# Patient Record
Sex: FEMALE | Race: BLACK OR AFRICAN AMERICAN | Employment: OTHER | URBAN - METROPOLITAN AREA
[De-identification: names, ages, dates, MRNs, and addresses within clinical notes are randomized per-mention and may not be internally consistent; named-entity substitution may affect disease eponyms.]

---

## 2017-02-15 ENCOUNTER — HOSPITAL ENCOUNTER (OUTPATIENT)
Dept: GENERAL RADIOLOGY | Age: 82
Discharge: HOME | End: 2017-02-15
Payer: MEDICARE

## 2017-02-15 DIAGNOSIS — M25.511 RIGHT SHOULDER PAIN: ICD-10-CM

## 2017-02-15 PROCEDURE — 73030 X-RAY EXAM OF SHOULDER: CPT

## 2017-06-20 ENCOUNTER — HOSPITAL ENCOUNTER (OUTPATIENT)
Age: 82
Discharge: HOME | End: 2017-06-20
Attending: ORTHOPAEDIC SURGERY
Payer: MEDICARE

## 2017-06-20 DIAGNOSIS — M25.511 RIGHT SHOULDER PAIN: ICD-10-CM

## 2017-06-20 PROCEDURE — 73221 MRI JOINT UPR EXTREM W/O DYE: CPT

## 2018-05-30 ENCOUNTER — ANESTHESIA EVENT (OUTPATIENT)
Dept: ENDOSCOPY | Age: 83
End: 2018-05-30
Payer: MEDICARE

## 2018-05-31 ENCOUNTER — ANESTHESIA (OUTPATIENT)
Dept: ENDOSCOPY | Age: 83
End: 2018-05-31
Payer: MEDICARE

## 2018-05-31 ENCOUNTER — HOSPITAL ENCOUNTER (OUTPATIENT)
Age: 83
Setting detail: OUTPATIENT SURGERY
Discharge: HOME | End: 2018-05-31
Attending: INTERNAL MEDICINE | Admitting: INTERNAL MEDICINE
Payer: MEDICARE

## 2018-05-31 VITALS
HEIGHT: 60 IN | HEART RATE: 54 BPM | DIASTOLIC BLOOD PRESSURE: 62 MMHG | BODY MASS INDEX: 30.55 KG/M2 | SYSTOLIC BLOOD PRESSURE: 147 MMHG | RESPIRATION RATE: 16 BRPM | TEMPERATURE: 97.6 F | WEIGHT: 155.6 LBS | OXYGEN SATURATION: 95 %

## 2018-05-31 LAB
ATRIAL RATE: 62 BPM
BUN BLD-MCNC: 45 MG/DL (ref 7–18)
CALCULATED P AXIS, ECG09: 42 DEGREES
CALCULATED R AXIS, ECG10: 15 DEGREES
CALCULATED T AXIS, ECG11: 43 DEGREES
CHLORIDE BLD-SCNC: 100 MMOL/L (ref 100–108)
DIAGNOSIS, 93000: NORMAL
GLUCOSE BLD STRIP.AUTO-MCNC: 93 MG/DL (ref 74–106)
HCT VFR BLD CALC: 35 % (ref 36–49)
HGB BLD-MCNC: 11.9 G/DL (ref 12–16)
P-R INTERVAL, ECG05: 150 MS
POTASSIUM BLD-SCNC: 4 MMOL/L (ref 3.5–5.5)
Q-T INTERVAL, ECG07: 420 MS
QRS DURATION, ECG06: 86 MS
QTC CALCULATION (BEZET), ECG08: 426 MS
SODIUM BLD-SCNC: 139 MMOL/L (ref 136–145)
VENTRICULAR RATE, ECG03: 62 BPM

## 2018-05-31 PROCEDURE — 93005 ELECTROCARDIOGRAM TRACING: CPT

## 2018-05-31 PROCEDURE — 77030038604 HC SNR ENDO EXACTO USEN -B: Performed by: INTERNAL MEDICINE

## 2018-05-31 PROCEDURE — 76040000019: Performed by: INTERNAL MEDICINE

## 2018-05-31 PROCEDURE — 76060000031 HC ANESTHESIA FIRST 0.5 HR: Performed by: INTERNAL MEDICINE

## 2018-05-31 PROCEDURE — 88305 TISSUE EXAM BY PATHOLOGIST: CPT | Performed by: INTERNAL MEDICINE

## 2018-05-31 PROCEDURE — 77030013992 HC SNR POLYP ENDOSC BSC -B: Performed by: INTERNAL MEDICINE

## 2018-05-31 PROCEDURE — 77030008565 HC TBNG SUC IRR ERBE -B: Performed by: INTERNAL MEDICINE

## 2018-05-31 PROCEDURE — 77030018846 HC SOL IRR STRL H20 ICUM -A: Performed by: INTERNAL MEDICINE

## 2018-05-31 PROCEDURE — 77030009426 HC FCPS BIOP ENDOSC BSC -B: Performed by: INTERNAL MEDICINE

## 2018-05-31 PROCEDURE — 82947 ASSAY GLUCOSE BLOOD QUANT: CPT

## 2018-05-31 PROCEDURE — 74011250636 HC RX REV CODE- 250/636: Performed by: NURSE ANESTHETIST, CERTIFIED REGISTERED

## 2018-05-31 PROCEDURE — 74011000250 HC RX REV CODE- 250: Performed by: NURSE ANESTHETIST, CERTIFIED REGISTERED

## 2018-05-31 PROCEDURE — 74011250636 HC RX REV CODE- 250/636

## 2018-05-31 RX ORDER — ONDANSETRON 2 MG/ML
4 INJECTION INTRAMUSCULAR; INTRAVENOUS ONCE
Status: DISCONTINUED | OUTPATIENT
Start: 2018-05-31 | End: 2018-05-31 | Stop reason: HOSPADM

## 2018-05-31 RX ORDER — FENTANYL CITRATE 50 UG/ML
50 INJECTION, SOLUTION INTRAMUSCULAR; INTRAVENOUS AS NEEDED
Status: DISCONTINUED | OUTPATIENT
Start: 2018-05-31 | End: 2018-05-31 | Stop reason: HOSPADM

## 2018-05-31 RX ORDER — DEXTROSE 50 % IN WATER (D50W) INTRAVENOUS SYRINGE
25-50 AS NEEDED
Status: DISCONTINUED | OUTPATIENT
Start: 2018-05-31 | End: 2018-05-31 | Stop reason: HOSPADM

## 2018-05-31 RX ORDER — MAGNESIUM SULFATE 100 %
4 CRYSTALS MISCELLANEOUS AS NEEDED
Status: DISCONTINUED | OUTPATIENT
Start: 2018-05-31 | End: 2018-05-31 | Stop reason: HOSPADM

## 2018-05-31 RX ORDER — SODIUM CHLORIDE 9 MG/ML
25 INJECTION, SOLUTION INTRAVENOUS CONTINUOUS
Status: DISCONTINUED | OUTPATIENT
Start: 2018-05-31 | End: 2018-05-31 | Stop reason: HOSPADM

## 2018-05-31 RX ORDER — PROPOFOL 10 MG/ML
INJECTION, EMULSION INTRAVENOUS AS NEEDED
Status: DISCONTINUED | OUTPATIENT
Start: 2018-05-31 | End: 2018-05-31 | Stop reason: HOSPADM

## 2018-05-31 RX ORDER — SODIUM CHLORIDE 0.9 % (FLUSH) 0.9 %
5-10 SYRINGE (ML) INJECTION AS NEEDED
Status: DISCONTINUED | OUTPATIENT
Start: 2018-05-31 | End: 2018-05-31 | Stop reason: HOSPADM

## 2018-05-31 RX ADMIN — SODIUM CHLORIDE 25 ML/HR: 900 INJECTION, SOLUTION INTRAVENOUS at 09:59

## 2018-05-31 RX ADMIN — PROPOFOL 30 MG: 10 INJECTION, EMULSION INTRAVENOUS at 11:07

## 2018-05-31 RX ADMIN — PROPOFOL 60 MG: 10 INJECTION, EMULSION INTRAVENOUS at 11:03

## 2018-05-31 RX ADMIN — SODIUM CHLORIDE 20 MG: 9 INJECTION INTRAMUSCULAR; INTRAVENOUS; SUBCUTANEOUS at 09:59

## 2018-05-31 NOTE — PROGRESS NOTES
[email or web address]  [address] [facility], [address]      Brief Procedure Note    Anna Dyson  1935  [id number]    Date of Procedure: 5/31/2018    Preoperative diagnosis: Abnormal feces [R19.5]    Postoperative diagnosis: mild rectal prolapse, ascending polyp, diverticula, hemorrhoids      Type of Anesthesia: MAC (Monitored anesthesia care)    Description of findings: same as post op dx    Procedure: Procedure(s):  COLONOSCOPY w polypectomy    :  Dr. Erin Cortez MD    Assistant(s): Endoscopy Technician-1: Flores Jin  Endoscopy RN-1: Susana Garcia RN; Ruben Bazzi RN    EBL:None    Specimens:   ID Type Source Tests Collected by Time Destination   1 : ascending polyp Preservative Colon  Erin Cortez MD 5/31/2018 1108 Pathology       Findings: See printed and scanned procedure note    Complications: None    Dr. Erin Cortez MD  5/31/2018  11:15 AM

## 2018-05-31 NOTE — DISCHARGE INSTRUCTIONS
Colonoscopy: What to Expect at [address] [place] [address]  After you have a colonoscopy, you will stay at the clinic for 1 to 2 hours until the medicines wear off. Then you can go home. But you will need to arrange for a ride. Your doctor will tell you when you can eat and do your other usual activities. Your doctor will talk to you about when you will need your next colonoscopy. Your doctor can help you decide how often you need to be checked. This will depend on the results of your test and your risk for colorectal cancer. After the test, you may be bloated or have gas pains. You may need to pass gas. If a biopsy was done or a polyp was removed, you may have streaks of blood in your stool (feces) for a few days. This care sheet gives you a general idea about how long it will take for you to recover. But each person recovers at a different pace. Follow the steps below to get better as quickly as possible. How can you care for yourself at home? Activity  ? · Rest when you feel tired. ? · You can do your normal activities when it feels okay to do so. Diet  ? · Follow your doctor's directions for eating. ? · Unless your doctor has told you not to, drink plenty of fluids. This helps to replace the fluids that were lost during the colon prep. ? · Do not drink alcohol. Medicines  ? · Your doctor will tell you if and when you can restart your medicines. He or she will also give you instructions about taking any new medicines. ? · If you take blood thinners, such as warfarin (Coumadin), clopidogrel (Plavix), or aspirin, be sure to talk to your doctor. He or she will tell you if and when to start taking those medicines again. Make sure that you understand exactly what your doctor wants you to do. ? · If polyps were removed or a biopsy was done during the test, your doctor may tell you not to take aspirin or other anti-inflammatory medicines for a few days.  These include ibuprofen (Advil, Motrin) and naproxen (Aleve). Other instructions  ? · For your safety, do not drive or operate machinery until the medicine wears off and you can think clearly. Your doctor may tell you not to drive or operate machinery until the day after your test.   ? · Do not sign legal documents or make major decisions until the medicine wears off and you can think clearly. The anesthesia can make it hard for you to fully understand what you are agreeing to. Follow-up care is a key part of your treatment and safety. Be sure to make and go to all appointments, and call your doctor if you are having problems. It's also a good idea to know your test results and keep a list of the medicines you take. When should you call for help? Call 911 anytime you think you may need emergency care. For example, call if:  ? · You passed out (lost consciousness). ? · You pass maroon or bloody stools. ? · You have trouble breathing. ?Call your doctor now or seek immediate medical care if:  ? · You have pain that does not get better after you take pain medicine. ? · You are sick to your stomach or cannot drink fluids. ? · You have new or worse belly pain. ? · You have blood in your stools. ? · You have a fever. ? · You cannot pass stools or gas. ? Watch closely for changes in your health, and be sure to contact your doctor if you have any problems. Where can you learn more? Go to http://elmo-eri.info/. Enter E264 in the search box to learn more about \"Colonoscopy: What to Expect at Home. \"  Current as of: May 12, 2017  Content Version: 11.4  © 5793-1592 [employer]. Care instructions adapted under license by [employer] (which disclaims liability or warranty for this information). If you have questions about a medical condition or this instruction, always ask your healthcare professional. [address] any warranty or liability for your use of this information. Colon Polyps: Care Instructions  Your Care Instructions    Colon polyps are growths in the colon or the rectum. The cause of most colon polyps is not known, and most people who get them do not have any problems. But a certain kind can turn into cancer. For this reason, regular testing for colon polyps is important for people age 48 and older and anyone who has an increased risk for colon cancer. Polyps are usually found through routine colon cancer screening tests. Although most colon polyps are not cancerous, they are usually removed and then tested for cancer. Screening for colon cancer saves lives because the cancer can usually be cured if it is caught early. If you have a polyp that is the type that can turn into cancer, you may need more tests to examine your entire colon. The doctor will remove any other polyps that he or she finds, and you will be tested more often. Follow-up care is a key part of your treatment and safety. Be sure to make and go to all appointments, and call your doctor if you are having problems. It's also a good idea to know your test results and keep a list of the medicines you take. How can you care for yourself at home? Regular exams to look for colon polyps are the best way to prevent polyps from turning into colon cancer. These can include stool tests, sigmoidoscopy, colonoscopy, and CT colonography. Talk with your doctor about a testing schedule that is right for you. To prevent polyps  There is no home treatment that can prevent colon polyps. But these steps may help lower your risk for cancer. · Stay active. Being active can help you get to and stay at a healthy weight. Try to exercise on most days of the week. Walking is a good choice. · Eat well. Choose a variety of vegetables, fruits, legumes (such as peas and beans), fish, poultry, and whole grains. · Do not smoke. If you need help quitting, talk to your doctor about stop-smoking programs and medicines.  These can increase your chances of quitting for good. · If you drink alcohol, limit how much you drink. Limit alcohol to 2 drinks a day for men and 1 drink a day for women. When should you call for help? Call your doctor now or seek immediate medical care if:  ? · You have severe belly pain. ? · Your stools are maroon or very bloody. ? Watch closely for changes in your health, and be sure to contact your doctor if:  ? · You have a fever. ? · You have nausea or vomiting. ? · You have a change in bowel habits (new constipation or diarrhea). ? · Your symptoms get worse or are not improving as expected. Where can you learn more? Go to http://elmo-eri.info/. Enter 95 [id number] in the search box to learn more about \"Colon Polyps: Care Instructions. \"  Current as of: May 12, 2017  Content Version: 11.4  © 3226-6185 [employer]. Care instructions adapted under license by [employer] (which disclaims liability or warranty for this information). If you have questions about a medical condition or this instruction, always ask your healthcare professional. [place] [address] any warranty or liability for your use of this information. Learning About Diverticulosis and Diverticulitis  What are diverticulosis and diverticulitis? In diverticulosis and diverticulitis, pouches called diverticula form in the wall of the large intestine, or colon. · In diverticulosis, the pouches do not cause any pain or other symptoms. · In diverticulitis, the pouches get inflamed or infected and cause symptoms. Doctors aren't sure what causes these pouches in the colon. But they think that a low-fiber diet may play a role. Without fiber to add bulk to the stool, the colon has to work harder than normal to push the stool forward. The pressure from this may cause pouches to form in weak spots along the colon. Some people with diverticulosis get diverticulitis.  But experts don't know why this happens. What are the symptoms? · In diverticulosis, most people don't have symptoms. But pouches sometimes bleed. · In diverticulitis, symptoms may last from a few hours to a week or more. They include:  ¨ Belly pain. This is usually in the lower left side. It is sometimes worse when you move. This is the most common symptom. ¨ Fever and chills. ¨ Bloating and gas. ¨ Diarrhea or constipation. ¨ Nausea and sometimes vomiting. ¨ Not feeling like eating. How can you prevent these problems? You may be able to lower your chance of getting diverticulitis. You can do this by taking steps to prevent constipation. · Eat fruits, vegetables, beans, and whole grains every day. These foods are high in fiber. · Drink plenty of fluids (enough so that your urine is light yellow or clear like water). If you have kidney, heart, or liver disease and have to limit fluids, talk with your doctor before you increase the amount of fluids you drink. · Get at least 30 minutes of exercise on most days of the week. Walking is a good choice. You also may want to do other activities, such as running, swimming, cycling, or playing tennis or team sports. · Take a fiber supplement, such as Citrucel or Metamucil, every day if needed. Read and follow all instructions on the label. · Schedule time each day for a bowel movement. Having a daily routine may help. Take your time and do not strain when having a bowel movement. Some people avoid nuts, seeds, berries, and popcorn. They believe that these foods might get trapped in the diverticula and cause pain. But there is no proof that these foods cause diverticulitis or make it worse. How are these problems treated? · The best way to treat diverticulosis is to avoid constipation. (See the tips above.)  · Treatment for diverticulitis includes antibiotics and often a change in your diet.  You may need only liquids at first. Your doctor may suggest pain medicines for pain or belly cramps. In some cases, surgery may be needed. Follow-up care is a key part of your treatment and safety. Be sure to make and go to all appointments, and call your doctor if you are having problems. It's also a good idea to know your test results and keep a list of the medicines you take. Where can you learn more? Go to http://elmo-eri.info/. Enter V175 in the search box to learn more about \"Learning About Diverticulosis and Diverticulitis. \"  Current as of: May 12, 2017  Content Version: 11.4  © 1617-0350 [employer]. Care instructions adapted under license by [employer] (which disclaims liability or warranty for this information). If you have questions about a medical condition or this instruction, always ask your healthcare professional. [address] any warranty or liability for your use of this information. Hemorrhoids: Care Instructions  Your Care Instructions    Hemorrhoids are enlarged veins that develop in the anal canal. Bleeding during bowel movements, itching, swelling, and rectal pain are the most common symptoms. They can be uncomfortable at times, but hemorrhoids rarely are a serious problem. You can treat most hemorrhoids with simple changes to your diet and bowel habits. These changes include eating more fiber and not straining to pass stools. Most hemorrhoids do not need surgery or other treatment unless they are very large and painful or bleed a lot. Follow-up care is a key part of your treatment and safety. Be sure to make and go to all appointments, and call your doctor if you are having problems. It's also a good idea to know your test results and keep a list of the medicines you take. How can you care for yourself at home? · Sit in a few inches of warm water (sitz bath) 3 times a day and after bowel movements. The warm water helps with pain and itching.   · Put ice on your anal area several times a day for 10 minutes at a time. Put a thin cloth between the ice and your skin. Follow this by placing a warm, wet towel on the area for another 10 to 20 minutes. · Take pain medicines exactly as directed. ¨ If the doctor gave you a prescription medicine for pain, take it as prescribed. ¨ If you are not taking a prescription pain medicine, ask your doctor if you can take an over-the-counter medicine. · Keep the anal area clean, but be gentle. Use water and a fragrance-free soap, such as [place], or use baby wipes or medicated pads, such as Tucks. · Wear cotton underwear and loose clothing to decrease moisture in the anal area. · Eat more fiber. Include foods such as whole-grain breads and cereals, raw vegetables, raw and dried fruits, and beans. · Drink plenty of fluids, enough so that your urine is light yellow or clear like water. If you have kidney, heart, or liver disease and have to limit fluids, talk with your doctor before you increase the amount of fluids you drink. · Use a stool softener that contains bran or psyllium. You can save money by buying bran or psyllium (available in bulk at most health food stores) and sprinkling it on foods or stirring it into fruit juice. Or you can use a product such as Metamucil or Hydrocil. · Practice healthy bowel habits. ¨ Go to the bathroom as soon as you have the urge. ¨ Avoid straining to pass stools. Relax and give yourself time to let things happen naturally. ¨ Do not hold your breath while passing stools. ¨ Do not read while sitting on the toilet. Get off the toilet as soon as you have finished. · Take your medicines exactly as prescribed. Call your doctor if you think you are having a problem with your medicine. When should you call for help? Call 911 anytime you think you may need emergency care. For example, call if:  ? · You pass maroon or very bloody stools. ?Call your doctor now or seek immediate medical care if:  ? · You have increased pain.    ? · You have increased bleeding. ? Watch closely for changes in your health, and be sure to contact your doctor if:  ? · Your symptoms have not improved after 3 or 4 days. Where can you learn more? Go to http://elmo-eri.info/. Enter F228 in the search box to learn more about \"Hemorrhoids: Care Instructions. \"  Current as of: May 12, 2017  Content Version: 11.4  © 2453-4623 [employer]. Care instructions adapted under license by [employer] (which disclaims liability or warranty for this information). If you have questions about a medical condition or this instruction, always ask your healthcare professional. [place] [address] any warranty or liability for your use of this information. DISCHARGE SUMMARY from Nurse    PATIENT INSTRUCTIONS:    After general anesthesia or intravenous sedation, for 24 hours or while taking prescription Narcotics:  · Limit your activities  · Do not drive and operate hazardous machinery  · Do not make important personal or business decisions  · Do  not drink alcoholic beverages  · If you have not urinated within 8 hours after discharge, please contact your surgeon on call. Report the following to your surgeon:  · Excessive pain, swelling, redness or odor of or around the surgical area  · Temperature over 100.5  · Nausea and vomiting lasting longer than 4 hours or if unable to take medications  · Any signs of decreased circulation or nerve impairment to extremity: change in color, persistent  numbness, tingling, coldness or increase pain  · Any questions    What to do at Home:  Recommended activity: Activity as tolerated and no driving for today      These are general instructions for a healthy lifestyle:    No smoking/ No tobacco products/ Avoid exposure to second hand smoke  Surgeon General's Warning:  Quitting smoking now greatly reduces serious risk to your health.     Obesity, smoking, and sedentary lifestyle greatly increases your risk for illness    A healthy diet, regular physical exercise & weight monitoring are important for maintaining a healthy lifestyle    You may be retaining fluid if you have a history of heart failure or if you experience any of the following symptoms:  Weight gain of 3 pounds or more overnight or 5 pounds in a week, increased swelling in our hands or feet or shortness of breath while lying flat in bed. Please call your doctor as soon as you notice any of these symptoms; do not wait until your next office visit. Recognize signs and symptoms of STROKE:    F-face looks uneven    A-arms unable to move or move unevenly    S-speech slurred or non-existent    T-time-call 911 as soon as signs and symptoms begin-DO NOT go       Back to bed or wait to see if you get better-TIME IS BRAIN. Warning Signs of HEART ATTACK     Call 911 if you have these symptoms:   Chest discomfort. Most heart attacks involve discomfort in the center of the chest that lasts more than a few minutes, or that goes away and comes back. It can feel like uncomfortable pressure, squeezing, fullness, or pain.  Discomfort in other areas of the upper body. Symptoms can include pain or discomfort in one or both arms, the back, neck, jaw, or stomach.  Shortness of breath with or without chest discomfort.  Other signs may include breaking out in a cold sweat, nausea, or lightheadedness. Don't wait more than five minutes to call 911 - MINUTES MATTER! Fast action can save your life. Calling 911 is almost always the fastest way to get lifesaving treatment. Emergency Medical Services staff can begin treatment when they arrive -- up to an hour sooner than if someone gets to the hospital by car. The discharge information has been reviewed with the patient. The patient verbalized understanding.   Discharge medications reviewed with the patient and appropriate educational materials and side effects teaching were provided.   ___________________________________________________________________________________________________________________________________

## 2018-05-31 NOTE — H&P
[email or web address]  [phone]    GASTROENTEROLOGY Pre-Procedure H and P      Impression/Plan:   1. This patient is consented for a colonoscopy for positive occult blood in stool    Chief Complaint: positive occult blood in stool      HPI:  Pamella Polk is a 81 y.o. female who is being is having a colonoscopy for positive occult blood in stool  PMH:   Past Medical History:   Diagnosis Date    Bell's palsy     Essential hypertension, benign     CONTROLLED    Mitral valve disorders(424.0)     2007 TRACE MR     Nonspecific abnormal electrocardiogram (ECG) (EKG)     Obesity, unspecified     Other and unspecified hyperlipidemia     Renal insufficiency        PSH:   Past Surgical History:   Procedure Laterality Date    ABDOMEN SURGERY PROC UNLISTED      Explor lap    BIOPSY OF BREAST, INCISIONAL      rt & lt 12 yrs ago    HX CATARACT REMOVAL      INSERT PROSTHETIC LENS: LEFT,RIGHT    HX GYN      HX ORTHOPAEDIC Left     Rotator cuff repair    HX PARTIAL THYROIDECTOMY         Social HX:   Social History     Social History    Marital status:      Spouse name: N/A    Number of children: N/A    Years of education: N/A     Occupational History    Not on file. Social History Main Topics    Smoking status: Former Smoker    Smokeless tobacco: Never Used      Comment: REMOTELY QUIT 1980'S    Alcohol use No    Drug use: No    Sexual activity: Not on file     Other Topics Concern    Not on file     Social History Narrative       FHX:   History reviewed. No pertinent family history. Allergy:   Allergies   Allergen Reactions    Ciprofibrate Unable to Obtain    Darvon [Propoxyphene] Unable to Obtain    Penicillins Unable to Obtain       Home Medications:     Prescriptions Prior to Admission   Medication Sig    losartan (COZAAR) 50 mg tablet Take  by mouth daily.  omeprazole (PRILOSEC) 20 mg capsule Take 20 mg by mouth daily.     hydrochlorothiazide (HYDRODIURIL) 25 mg tablet Take 25 mg by mouth daily.  aspirin 81 mg tablet Take 81 mg by mouth.  LORazepam (ATIVAN) 1 mg tablet Take  by mouth two (2) times a day.  FOLIC ACID/VITAMIN B COMP W-C (VIKTOR-LUCERO PO) Take  by mouth.  paricalcitol (ZEMPLAR) 1 mcg capsule Take 1 mcg by mouth daily.  biotin 500 mcg cap Take  by mouth.  FLUTICASONE PROPIONATE, BULK, 50 mcg by Does Not Apply route. Review of Systems:     Constitutional: No fevers, chills, weight loss, fatigue. Skin: No rashes, pruritis, jaundice, ulcerations, erythema. HENT: No headaches, nosebleeds, sinus pressure, rhinorrhea, sore throat. Eyes: No visual changes, blurred vision, eye pain, photophobia, jaundice. Cardiovascular: No chest pain, heart palpitations. Respiratory: No cough, SOB, wheezing, chest discomfort, orthopnea. Gastrointestinal:    Genitourinary: No dysuria, bleeding, discharge, pyuria. Musculoskeletal: No weakness, arthralgias, wasting. Endo: No sweats. Heme: No bruising, easy bleeding. Allergies: As noted. Neurological: Cranial nerves intact. Alert and oriented. Gait not assessed. Psychiatric:  No anxiety, depression, hallucinations. Visit Vitals    /73    Pulse 69    Temp 97.7 °F (36.5 °C)    Resp 18    Ht 5' (1.524 m)    Wt 70.6 kg (155 lb 9.6 oz)    SpO2 98%    BMI 30.39 kg/m2       Physical Assessment:     constitutional: appearance: well developed, well nourished, normal habitus, no deformities, in no acute distress. skin: inspection: no rashes, ulcers, icterus or other lesions; no clubbing or telangiectasias. palpation: no induration or subcutaneos nodules. eyes: inspection: normal conjunctivae and lids; no jaundice pupils: normal  ENMT: mouth: normal oral mucosa,lips and gums; good dentition. oropharynx: normal tongue, hard and soft palate; posterior pharynx without erithema, exudate or lesions. neck: thyroid: normal size, consistency and position; no masses or tenderness.    respiratory: effort: normal chest excursion; no intercostal retraction or accessory muscle use. cardiovascular: abdominal aorta: normal size and position; no bruits. palpation: PMI of normal size and position; normal rhythm; no thrill or murmurs. abdominal: abdomen: normal consistency; no tenderness or masses. hernias: no hernias appreciated. liver: normal size and consistency. spleen: not palpable. rectal: hemoccult/guaiac: not performed. musculoskeletal: digits and nails: no clubbing, cyanosis, petechiae or other inflammatory conditions. gait: normal gait and station head and neck: normal range of motion; no pain, crepitation or contracture. spine/ribs/pelvis: normal range of motion; no pain, deformity or contracture. neurologic: cranial nerves: II-XII normal.   psychiatric: judgement/insight: within normal limits. memory: within normal limits for recent and remote events. mood and affect: no evidence of depression, anxiety or agitation. orientation: oriented to time, space and person. Basic Metabolic Profile   No results for input(s): NA, K, CL, CO2, BUN, GLU, CA, MG, PHOS in the last 72 hours. No lab exists for component: CREAT      CBC w/Diff    No results for input(s): WBC, RBC, HGB, HCT, MCV, MCH, MCHC, RDW, PLT, HGBEXT, HCTEXT, PLTEXT in the last 72 hours. No lab exists for component: MPV No results for input(s): GRANS, LYMPH, EOS, PRO, MYELO, METAS, BLAST in the last 72 hours. No lab exists for component: MONO, BASO     Hepatic Function   No results for input(s): ALB, TP, TBILI, GPT, SGOT, AP, AML, LPSE in the last 72 hours. No lab exists for component: DBILI     Coags   No results for input(s): PTP, INR, APTT in the last 72 hours. No lab exists for component: Merle Diallo MD.  [facility] 1946, [facility]  Cell: [phone]  Direct pager: [phone]  [email or web address]  [email or web address]. [employer]

## 2018-05-31 NOTE — ANESTHESIA PREPROCEDURE EVALUATION
Anesthetic History   No history of anesthetic complications            Review of Systems / Medical History  Patient summary reviewed and pertinent labs reviewed    Pulmonary  Within defined limits                 Neuro/Psych   Within defined limits           Cardiovascular    Hypertension: well controlled              Exercise tolerance: >4 METS     GI/Hepatic/Renal  Within defined limits              Endo/Other        Morbid obesity     Other Findings   Comments: Documentation of current medication  Current medications obtained, documented and obtained? YES      Risk Factors for Postoperative nausea/vomiting:       History of postoperative nausea/vomiting? NO       Female? YES       Motion sickness? NO       Intended opioid administration for postoperative analgesia? NO      Smoking Abstinence:  Current Smoker? NO  Elective Surgery? YES  Seen preoperatively by anesthesiologist or proxy prior to day of surgery? YES  Pt abstained from smoking 24 hours prior to anesthesia?  N/A    Preventive care/screening for High Blood Pressure:  Aged 18 years and older: YES  Screened for high blood pressure: YES  Patients with high blood pressure referred to primary care provider   for BP management: YES                 Physical Exam    Airway  Mallampati: III  TM Distance: 4 - 6 cm  Neck ROM: decreased range of motion   Mouth opening: Diminished (comment)     Cardiovascular    Rhythm: regular  Rate: normal         Dental    Dentition: Caps/crowns and Poor dentition     Pulmonary  Breath sounds clear to auscultation               Abdominal  GI exam deferred       Other Findings            Anesthetic Plan    ASA: 3  Anesthesia type: MAC            Anesthetic plan and risks discussed with: Patient

## 2018-05-31 NOTE — IP AVS SNAPSHOT
[facility] 
 
 
 [address] [facility] Patient: Moisés Rodriguez MRN: [id number] FTA:[id number] About your hospitalization You were admitted on:  May 31, 2018 You last received care in the:  [facility] PHASE 2 RECOVERY You were discharged on:  May 31, 2018 Why you were hospitalized Your primary diagnosis was:  Not on File Follow-up Information Follow up With Details Comments Contact Info Sudhir Vasquez MD   [address] [facility] [address] [phone] Marek Joe MD  Follow up as instructed [address] [place] [address] [address] 706 [facility] 
[phone] Discharge Orders None A check opal indicates which time of day the medication should be taken. My Medications CONTINUE taking these medications Instructions Each Dose to Equal  
 Morning Noon Evening Bedtime  
 aspirin 81 mg tablet Your last dose was: Your next dose is: Take 81 mg by mouth. 81 mg  
    
   
   
   
  
 biotin 500 mcg Cap Your last dose was: Your next dose is: Take  by mouth. COZAAR 50 mg tablet Generic drug:  losartan Your last dose was: Your next dose is: Take  by mouth daily. FLUTICASONE PROPIONATE (BULK) Your last dose was: Your next dose is:    
   
   
 50 mcg by Does Not Apply route. 50 mcg  
    
   
   
   
  
 hydroCHLOROthiazide 25 mg tablet Commonly known as:  HYDRODIURIL Your last dose was: Your next dose is: Take 25 mg by mouth daily. 25 mg LORazepam 1 mg tablet Commonly known as:  ATIVAN Your last dose was: Your next dose is: Take  by mouth two (2) times a day. omeprazole 20 mg capsule Commonly known as:  PRILOSEC Your last dose was: Your next dose is: Take 20 mg by mouth daily. 20 mg  
    
   
   
   
  
 VIKTOR-LUCERO PO Your last dose was: Your next dose is: Take  by mouth. ZEMPLAR 1 mcg capsule Generic drug:  paricalcitol Your last dose was: Your next dose is: Take 1 mcg by mouth daily. 1 mcg Discharge Instructions Colonoscopy: What to Expect at [facility] Your Recovery After you have a colonoscopy, you will stay at the clinic for 1 to 2 hours until the medicines wear off. Then you can go home. But you will need to arrange for a ride. Your doctor will tell you when you can eat and do your other usual activities. Your doctor will talk to you about when you will need your next colonoscopy. Your doctor can help you decide how often you need to be checked. This will depend on the results of your test and your risk for colorectal cancer. After the test, you may be bloated or have gas pains. You may need to pass gas. If a biopsy was done or a polyp was removed, you may have streaks of blood in your stool (feces) for a few days. This care sheet gives you a general idea about how long it will take for you to recover. But each person recovers at a different pace. Follow the steps below to get better as quickly as possible. How can you care for yourself at home? Activity ? · Rest when you feel tired. ? · You can do your normal activities when it feels okay to do so. Diet ? · Follow your doctor's directions for eating. ? · Unless your doctor has told you not to, drink plenty of fluids. This helps to replace the fluids that were lost during the colon prep. ? · Do not drink alcohol. Medicines ? · Your doctor will tell you if and when you can restart your medicines. He or she will also give you instructions about taking any new medicines. ? · If you take blood thinners, such as warfarin (Coumadin), clopidogrel (Plavix), or aspirin, be sure to talk to your doctor. He or she will tell you if and when to start taking those medicines again. Make sure that you understand exactly what your doctor wants you to do. ? · If polyps were removed or a biopsy was done during the test, your doctor may tell you not to take aspirin or other anti-inflammatory medicines for a few days. These include ibuprofen (Advil, Motrin) and naproxen (Aleve). Other instructions ? · For your safety, do not drive or operate machinery until the medicine wears off and you can think clearly. Your doctor may tell you not to drive or operate machinery until the day after your test.  
? · Do not sign legal documents or make major decisions until the medicine wears off and you can think clearly. The anesthesia can make it hard for you to fully understand what you are agreeing to. Follow-up care is a key part of your treatment and safety. Be sure to make and go to all appointments, and call your doctor if you are having problems. It's also a good idea to know your test results and keep a list of the medicines you take. When should you call for help? Call 911 anytime you think you may need emergency care. For example, call if: 
? · You passed out (lost consciousness). ? · You pass maroon or bloody stools. ? · You have trouble breathing. ?Call your doctor now or seek immediate medical care if: 
? · You have pain that does not get better after you take pain medicine. ? · You are sick to your stomach or cannot drink fluids. ? · You have new or worse belly pain. ? · You have blood in your stools. ? · You have a fever. ? · You cannot pass stools or gas. ? Watch closely for changes in your health, and be sure to contact your doctor if you have any problems. Where can you learn more? Go to http://elmo-eri.info/. Enter E264 in the search box to learn more about \"Colonoscopy: What to Expect at Home. \" Current as of: May 12, 2017 Content Version: 11.4 © 2808-1963 Neomobile. Care instructions adapted under license by Pebbles Interfaces (which disclaims liability or warranty for this information). If you have questions about a medical condition or this instruction, always ask your healthcare professional. [address] any warranty or liability for your use of this information. Colon Polyps: Care Instructions Your Care Instructions Colon polyps are growths in the colon or the rectum. The cause of most colon polyps is not known, and most people who get them do not have any problems. But a certain kind can turn into cancer. For this reason, regular testing for colon polyps is important for people age 48 and older and anyone who has an increased risk for colon cancer. Polyps are usually found through routine colon cancer screening tests. Although most colon polyps are not cancerous, they are usually removed and then tested for cancer. Screening for colon cancer saves lives because the cancer can usually be cured if it is caught early. If you have a polyp that is the type that can turn into cancer, you may need more tests to examine your entire colon. The doctor will remove any other polyps that he or she finds, and you will be tested more often. Follow-up care is a key part of your treatment and safety. Be sure to make and go to all appointments, and call your doctor if you are having problems. It's also a good idea to know your test results and keep a list of the medicines you take. How can you care for yourself at home? Regular exams to look for colon polyps are the best way to prevent polyps from turning into colon cancer. These can include stool tests, sigmoidoscopy, colonoscopy, and CT colonography. Talk with your doctor about a testing schedule that is right for you. To prevent polyps There is no home treatment that can prevent colon polyps. But these steps may help lower your risk for cancer. · Stay active. Being active can help you get to and stay at a healthy weight. Try to exercise on most days of the week. Walking is a good choice. · Eat well. Choose a variety of vegetables, fruits, legumes (such as peas and beans), fish, poultry, and whole grains. · Do not smoke. If you need help quitting, talk to your doctor about stop-smoking programs and medicines. These can increase your chances of quitting for good. · If you drink alcohol, limit how much you drink. Limit alcohol to 2 drinks a day for men and 1 drink a day for women. When should you call for help? Call your doctor now or seek immediate medical care if: 
? · You have severe belly pain. ? · Your stools are maroon or very bloody. ? Watch closely for changes in your health, and be sure to contact your doctor if: 
? · You have a fever. ? · You have nausea or vomiting. ? · You have a change in bowel habits (new constipation or diarrhea). ? · Your symptoms get worse or are not improving as expected. Where can you learn more? Go to http://elmo-eri.info/. Enter 95 [id number] in the search box to learn more about \"Colon Polyps: Care Instructions. \" Current as of: May 12, 2017 Content Version: 11.4 © 0388-1852 VidaPak. Care instructions adapted under license by betNOW (which disclaims liability or warranty for this information). If you have questions about a medical condition or this instruction, always ask your healthcare professional. [address] any warranty or liability for your use of this information. Learning About Diverticulosis and Diverticulitis What are diverticulosis and diverticulitis? In diverticulosis and diverticulitis, pouches called diverticula form in the wall of the large intestine, or colon. · In diverticulosis, the pouches do not cause any pain or other symptoms. · In diverticulitis, the pouches get inflamed or infected and cause symptoms. Doctors aren't sure what causes these pouches in the colon. But they think that a low-fiber diet may play a role. Without fiber to add bulk to the stool, the colon has to work harder than normal to push the stool forward. The pressure from this may cause pouches to form in weak spots along the colon. Some people with diverticulosis get diverticulitis. But experts don't know why this happens. What are the symptoms? · In diverticulosis, most people don't have symptoms. But pouches sometimes bleed. · In diverticulitis, symptoms may last from a few hours to a week or more. They include: ¨ Belly pain. This is usually in the lower left side. It is sometimes worse when you move. This is the most common symptom. ¨ Fever and chills. ¨ Bloating and gas. ¨ Diarrhea or constipation. ¨ Nausea and sometimes vomiting. ¨ Not feeling like eating. How can you prevent these problems? You may be able to lower your chance of getting diverticulitis. You can do this by taking steps to prevent constipation. · Eat fruits, vegetables, beans, and whole grains every day. These foods are high in fiber. · Drink plenty of fluids (enough so that your urine is light yellow or clear like water). If you have kidney, heart, or liver disease and have to limit fluids, talk with your doctor before you increase the amount of fluids you drink. · Get at least 30 minutes of exercise on most days of the week. Walking is a good choice. You also may want to do other activities, such as running, swimming, cycling, or playing tennis or team sports. · Take a fiber supplement, such as Citrucel or Metamucil, every day if needed. Read and follow all instructions on the label. · Schedule time each day for a bowel movement.  Having a daily routine may help. Take your time and do not strain when having a bowel movement. Some people avoid nuts, seeds, berries, and popcorn. They believe that these foods might get trapped in the diverticula and cause pain. But there is no proof that these foods cause diverticulitis or make it worse. How are these problems treated? · The best way to treat diverticulosis is to avoid constipation. (See the tips above.) · Treatment for diverticulitis includes antibiotics and often a change in your diet. You may need only liquids at first. Your doctor may suggest pain medicines for pain or belly cramps. In some cases, surgery may be needed. Follow-up care is a key part of your treatment and safety. Be sure to make and go to all appointments, and call your doctor if you are having problems. It's also a good idea to know your test results and keep a list of the medicines you take. Where can you learn more? Go to http://elmoPlantSenseeri.info/. Enter U718 in the search box to learn more about \"Learning About Diverticulosis and Diverticulitis. \" Current as of: May 12, 2017 Content Version: 11.4 © 5141-9534 [employer]D Eye Solutions. Care instructions adapted under license by PubNative (which disclaims liability or warranty for this information). If you have questions about a medical condition or this instruction, always ask your healthcare professional. [address] any warranty or liability for your use of this information. Hemorrhoids: Care Instructions Your Care Instructions Hemorrhoids are enlarged veins that develop in the anal canal. Bleeding during bowel movements, itching, swelling, and rectal pain are the most common symptoms. They can be uncomfortable at times, but hemorrhoids rarely are a serious problem. You can treat most hemorrhoids with simple changes to your diet and bowel habits.  These changes include eating more fiber and not straining to pass stools. Most hemorrhoids do not need surgery or other treatment unless they are very large and painful or bleed a lot. Follow-up care is a key part of your treatment and safety. Be sure to make and go to all appointments, and call your doctor if you are having problems. It's also a good idea to know your test results and keep a list of the medicines you take. How can you care for yourself at home? · Sit in a few inches of warm water (sitz bath) 3 times a day and after bowel movements. The warm water helps with pain and itching. · Put ice on your anal area several times a day for 10 minutes at a time. Put a thin cloth between the ice and your skin. Follow this by placing a warm, wet towel on the area for another 10 to 20 minutes. · Take pain medicines exactly as directed. ¨ If the doctor gave you a prescription medicine for pain, take it as prescribed. ¨ If you are not taking a prescription pain medicine, ask your doctor if you can take an over-the-counter medicine. · Keep the anal area clean, but be gentle. Use water and a fragrance-free soap, such as [place], or use baby wipes or medicated pads, such as Tucks. · Wear cotton underwear and loose clothing to decrease moisture in the anal area. · Eat more fiber. Include foods such as whole-grain breads and cereals, raw vegetables, raw and dried fruits, and beans. · Drink plenty of fluids, enough so that your urine is light yellow or clear like water. If you have kidney, heart, or liver disease and have to limit fluids, talk with your doctor before you increase the amount of fluids you drink. · Use a stool softener that contains bran or psyllium. You can save money by buying bran or psyllium (available in bulk at most health food stores) and sprinkling it on foods or stirring it into fruit juice. Or you can use a product such as Metamucil or Hydrocil. · Practice healthy bowel habits. ¨ Go to the bathroom as soon as you have the urge. ¨ Avoid straining to pass stools. Relax and give yourself time to let things happen naturally. ¨ Do not hold your breath while passing stools. ¨ Do not read while sitting on the toilet. Get off the toilet as soon as you have finished. · Take your medicines exactly as prescribed. Call your doctor if you think you are having a problem with your medicine. When should you call for help? Call 911 anytime you think you may need emergency care. For example, call if: 
? · You pass maroon or very bloody stools. ?Call your doctor now or seek immediate medical care if: 
? · You have increased pain. ? · You have increased bleeding. ? Watch closely for changes in your health, and be sure to contact your doctor if: 
? · Your symptoms have not improved after 3 or 4 days. Where can you learn more? Go to http://elmoWebVisibleeri.info/. Enter F228 in the search box to learn more about \"Hemorrhoids: Care Instructions. \" Current as of: May 12, 2017 Content Version: 11.4 © 9763-9143 Gemini Mobile Technologies. Care instructions adapted under license by [employer] (which disclaims liability or warranty for this information). If you have questions about a medical condition or this instruction, always ask your healthcare professional. [address] any warranty or liability for your use of this information. DISCHARGE SUMMARY from Nurse PATIENT INSTRUCTIONS: 
 
After general anesthesia or intravenous sedation, for 24 hours or while taking prescription Narcotics: · Limit your activities · Do not drive and operate hazardous machinery · Do not make important personal or business decisions · Do  not drink alcoholic beverages · If you have not urinated within 8 hours after discharge, please contact your surgeon on call. Report the following to your surgeon: 
· Excessive pain, swelling, redness or odor of or around the surgical area · Temperature over 100.5 · Nausea and vomiting lasting longer than 4 hours or if unable to take medications · Any signs of decreased circulation or nerve impairment to extremity: change in color, persistent  numbness, tingling, coldness or increase pain · Any questions What to do at Home: 
Recommended activity: Activity as tolerated and no driving for today These are general instructions for a healthy lifestyle: No smoking/ No tobacco products/ Avoid exposure to second hand smoke Surgeon General's Warning:  Quitting smoking now greatly reduces serious risk to your health. Obesity, smoking, and sedentary lifestyle greatly increases your risk for illness A healthy diet, regular physical exercise & weight monitoring are important for maintaining a healthy lifestyle You may be retaining fluid if you have a history of heart failure or if you experience any of the following symptoms:  Weight gain of 3 pounds or more overnight or 5 pounds in a week, increased swelling in our hands or feet or shortness of breath while lying flat in bed. Please call your doctor as soon as you notice any of these symptoms; do not wait until your next office visit. Recognize signs and symptoms of STROKE: 
 
F-face looks uneven A-arms unable to move or move unevenly S-speech slurred or non-existent T-time-call 911 as soon as signs and symptoms begin-DO NOT go Back to bed or wait to see if you get better-TIME IS BRAIN. Warning Signs of HEART ATTACK Call 911 if you have these symptoms: 
? Chest discomfort. Most heart attacks involve discomfort in the center of the chest that lasts more than a few minutes, or that goes away and comes back. It can feel like uncomfortable pressure, squeezing, fullness, or pain. ? Discomfort in other areas of the upper body. Symptoms can include pain or discomfort in one or both arms, the back, neck, jaw, or stomach. ? Shortness of breath with or without chest discomfort. ? Other signs may include breaking out in a cold sweat, nausea, or lightheadedness. Don't wait more than five minutes to call [address]! Fast action can save your life. Calling 911 is almost always the fastest way to get lifesaving treatment. Emergency Medical Services staff can begin treatment when they arrive  up to an hour sooner than if someone gets to the hospital by car. The discharge information has been reviewed with the patient. The patient verbalized understanding. Discharge medications reviewed with the patient and appropriate educational materials and side effects teaching were provided. ___________________________________________________________________________________________________________________________________ Introducing [facility] & HEALTH SERVICES! Escobar Claudio introduces Heart Health patient portal. Now you can access parts of your medical record, email your doctor's office, and request medication refills online. 1. In your internet browser, go to [email or web address] 2. Click on the First Time User? Click Here link in the Sign In box. You will see the New Member Sign Up page. 3. Enter your Heart Health Access Code exactly as it appears below. You will not need to use this code after youve completed the sign-up process. If you do not sign up before the expiration date, you must request a new code. · Heart Health Access Code: [email or web address] Expires: 8/28/2018 10:58 AM 
 
4. Enter the last four digits of your Social Security Number (xxxx) and Date of Birth (mm/dd/yyyy) as indicated and click Submit. You will be taken to the next sign-up page. 5. Create a Heart Health ID. This will be your Heart Health login ID and cannot be changed, so think of one that is secure and easy to remember. 6. Create a Heart Health password. You can change your password at any time. 7. Enter your Password Reset Question and Answer. This can be used at a later time if you forget your password. 8. Enter your e-mail address. You will receive e-mail notification when new information is available in [address] 9. Click Sign Up. You can now view and download portions of your medical record. 10. Click the Download Summary menu link to download a portable copy of your medical information. If you have questions, please visit the Frequently Asked Questions section of the SADAR [employer]Dhart website. Remember, [employer] is NOT to be used for urgent needs. For medical emergencies, dial 911. Now available from your iPhone and Android! Introducing Jose Scherer As a [email or web address] patient, I wanted to make you aware of our electronic visit tool called Jose Scherer. Polleverywhere/[employer] allows you to connect within minutes with a medical provider 24 hours a day, seven days a week via a mobile device or tablet or logging into a secure website from your computer. You can access Jose Scherer from anywhere in the [place]. A virtual visit might be right for you when you have a simple condition and feel like you just dont want to get out of bed, or cant get away from work for an appointment, when your regular [facility] [employer] [facility] provider is not available (evenings, weekends or holidays), or when youre out of town and need minor care. Electronic visits cost only $49 and if the Polleverywhere[employer] provider determines a prescription is needed to treat your condition, one can be electronically transmitted to a nearby pharmacy*. Please take a moment to enroll today if you have not already done so. The enrollment process is free and takes just a few minutes. To enroll, please download the Polleverywhere/[employer] cuong to your tablet or phone, or visit [email or web address] to enroll on your computer.    
And, as an [address] [place] [address] patient with a [employer] account, the results of your visits will be scanned into your electronic medical record and your primary care provider will be able to view the scanned results. We urge you to continue to see your regular [facility] provider for your ongoing medical care. And while your primary care provider may not be the one available when you seek a [employer]gavinofin virtual visit, the peace of mind you get from getting a real diagnosis real time can be priceless. For more information on [employer], view our Frequently Asked Questions (FAQs) at [email or web address]. Sincerely, 
 
Carlota Hummel MD 
Chief Medical Officer Marianne Perez *:  certain medications cannot be prescribed via [employer]vo Unresulted Labs-Please follow up with your PCP about these lab tests Order Current Status EKG, 12 LEAD, INITIAL Preliminary result Providers Seen During Your Hospitalization Provider Specialty Primary office phone Kayleen Groves MD Gastroenterology [phone] Your Primary Care Physician (PCP) Primary Care Physician Office Phone Office Fax [place], Matthew [facility] [phone] You are allergic to the following Allergen Reactions Ciprofibrate Unable to Obtain Darvon (Propoxyphene) Unable to Obtain Penicillins Unable to Obtain Recent Documentation Height Weight BMI OB Status Smoking Status 1.524 m 70.6 kg 30.39 kg/m2 Menopause Former Smoker Emergency Contacts Name Discharge Info Relation Home Work Mobile Clyde Sheridan DISCHARGE CAREGIVER [3] Spouse [3] [phone] Darby Sheridan DISCHARGE CAREGIVER [3] Daughter [21] [phone] Patient Belongings The following personal items are in your possession at time of discharge: 
  Dental Appliances: None  Visual Aid: None Please provide this summary of care documentation to your next provider. Signatures-by signing, you are acknowledging that this After Visit Summary has been reviewed with you and you have received a copy. Patient Signature:  ____________________________________________________________ Date:  ____________________________________________________________  
  
Megan Oka Provider Signature:  ____________________________________________________________ Date:  ____________________________________________________________

## 2018-05-31 NOTE — ANESTHESIA POSTPROCEDURE EVALUATION
Post-Anesthesia Evaluation and Assessment    Patient: Maliha Henderson MRN: [id number]  SSN: [id number]    YOB: 1935  Age: 81 y.o. Sex: female       Cardiovascular Function/Vital Signs  Visit Vitals    /63    Pulse (!) 56    Temp 36.5 °C (97.7 °F)    Resp 16    Ht 5' (1.524 m)    Wt 70.6 kg (155 lb 9.6 oz)    SpO2 97%    BMI 30.39 kg/m2       Patient is status post MAC anesthesia for Procedure(s):  COLONOSCOPY w polypectomy. Nausea/Vomiting: None    Postoperative hydration reviewed and adequate. Pain:  Pain Scale 1: Numeric (0 - 10) (05/31/18 1121)  Pain Intensity 1: 0 (05/31/18 1121)   Managed    Neurological Status: At baseline    Mental Status and Level of Consciousness: Arousable    Pulmonary Status:   O2 Device: Oxygen mask;Room air (05/31/18 1121)   Adequate oxygenation and airway patent    Complications related to anesthesia: None    Post-anesthesia assessment completed.  No concerns    Signed By: Millie Moon MD     May 31, 2018

## 2019-09-23 ENCOUNTER — HOSPITAL ENCOUNTER (OUTPATIENT)
Dept: MAMMOGRAPHY | Age: 84
Discharge: HOME | End: 2019-09-23
Attending: FAMILY MEDICINE
Payer: MEDICARE

## 2019-09-23 DIAGNOSIS — Z12.39 BREAST SCREENING, UNSPECIFIED: ICD-10-CM

## 2019-09-23 PROCEDURE — 77063 BREAST TOMOSYNTHESIS BI: CPT

## 2019-09-23 PROCEDURE — 77067 SCR MAMMO BI INCL CAD: CPT

## 2020-01-01 ENCOUNTER — APPOINTMENT (OUTPATIENT)
Dept: GENERAL RADIOLOGY | Age: 85
DRG: 480 | End: 2020-01-01
Attending: ORTHOPAEDIC SURGERY
Payer: MEDICARE

## 2020-01-01 ENCOUNTER — APPOINTMENT (OUTPATIENT)
Dept: GENERAL RADIOLOGY | Age: 85
DRG: 480 | End: 2020-01-01
Attending: INTERNAL MEDICINE
Payer: MEDICARE

## 2020-01-01 ENCOUNTER — PATIENT OUTREACH (OUTPATIENT)
Dept: CASE MANAGEMENT | Age: 85
End: 2020-01-01

## 2020-01-01 ENCOUNTER — OFFICE VISIT (OUTPATIENT)
Dept: ORTHOPEDIC SURGERY | Age: 85
End: 2020-01-01

## 2020-01-01 ENCOUNTER — APPOINTMENT (OUTPATIENT)
Dept: NON INVASIVE DIAGNOSTICS | Age: 85
DRG: 480 | End: 2020-01-01
Attending: INTERNAL MEDICINE
Payer: MEDICARE

## 2020-01-01 ENCOUNTER — VIRTUAL VISIT (OUTPATIENT)
Dept: ORTHOPEDIC SURGERY | Age: 85
End: 2020-01-01

## 2020-01-01 ENCOUNTER — HOSPITAL ENCOUNTER (EMERGENCY)
Age: 85
Discharge: HOME | End: 2020-07-25
Attending: EMERGENCY MEDICINE
Payer: MEDICARE

## 2020-01-01 ENCOUNTER — APPOINTMENT (OUTPATIENT)
Dept: GENERAL RADIOLOGY | Age: 85
DRG: 480 | End: 2020-01-01
Attending: NURSE PRACTITIONER
Payer: MEDICARE

## 2020-01-01 ENCOUNTER — DOCUMENTATION ONLY (OUTPATIENT)
Dept: ORTHOPEDIC SURGERY | Age: 85
End: 2020-01-01

## 2020-01-01 ENCOUNTER — APPOINTMENT (OUTPATIENT)
Dept: NUCLEAR MEDICINE | Age: 85
DRG: 480 | End: 2020-01-01
Attending: INTERNAL MEDICINE
Payer: MEDICARE

## 2020-01-01 ENCOUNTER — APPOINTMENT (OUTPATIENT)
Dept: GENERAL RADIOLOGY | Age: 85
End: 2020-01-01
Attending: EMERGENCY MEDICINE
Payer: MEDICARE

## 2020-01-01 ENCOUNTER — OFFICE VISIT (OUTPATIENT)
Dept: ORTHOPEDIC SURGERY | Age: 85
End: 2020-01-01
Payer: MEDICARE

## 2020-01-01 ENCOUNTER — APPOINTMENT (OUTPATIENT)
Dept: CT IMAGING | Age: 85
DRG: 480 | End: 2020-01-01
Attending: INTERNAL MEDICINE
Payer: MEDICARE

## 2020-01-01 ENCOUNTER — APPOINTMENT (OUTPATIENT)
Dept: GENERAL RADIOLOGY | Age: 85
DRG: 480 | End: 2020-01-01
Attending: EMERGENCY MEDICINE
Payer: MEDICARE

## 2020-01-01 ENCOUNTER — VIRTUAL VISIT (OUTPATIENT)
Dept: VASCULAR SURGERY | Age: 85
End: 2020-01-01

## 2020-01-01 ENCOUNTER — TELEPHONE (OUTPATIENT)
Dept: ORTHOPEDIC SURGERY | Age: 85
End: 2020-01-01

## 2020-01-01 ENCOUNTER — HOSPITAL ENCOUNTER (INPATIENT)
Age: 85
LOS: 18 days | Discharge: NURSING, REHAB OR LONG-TERM CARE | DRG: 480 | End: 2020-04-29
Attending: EMERGENCY MEDICINE | Admitting: INTERNAL MEDICINE
Payer: MEDICARE

## 2020-01-01 ENCOUNTER — HOSPITAL ENCOUNTER (OUTPATIENT)
Dept: INTERVENTIONAL RADIOLOGY/VASCULAR | Age: 85
Discharge: HOME | DRG: 480 | End: 2020-04-13
Attending: HOSPITALIST
Payer: MEDICARE

## 2020-01-01 ENCOUNTER — HOSPITAL ENCOUNTER (OUTPATIENT)
Dept: NUCLEAR MEDICINE | Age: 85
Discharge: HOME | DRG: 480 | End: 2020-04-16
Attending: INTERNAL MEDICINE
Payer: MEDICARE

## 2020-01-01 ENCOUNTER — ANESTHESIA EVENT (OUTPATIENT)
Dept: SURGERY | Age: 85
DRG: 480 | End: 2020-01-01
Payer: MEDICARE

## 2020-01-01 ENCOUNTER — ANESTHESIA (OUTPATIENT)
Dept: SURGERY | Age: 85
DRG: 480 | End: 2020-01-01
Payer: MEDICARE

## 2020-01-01 ENCOUNTER — APPOINTMENT (OUTPATIENT)
Dept: VASCULAR SURGERY | Age: 85
DRG: 480 | End: 2020-01-01
Attending: INTERNAL MEDICINE
Payer: MEDICARE

## 2020-01-01 ENCOUNTER — OFFICE VISIT (OUTPATIENT)
Dept: VASCULAR SURGERY | Age: 85
End: 2020-01-01
Payer: MEDICARE

## 2020-01-01 VITALS
WEIGHT: 160.94 LBS | TEMPERATURE: 97.8 F | HEART RATE: 72 BPM | SYSTOLIC BLOOD PRESSURE: 157 MMHG | BODY MASS INDEX: 31.6 KG/M2 | OXYGEN SATURATION: 97 % | DIASTOLIC BLOOD PRESSURE: 53 MMHG | RESPIRATION RATE: 18 BRPM | HEIGHT: 60 IN

## 2020-01-01 VITALS
OXYGEN SATURATION: 100 % | HEART RATE: 57 BPM | DIASTOLIC BLOOD PRESSURE: 80 MMHG | RESPIRATION RATE: 20 BRPM | SYSTOLIC BLOOD PRESSURE: 150 MMHG

## 2020-01-01 VITALS
RESPIRATION RATE: 16 BRPM | DIASTOLIC BLOOD PRESSURE: 58 MMHG | HEIGHT: 60 IN | WEIGHT: 142 LBS | OXYGEN SATURATION: 98 % | SYSTOLIC BLOOD PRESSURE: 90 MMHG | TEMPERATURE: 98 F | BODY MASS INDEX: 27.88 KG/M2 | HEART RATE: 61 BPM

## 2020-01-01 VITALS
RESPIRATION RATE: 16 BRPM | WEIGHT: 132 LBS | HEART RATE: 47 BPM | SYSTOLIC BLOOD PRESSURE: 143 MMHG | TEMPERATURE: 98 F | DIASTOLIC BLOOD PRESSURE: 62 MMHG | OXYGEN SATURATION: 97 % | HEIGHT: 60 IN | BODY MASS INDEX: 25.91 KG/M2

## 2020-01-01 VITALS
TEMPERATURE: 97.1 F | RESPIRATION RATE: 15 BRPM | SYSTOLIC BLOOD PRESSURE: 107 MMHG | WEIGHT: 132 LBS | DIASTOLIC BLOOD PRESSURE: 48 MMHG | OXYGEN SATURATION: 100 % | BODY MASS INDEX: 25.91 KG/M2 | HEIGHT: 60 IN | HEART RATE: 56 BPM

## 2020-01-01 VITALS
BODY MASS INDEX: 25.91 KG/M2 | TEMPERATURE: 96.9 F | RESPIRATION RATE: 16 BRPM | OXYGEN SATURATION: 98 % | HEIGHT: 60 IN | DIASTOLIC BLOOD PRESSURE: 59 MMHG | WEIGHT: 132 LBS | HEART RATE: 65 BPM | SYSTOLIC BLOOD PRESSURE: 132 MMHG

## 2020-01-01 DIAGNOSIS — S62.342A CLOSED NONDISPLACED FRACTURE OF BASE OF THIRD METACARPAL BONE OF RIGHT HAND, INITIAL ENCOUNTER: ICD-10-CM

## 2020-01-01 DIAGNOSIS — S62.324D CLOSED DISPLACED FRACTURE OF SHAFT OF FOURTH METACARPAL BONE OF RIGHT HAND WITH ROUTINE HEALING, SUBSEQUENT ENCOUNTER: ICD-10-CM

## 2020-01-01 DIAGNOSIS — S62.322D CLOSED DISPLACED FRACTURE OF SHAFT OF THIRD METACARPAL BONE OF RIGHT HAND WITH ROUTINE HEALING, SUBSEQUENT ENCOUNTER: Primary | ICD-10-CM

## 2020-01-01 DIAGNOSIS — I82.621 ARM DVT (DEEP VENOUS THROMBOEMBOLISM), ACUTE, RIGHT (HCC): ICD-10-CM

## 2020-01-01 DIAGNOSIS — S62.324A CLOSED DISPLACED FRACTURE OF SHAFT OF FOURTH METACARPAL BONE OF RIGHT HAND, INITIAL ENCOUNTER: ICD-10-CM

## 2020-01-01 DIAGNOSIS — S62.322A DISPLACED FRACTURE OF SHAFT OF THIRD METACARPAL BONE, RIGHT HAND, INITIAL ENCOUNTER FOR CLOSED FRACTURE: Primary | ICD-10-CM

## 2020-01-01 DIAGNOSIS — I82.411 ACUTE DEEP VEIN THROMBOSIS (DVT) OF FEMORAL VEIN OF RIGHT LOWER EXTREMITY (HCC): Primary | ICD-10-CM

## 2020-01-01 DIAGNOSIS — S05.11XA CONTUSION OF GLOBE OF RIGHT EYE, INITIAL ENCOUNTER: ICD-10-CM

## 2020-01-01 DIAGNOSIS — S72.001A CLOSED FRACTURE OF RIGHT HIP, INITIAL ENCOUNTER (HCC): Primary | ICD-10-CM

## 2020-01-01 DIAGNOSIS — S62.304A CLOSED NONDISPLACED FRACTURE OF FOURTH METACARPAL BONE OF RIGHT HAND, UNSPECIFIED PORTION OF METACARPAL, INITIAL ENCOUNTER: Primary | ICD-10-CM

## 2020-01-01 DIAGNOSIS — I82.411 ACUTE DEEP VEIN THROMBOSIS (DVT) OF FEMORAL VEIN OF RIGHT LOWER EXTREMITY (HCC): ICD-10-CM

## 2020-01-01 DIAGNOSIS — S72.144D CLOSED NONDISPLACED INTERTROCHANTERIC FRACTURE OF RIGHT FEMUR WITH ROUTINE HEALING, SUBSEQUENT ENCOUNTER: Primary | ICD-10-CM

## 2020-01-01 DIAGNOSIS — Z79.01 ANTICOAGULATED: ICD-10-CM

## 2020-01-01 DIAGNOSIS — F41.9 ANXIETY: ICD-10-CM

## 2020-01-01 DIAGNOSIS — S72.001D CLOSED FRACTURE OF RIGHT HIP REQUIRING OPERATIVE REPAIR WITH ROUTINE HEALING, SUBSEQUENT ENCOUNTER: Primary | ICD-10-CM

## 2020-01-01 LAB
ABO + RH BLD: NORMAL
ABO + RH BLD: NORMAL
ALBUMIN SERPL-MCNC: 2.1 G/DL (ref 3.4–5)
ALBUMIN SERPL-MCNC: 3.3 G/DL (ref 3.4–5)
ALBUMIN/GLOB SERPL: 0.7 {RATIO} (ref 0.8–1.7)
ALBUMIN/GLOB SERPL: 0.8 {RATIO} (ref 0.8–1.7)
ALP SERPL-CCNC: 128 U/L (ref 45–117)
ALP SERPL-CCNC: 76 U/L (ref 45–117)
ALT SERPL-CCNC: 11 U/L (ref 13–56)
ALT SERPL-CCNC: 13 U/L (ref 13–56)
ANION GAP SERPL CALC-SCNC: 5 MMOL/L (ref 3–18)
ANION GAP SERPL CALC-SCNC: 6 MMOL/L (ref 3–18)
ANION GAP SERPL CALC-SCNC: 7 MMOL/L (ref 3–18)
ANION GAP SERPL CALC-SCNC: 7 MMOL/L (ref 3–18)
ANION GAP SERPL CALC-SCNC: 8 MMOL/L (ref 3–18)
ANION GAP SERPL CALC-SCNC: 9 MMOL/L (ref 3–18)
ANION GAP SERPL CALC-SCNC: 9 MMOL/L (ref 3–18)
APPEARANCE UR: ABNORMAL
APPEARANCE UR: CLEAR
APTT PPP: 32.6 SEC (ref 23–36.4)
APTT PPP: 40.9 SEC (ref 23–36.4)
APTT PPP: 63.8 SEC (ref 23–36.4)
ARTERIAL PATENCY WRIST A: YES
AST SERPL-CCNC: 13 U/L (ref 10–38)
AST SERPL-CCNC: 18 U/L (ref 10–38)
ATRIAL RATE: 163 BPM
ATRIAL RATE: 170 BPM
ATRIAL RATE: 197 BPM
ATRIAL RATE: 58 BPM
ATRIAL RATE: 78 BPM
ATRIAL RATE: 79 BPM
ATRIAL RATE: 79 BPM
BACTERIA SPEC CULT: NORMAL
BACTERIA URNS QL MICRO: ABNORMAL /HPF
BASE DEFICIT BLD-SCNC: 3 MMOL/L
BASOPHILS # BLD: 0 K/UL (ref 0–0.06)
BASOPHILS # BLD: 0 K/UL (ref 0–0.1)
BASOPHILS NFR BLD: 0 % (ref 0–2)
BASOPHILS NFR BLD: 0 % (ref 0–3)
BDY SITE: ABNORMAL
BILIRUB DIRECT SERPL-MCNC: 0.2 MG/DL (ref 0–0.2)
BILIRUB SERPL-MCNC: 0.4 MG/DL (ref 0.2–1)
BILIRUB SERPL-MCNC: 0.5 MG/DL (ref 0.2–1)
BILIRUB UR QL: NEGATIVE
BLD PROD TYP BPU: NORMAL
BLOOD GROUP ANTIBODIES SERPL: NORMAL
BLOOD GROUP ANTIBODIES SERPL: NORMAL
BNP SERPL-MCNC: ABNORMAL PG/ML (ref 0–1800)
BPU ID: NORMAL
BUN SERPL-MCNC: 103 MG/DL (ref 7–18)
BUN SERPL-MCNC: 32 MG/DL (ref 7–18)
BUN SERPL-MCNC: 34 MG/DL (ref 7–18)
BUN SERPL-MCNC: 46 MG/DL (ref 7–18)
BUN SERPL-MCNC: 47 MG/DL (ref 7–18)
BUN SERPL-MCNC: 49 MG/DL (ref 7–18)
BUN SERPL-MCNC: 52 MG/DL (ref 7–18)
BUN SERPL-MCNC: 60 MG/DL (ref 7–18)
BUN SERPL-MCNC: 63 MG/DL (ref 7–18)
BUN SERPL-MCNC: 65 MG/DL (ref 7–18)
BUN SERPL-MCNC: 72 MG/DL (ref 7–18)
BUN SERPL-MCNC: 72 MG/DL (ref 7–18)
BUN SERPL-MCNC: 79 MG/DL (ref 7–18)
BUN SERPL-MCNC: 81 MG/DL (ref 7–18)
BUN SERPL-MCNC: 83 MG/DL (ref 7–18)
BUN SERPL-MCNC: 89 MG/DL (ref 7–18)
BUN SERPL-MCNC: 94 MG/DL (ref 7–18)
BUN SERPL-MCNC: 95 MG/DL (ref 7–18)
BUN/CREAT SERPL: 17 (ref 12–20)
BUN/CREAT SERPL: 18 (ref 12–20)
BUN/CREAT SERPL: 22 (ref 12–20)
BUN/CREAT SERPL: 31 (ref 12–20)
BUN/CREAT SERPL: 32 (ref 12–20)
BUN/CREAT SERPL: 33 (ref 12–20)
BUN/CREAT SERPL: 35 (ref 12–20)
BUN/CREAT SERPL: 38 (ref 12–20)
BUN/CREAT SERPL: 40 (ref 12–20)
BUN/CREAT SERPL: 40 (ref 12–20)
BUN/CREAT SERPL: 44 (ref 12–20)
BUN/CREAT SERPL: 44 (ref 12–20)
BUN/CREAT SERPL: 45 (ref 12–20)
BUN/CREAT SERPL: 46 (ref 12–20)
BUN/CREAT SERPL: 48 (ref 12–20)
BUN/CREAT SERPL: 52 (ref 12–20)
CALCIUM SERPL-MCNC: 10 MG/DL (ref 8.5–10.1)
CALCIUM SERPL-MCNC: 7.9 MG/DL (ref 8.5–10.1)
CALCIUM SERPL-MCNC: 8.6 MG/DL (ref 8.5–10.1)
CALCIUM SERPL-MCNC: 9 MG/DL (ref 8.5–10.1)
CALCIUM SERPL-MCNC: 9.1 MG/DL (ref 8.5–10.1)
CALCIUM SERPL-MCNC: 9.2 MG/DL (ref 8.5–10.1)
CALCIUM SERPL-MCNC: 9.3 MG/DL (ref 8.5–10.1)
CALCIUM SERPL-MCNC: 9.3 MG/DL (ref 8.5–10.1)
CALCIUM SERPL-MCNC: 9.4 MG/DL (ref 8.5–10.1)
CALCIUM SERPL-MCNC: 9.4 MG/DL (ref 8.5–10.1)
CALCIUM SERPL-MCNC: 9.5 MG/DL (ref 8.5–10.1)
CALCIUM SERPL-MCNC: 9.6 MG/DL (ref 8.5–10.1)
CALCIUM SERPL-MCNC: 9.7 MG/DL (ref 8.5–10.1)
CALCIUM SERPL-MCNC: 9.7 MG/DL (ref 8.5–10.1)
CALCIUM SERPL-MCNC: 9.9 MG/DL (ref 8.5–10.1)
CALCIUM SERPL-MCNC: 9.9 MG/DL (ref 8.5–10.1)
CALCULATED P AXIS, ECG09: 52 DEGREES
CALCULATED P AXIS, ECG09: 56 DEGREES
CALCULATED P AXIS, ECG09: 66 DEGREES
CALCULATED P AXIS, ECG09: 77 DEGREES
CALCULATED R AXIS, ECG10: 22 DEGREES
CALCULATED R AXIS, ECG10: 43 DEGREES
CALCULATED R AXIS, ECG10: 57 DEGREES
CALCULATED R AXIS, ECG10: 65 DEGREES
CALCULATED R AXIS, ECG10: 76 DEGREES
CALCULATED R AXIS, ECG10: 76 DEGREES
CALCULATED R AXIS, ECG10: 85 DEGREES
CALCULATED T AXIS, ECG11: -32 DEGREES
CALCULATED T AXIS, ECG11: -44 DEGREES
CALCULATED T AXIS, ECG11: -83 DEGREES
CALCULATED T AXIS, ECG11: 123 DEGREES
CALCULATED T AXIS, ECG11: 17 DEGREES
CALCULATED T AXIS, ECG11: 29 DEGREES
CALCULATED T AXIS, ECG11: 47 DEGREES
CALLED TO:,BCALL1: NORMAL
CALLED TO:,BCALL1: NORMAL
CALLED TO:,BCALL2: NORMAL
CALLED TO:,BCALL3: NORMAL
CHLORIDE SERPL-SCNC: 101 MMOL/L (ref 100–111)
CHLORIDE SERPL-SCNC: 103 MMOL/L (ref 100–111)
CHLORIDE SERPL-SCNC: 107 MMOL/L (ref 100–111)
CHLORIDE SERPL-SCNC: 108 MMOL/L (ref 100–111)
CHLORIDE SERPL-SCNC: 108 MMOL/L (ref 100–111)
CHLORIDE SERPL-SCNC: 109 MMOL/L (ref 100–111)
CHLORIDE SERPL-SCNC: 110 MMOL/L (ref 100–111)
CHLORIDE SERPL-SCNC: 112 MMOL/L (ref 100–111)
CHLORIDE SERPL-SCNC: 114 MMOL/L (ref 100–111)
CHLORIDE SERPL-SCNC: 114 MMOL/L (ref 100–111)
CHLORIDE SERPL-SCNC: 116 MMOL/L (ref 100–111)
CK MB CFR SERPL CALC: 2.8 % (ref 0–4)
CK MB SERPL-MCNC: 1.3 NG/ML (ref 5–25)
CK SERPL-CCNC: 47 U/L (ref 26–192)
CO2 SERPL-SCNC: 20 MMOL/L (ref 21–32)
CO2 SERPL-SCNC: 22 MMOL/L (ref 21–32)
CO2 SERPL-SCNC: 23 MMOL/L (ref 21–32)
CO2 SERPL-SCNC: 24 MMOL/L (ref 21–32)
CO2 SERPL-SCNC: 25 MMOL/L (ref 21–32)
CO2 SERPL-SCNC: 28 MMOL/L (ref 21–32)
CO2 SERPL-SCNC: 28 MMOL/L (ref 21–32)
CO2 SERPL-SCNC: 29 MMOL/L (ref 21–32)
COLOR UR: ABNORMAL
COLOR UR: YELLOW
CREAT SERPL-MCNC: 1.38 MG/DL (ref 0.6–1.3)
CREAT SERPL-MCNC: 1.47 MG/DL (ref 0.6–1.3)
CREAT SERPL-MCNC: 1.55 MG/DL (ref 0.6–1.3)
CREAT SERPL-MCNC: 1.56 MG/DL (ref 0.6–1.3)
CREAT SERPL-MCNC: 1.6 MG/DL (ref 0.6–1.3)
CREAT SERPL-MCNC: 1.64 MG/DL (ref 0.6–1.3)
CREAT SERPL-MCNC: 1.85 MG/DL (ref 0.6–1.3)
CREAT SERPL-MCNC: 1.89 MG/DL (ref 0.6–1.3)
CREAT SERPL-MCNC: 1.91 MG/DL (ref 0.6–1.3)
CREAT SERPL-MCNC: 1.93 MG/DL (ref 0.6–1.3)
CREAT SERPL-MCNC: 1.97 MG/DL (ref 0.6–1.3)
CREAT SERPL-MCNC: 2.03 MG/DL (ref 0.6–1.3)
CREAT SERPL-MCNC: 2.07 MG/DL (ref 0.6–1.3)
CREAT SERPL-MCNC: 2.08 MG/DL (ref 0.6–1.3)
CREAT SERPL-MCNC: 2.16 MG/DL (ref 0.6–1.3)
CREAT SERPL-MCNC: 2.18 MG/DL (ref 0.6–1.3)
CREAT SERPL-MCNC: 2.34 MG/DL (ref 0.6–1.3)
CREAT SERPL-MCNC: 2.38 MG/DL (ref 0.6–1.3)
CREAT UR-MCNC: 100 MG/DL (ref 30–125)
CREAT UR-MCNC: 101 MG/DL (ref 30–125)
CROSSMATCH RESULT,%XM: NORMAL
D DIMER PPP FEU-MCNC: 16.81 UG/ML(FEU)
DIAGNOSIS, 93000: NORMAL
DIFFERENTIAL METHOD BLD: ABNORMAL
ECHO AO ROOT DIAM: 3.08 CM
ECHO LA AREA 4C: 21 CM2
ECHO LA VOL 2C: 65.88 ML (ref 22–52)
ECHO LA VOL 4C: 53.24 ML (ref 22–52)
ECHO LA VOL BP: 67.66 ML (ref 22–52)
ECHO LA VOL/BSA BIPLANE: 40.07 ML/M2 (ref 16–28)
ECHO LA VOLUME INDEX A2C: 39.01 ML/M2 (ref 16–28)
ECHO LA VOLUME INDEX A4C: 31.53 ML/M2 (ref 16–28)
ECHO LV EDV TEICHHOLZ: 0.55 ML
ECHO LV ESV TEICHHOLZ: 0.12 ML
ECHO LV INTERNAL DIMENSION DIASTOLIC: 4.31 CM (ref 3.9–5.3)
ECHO LV INTERNAL DIMENSION SYSTOLIC: 2.32 CM
ECHO LV IVSD: 1 CM (ref 0.6–0.9)
ECHO LV MASS 2D: 170.1 G (ref 67–162)
ECHO LV MASS INDEX 2D: 100.7 G/M2 (ref 43–95)
ECHO LV POSTERIOR WALL DIASTOLIC: 1.04 CM (ref 0.6–0.9)
ECHO LVOT DIAM: 1.92 CM
ECHO MV A VELOCITY: 122.37 CM/S
ECHO MV E DECELERATION TIME (DT): 267.8 MS
ECHO MV E VELOCITY: 98.21 CM/S
ECHO MV E/A RATIO: 0.8
ECHO PVEIN A DURATION: 133.8 MS
ECHO PVEIN A VELOCITY: 32.3 CM/S
ECHO RV INTERNAL DIMENSION: 4.02 CM
ECHO TV REGURGITANT MAX VELOCITY: 356.25 CM/S
ECHO TV REGURGITANT PEAK GRADIENT: 50.8 MMHG
EOSINOPHIL # BLD: 0 K/UL (ref 0–0.4)
EOSINOPHIL # BLD: 0.1 K/UL (ref 0–0.4)
EOSINOPHIL # BLD: 0.2 K/UL (ref 0–0.4)
EOSINOPHIL # BLD: 0.2 K/UL (ref 0–0.4)
EOSINOPHIL NFR BLD: 0 % (ref 0–5)
EOSINOPHIL NFR BLD: 1 % (ref 0–5)
EOSINOPHIL NFR BLD: 2 % (ref 0–5)
EPITH CASTS URNS QL MICRO: ABNORMAL /LPF (ref 0–5)
EPITH CASTS URNS QL MICRO: ABNORMAL /LPF (ref 0–5)
EPITH CASTS URNS QL MICRO: NEGATIVE /LPF (ref 0–5)
EPITH CASTS URNS QL MICRO: NORMAL /LPF (ref 0–5)
ERYTHROCYTE [DISTWIDTH] IN BLOOD BY AUTOMATED COUNT: 13.8 % (ref 11.6–14.5)
ERYTHROCYTE [DISTWIDTH] IN BLOOD BY AUTOMATED COUNT: 14.3 % (ref 11.6–14.5)
ERYTHROCYTE [DISTWIDTH] IN BLOOD BY AUTOMATED COUNT: 16.1 % (ref 11.6–14.5)
ERYTHROCYTE [DISTWIDTH] IN BLOOD BY AUTOMATED COUNT: 16.3 % (ref 11.6–14.5)
ERYTHROCYTE [DISTWIDTH] IN BLOOD BY AUTOMATED COUNT: 16.3 % (ref 11.6–14.5)
ERYTHROCYTE [DISTWIDTH] IN BLOOD BY AUTOMATED COUNT: 16.4 % (ref 11.6–14.5)
ERYTHROCYTE [DISTWIDTH] IN BLOOD BY AUTOMATED COUNT: 16.6 % (ref 11.6–14.5)
ERYTHROCYTE [DISTWIDTH] IN BLOOD BY AUTOMATED COUNT: 16.7 % (ref 11.6–14.5)
ERYTHROCYTE [DISTWIDTH] IN BLOOD BY AUTOMATED COUNT: 16.8 % (ref 11.6–14.5)
ERYTHROCYTE [DISTWIDTH] IN BLOOD BY AUTOMATED COUNT: 17.5 % (ref 11.6–14.5)
EST. AVERAGE GLUCOSE BLD GHB EST-MCNC: 111 MG/DL
FERRITIN SERPL-MCNC: 1457 NG/ML (ref 8–388)
FERRITIN SERPL-MCNC: 371 NG/ML (ref 8–388)
FOLATE SERPL-MCNC: >20 NG/ML (ref 3.1–17.5)
GAS FLOW.O2 O2 DELIVERY SYS: ABNORMAL L/MIN
GAS FLOW.O2 SETTING OXYMISER: 4 L/M
GLOBULIN SER CALC-MCNC: 2.9 G/DL (ref 2–4)
GLOBULIN SER CALC-MCNC: 3.9 G/DL (ref 2–4)
GLUCOSE BLD STRIP.AUTO-MCNC: 134 MG/DL (ref 70–110)
GLUCOSE BLD STRIP.AUTO-MCNC: 174 MG/DL (ref 70–110)
GLUCOSE BLD STRIP.AUTO-MCNC: 202 MG/DL (ref 70–110)
GLUCOSE SERPL-MCNC: 100 MG/DL (ref 74–99)
GLUCOSE SERPL-MCNC: 100 MG/DL (ref 74–99)
GLUCOSE SERPL-MCNC: 104 MG/DL (ref 74–99)
GLUCOSE SERPL-MCNC: 106 MG/DL (ref 74–99)
GLUCOSE SERPL-MCNC: 106 MG/DL (ref 74–99)
GLUCOSE SERPL-MCNC: 116 MG/DL (ref 74–99)
GLUCOSE SERPL-MCNC: 119 MG/DL (ref 74–99)
GLUCOSE SERPL-MCNC: 122 MG/DL (ref 74–99)
GLUCOSE SERPL-MCNC: 125 MG/DL (ref 74–99)
GLUCOSE SERPL-MCNC: 128 MG/DL (ref 74–99)
GLUCOSE SERPL-MCNC: 130 MG/DL (ref 74–99)
GLUCOSE SERPL-MCNC: 131 MG/DL (ref 74–99)
GLUCOSE SERPL-MCNC: 133 MG/DL (ref 74–99)
GLUCOSE SERPL-MCNC: 135 MG/DL (ref 74–99)
GLUCOSE SERPL-MCNC: 149 MG/DL (ref 74–99)
GLUCOSE SERPL-MCNC: 181 MG/DL (ref 74–99)
GLUCOSE UR STRIP.AUTO-MCNC: NEGATIVE MG/DL
HBA1C MFR BLD: 5.5 % (ref 4.2–5.6)
HCO3 BLD-SCNC: 23.1 MMOL/L (ref 22–26)
HCT VFR BLD AUTO: 18.9 % (ref 35–45)
HCT VFR BLD AUTO: 19.6 % (ref 35–45)
HCT VFR BLD AUTO: 20 % (ref 35–45)
HCT VFR BLD AUTO: 20.4 % (ref 35–45)
HCT VFR BLD AUTO: 20.6 % (ref 35–45)
HCT VFR BLD AUTO: 21.2 % (ref 35–45)
HCT VFR BLD AUTO: 21.5 % (ref 35–45)
HCT VFR BLD AUTO: 23 % (ref 35–45)
HCT VFR BLD AUTO: 23 % (ref 35–45)
HCT VFR BLD AUTO: 23.3 % (ref 35–45)
HCT VFR BLD AUTO: 23.3 % (ref 35–45)
HCT VFR BLD AUTO: 23.6 % (ref 35–45)
HCT VFR BLD AUTO: 24.1 % (ref 35–45)
HCT VFR BLD AUTO: 24.3 % (ref 35–45)
HCT VFR BLD AUTO: 24.5 % (ref 35–45)
HCT VFR BLD AUTO: 24.7 % (ref 35–45)
HCT VFR BLD AUTO: 24.7 % (ref 35–45)
HCT VFR BLD AUTO: 25 % (ref 35–45)
HCT VFR BLD AUTO: 25 % (ref 35–45)
HCT VFR BLD AUTO: 25.2 % (ref 35–45)
HCT VFR BLD AUTO: 25.3 % (ref 35–45)
HCT VFR BLD AUTO: 25.5 % (ref 35–45)
HCT VFR BLD AUTO: 25.7 % (ref 35–45)
HCT VFR BLD AUTO: 25.7 % (ref 35–45)
HCT VFR BLD AUTO: 26.2 % (ref 35–45)
HCT VFR BLD AUTO: 26.3 % (ref 35–45)
HCT VFR BLD AUTO: 26.3 % (ref 35–45)
HCT VFR BLD AUTO: 26.6 % (ref 35–45)
HCT VFR BLD AUTO: 26.7 % (ref 35–45)
HCT VFR BLD AUTO: 26.9 % (ref 35–45)
HCT VFR BLD AUTO: 27.2 % (ref 35–45)
HCT VFR BLD AUTO: 27.4 % (ref 35–45)
HCT VFR BLD AUTO: 28 % (ref 35–45)
HCT VFR BLD AUTO: 28.1 % (ref 35–45)
HCT VFR BLD AUTO: 31.5 % (ref 35–45)
HEMOCCULT STL QL: POSITIVE
HEMOCCULT STL QL: POSITIVE
HGB BLD-MCNC: 6.1 G/DL (ref 12–16)
HGB BLD-MCNC: 6.3 G/DL (ref 12–16)
HGB BLD-MCNC: 6.5 G/DL (ref 12–16)
HGB BLD-MCNC: 6.6 G/DL (ref 12–16)
HGB BLD-MCNC: 6.6 G/DL (ref 12–16)
HGB BLD-MCNC: 6.8 G/DL (ref 12–16)
HGB BLD-MCNC: 7 G/DL (ref 12–16)
HGB BLD-MCNC: 7.1 G/DL (ref 12–16)
HGB BLD-MCNC: 7.3 G/DL (ref 12–16)
HGB BLD-MCNC: 7.4 G/DL (ref 12–16)
HGB BLD-MCNC: 7.4 G/DL (ref 12–16)
HGB BLD-MCNC: 7.5 G/DL (ref 12–16)
HGB BLD-MCNC: 7.6 G/DL (ref 12–16)
HGB BLD-MCNC: 7.8 G/DL (ref 12–16)
HGB BLD-MCNC: 7.9 G/DL (ref 12–16)
HGB BLD-MCNC: 8 G/DL (ref 12–16)
HGB BLD-MCNC: 8.1 G/DL (ref 12–16)
HGB BLD-MCNC: 8.2 G/DL (ref 12–16)
HGB BLD-MCNC: 8.3 G/DL (ref 12–16)
HGB BLD-MCNC: 8.3 G/DL (ref 12–16)
HGB BLD-MCNC: 8.4 G/DL (ref 12–16)
HGB BLD-MCNC: 8.6 G/DL (ref 12–16)
HGB BLD-MCNC: 8.6 G/DL (ref 12–16)
HGB BLD-MCNC: 8.7 G/DL (ref 12–16)
HGB BLD-MCNC: 9.8 G/DL (ref 12–16)
HGB UR QL STRIP: ABNORMAL
HGB UR QL STRIP: NEGATIVE
HYALINE CASTS URNS QL MICRO: ABNORMAL /LPF (ref 0–2)
INR PPP: 1.3 (ref 0.8–1.2)
IRON SATN MFR SERPL: 13 % (ref 20–50)
IRON SATN MFR SERPL: 16 % (ref 20–50)
IRON SERPL-MCNC: 21 UG/DL (ref 50–175)
IRON SERPL-MCNC: 23 UG/DL (ref 50–175)
KETONES UR QL STRIP.AUTO: NEGATIVE MG/DL
LEUKOCYTE ESTERASE UR QL STRIP.AUTO: ABNORMAL
LEUKOCYTE ESTERASE UR QL STRIP.AUTO: ABNORMAL
LEUKOCYTE ESTERASE UR QL STRIP.AUTO: NEGATIVE
LEUKOCYTE ESTERASE UR QL STRIP.AUTO: NEGATIVE
LVFS 2D: 46.11 %
LVSV (TEICH): 36.73 ML
LYMPHOCYTES # BLD: 1.1 K/UL (ref 0.9–3.6)
LYMPHOCYTES # BLD: 1.2 K/UL (ref 0.8–3.5)
LYMPHOCYTES # BLD: 1.3 K/UL (ref 0.9–3.6)
LYMPHOCYTES # BLD: 1.4 K/UL (ref 0.9–3.6)
LYMPHOCYTES # BLD: 1.5 K/UL (ref 0.9–3.6)
LYMPHOCYTES # BLD: 1.7 K/UL (ref 0.8–3.5)
LYMPHOCYTES # BLD: 2 K/UL (ref 0.8–3.5)
LYMPHOCYTES # BLD: 2 K/UL (ref 0.9–3.6)
LYMPHOCYTES # BLD: 2.7 K/UL (ref 0.9–3.6)
LYMPHOCYTES NFR BLD: 10 % (ref 20–51)
LYMPHOCYTES NFR BLD: 10 % (ref 21–52)
LYMPHOCYTES NFR BLD: 11 % (ref 20–51)
LYMPHOCYTES NFR BLD: 12 % (ref 21–52)
LYMPHOCYTES NFR BLD: 12 % (ref 21–52)
LYMPHOCYTES NFR BLD: 14 % (ref 21–52)
LYMPHOCYTES NFR BLD: 15 % (ref 20–51)
LYMPHOCYTES NFR BLD: 15 % (ref 21–52)
LYMPHOCYTES NFR BLD: 16 % (ref 21–52)
LYMPHOCYTES NFR BLD: 24 % (ref 21–52)
LYMPHOCYTES NFR BLD: 28 % (ref 21–52)
LYMPHOCYTES NFR BLD: 6 % (ref 20–51)
LYMPHOCYTES NFR BLD: 8 % (ref 20–51)
MAGNESIUM SERPL-MCNC: 1.9 MG/DL (ref 1.6–2.6)
MAGNESIUM SERPL-MCNC: 2.3 MG/DL (ref 1.6–2.6)
MCH RBC QN AUTO: 29.7 PG (ref 24–34)
MCH RBC QN AUTO: 30 PG (ref 24–34)
MCH RBC QN AUTO: 30.2 PG (ref 24–34)
MCH RBC QN AUTO: 30.2 PG (ref 24–34)
MCH RBC QN AUTO: 30.4 PG (ref 24–34)
MCH RBC QN AUTO: 30.5 PG (ref 24–34)
MCH RBC QN AUTO: 30.6 PG (ref 24–34)
MCH RBC QN AUTO: 30.7 PG (ref 24–34)
MCH RBC QN AUTO: 30.9 PG (ref 24–34)
MCH RBC QN AUTO: 31.1 PG (ref 24–34)
MCH RBC QN AUTO: 31.1 PG (ref 24–34)
MCH RBC QN AUTO: 31.4 PG (ref 24–34)
MCH RBC QN AUTO: 31.4 PG (ref 24–34)
MCH RBC QN AUTO: 31.6 PG (ref 24–34)
MCHC RBC AUTO-ENTMCNC: 30.5 G/DL (ref 31–37)
MCHC RBC AUTO-ENTMCNC: 31.1 G/DL (ref 31–37)
MCHC RBC AUTO-ENTMCNC: 31.4 G/DL (ref 31–37)
MCHC RBC AUTO-ENTMCNC: 31.4 G/DL (ref 31–37)
MCHC RBC AUTO-ENTMCNC: 31.6 G/DL (ref 31–37)
MCHC RBC AUTO-ENTMCNC: 31.6 G/DL (ref 31–37)
MCHC RBC AUTO-ENTMCNC: 31.8 G/DL (ref 31–37)
MCHC RBC AUTO-ENTMCNC: 31.9 G/DL (ref 31–37)
MCHC RBC AUTO-ENTMCNC: 32 G/DL (ref 31–37)
MCHC RBC AUTO-ENTMCNC: 32 G/DL (ref 31–37)
MCHC RBC AUTO-ENTMCNC: 32.3 G/DL (ref 31–37)
MCHC RBC AUTO-ENTMCNC: 32.4 G/DL (ref 31–37)
MCHC RBC AUTO-ENTMCNC: 33 G/DL (ref 31–37)
MCHC RBC AUTO-ENTMCNC: 33 G/DL (ref 31–37)
MCV RBC AUTO: 100 FL (ref 74–97)
MCV RBC AUTO: 91.6 FL (ref 74–97)
MCV RBC AUTO: 92.6 FL (ref 74–97)
MCV RBC AUTO: 93.6 FL (ref 74–97)
MCV RBC AUTO: 94 FL (ref 74–97)
MCV RBC AUTO: 95.1 FL (ref 74–97)
MCV RBC AUTO: 96.1 FL (ref 74–97)
MCV RBC AUTO: 97 FL (ref 74–97)
MCV RBC AUTO: 97.2 FL (ref 74–97)
MCV RBC AUTO: 97.8 FL (ref 74–97)
MCV RBC AUTO: 98.2 FL (ref 74–97)
MCV RBC AUTO: 98.6 FL (ref 74–97)
MCV RBC AUTO: 98.7 FL (ref 74–97)
MCV RBC AUTO: 99.1 FL (ref 74–97)
MONOCYTES # BLD: 0.4 K/UL (ref 0–1)
MONOCYTES # BLD: 0.5 K/UL (ref 0–1)
MONOCYTES # BLD: 0.7 K/UL (ref 0.05–1.2)
MONOCYTES # BLD: 0.7 K/UL (ref 0.05–1.2)
MONOCYTES # BLD: 0.8 K/UL (ref 0.05–1.2)
MONOCYTES # BLD: 0.8 K/UL (ref 0–1)
MONOCYTES # BLD: 1 K/UL (ref 0.05–1.2)
MONOCYTES # BLD: 1 K/UL (ref 0–1)
MONOCYTES # BLD: 1 K/UL (ref 0–1)
MONOCYTES # BLD: 1.1 K/UL (ref 0.05–1.2)
MONOCYTES # BLD: 1.1 K/UL (ref 0.05–1.2)
MONOCYTES # BLD: 1.2 K/UL (ref 0.05–1.2)
MONOCYTES # BLD: 1.2 K/UL (ref 0.05–1.2)
MONOCYTES NFR BLD: 12 % (ref 3–10)
MONOCYTES NFR BLD: 12 % (ref 3–10)
MONOCYTES NFR BLD: 13 % (ref 3–10)
MONOCYTES NFR BLD: 13 % (ref 3–10)
MONOCYTES NFR BLD: 3 % (ref 2–9)
MONOCYTES NFR BLD: 3 % (ref 2–9)
MONOCYTES NFR BLD: 4 % (ref 2–9)
MONOCYTES NFR BLD: 5 % (ref 3–10)
MONOCYTES NFR BLD: 7 % (ref 2–9)
MONOCYTES NFR BLD: 8 % (ref 2–9)
MONOCYTES NFR BLD: 8 % (ref 3–10)
MONOCYTES NFR BLD: 9 % (ref 3–10)
MONOCYTES NFR BLD: 9 % (ref 3–10)
MUCOUS THREADS URNS QL MICRO: ABNORMAL /LPF
MV DEC SLOPE: 3.67
NEUTS BAND NFR BLD MANUAL: 13 % (ref 0–5)
NEUTS BAND NFR BLD MANUAL: 4 % (ref 0–5)
NEUTS BAND NFR BLD MANUAL: 4 % (ref 0–5)
NEUTS BAND NFR BLD MANUAL: 9 % (ref 0–5)
NEUTS SEG # BLD: 10.6 K/UL (ref 1.8–8)
NEUTS SEG # BLD: 10.8 K/UL (ref 1.8–8)
NEUTS SEG # BLD: 12 K/UL (ref 1.8–8)
NEUTS SEG # BLD: 13.1 K/UL (ref 1.8–8)
NEUTS SEG # BLD: 13.2 K/UL (ref 1.8–8)
NEUTS SEG # BLD: 18 K/UL (ref 1.8–8)
NEUTS SEG # BLD: 5.4 K/UL (ref 1.8–8)
NEUTS SEG # BLD: 5.5 K/UL (ref 1.8–8)
NEUTS SEG # BLD: 6.3 K/UL (ref 1.8–8)
NEUTS SEG # BLD: 6.4 K/UL (ref 1.8–8)
NEUTS SEG # BLD: 7.1 K/UL (ref 1.8–8)
NEUTS SEG # BLD: 7.5 K/UL (ref 1.8–8)
NEUTS SEG # BLD: 9.6 K/UL (ref 1.8–8)
NEUTS SEG NFR BLD: 57 % (ref 40–73)
NEUTS SEG NFR BLD: 65 % (ref 40–73)
NEUTS SEG NFR BLD: 70 % (ref 40–73)
NEUTS SEG NFR BLD: 72 % (ref 40–73)
NEUTS SEG NFR BLD: 74 % (ref 40–73)
NEUTS SEG NFR BLD: 74 % (ref 42–75)
NEUTS SEG NFR BLD: 77 % (ref 40–73)
NEUTS SEG NFR BLD: 77 % (ref 42–75)
NEUTS SEG NFR BLD: 82 % (ref 40–73)
NEUTS SEG NFR BLD: 83 % (ref 40–73)
NEUTS SEG NFR BLD: 89 % (ref 42–75)
NITRITE UR QL STRIP.AUTO: NEGATIVE
NRBC BLD-RTO: 1 PER 100 WBC
NRBC BLD-RTO: 1 PER 100 WBC
P-R INTERVAL, ECG05: 134 MS
P-R INTERVAL, ECG05: 138 MS
P-R INTERVAL, ECG05: 142 MS
P-R INTERVAL, ECG05: 146 MS
PCO2 BLD: 42.3 MMHG (ref 35–45)
PH BLD: 7.34 [PH] (ref 7.35–7.45)
PH UR STRIP: 5 [PH] (ref 5–8)
PH UR STRIP: 5.5 [PH] (ref 5–8)
PLATELET # BLD AUTO: 130 K/UL (ref 135–420)
PLATELET # BLD AUTO: 140 K/UL (ref 135–420)
PLATELET # BLD AUTO: 150 K/UL (ref 135–420)
PLATELET # BLD AUTO: 154 K/UL (ref 135–420)
PLATELET # BLD AUTO: 159 K/UL (ref 135–420)
PLATELET # BLD AUTO: 161 K/UL (ref 135–420)
PLATELET # BLD AUTO: 169 K/UL (ref 135–420)
PLATELET # BLD AUTO: 182 K/UL (ref 135–420)
PLATELET # BLD AUTO: 186 K/UL (ref 135–420)
PLATELET # BLD AUTO: 198 K/UL (ref 135–420)
PLATELET # BLD AUTO: 201 K/UL (ref 135–420)
PLATELET # BLD AUTO: 202 K/UL (ref 135–420)
PLATELET # BLD AUTO: 203 K/UL (ref 135–420)
PLATELET # BLD AUTO: 237 K/UL (ref 135–420)
PLATELET COMMENTS,PCOM: ABNORMAL
PMV BLD AUTO: 10.6 FL (ref 9.2–11.8)
PMV BLD AUTO: 11 FL (ref 9.2–11.8)
PMV BLD AUTO: 11.1 FL (ref 9.2–11.8)
PMV BLD AUTO: 11.2 FL (ref 9.2–11.8)
PMV BLD AUTO: 11.3 FL (ref 9.2–11.8)
PMV BLD AUTO: 11.4 FL (ref 9.2–11.8)
PMV BLD AUTO: 11.6 FL (ref 9.2–11.8)
PMV BLD AUTO: 11.7 FL (ref 9.2–11.8)
PMV BLD AUTO: 11.7 FL (ref 9.2–11.8)
PMV BLD AUTO: 12.1 FL (ref 9.2–11.8)
PMV BLD AUTO: 12.1 FL (ref 9.2–11.8)
PMV BLD AUTO: 12.3 FL (ref 9.2–11.8)
PO2 BLD: 56 MMHG (ref 80–100)
POTASSIUM SERPL-SCNC: 4.2 MMOL/L (ref 3.5–5.5)
POTASSIUM SERPL-SCNC: 4.3 MMOL/L (ref 3.5–5.5)
POTASSIUM SERPL-SCNC: 4.4 MMOL/L (ref 3.5–5.5)
POTASSIUM SERPL-SCNC: 4.5 MMOL/L (ref 3.5–5.5)
POTASSIUM SERPL-SCNC: 4.5 MMOL/L (ref 3.5–5.5)
POTASSIUM SERPL-SCNC: 4.7 MMOL/L (ref 3.5–5.5)
POTASSIUM SERPL-SCNC: 4.8 MMOL/L (ref 3.5–5.5)
POTASSIUM SERPL-SCNC: 4.8 MMOL/L (ref 3.5–5.5)
POTASSIUM SERPL-SCNC: 4.9 MMOL/L (ref 3.5–5.5)
POTASSIUM SERPL-SCNC: 5 MMOL/L (ref 3.5–5.5)
POTASSIUM SERPL-SCNC: 5.1 MMOL/L (ref 3.5–5.5)
POTASSIUM SERPL-SCNC: 5.2 MMOL/L (ref 3.5–5.5)
POTASSIUM SERPL-SCNC: 5.3 MMOL/L (ref 3.5–5.5)
POTASSIUM SERPL-SCNC: 5.4 MMOL/L (ref 3.5–5.5)
POTASSIUM SERPL-SCNC: 5.4 MMOL/L (ref 3.5–5.5)
POTASSIUM SERPL-SCNC: 5.5 MMOL/L (ref 3.5–5.5)
POTASSIUM SERPL-SCNC: 5.9 MMOL/L (ref 3.5–5.5)
PROCALCITONIN SERPL-MCNC: 2.74 NG/ML
PROCALCITONIN SERPL-MCNC: 4.54 NG/ML
PROCALCITONIN SERPL-MCNC: 6.3 NG/ML
PROT SERPL-MCNC: 5 G/DL (ref 6.4–8.2)
PROT SERPL-MCNC: 7.2 G/DL (ref 6.4–8.2)
PROT UR STRIP-MCNC: ABNORMAL MG/DL
PROT UR STRIP-MCNC: NEGATIVE MG/DL
PROT UR-MCNC: 59 MG/DL
PROT UR-MCNC: 60 MG/DL
PROT/CREAT UR-RTO: 0.6
PROTHROMBIN TIME: 15.8 SEC (ref 11.5–15.2)
Q-T INTERVAL, ECG07: 196 MS
Q-T INTERVAL, ECG07: 256 MS
Q-T INTERVAL, ECG07: 288 MS
Q-T INTERVAL, ECG07: 344 MS
Q-T INTERVAL, ECG07: 364 MS
Q-T INTERVAL, ECG07: 368 MS
Q-T INTERVAL, ECG07: 392 MS
QRS DURATION, ECG06: 72 MS
QRS DURATION, ECG06: 78 MS
QRS DURATION, ECG06: 78 MS
QRS DURATION, ECG06: 80 MS
QRS DURATION, ECG06: 84 MS
QRS DURATION, ECG06: 84 MS
QRS DURATION, ECG06: 88 MS
QTC CALCULATION (BEZET), ECG08: 354 MS
QTC CALCULATION (BEZET), ECG08: 384 MS
QTC CALCULATION (BEZET), ECG08: 392 MS
QTC CALCULATION (BEZET), ECG08: 417 MS
QTC CALCULATION (BEZET), ECG08: 421 MS
QTC CALCULATION (BEZET), ECG08: 433 MS
QTC CALCULATION (BEZET), ECG08: 446 MS
RBC # BLD AUTO: 2.02 M/UL (ref 4.2–5.3)
RBC # BLD AUTO: 2.09 M/UL (ref 4.2–5.3)
RBC # BLD AUTO: 2.16 M/UL (ref 4.2–5.3)
RBC # BLD AUTO: 2.19 M/UL (ref 4.2–5.3)
RBC # BLD AUTO: 2.23 M/UL (ref 4.2–5.3)
RBC # BLD AUTO: 2.35 M/UL (ref 4.2–5.3)
RBC # BLD AUTO: 2.36 M/UL (ref 4.2–5.3)
RBC # BLD AUTO: 2.63 M/UL (ref 4.2–5.3)
RBC # BLD AUTO: 2.63 M/UL (ref 4.2–5.3)
RBC # BLD AUTO: 2.67 M/UL (ref 4.2–5.3)
RBC # BLD AUTO: 2.69 M/UL (ref 4.2–5.3)
RBC # BLD AUTO: 2.8 M/UL (ref 4.2–5.3)
RBC # BLD AUTO: 2.82 M/UL (ref 4.2–5.3)
RBC # BLD AUTO: 3.15 M/UL (ref 4.2–5.3)
RBC #/AREA URNS HPF: ABNORMAL /HPF (ref 0–5)
RBC #/AREA URNS HPF: ABNORMAL /HPF (ref 0–5)
RBC #/AREA URNS HPF: NEGATIVE /HPF (ref 0–5)
RBC #/AREA URNS HPF: NORMAL /HPF (ref 0–5)
RBC MORPH BLD: ABNORMAL
SAO2 % BLD: 87 % (ref 92–97)
SERVICE CMNT-IMP: ABNORMAL
SERVICE CMNT-IMP: NORMAL
SODIUM SERPL-SCNC: 129 MMOL/L (ref 136–145)
SODIUM SERPL-SCNC: 135 MMOL/L (ref 136–145)
SODIUM SERPL-SCNC: 137 MMOL/L (ref 136–145)
SODIUM SERPL-SCNC: 138 MMOL/L (ref 136–145)
SODIUM SERPL-SCNC: 138 MMOL/L (ref 136–145)
SODIUM SERPL-SCNC: 139 MMOL/L (ref 136–145)
SODIUM SERPL-SCNC: 140 MMOL/L (ref 136–145)
SODIUM SERPL-SCNC: 140 MMOL/L (ref 136–145)
SODIUM SERPL-SCNC: 141 MMOL/L (ref 136–145)
SODIUM SERPL-SCNC: 142 MMOL/L (ref 136–145)
SODIUM SERPL-SCNC: 142 MMOL/L (ref 136–145)
SODIUM SERPL-SCNC: 144 MMOL/L (ref 136–145)
SODIUM SERPL-SCNC: 144 MMOL/L (ref 136–145)
SODIUM SERPL-SCNC: 145 MMOL/L (ref 136–145)
SODIUM SERPL-SCNC: 146 MMOL/L (ref 136–145)
SODIUM SERPL-SCNC: 146 MMOL/L (ref 136–145)
SP GR UR REFRACTOMETRY: 1.01 (ref 1–1.03)
SPECIMEN EXP DATE BLD: NORMAL
SPECIMEN EXP DATE BLD: NORMAL
SPECIMEN TYPE: ABNORMAL
STATUS OF UNIT,%ST: NORMAL
T3 SERPL-MCNC: 107 NG/DL (ref 71–180)
T3 SERPL-MCNC: 133 NG/DL (ref 71–180)
T3 SERPL-MCNC: 280 NG/DL (ref 71–180)
T3 SERPL-MCNC: 95 NG/DL (ref 71–180)
T3FREE SERPL-MCNC: 2.6 PG/ML (ref 2.18–3.98)
T4 FREE SERPL-MCNC: 0.5 NG/DL (ref 0.7–1.5)
T4 FREE SERPL-MCNC: 0.7 NG/DL (ref 0.7–1.5)
T4 FREE SERPL-MCNC: 0.8 NG/DL (ref 0.7–1.5)
T4 FREE SERPL-MCNC: 0.8 NG/DL (ref 0.7–1.5)
T4 FREE SERPL-MCNC: 0.9 NG/DL (ref 0.7–1.5)
T4 FREE SERPL-MCNC: 1.1 NG/DL (ref 0.7–1.5)
T4 FREE SERPL-MCNC: 1.3 NG/DL (ref 0.7–1.5)
T4 FREE SERPL-MCNC: 1.4 NG/DL (ref 0.7–1.5)
T4 FREE SERPL-MCNC: 1.7 NG/DL (ref 0.7–1.5)
T4 FREE SERPL-MCNC: 2 NG/DL (ref 0.7–1.5)
T4 FREE SERPL-MCNC: 2.6 NG/DL (ref 0.7–1.5)
T4 FREE SERPL-MCNC: 2.6 NG/DL (ref 0.7–1.5)
T4 FREE SERPL-MCNC: 2.7 NG/DL (ref 0.7–1.5)
T4 FREE SERPL-MCNC: 3.4 NG/DL (ref 0.7–1.5)
T4 FREE SERPL-MCNC: 3.7 NG/DL (ref 0.7–1.5)
THYROGLOB AB SERPL-ACNC: <1 IU/ML (ref 0–0.9)
THYROPEROXIDASE AB SERPL-ACNC: 8 IU/ML (ref 0–34)
TIBC SERPL-MCNC: 140 UG/DL (ref 250–450)
TIBC SERPL-MCNC: 162 UG/DL (ref 250–450)
TROPONIN I SERPL-MCNC: 0.15 NG/ML (ref 0–0.04)
TROPONIN I SERPL-MCNC: 0.2 NG/ML (ref 0–0.04)
TROPONIN I SERPL-MCNC: 0.63 NG/ML (ref 0–0.04)
TROPONIN I SERPL-MCNC: 1.87 NG/ML (ref 0–0.04)
TSH SERPL DL<=0.05 MIU/L-ACNC: 0.08 UIU/ML (ref 0.36–3.74)
TSH SERPL DL<=0.05 MIU/L-ACNC: 48.2 UIU/ML (ref 0.36–3.74)
TSI ACT/NOR SER: 20 IU/L (ref 0–0.55)
UNIT DIVISION, %UDIV: 0
UROBILINOGEN UR QL STRIP.AUTO: 0.2 EU/DL (ref 0.2–1)
UROBILINOGEN UR QL STRIP.AUTO: 1 EU/DL (ref 0.2–1)
VENTRICULAR RATE, ECG03: 136 BPM
VENTRICULAR RATE, ECG03: 183 BPM
VENTRICULAR RATE, ECG03: 196 BPM
VENTRICULAR RATE, ECG03: 58 BPM
VENTRICULAR RATE, ECG03: 78 BPM
VENTRICULAR RATE, ECG03: 79 BPM
VENTRICULAR RATE, ECG03: 79 BPM
VIT B12 SERPL-MCNC: 542 PG/ML (ref 211–911)
WBC # BLD AUTO: 11.9 K/UL (ref 4.6–13.2)
WBC # BLD AUTO: 13.1 K/UL (ref 4.6–13.2)
WBC # BLD AUTO: 13.6 K/UL (ref 4.6–13.2)
WBC # BLD AUTO: 14.7 K/UL (ref 4.6–13.2)
WBC # BLD AUTO: 14.8 K/UL (ref 4.6–13.2)
WBC # BLD AUTO: 15.4 K/UL (ref 4.6–13.2)
WBC # BLD AUTO: 20 K/UL (ref 4.6–13.2)
WBC # BLD AUTO: 8.3 K/UL (ref 4.6–13.2)
WBC # BLD AUTO: 8.4 K/UL (ref 4.6–13.2)
WBC # BLD AUTO: 8.7 K/UL (ref 4.6–13.2)
WBC # BLD AUTO: 8.8 K/UL (ref 4.6–13.2)
WBC # BLD AUTO: 9.5 K/UL (ref 4.6–13.2)
WBC # BLD AUTO: 9.6 K/UL (ref 4.6–13.2)
WBC # BLD AUTO: 9.8 K/UL (ref 4.6–13.2)
WBC URNS QL MICRO: ABNORMAL /HPF (ref 0–4)
WBC URNS QL MICRO: NORMAL /HPF (ref 0–5)

## 2020-01-01 PROCEDURE — 85025 COMPLETE CBC W/AUTO DIFF WBC: CPT

## 2020-01-01 PROCEDURE — P9016 RBC LEUKOCYTES REDUCED: HCPCS

## 2020-01-01 PROCEDURE — G8419 CALC BMI OUT NRM PARAM NOF/U: HCPCS | Performed by: PHYSICIAN ASSISTANT

## 2020-01-01 PROCEDURE — 74011250637 HC RX REV CODE- 250/637: Performed by: INTERNAL MEDICINE

## 2020-01-01 PROCEDURE — 74011250637 HC RX REV CODE- 250/637: Performed by: NURSE PRACTITIONER

## 2020-01-01 PROCEDURE — 74011250637 HC RX REV CODE- 250/637: Performed by: PHYSICIAN ASSISTANT

## 2020-01-01 PROCEDURE — 93005 ELECTROCARDIOGRAM TRACING: CPT

## 2020-01-01 PROCEDURE — 82962 GLUCOSE BLOOD TEST: CPT

## 2020-01-01 PROCEDURE — 77010033678 HC OXYGEN DAILY

## 2020-01-01 PROCEDURE — 74011250636 HC RX REV CODE- 250/636: Performed by: HOSPITALIST

## 2020-01-01 PROCEDURE — 74011250637 HC RX REV CODE- 250/637: Performed by: FAMILY MEDICINE

## 2020-01-01 PROCEDURE — C1713 ANCHOR/SCREW BN/BN,TIS/BN: HCPCS | Performed by: ORTHOPAEDIC SURGERY

## 2020-01-01 PROCEDURE — 51798 US URINE CAPACITY MEASURE: CPT

## 2020-01-01 PROCEDURE — 80048 BASIC METABOLIC PNL TOTAL CA: CPT

## 2020-01-01 PROCEDURE — 97530 THERAPEUTIC ACTIVITIES: CPT

## 2020-01-01 PROCEDURE — 74011250636 HC RX REV CODE- 250/636: Performed by: INTERNAL MEDICINE

## 2020-01-01 PROCEDURE — 82728 ASSAY OF FERRITIN: CPT

## 2020-01-01 PROCEDURE — 74011000250 HC RX REV CODE- 250: Performed by: INTERNAL MEDICINE

## 2020-01-01 PROCEDURE — G8419 CALC BMI OUT NRM PARAM NOF/U: HCPCS | Performed by: ORTHOPAEDIC SURGERY

## 2020-01-01 PROCEDURE — 82272 OCCULT BLD FECES 1-3 TESTS: CPT

## 2020-01-01 PROCEDURE — 65270000029 HC RM PRIVATE

## 2020-01-01 PROCEDURE — 76937 US GUIDE VASCULAR ACCESS: CPT

## 2020-01-01 PROCEDURE — 74011250637 HC RX REV CODE- 250/637: Performed by: HOSPITALIST

## 2020-01-01 PROCEDURE — 86376 MICROSOMAL ANTIBODY EACH: CPT

## 2020-01-01 PROCEDURE — 94762 N-INVAS EAR/PLS OXIMTRY CONT: CPT

## 2020-01-01 PROCEDURE — 1100F PTFALLS ASSESS-DOCD GE2>/YR: CPT | Performed by: ORTHOPAEDIC SURGERY

## 2020-01-01 PROCEDURE — 83540 ASSAY OF IRON: CPT

## 2020-01-01 PROCEDURE — C9113 INJ PANTOPRAZOLE SODIUM, VIA: HCPCS | Performed by: INTERNAL MEDICINE

## 2020-01-01 PROCEDURE — G8428 CUR MEDS NOT DOCUMENT: HCPCS | Performed by: PHYSICIAN ASSISTANT

## 2020-01-01 PROCEDURE — 85018 HEMOGLOBIN: CPT

## 2020-01-01 PROCEDURE — 84439 ASSAY OF FREE THYROXINE: CPT

## 2020-01-01 PROCEDURE — 74011250636 HC RX REV CODE- 250/636: Performed by: PHYSICIAN ASSISTANT

## 2020-01-01 PROCEDURE — 77030040361 HC SLV COMPR DVT MDII -B: Performed by: ORTHOPAEDIC SURGERY

## 2020-01-01 PROCEDURE — 36415 COLL VENOUS BLD VENIPUNCTURE: CPT

## 2020-01-01 PROCEDURE — 77030040830 HC CATH URETH FOL MDII -A

## 2020-01-01 PROCEDURE — 73130 X-RAY EXAM OF HAND: CPT

## 2020-01-01 PROCEDURE — 74011250636 HC RX REV CODE- 250/636

## 2020-01-01 PROCEDURE — 96375 TX/PRO/DX INJ NEW DRUG ADDON: CPT

## 2020-01-01 PROCEDURE — 81001 URINALYSIS AUTO W/SCOPE: CPT

## 2020-01-01 PROCEDURE — 84480 ASSAY TRIIODOTHYRONINE (T3): CPT

## 2020-01-01 PROCEDURE — 97535 SELF CARE MNGMENT TRAINING: CPT

## 2020-01-01 PROCEDURE — 77030037878 HC DRSG MEPILEX >48IN BORD MOLN -B

## 2020-01-01 PROCEDURE — C1769 GUIDE WIRE: HCPCS | Performed by: ORTHOPAEDIC SURGERY

## 2020-01-01 PROCEDURE — 1090F PRES/ABSN URINE INCON ASSESS: CPT | Performed by: PHYSICIAN ASSISTANT

## 2020-01-01 PROCEDURE — 97168 OT RE-EVAL EST PLAN CARE: CPT

## 2020-01-01 PROCEDURE — 74011000258 HC RX REV CODE- 258: Performed by: INTERNAL MEDICINE

## 2020-01-01 PROCEDURE — G8400 PT W/DXA NO RESULTS DOC: HCPCS | Performed by: PHYSICIAN ASSISTANT

## 2020-01-01 PROCEDURE — G8427 DOCREV CUR MEDS BY ELIG CLIN: HCPCS | Performed by: ORTHOPAEDIC SURGERY

## 2020-01-01 PROCEDURE — 83880 ASSAY OF NATRIURETIC PEPTIDE: CPT

## 2020-01-01 PROCEDURE — 82570 ASSAY OF URINE CREATININE: CPT

## 2020-01-01 PROCEDURE — 85027 COMPLETE CBC AUTOMATED: CPT

## 2020-01-01 PROCEDURE — 82550 ASSAY OF CK (CPK): CPT

## 2020-01-01 PROCEDURE — 77030038269 HC DRN EXT URIN PURWCK BARD -A

## 2020-01-01 PROCEDURE — 77030041247 HC PROTECTOR HEEL HEELMEDIX MDII -B

## 2020-01-01 PROCEDURE — 70450 CT HEAD/BRAIN W/O DYE: CPT

## 2020-01-01 PROCEDURE — 85730 THROMBOPLASTIN TIME PARTIAL: CPT

## 2020-01-01 PROCEDURE — 77030016474 HC BIT DRL QC3 SYNT -C: Performed by: ORTHOPAEDIC SURGERY

## 2020-01-01 PROCEDURE — 97110 THERAPEUTIC EXERCISES: CPT

## 2020-01-01 PROCEDURE — 65660000000 HC RM CCU STEPDOWN

## 2020-01-01 PROCEDURE — C9113 INJ PANTOPRAZOLE SODIUM, VIA: HCPCS | Performed by: HOSPITALIST

## 2020-01-01 PROCEDURE — 77010033678 HC OXYGEN DAILY: Performed by: INTERNAL MEDICINE

## 2020-01-01 PROCEDURE — 71045 X-RAY EXAM CHEST 1 VIEW: CPT

## 2020-01-01 PROCEDURE — 1100F PTFALLS ASSESS-DOCD GE2>/YR: CPT | Performed by: PHYSICIAN ASSISTANT

## 2020-01-01 PROCEDURE — 99283 EMERGENCY DEPT VISIT LOW MDM: CPT

## 2020-01-01 PROCEDURE — 73502 X-RAY EXAM HIP UNI 2-3 VIEWS: CPT

## 2020-01-01 PROCEDURE — 85379 FIBRIN DEGRADATION QUANT: CPT

## 2020-01-01 PROCEDURE — 84156 ASSAY OF PROTEIN URINE: CPT

## 2020-01-01 PROCEDURE — 36430 TRANSFUSION BLD/BLD COMPNT: CPT

## 2020-01-01 PROCEDURE — 74011000258 HC RX REV CODE- 258: Performed by: NURSE PRACTITIONER

## 2020-01-01 PROCEDURE — 99285 EMERGENCY DEPT VISIT HI MDM: CPT

## 2020-01-01 PROCEDURE — 86923 COMPATIBILITY TEST ELECTRIC: CPT

## 2020-01-01 PROCEDURE — 97164 PT RE-EVAL EST PLAN CARE: CPT

## 2020-01-01 PROCEDURE — 84484 ASSAY OF TROPONIN QUANT: CPT

## 2020-01-01 PROCEDURE — 74011250636 HC RX REV CODE- 250/636: Performed by: NURSE PRACTITIONER

## 2020-01-01 PROCEDURE — 84481 FREE ASSAY (FT-3): CPT

## 2020-01-01 PROCEDURE — 3288F FALL RISK ASSESSMENT DOCD: CPT | Performed by: PHYSICIAN ASSISTANT

## 2020-01-01 PROCEDURE — 84443 ASSAY THYROID STIM HORMONE: CPT

## 2020-01-01 PROCEDURE — 76010000149 HC OR TIME 1 TO 1.5 HR: Performed by: ORTHOPAEDIC SURGERY

## 2020-01-01 PROCEDURE — 84145 PROCALCITONIN (PCT): CPT

## 2020-01-01 PROCEDURE — 77010033711 HC HIGH FLOW OXYGEN

## 2020-01-01 PROCEDURE — 86900 BLOOD TYPING SEROLOGIC ABO: CPT

## 2020-01-01 PROCEDURE — 96374 THER/PROPH/DIAG INJ IV PUSH: CPT

## 2020-01-01 PROCEDURE — 77030018836 HC SOL IRR NACL ICUM -A: Performed by: ORTHOPAEDIC SURGERY

## 2020-01-01 PROCEDURE — 97116 GAIT TRAINING THERAPY: CPT

## 2020-01-01 PROCEDURE — G8432 DEP SCR NOT DOC, RNG: HCPCS | Performed by: ORTHOPAEDIC SURGERY

## 2020-01-01 PROCEDURE — 3288F FALL RISK ASSESSMENT DOCD: CPT | Performed by: ORTHOPAEDIC SURGERY

## 2020-01-01 PROCEDURE — G8400 PT W/DXA NO RESULTS DOC: HCPCS | Performed by: ORTHOPAEDIC SURGERY

## 2020-01-01 PROCEDURE — 84445 ASSAY OF TSI GLOBULIN: CPT

## 2020-01-01 PROCEDURE — 77030027138 HC INCENT SPIROMETER -A

## 2020-01-01 PROCEDURE — 74011250636 HC RX REV CODE- 250/636: Performed by: EMERGENCY MEDICINE

## 2020-01-01 PROCEDURE — 76060000033 HC ANESTHESIA 1 TO 1.5 HR: Performed by: ORTHOPAEDIC SURGERY

## 2020-01-01 PROCEDURE — 36600 WITHDRAWAL OF ARTERIAL BLOOD: CPT

## 2020-01-01 PROCEDURE — 74011000250 HC RX REV CODE- 250

## 2020-01-01 PROCEDURE — G8753 SYS BP > OR = 140: HCPCS | Performed by: PHYSICIAN ASSISTANT

## 2020-01-01 PROCEDURE — 76210000017 HC OR PH I REC 1.5 TO 2 HR: Performed by: ORTHOPAEDIC SURGERY

## 2020-01-01 PROCEDURE — G8752 SYS BP LESS 140: HCPCS | Performed by: ORTHOPAEDIC SURGERY

## 2020-01-01 PROCEDURE — 72170 X-RAY EXAM OF PELVIS: CPT

## 2020-01-01 PROCEDURE — 80053 COMPREHEN METABOLIC PANEL: CPT

## 2020-01-01 PROCEDURE — G8754 DIAS BP LESS 90: HCPCS | Performed by: PHYSICIAN ASSISTANT

## 2020-01-01 PROCEDURE — 99213 OFFICE O/P EST LOW 20 MIN: CPT | Performed by: ORTHOPAEDIC SURGERY

## 2020-01-01 PROCEDURE — 74011000250 HC RX REV CODE- 250: Performed by: NURSE PRACTITIONER

## 2020-01-01 PROCEDURE — 74011000250 HC RX REV CODE- 250: Performed by: NURSE ANESTHETIST, CERTIFIED REGISTERED

## 2020-01-01 PROCEDURE — 77030008462 HC STPLR SKN PROX J&J -A: Performed by: ORTHOPAEDIC SURGERY

## 2020-01-01 PROCEDURE — 82803 BLOOD GASES ANY COMBINATION: CPT

## 2020-01-01 PROCEDURE — 74011250636 HC RX REV CODE- 250/636: Performed by: NURSE ANESTHETIST, CERTIFIED REGISTERED

## 2020-01-01 PROCEDURE — 77030019908 HC STETH ESOPH SIMS -A: Performed by: ANESTHESIOLOGY

## 2020-01-01 PROCEDURE — 82607 VITAMIN B-12: CPT

## 2020-01-01 PROCEDURE — G8754 DIAS BP LESS 90: HCPCS | Performed by: ORTHOPAEDIC SURGERY

## 2020-01-01 PROCEDURE — 30233N1 TRANSFUSION OF NONAUTOLOGOUS RED BLOOD CELLS INTO PERIPHERAL VEIN, PERCUTANEOUS APPROACH: ICD-10-PCS | Performed by: HOSPITALIST

## 2020-01-01 PROCEDURE — 74011000250 HC RX REV CODE- 250: Performed by: STUDENT IN AN ORGANIZED HEALTH CARE EDUCATION/TRAINING PROGRAM

## 2020-01-01 PROCEDURE — 87040 BLOOD CULTURE FOR BACTERIA: CPT

## 2020-01-01 PROCEDURE — G8536 NO DOC ELDER MAL SCRN: HCPCS | Performed by: ORTHOPAEDIC SURGERY

## 2020-01-01 PROCEDURE — 93970 EXTREMITY STUDY: CPT

## 2020-01-01 PROCEDURE — 77030008683 HC TU ET CUF COVD -A: Performed by: ANESTHESIOLOGY

## 2020-01-01 PROCEDURE — 99213 OFFICE O/P EST LOW 20 MIN: CPT | Performed by: PHYSICIAN ASSISTANT

## 2020-01-01 PROCEDURE — 83036 HEMOGLOBIN GLYCOSYLATED A1C: CPT

## 2020-01-01 PROCEDURE — 97165 OT EVAL LOW COMPLEX 30 MIN: CPT

## 2020-01-01 PROCEDURE — 83735 ASSAY OF MAGNESIUM: CPT

## 2020-01-01 PROCEDURE — 77030038554 HC DRSG WND THERAHONEY MDII -Z

## 2020-01-01 PROCEDURE — 77030013079 HC BLNKT BAIR HGGR 3M -A: Performed by: ANESTHESIOLOGY

## 2020-01-01 PROCEDURE — 84132 ASSAY OF SERUM POTASSIUM: CPT

## 2020-01-01 PROCEDURE — A9516 IODINE I-123 SOD IODIDE MIC: HCPCS

## 2020-01-01 PROCEDURE — 77030031139 HC SUT VCRL2 J&J -A: Performed by: ORTHOPAEDIC SURGERY

## 2020-01-01 PROCEDURE — 0QS604Z REPOSITION RIGHT UPPER FEMUR WITH INTERNAL FIXATION DEVICE, OPEN APPROACH: ICD-10-PCS | Performed by: ORTHOPAEDIC SURGERY

## 2020-01-01 PROCEDURE — 75810000053 HC SPLINT APPLICATION

## 2020-01-01 PROCEDURE — 97162 PT EVAL MOD COMPLEX 30 MIN: CPT

## 2020-01-01 PROCEDURE — 80076 HEPATIC FUNCTION PANEL: CPT

## 2020-01-01 PROCEDURE — 1090F PRES/ABSN URINE INCON ASSESS: CPT | Performed by: ORTHOPAEDIC SURGERY

## 2020-01-01 PROCEDURE — G8536 NO DOC ELDER MAL SCRN: HCPCS | Performed by: PHYSICIAN ASSISTANT

## 2020-01-01 PROCEDURE — 74011000258 HC RX REV CODE- 258: Performed by: STUDENT IN AN ORGANIZED HEALTH CARE EDUCATION/TRAINING PROGRAM

## 2020-01-01 PROCEDURE — 85610 PROTHROMBIN TIME: CPT

## 2020-01-01 PROCEDURE — 93306 TTE W/DOPPLER COMPLETE: CPT

## 2020-01-01 PROCEDURE — 77030003029 HC SUT VCRL J&J -B: Performed by: ORTHOPAEDIC SURGERY

## 2020-01-01 PROCEDURE — G8432 DEP SCR NOT DOC, RNG: HCPCS | Performed by: PHYSICIAN ASSISTANT

## 2020-01-01 PROCEDURE — 73130 X-RAY EXAM OF HAND: CPT | Performed by: ORTHOPAEDIC SURGERY

## 2020-01-01 PROCEDURE — 74011250636 HC RX REV CODE- 250/636: Performed by: STUDENT IN AN ORGANIZED HEALTH CARE EDUCATION/TRAINING PROGRAM

## 2020-01-01 PROCEDURE — 94760 N-INVAS EAR/PLS OXIMETRY 1: CPT

## 2020-01-01 DEVICE — IMPLANTABLE DEVICE: Type: IMPLANTABLE DEVICE | Site: HIP | Status: FUNCTIONAL

## 2020-01-01 DEVICE — SCREW BNE L36MM DIA5MM NONSTERILE TIB LT GRN TI ST CANN LOK: Type: IMPLANTABLE DEVICE | Site: HIP | Status: FUNCTIONAL

## 2020-01-01 RX ORDER — CALCITRIOL 0.25 UG/1
0.25 CAPSULE ORAL
Status: DISCONTINUED | OUTPATIENT
Start: 2020-01-01 | End: 2020-01-01 | Stop reason: HOSPADM

## 2020-01-01 RX ORDER — ACETAMINOPHEN 325 MG/1
650 TABLET ORAL EVERY 8 HOURS
Status: DISCONTINUED | OUTPATIENT
Start: 2020-01-01 | End: 2020-01-01 | Stop reason: HOSPADM

## 2020-01-01 RX ORDER — AMIODARONE HYDROCHLORIDE 200 MG/1
200 TABLET ORAL DAILY
Status: DISCONTINUED | OUTPATIENT
Start: 2020-01-01 | End: 2020-01-01

## 2020-01-01 RX ORDER — SODIUM CHLORIDE 9 MG/ML
250 INJECTION, SOLUTION INTRAVENOUS AS NEEDED
Status: DISCONTINUED | OUTPATIENT
Start: 2020-01-01 | End: 2020-01-01 | Stop reason: ALTCHOICE

## 2020-01-01 RX ORDER — HEPARIN SODIUM 5000 [USP'U]/ML
5000 INJECTION, SOLUTION INTRAVENOUS; SUBCUTANEOUS EVERY 8 HOURS
Status: DISCONTINUED | OUTPATIENT
Start: 2020-01-01 | End: 2020-01-01

## 2020-01-01 RX ORDER — METHIMAZOLE 10 MG/1
10 TABLET ORAL DAILY
Qty: 30 TAB | Refills: 0 | Status: SHIPPED
Start: 2020-01-01

## 2020-01-01 RX ORDER — NALOXONE HYDROCHLORIDE 1 MG/ML
1 INJECTION INTRAMUSCULAR; INTRAVENOUS; SUBCUTANEOUS
Status: ACTIVE | OUTPATIENT
Start: 2020-01-01 | End: 2020-01-01

## 2020-01-01 RX ORDER — METOPROLOL TARTRATE 25 MG/1
12.5 TABLET, FILM COATED ORAL EVERY 12 HOURS
Status: DISCONTINUED | OUTPATIENT
Start: 2020-01-01 | End: 2020-01-01

## 2020-01-01 RX ORDER — PANTOPRAZOLE SODIUM 40 MG/10ML
40 INJECTION, POWDER, LYOPHILIZED, FOR SOLUTION INTRAVENOUS EVERY 12 HOURS
Status: DISCONTINUED | OUTPATIENT
Start: 2020-01-01 | End: 2020-01-01

## 2020-01-01 RX ORDER — LORAZEPAM 0.5 MG/1
0.5 TABLET ORAL 2 TIMES DAILY
Status: DISCONTINUED | OUTPATIENT
Start: 2020-01-01 | End: 2020-01-01

## 2020-01-01 RX ORDER — DEXTROSE MONOHYDRATE AND SODIUM CHLORIDE 5; .9 G/100ML; G/100ML
100 INJECTION, SOLUTION INTRAVENOUS CONTINUOUS
Status: DISCONTINUED | OUTPATIENT
Start: 2020-01-01 | End: 2020-01-01

## 2020-01-01 RX ORDER — AMIODARONE HCL/D5W 450 MG/250
1 PLASTIC BAG, INJECTION (ML) INTRAVENOUS
Status: DISCONTINUED | OUTPATIENT
Start: 2020-01-01 | End: 2020-01-01

## 2020-01-01 RX ORDER — POLYETHYLENE GLYCOL 3350 17 G/17G
17 POWDER, FOR SOLUTION ORAL DAILY
Qty: 30 PACKET | Refills: 0 | Status: SHIPPED
Start: 2020-01-01

## 2020-01-01 RX ORDER — SIMVASTATIN 20 MG/1
20 TABLET, FILM COATED ORAL
COMMUNITY
End: 2020-01-01

## 2020-01-01 RX ORDER — FUROSEMIDE 10 MG/ML
20 INJECTION INTRAMUSCULAR; INTRAVENOUS ONCE
Status: COMPLETED | OUTPATIENT
Start: 2020-01-01 | End: 2020-01-01

## 2020-01-01 RX ORDER — AMIODARONE HYDROCHLORIDE 200 MG/1
200 TABLET ORAL 3 TIMES DAILY
Status: DISCONTINUED | OUTPATIENT
Start: 2020-01-01 | End: 2020-01-01

## 2020-01-01 RX ORDER — DEXAMETHASONE SODIUM PHOSPHATE 4 MG/ML
INJECTION, SOLUTION INTRA-ARTICULAR; INTRALESIONAL; INTRAMUSCULAR; INTRAVENOUS; SOFT TISSUE AS NEEDED
Status: DISCONTINUED | OUTPATIENT
Start: 2020-01-01 | End: 2020-01-01 | Stop reason: HOSPADM

## 2020-01-01 RX ORDER — ASPIRIN 325 MG
325 TABLET ORAL 2 TIMES DAILY
Status: DISCONTINUED | OUTPATIENT
Start: 2020-01-01 | End: 2020-01-01

## 2020-01-01 RX ORDER — SODIUM CHLORIDE 9 MG/ML
250 INJECTION, SOLUTION INTRAVENOUS AS NEEDED
Status: DISCONTINUED | OUTPATIENT
Start: 2020-01-01 | End: 2020-01-01 | Stop reason: SDUPTHER

## 2020-01-01 RX ORDER — PANTOPRAZOLE SODIUM 40 MG/1
40 TABLET, DELAYED RELEASE ORAL
Status: DISCONTINUED | OUTPATIENT
Start: 2020-01-01 | End: 2020-01-01 | Stop reason: HOSPADM

## 2020-01-01 RX ORDER — EPHEDRINE SULFATE/0.9% NACL/PF 50 MG/5 ML
SYRINGE (ML) INTRAVENOUS AS NEEDED
Status: DISCONTINUED | OUTPATIENT
Start: 2020-01-01 | End: 2020-01-01 | Stop reason: HOSPADM

## 2020-01-01 RX ORDER — CALCITRIOL 0.25 UG/1
0.25 CAPSULE ORAL
Qty: 12 CAP | Refills: 0 | Status: SHIPPED
Start: 2020-01-01

## 2020-01-01 RX ORDER — DILTIAZEM HYDROCHLORIDE 5 MG/ML
INJECTION INTRAVENOUS
Status: DISPENSED
Start: 2020-01-01 | End: 2020-01-01

## 2020-01-01 RX ORDER — SODIUM CHLORIDE 9 MG/ML
500 INJECTION, SOLUTION INTRAVENOUS ONCE
Status: COMPLETED | OUTPATIENT
Start: 2020-01-01 | End: 2020-01-01

## 2020-01-01 RX ORDER — POLYETHYLENE GLYCOL 3350 17 G/17G
17 POWDER, FOR SOLUTION ORAL DAILY
Status: DISCONTINUED | OUTPATIENT
Start: 2020-01-01 | End: 2020-01-01 | Stop reason: HOSPADM

## 2020-01-01 RX ORDER — PROPOFOL 10 MG/ML
INJECTION, EMULSION INTRAVENOUS AS NEEDED
Status: DISCONTINUED | OUTPATIENT
Start: 2020-01-01 | End: 2020-01-01 | Stop reason: HOSPADM

## 2020-01-01 RX ORDER — HEPARIN SODIUM 1000 [USP'U]/ML
INJECTION, SOLUTION INTRAVENOUS; SUBCUTANEOUS
Status: COMPLETED
Start: 2020-01-01 | End: 2020-01-01

## 2020-01-01 RX ORDER — DIGOXIN 0.25 MG/ML
500 INJECTION INTRAMUSCULAR; INTRAVENOUS
Status: COMPLETED | OUTPATIENT
Start: 2020-01-01 | End: 2020-01-01

## 2020-01-01 RX ORDER — ATORVASTATIN CALCIUM 40 MG/1
40 TABLET, FILM COATED ORAL
Qty: 30 TAB | Refills: 0 | Status: SHIPPED
Start: 2020-01-01

## 2020-01-01 RX ORDER — ATORVASTATIN CALCIUM 10 MG/1
10 TABLET, FILM COATED ORAL
Status: DISCONTINUED | OUTPATIENT
Start: 2020-01-01 | End: 2020-01-01

## 2020-01-01 RX ORDER — HEPARIN SODIUM 1000 [USP'U]/ML
3000 INJECTION, SOLUTION INTRAVENOUS; SUBCUTANEOUS ONCE
Status: COMPLETED | OUTPATIENT
Start: 2020-01-01 | End: 2020-01-01

## 2020-01-01 RX ORDER — SODIUM CHLORIDE 9 MG/ML
50 INJECTION, SOLUTION INTRAVENOUS CONTINUOUS
Status: DISCONTINUED | OUTPATIENT
Start: 2020-01-01 | End: 2020-01-01

## 2020-01-01 RX ORDER — LORAZEPAM 0.5 MG/1
0.5 TABLET ORAL
Status: DISCONTINUED | OUTPATIENT
Start: 2020-01-01 | End: 2020-01-01 | Stop reason: HOSPADM

## 2020-01-01 RX ORDER — HEPARIN SODIUM 10000 [USP'U]/100ML
12-25 INJECTION, SOLUTION INTRAVENOUS
Status: DISCONTINUED | OUTPATIENT
Start: 2020-01-01 | End: 2020-01-01

## 2020-01-01 RX ORDER — LORAZEPAM 1 MG/1
0.5 TABLET ORAL
Qty: 6 TAB | Refills: 0 | Status: SHIPPED | OUTPATIENT
Start: 2020-01-01

## 2020-01-01 RX ORDER — METOPROLOL TARTRATE 25 MG/1
25 TABLET, FILM COATED ORAL 2 TIMES DAILY
Status: DISCONTINUED | OUTPATIENT
Start: 2020-01-01 | End: 2020-01-01

## 2020-01-01 RX ORDER — ATORVASTATIN CALCIUM 40 MG/1
40 TABLET, FILM COATED ORAL
Status: DISCONTINUED | OUTPATIENT
Start: 2020-01-01 | End: 2020-01-01 | Stop reason: HOSPADM

## 2020-01-01 RX ORDER — ONDANSETRON 2 MG/ML
4 INJECTION INTRAMUSCULAR; INTRAVENOUS
Status: COMPLETED | OUTPATIENT
Start: 2020-01-01 | End: 2020-01-01

## 2020-01-01 RX ORDER — PANTOPRAZOLE SODIUM 40 MG/1
40 TABLET, DELAYED RELEASE ORAL
Status: DISCONTINUED | OUTPATIENT
Start: 2020-01-01 | End: 2020-01-01

## 2020-01-01 RX ORDER — SODIUM CHLORIDE 9 MG/ML
150 INJECTION, SOLUTION INTRAVENOUS ONCE
Status: COMPLETED | OUTPATIENT
Start: 2020-01-01 | End: 2020-01-01

## 2020-01-01 RX ORDER — CEFPODOXIME PROXETIL 100 MG/1
200 TABLET, FILM COATED ORAL EVERY 12 HOURS
Status: DISCONTINUED | OUTPATIENT
Start: 2020-01-01 | End: 2020-01-01

## 2020-01-01 RX ORDER — SODIUM BICARBONATE 650 MG/1
650 TABLET ORAL 2 TIMES DAILY
Status: DISCONTINUED | OUTPATIENT
Start: 2020-01-01 | End: 2020-01-01 | Stop reason: HOSPADM

## 2020-01-01 RX ORDER — SODIUM CHLORIDE 9 MG/ML
1000 INJECTION, SOLUTION INTRAVENOUS ONCE
Status: COMPLETED | OUTPATIENT
Start: 2020-01-01 | End: 2020-01-01

## 2020-01-01 RX ORDER — HYDROMORPHONE HYDROCHLORIDE 2 MG/ML
1 INJECTION, SOLUTION INTRAMUSCULAR; INTRAVENOUS; SUBCUTANEOUS
Status: DISCONTINUED | OUTPATIENT
Start: 2020-01-01 | End: 2020-01-01 | Stop reason: HOSPADM

## 2020-01-01 RX ORDER — ONDANSETRON 2 MG/ML
INJECTION INTRAMUSCULAR; INTRAVENOUS AS NEEDED
Status: DISCONTINUED | OUTPATIENT
Start: 2020-01-01 | End: 2020-01-01 | Stop reason: HOSPADM

## 2020-01-01 RX ORDER — ACETAMINOPHEN 500 MG
1000 TABLET ORAL EVERY 8 HOURS
Status: DISCONTINUED | OUTPATIENT
Start: 2020-01-01 | End: 2020-01-01

## 2020-01-01 RX ORDER — METOPROLOL TARTRATE 5 MG/5ML
INJECTION INTRAVENOUS
Status: DISPENSED
Start: 2020-01-01 | End: 2020-01-01

## 2020-01-01 RX ORDER — METOPROLOL TARTRATE 5 MG/5ML
2.5 INJECTION INTRAVENOUS ONCE
Status: COMPLETED | OUTPATIENT
Start: 2020-01-01 | End: 2020-01-01

## 2020-01-01 RX ORDER — SUCCINYLCHOLINE CHLORIDE 20 MG/ML
INJECTION INTRAMUSCULAR; INTRAVENOUS AS NEEDED
Status: DISCONTINUED | OUTPATIENT
Start: 2020-01-01 | End: 2020-01-01 | Stop reason: HOSPADM

## 2020-01-01 RX ORDER — DEXMEDETOMIDINE HYDROCHLORIDE 100 UG/ML
INJECTION, SOLUTION INTRAVENOUS AS NEEDED
Status: DISCONTINUED | OUTPATIENT
Start: 2020-01-01 | End: 2020-01-01 | Stop reason: HOSPADM

## 2020-01-01 RX ORDER — ONDANSETRON 2 MG/ML
8 INJECTION INTRAMUSCULAR; INTRAVENOUS
Status: DISCONTINUED | OUTPATIENT
Start: 2020-01-01 | End: 2020-01-01 | Stop reason: HOSPADM

## 2020-01-01 RX ORDER — VANCOMYCIN/0.9 % SOD CHLORIDE 1.5G/250ML
1500 PLASTIC BAG, INJECTION (ML) INTRAVENOUS
Status: COMPLETED | OUTPATIENT
Start: 2020-01-01 | End: 2020-01-01

## 2020-01-01 RX ORDER — ALLOPURINOL 100 MG/1
100 TABLET ORAL DAILY
COMMUNITY

## 2020-01-01 RX ORDER — ENOXAPARIN SODIUM 100 MG/ML
30 INJECTION SUBCUTANEOUS EVERY 24 HOURS
Status: DISCONTINUED | OUTPATIENT
Start: 2020-01-01 | End: 2020-01-01

## 2020-01-01 RX ORDER — AMIODARONE HYDROCHLORIDE 200 MG/1
200 TABLET ORAL 2 TIMES DAILY
Status: DISCONTINUED | OUTPATIENT
Start: 2020-01-01 | End: 2020-01-01

## 2020-01-01 RX ORDER — SODIUM POLYSTYRENE SULFONATE 15 G/60ML
30 SUSPENSION ORAL; RECTAL
Status: COMPLETED | OUTPATIENT
Start: 2020-01-01 | End: 2020-01-01

## 2020-01-01 RX ORDER — ALLOPURINOL 100 MG/1
100 TABLET ORAL DAILY
Status: DISCONTINUED | OUTPATIENT
Start: 2020-01-01 | End: 2020-01-01 | Stop reason: HOSPADM

## 2020-01-01 RX ORDER — ONDANSETRON 2 MG/ML
4 INJECTION INTRAMUSCULAR; INTRAVENOUS ONCE
Status: COMPLETED | OUTPATIENT
Start: 2020-01-01 | End: 2020-01-01

## 2020-01-01 RX ORDER — ASPIRIN 81 MG/1
81 TABLET ORAL DAILY
Status: DISCONTINUED | OUTPATIENT
Start: 2020-01-01 | End: 2020-01-01 | Stop reason: HOSPADM

## 2020-01-01 RX ORDER — CEFPODOXIME PROXETIL 200 MG/1
200 TABLET, FILM COATED ORAL EVERY 24 HOURS
Qty: 5 TAB | Refills: 0 | Status: SHIPPED
Start: 2020-01-01 | End: 2020-01-01

## 2020-01-01 RX ORDER — LEVOTHYROXINE SODIUM ANHYDROUS 200 UG/5ML
100 INJECTION, POWDER, LYOPHILIZED, FOR SOLUTION INTRAVENOUS DAILY
Status: DISCONTINUED | OUTPATIENT
Start: 2020-01-01 | End: 2020-01-01

## 2020-01-01 RX ORDER — AMLODIPINE BESYLATE 5 MG/1
5 TABLET ORAL DAILY
COMMUNITY
End: 2020-01-01

## 2020-01-01 RX ORDER — METRONIDAZOLE 500 MG/1
500 TABLET ORAL EVERY 12 HOURS
Qty: 10 TAB | Refills: 0 | Status: SHIPPED
Start: 2020-01-01 | End: 2020-01-01

## 2020-01-01 RX ORDER — ACETAMINOPHEN 500 MG
1000 TABLET ORAL ONCE
Status: DISCONTINUED | OUTPATIENT
Start: 2020-01-01 | End: 2020-01-01 | Stop reason: HOSPADM

## 2020-01-01 RX ORDER — DILTIAZEM HYDROCHLORIDE 5 MG/ML
0.25 INJECTION INTRAVENOUS ONCE
Status: COMPLETED | OUTPATIENT
Start: 2020-01-01 | End: 2020-01-01

## 2020-01-01 RX ORDER — DILTIAZEM HYDROCHLORIDE 5 MG/ML
INJECTION INTRAVENOUS
Status: COMPLETED
Start: 2020-01-01 | End: 2020-01-01

## 2020-01-01 RX ORDER — GLYCOPYRROLATE 0.2 MG/ML
INJECTION INTRAMUSCULAR; INTRAVENOUS AS NEEDED
Status: DISCONTINUED | OUTPATIENT
Start: 2020-01-01 | End: 2020-01-01 | Stop reason: HOSPADM

## 2020-01-01 RX ORDER — SODIUM BICARBONATE 650 MG/1
650 TABLET ORAL 3 TIMES DAILY
Status: DISCONTINUED | OUTPATIENT
Start: 2020-01-01 | End: 2020-01-01

## 2020-01-01 RX ORDER — PANTOPRAZOLE SODIUM 40 MG/1
40 TABLET, DELAYED RELEASE ORAL
Qty: 60 TAB | Refills: 0 | Status: SHIPPED
Start: 2020-01-01

## 2020-01-01 RX ORDER — OXYCODONE HYDROCHLORIDE 5 MG/1
5 TABLET ORAL
Qty: 28 TAB | Refills: 0 | Status: SHIPPED | OUTPATIENT
Start: 2020-01-01 | End: 2020-01-01

## 2020-01-01 RX ORDER — PROMETHAZINE HYDROCHLORIDE 25 MG/ML
INJECTION, SOLUTION INTRAMUSCULAR; INTRAVENOUS
Status: DISCONTINUED
Start: 2020-01-01 | End: 2020-01-01

## 2020-01-01 RX ORDER — FUROSEMIDE 20 MG/1
20 TABLET ORAL EVERY OTHER DAY
COMMUNITY
End: 2020-01-01

## 2020-01-01 RX ORDER — METOPROLOL TARTRATE 25 MG/1
12.5 TABLET, FILM COATED ORAL EVERY 12 HOURS
Status: DISCONTINUED | OUTPATIENT
Start: 2020-01-01 | End: 2020-01-01 | Stop reason: HOSPADM

## 2020-01-01 RX ORDER — LIDOCAINE HYDROCHLORIDE 20 MG/ML
INJECTION, SOLUTION EPIDURAL; INFILTRATION; INTRACAUDAL; PERINEURAL AS NEEDED
Status: DISCONTINUED | OUTPATIENT
Start: 2020-01-01 | End: 2020-01-01 | Stop reason: HOSPADM

## 2020-01-01 RX ORDER — METHIMAZOLE 5 MG/1
10 TABLET ORAL EVERY 12 HOURS
Status: COMPLETED | OUTPATIENT
Start: 2020-01-01 | End: 2020-01-01

## 2020-01-01 RX ORDER — METRONIDAZOLE 500 MG/100ML
500 INJECTION, SOLUTION INTRAVENOUS EVERY 8 HOURS
Status: DISCONTINUED | OUTPATIENT
Start: 2020-01-01 | End: 2020-01-01

## 2020-01-01 RX ORDER — OXYCODONE AND ACETAMINOPHEN 5; 325 MG/1; MG/1
1-2 TABLET ORAL
Status: DISCONTINUED | OUTPATIENT
Start: 2020-01-01 | End: 2020-01-01

## 2020-01-01 RX ORDER — SODIUM CHLORIDE 9 MG/ML
INJECTION, SOLUTION INTRAVENOUS
Status: DISCONTINUED | OUTPATIENT
Start: 2020-01-01 | End: 2020-01-01 | Stop reason: HOSPADM

## 2020-01-01 RX ORDER — DILTIAZEM HYDROCHLORIDE 5 MG/ML
5 INJECTION INTRAVENOUS ONCE
Status: COMPLETED | OUTPATIENT
Start: 2020-01-01 | End: 2020-01-01

## 2020-01-01 RX ORDER — FAMOTIDINE 20 MG/1
20 TABLET, FILM COATED ORAL 2 TIMES DAILY
Status: DISCONTINUED | OUTPATIENT
Start: 2020-01-01 | End: 2020-01-01

## 2020-01-01 RX ORDER — HYDROCORTISONE ACETATE 25 MG/1
25 SUPPOSITORY RECTAL EVERY 12 HOURS
Status: DISCONTINUED | OUTPATIENT
Start: 2020-01-01 | End: 2020-01-01 | Stop reason: HOSPADM

## 2020-01-01 RX ORDER — METOPROLOL TARTRATE 25 MG/1
12.5 TABLET, FILM COATED ORAL EVERY 12 HOURS
Qty: 15 TAB | Refills: 0 | Status: SHIPPED
Start: 2020-01-01

## 2020-01-01 RX ORDER — SODIUM POLYSTYRENE SULFONATE 15 G/60ML
15 SUSPENSION ORAL; RECTAL
Status: COMPLETED | OUTPATIENT
Start: 2020-01-01 | End: 2020-01-01

## 2020-01-01 RX ORDER — OXYCODONE HYDROCHLORIDE 5 MG/1
5 TABLET ORAL
Status: DISCONTINUED | OUTPATIENT
Start: 2020-01-01 | End: 2020-01-01

## 2020-01-01 RX ORDER — FENTANYL CITRATE 50 UG/ML
50 INJECTION, SOLUTION INTRAMUSCULAR; INTRAVENOUS AS NEEDED
Status: DISCONTINUED | OUTPATIENT
Start: 2020-01-01 | End: 2020-01-01 | Stop reason: HOSPADM

## 2020-01-01 RX ORDER — KETAMINE HCL 50MG/ML(1)
SYRINGE (ML) INTRAVENOUS AS NEEDED
Status: DISCONTINUED | OUTPATIENT
Start: 2020-01-01 | End: 2020-01-01 | Stop reason: HOSPADM

## 2020-01-01 RX ORDER — MORPHINE SULFATE 2 MG/ML
2 INJECTION, SOLUTION INTRAMUSCULAR; INTRAVENOUS
Status: COMPLETED | OUTPATIENT
Start: 2020-01-01 | End: 2020-01-01

## 2020-01-01 RX ORDER — DEXTROSE MONOHYDRATE 50 MG/ML
100 INJECTION, SOLUTION INTRAVENOUS CONTINUOUS
Status: DISCONTINUED | OUTPATIENT
Start: 2020-01-01 | End: 2020-01-01

## 2020-01-01 RX ORDER — AMOXICILLIN 250 MG
1 CAPSULE ORAL DAILY
Status: DISCONTINUED | OUTPATIENT
Start: 2020-01-01 | End: 2020-01-01 | Stop reason: HOSPADM

## 2020-01-01 RX ORDER — SODIUM CHLORIDE 0.9 % (FLUSH) 0.9 %
5-40 SYRINGE (ML) INJECTION EVERY 8 HOURS
Status: DISCONTINUED | OUTPATIENT
Start: 2020-01-01 | End: 2020-01-01 | Stop reason: HOSPADM

## 2020-01-01 RX ORDER — LORAZEPAM 1 MG/1
1 TABLET ORAL
Status: DISCONTINUED | OUTPATIENT
Start: 2020-01-01 | End: 2020-01-01

## 2020-01-01 RX ORDER — SODIUM CHLORIDE 0.9 % (FLUSH) 0.9 %
5-40 SYRINGE (ML) INJECTION AS NEEDED
Status: DISCONTINUED | OUTPATIENT
Start: 2020-01-01 | End: 2020-01-01 | Stop reason: HOSPADM

## 2020-01-01 RX ORDER — CEFPODOXIME PROXETIL 100 MG/1
200 TABLET, FILM COATED ORAL EVERY 24 HOURS
Status: DISCONTINUED | OUTPATIENT
Start: 2020-01-01 | End: 2020-01-01 | Stop reason: HOSPADM

## 2020-01-01 RX ORDER — METOPROLOL TARTRATE 5 MG/5ML
5 INJECTION INTRAVENOUS EVERY 6 HOURS
Status: DISCONTINUED | OUTPATIENT
Start: 2020-01-01 | End: 2020-01-01

## 2020-01-01 RX ORDER — MORPHINE SULFATE 2 MG/ML
1-2 INJECTION, SOLUTION INTRAMUSCULAR; INTRAVENOUS
Status: DISCONTINUED | OUTPATIENT
Start: 2020-01-01 | End: 2020-01-01

## 2020-01-01 RX ORDER — METRONIDAZOLE 500 MG/1
500 TABLET ORAL EVERY 12 HOURS
Status: DISCONTINUED | OUTPATIENT
Start: 2020-01-01 | End: 2020-01-01 | Stop reason: HOSPADM

## 2020-01-01 RX ORDER — METHIMAZOLE 5 MG/1
10 TABLET ORAL DAILY
Status: DISCONTINUED | OUTPATIENT
Start: 2020-01-01 | End: 2020-01-01 | Stop reason: HOSPADM

## 2020-01-01 RX ADMIN — SODIUM CHLORIDE 40 MG: 9 INJECTION, SOLUTION INTRAMUSCULAR; INTRAVENOUS; SUBCUTANEOUS at 11:35

## 2020-01-01 RX ADMIN — NEPHROCAP 1 CAPSULE: 1 CAP ORAL at 15:26

## 2020-01-01 RX ADMIN — SENNOSIDES AND DOCUSATE SODIUM 1 TABLET: 8.6; 5 TABLET ORAL at 08:24

## 2020-01-01 RX ADMIN — ASPIRIN 325 MG ORAL TABLET 325 MG: 325 PILL ORAL at 08:23

## 2020-01-01 RX ADMIN — PANTOPRAZOLE 40 MG: 40 TABLET, DELAYED RELEASE ORAL at 05:20

## 2020-01-01 RX ADMIN — PANTOPRAZOLE 40 MG: 40 TABLET, DELAYED RELEASE ORAL at 05:44

## 2020-01-01 RX ADMIN — METOPROLOL TARTRATE 25 MG: 25 TABLET, FILM COATED ORAL at 08:56

## 2020-01-01 RX ADMIN — VANCOMYCIN HYDROCHLORIDE 1500 G: 10 INJECTION, POWDER, LYOPHILIZED, FOR SOLUTION INTRAVENOUS at 11:35

## 2020-01-01 RX ADMIN — ENOXAPARIN SODIUM 30 MG: 30 INJECTION SUBCUTANEOUS at 08:07

## 2020-01-01 RX ADMIN — ACETAMINOPHEN 1000 MG: 500 TABLET, FILM COATED ORAL at 05:20

## 2020-01-01 RX ADMIN — SODIUM CHLORIDE 40 MG: 9 INJECTION, SOLUTION INTRAMUSCULAR; INTRAVENOUS; SUBCUTANEOUS at 22:02

## 2020-01-01 RX ADMIN — OXYCODONE 5 MG: 5 TABLET ORAL at 11:24

## 2020-01-01 RX ADMIN — METHIMAZOLE 10 MG: 5 TABLET ORAL at 11:37

## 2020-01-01 RX ADMIN — HYDROCORTISONE ACETATE 25 MG: 25 SUPPOSITORY RECTAL at 11:36

## 2020-01-01 RX ADMIN — MORPHINE SULFATE 2 MG: 2 INJECTION, SOLUTION INTRAMUSCULAR; INTRAVENOUS at 07:30

## 2020-01-01 RX ADMIN — PANTOPRAZOLE SODIUM 40 MG: 40 INJECTION, POWDER, FOR SOLUTION INTRAVENOUS at 08:51

## 2020-01-01 RX ADMIN — ALLOPURINOL 100 MG: 100 TABLET ORAL at 11:36

## 2020-01-01 RX ADMIN — DEXTROSE MONOHYDRATE AND SODIUM CHLORIDE 100 ML/HR: 5; .9 INJECTION, SOLUTION INTRAVENOUS at 12:35

## 2020-01-01 RX ADMIN — MORPHINE SULFATE 2 MG: 2 INJECTION, SOLUTION INTRAMUSCULAR; INTRAVENOUS at 09:00

## 2020-01-01 RX ADMIN — METRONIDAZOLE 500 MG: 500 TABLET ORAL at 10:09

## 2020-01-01 RX ADMIN — PANTOPRAZOLE SODIUM 40 MG: 40 INJECTION, POWDER, FOR SOLUTION INTRAVENOUS at 08:07

## 2020-01-01 RX ADMIN — ACETAMINOPHEN 650 MG: 325 TABLET ORAL at 15:15

## 2020-01-01 RX ADMIN — ACETAMINOPHEN 650 MG: 325 TABLET ORAL at 22:32

## 2020-01-01 RX ADMIN — METOPROLOL TARTRATE 25 MG: 25 TABLET, FILM COATED ORAL at 09:02

## 2020-01-01 RX ADMIN — AMIODARONE HYDROCHLORIDE 200 MG: 200 TABLET ORAL at 17:34

## 2020-01-01 RX ADMIN — HYDROCORTISONE ACETATE 25 MG: 25 SUPPOSITORY RECTAL at 10:01

## 2020-01-01 RX ADMIN — SODIUM CHLORIDE 40 MG: 9 INJECTION, SOLUTION INTRAMUSCULAR; INTRAVENOUS; SUBCUTANEOUS at 21:37

## 2020-01-01 RX ADMIN — ACETAMINOPHEN 650 MG: 325 TABLET ORAL at 14:20

## 2020-01-01 RX ADMIN — Medication 50 MG: at 11:15

## 2020-01-01 RX ADMIN — OXYCODONE 5 MG: 5 TABLET ORAL at 13:30

## 2020-01-01 RX ADMIN — ACETAMINOPHEN 650 MG: 325 TABLET ORAL at 14:30

## 2020-01-01 RX ADMIN — POLYETHYLENE GLYCOL 3350 17 G: 17 POWDER, FOR SOLUTION ORAL at 08:13

## 2020-01-01 RX ADMIN — GLYCOPYRROLATE 0.2 MG: 0.2 INJECTION INTRAMUSCULAR; INTRAVENOUS at 12:03

## 2020-01-01 RX ADMIN — ACETAMINOPHEN 650 MG: 325 TABLET ORAL at 21:38

## 2020-01-01 RX ADMIN — LORAZEPAM 1 MG: 1 TABLET ORAL at 05:32

## 2020-01-01 RX ADMIN — ATORVASTATIN CALCIUM 10 MG: 10 TABLET, FILM COATED ORAL at 21:31

## 2020-01-01 RX ADMIN — ATORVASTATIN CALCIUM 40 MG: 40 TABLET, FILM COATED ORAL at 21:32

## 2020-01-01 RX ADMIN — ACETAMINOPHEN 650 MG: 325 TABLET ORAL at 15:30

## 2020-01-01 RX ADMIN — HYDROCORTISONE ACETATE 25 MG: 25 SUPPOSITORY RECTAL at 09:03

## 2020-01-01 RX ADMIN — OXYCODONE 5 MG: 5 TABLET ORAL at 07:45

## 2020-01-01 RX ADMIN — ACETAMINOPHEN 650 MG: 325 TABLET ORAL at 21:10

## 2020-01-01 RX ADMIN — PANTOPRAZOLE SODIUM 40 MG: 40 TABLET, DELAYED RELEASE ORAL at 16:01

## 2020-01-01 RX ADMIN — Medication 81 MG: at 08:29

## 2020-01-01 RX ADMIN — PANTOPRAZOLE SODIUM 40 MG: 40 TABLET, DELAYED RELEASE ORAL at 15:37

## 2020-01-01 RX ADMIN — SODIUM CHLORIDE 5 MG/HR: 900 INJECTION, SOLUTION INTRAVENOUS at 01:47

## 2020-01-01 RX ADMIN — NEPHROCAP 1 CAPSULE: 1 CAP ORAL at 09:10

## 2020-01-01 RX ADMIN — METHIMAZOLE 10 MG: 5 TABLET ORAL at 09:02

## 2020-01-01 RX ADMIN — HYDROCORTISONE ACETATE 25 MG: 25 SUPPOSITORY RECTAL at 08:50

## 2020-01-01 RX ADMIN — CEFEPIME 2 G: 2 INJECTION, POWDER, FOR SOLUTION INTRAVENOUS at 17:47

## 2020-01-01 RX ADMIN — LORAZEPAM 1 MG: 1 TABLET ORAL at 21:26

## 2020-01-01 RX ADMIN — METHIMAZOLE 10 MG: 5 TABLET ORAL at 10:28

## 2020-01-01 RX ADMIN — METHIMAZOLE 10 MG: 5 TABLET ORAL at 21:32

## 2020-01-01 RX ADMIN — ACETAMINOPHEN 650 MG: 325 TABLET ORAL at 06:11

## 2020-01-01 RX ADMIN — ACETAMINOPHEN 1000 MG: 500 TABLET, FILM COATED ORAL at 22:43

## 2020-01-01 RX ADMIN — PANTOPRAZOLE SODIUM 40 MG: 40 INJECTION, POWDER, FOR SOLUTION INTRAVENOUS at 22:04

## 2020-01-01 RX ADMIN — SENNOSIDES AND DOCUSATE SODIUM 1 TABLET: 8.6; 5 TABLET ORAL at 11:37

## 2020-01-01 RX ADMIN — LORAZEPAM 0.5 MG: 0.5 TABLET ORAL at 17:55

## 2020-01-01 RX ADMIN — POLYETHYLENE GLYCOL 3350 17 G: 17 POWDER, FOR SOLUTION ORAL at 11:37

## 2020-01-01 RX ADMIN — HYDROCORTISONE ACETATE 25 MG: 25 SUPPOSITORY RECTAL at 22:03

## 2020-01-01 RX ADMIN — POLYETHYLENE GLYCOL 3350 17 G: 17 POWDER, FOR SOLUTION ORAL at 08:23

## 2020-01-01 RX ADMIN — SENNOSIDES AND DOCUSATE SODIUM 1 TABLET: 8.6; 5 TABLET ORAL at 08:28

## 2020-01-01 RX ADMIN — ATORVASTATIN CALCIUM 10 MG: 10 TABLET, FILM COATED ORAL at 21:01

## 2020-01-01 RX ADMIN — OXYCODONE AND ACETAMINOPHEN 1 TABLET: 5; 325 TABLET ORAL at 23:39

## 2020-01-01 RX ADMIN — AMIODARONE HYDROCHLORIDE 200 MG: 200 TABLET ORAL at 11:36

## 2020-01-01 RX ADMIN — NEPHROCAP 1 CAPSULE: 1 CAP ORAL at 09:01

## 2020-01-01 RX ADMIN — PANTOPRAZOLE SODIUM 40 MG: 40 INJECTION, POWDER, FOR SOLUTION INTRAVENOUS at 20:29

## 2020-01-01 RX ADMIN — DEXAMETHASONE SODIUM PHOSPHATE 4 MG: 4 INJECTION, SOLUTION INTRAMUSCULAR; INTRAVENOUS at 11:40

## 2020-01-01 RX ADMIN — LORAZEPAM 0.5 MG: 0.5 TABLET ORAL at 17:09

## 2020-01-01 RX ADMIN — Medication 81 MG: at 10:08

## 2020-01-01 RX ADMIN — METOPROLOL TARTRATE 12.5 MG: 25 TABLET, FILM COATED ORAL at 21:16

## 2020-01-01 RX ADMIN — ALLOPURINOL 100 MG: 100 TABLET ORAL at 08:50

## 2020-01-01 RX ADMIN — DIGOXIN 500 MCG: 250 INJECTION, SOLUTION INTRAMUSCULAR; INTRAVENOUS; PARENTERAL at 11:33

## 2020-01-01 RX ADMIN — ACETAMINOPHEN 650 MG: 325 TABLET ORAL at 21:18

## 2020-01-01 RX ADMIN — CEFPODOXIME PROXETIL 200 MG: 100 TABLET, FILM COATED ORAL at 20:54

## 2020-01-01 RX ADMIN — METHIMAZOLE 10 MG: 5 TABLET ORAL at 21:11

## 2020-01-01 RX ADMIN — NEPHROCAP 1 CAPSULE: 1 CAP ORAL at 10:08

## 2020-01-01 RX ADMIN — CEFTRIAXONE SODIUM 2 G: 2 INJECTION, POWDER, FOR SOLUTION INTRAMUSCULAR; INTRAVENOUS at 15:47

## 2020-01-01 RX ADMIN — SODIUM CHLORIDE 40 MG: 9 INJECTION, SOLUTION INTRAMUSCULAR; INTRAVENOUS; SUBCUTANEOUS at 08:59

## 2020-01-01 RX ADMIN — ACETAMINOPHEN 1000 MG: 500 TABLET, FILM COATED ORAL at 21:27

## 2020-01-01 RX ADMIN — ATORVASTATIN CALCIUM 10 MG: 10 TABLET, FILM COATED ORAL at 21:26

## 2020-01-01 RX ADMIN — DEXMEDETOMIDINE 6 MCG: 100 INJECTION, SOLUTION, CONCENTRATE INTRAVENOUS at 12:12

## 2020-01-01 RX ADMIN — POLYETHYLENE GLYCOL 3350 17 G: 17 POWDER, FOR SOLUTION ORAL at 08:07

## 2020-01-01 RX ADMIN — SODIUM BICARBONATE 650 MG TABLET 650 MG: at 15:20

## 2020-01-01 RX ADMIN — ASPIRIN 325 MG ORAL TABLET 325 MG: 325 PILL ORAL at 08:49

## 2020-01-01 RX ADMIN — DEXTROSE MONOHYDRATE 100 ML/HR: 5 INJECTION, SOLUTION INTRAVENOUS at 17:45

## 2020-01-01 RX ADMIN — HYDROCORTISONE ACETATE 25 MG: 25 SUPPOSITORY RECTAL at 08:56

## 2020-01-01 RX ADMIN — ONDANSETRON 8 MG: 2 INJECTION INTRAMUSCULAR; INTRAVENOUS at 08:08

## 2020-01-01 RX ADMIN — ACETAMINOPHEN 650 MG: 325 TABLET ORAL at 21:26

## 2020-01-01 RX ADMIN — POLYETHYLENE GLYCOL 3350 17 G: 17 POWDER, FOR SOLUTION ORAL at 08:39

## 2020-01-01 RX ADMIN — SENNOSIDES AND DOCUSATE SODIUM 1 TABLET: 8.6; 5 TABLET ORAL at 10:09

## 2020-01-01 RX ADMIN — ACETAMINOPHEN 1000 MG: 500 TABLET, FILM COATED ORAL at 05:44

## 2020-01-01 RX ADMIN — ALLOPURINOL 100 MG: 100 TABLET ORAL at 08:28

## 2020-01-01 RX ADMIN — METRONIDAZOLE 500 MG: 500 TABLET ORAL at 20:54

## 2020-01-01 RX ADMIN — ALUMINUM HYDROXIDE AND MAGNESIUM HYDROXIDE 15 ML: 200; 200 SUSPENSION ORAL at 08:07

## 2020-01-01 RX ADMIN — NEPHROCAP 1 CAPSULE: 1 CAP ORAL at 08:56

## 2020-01-01 RX ADMIN — ACETAMINOPHEN 650 MG: 325 TABLET ORAL at 05:15

## 2020-01-01 RX ADMIN — ACETAMINOPHEN 650 MG: 325 TABLET ORAL at 21:55

## 2020-01-01 RX ADMIN — HYDROCORTISONE ACETATE 25 MG: 25 SUPPOSITORY RECTAL at 22:44

## 2020-01-01 RX ADMIN — DEXMEDETOMIDINE 12 MCG: 100 INJECTION, SOLUTION, CONCENTRATE INTRAVENOUS at 11:10

## 2020-01-01 RX ADMIN — ACETAMINOPHEN 650 MG: 325 TABLET ORAL at 06:31

## 2020-01-01 RX ADMIN — OXYCODONE AND ACETAMINOPHEN 1 TABLET: 5; 325 TABLET ORAL at 04:15

## 2020-01-01 RX ADMIN — OXYCODONE 5 MG: 5 TABLET ORAL at 13:00

## 2020-01-01 RX ADMIN — OXYCODONE 5 MG: 5 TABLET ORAL at 20:08

## 2020-01-01 RX ADMIN — METHIMAZOLE 10 MG: 5 TABLET ORAL at 22:03

## 2020-01-01 RX ADMIN — HYDROCORTISONE ACETATE 25 MG: 25 SUPPOSITORY RECTAL at 15:19

## 2020-01-01 RX ADMIN — MORPHINE SULFATE 2 MG: 2 INJECTION, SOLUTION INTRAMUSCULAR; INTRAVENOUS at 00:39

## 2020-01-01 RX ADMIN — SODIUM CHLORIDE 150 ML/HR: 900 INJECTION, SOLUTION INTRAVENOUS at 21:51

## 2020-01-01 RX ADMIN — SENNOSIDES AND DOCUSATE SODIUM 1 TABLET: 8.6; 5 TABLET ORAL at 09:02

## 2020-01-01 RX ADMIN — Medication 81 MG: at 08:59

## 2020-01-01 RX ADMIN — LORAZEPAM 1 MG: 1 TABLET ORAL at 08:39

## 2020-01-01 RX ADMIN — METHIMAZOLE 10 MG: 5 TABLET ORAL at 21:17

## 2020-01-01 RX ADMIN — LORAZEPAM 1 MG: 1 TABLET ORAL at 06:32

## 2020-01-01 RX ADMIN — OXYCODONE 5 MG: 5 TABLET ORAL at 06:25

## 2020-01-01 RX ADMIN — ACETAMINOPHEN 650 MG: 325 TABLET ORAL at 22:02

## 2020-01-01 RX ADMIN — SODIUM CHLORIDE 5 MG/HR: 900 INJECTION, SOLUTION INTRAVENOUS at 14:06

## 2020-01-01 RX ADMIN — SENNOSIDES AND DOCUSATE SODIUM 1 TABLET: 8.6; 5 TABLET ORAL at 10:28

## 2020-01-01 RX ADMIN — PANTOPRAZOLE SODIUM 40 MG: 40 INJECTION, POWDER, FOR SOLUTION INTRAVENOUS at 08:23

## 2020-01-01 RX ADMIN — METHIMAZOLE 10 MG: 5 TABLET ORAL at 08:28

## 2020-01-01 RX ADMIN — SODIUM CHLORIDE 10 MG/HR: 900 INJECTION, SOLUTION INTRAVENOUS at 05:32

## 2020-01-01 RX ADMIN — METOPROLOL TARTRATE 5 MG: 5 INJECTION INTRAVENOUS at 00:01

## 2020-01-01 RX ADMIN — SUCCINYLCHOLINE CHLORIDE 140 MG: 20 INJECTION, SOLUTION INTRAMUSCULAR; INTRAVENOUS at 11:15

## 2020-01-01 RX ADMIN — POLYETHYLENE GLYCOL 3350 17 G: 17 POWDER, FOR SOLUTION ORAL at 10:35

## 2020-01-01 RX ADMIN — METHIMAZOLE 10 MG: 5 TABLET ORAL at 09:53

## 2020-01-01 RX ADMIN — OXYCODONE 5 MG: 5 TABLET ORAL at 21:18

## 2020-01-01 RX ADMIN — ATORVASTATIN CALCIUM 40 MG: 40 TABLET, FILM COATED ORAL at 10:58

## 2020-01-01 RX ADMIN — METOPROLOL TARTRATE 5 MG: 5 INJECTION INTRAVENOUS at 17:34

## 2020-01-01 RX ADMIN — SENNOSIDES AND DOCUSATE SODIUM 1 TABLET: 8.6; 5 TABLET ORAL at 08:39

## 2020-01-01 RX ADMIN — LORAZEPAM 1 MG: 1 TABLET ORAL at 07:45

## 2020-01-01 RX ADMIN — PANTOPRAZOLE SODIUM 40 MG: 40 INJECTION, POWDER, FOR SOLUTION INTRAVENOUS at 21:19

## 2020-01-01 RX ADMIN — DEXMEDETOMIDINE 8 MCG: 100 INJECTION, SOLUTION, CONCENTRATE INTRAVENOUS at 11:43

## 2020-01-01 RX ADMIN — SENNOSIDES AND DOCUSATE SODIUM 1 TABLET: 8.6; 5 TABLET ORAL at 08:06

## 2020-01-01 RX ADMIN — HYDROCORTISONE ACETATE 25 MG: 25 SUPPOSITORY RECTAL at 08:27

## 2020-01-01 RX ADMIN — SODIUM CHLORIDE 1000 ML: 900 INJECTION, SOLUTION INTRAVENOUS at 20:42

## 2020-01-01 RX ADMIN — ACETAMINOPHEN 650 MG: 325 TABLET ORAL at 17:47

## 2020-01-01 RX ADMIN — METRONIDAZOLE 500 MG: 500 INJECTION, SOLUTION INTRAVENOUS at 00:13

## 2020-01-01 RX ADMIN — HYDROCORTISONE ACETATE 25 MG: 25 SUPPOSITORY RECTAL at 21:12

## 2020-01-01 RX ADMIN — ALLOPURINOL 100 MG: 100 TABLET ORAL at 10:01

## 2020-01-01 RX ADMIN — DILTIAZEM HYDROCHLORIDE 16 MG: 5 INJECTION INTRAVENOUS at 01:42

## 2020-01-01 RX ADMIN — METHIMAZOLE 10 MG: 5 TABLET ORAL at 08:55

## 2020-01-01 RX ADMIN — ACETAMINOPHEN 650 MG: 325 TABLET ORAL at 14:33

## 2020-01-01 RX ADMIN — LORAZEPAM 0.5 MG: 0.5 TABLET ORAL at 17:47

## 2020-01-01 RX ADMIN — ACETAMINOPHEN 650 MG: 325 TABLET ORAL at 21:00

## 2020-01-01 RX ADMIN — POLYETHYLENE GLYCOL 3350 17 G: 17 POWDER, FOR SOLUTION ORAL at 09:00

## 2020-01-01 RX ADMIN — LORAZEPAM 0.5 MG: 0.5 TABLET ORAL at 09:10

## 2020-01-01 RX ADMIN — Medication 25 MG: at 11:37

## 2020-01-01 RX ADMIN — ONDANSETRON 4 MG: 2 INJECTION INTRAMUSCULAR; INTRAVENOUS at 19:58

## 2020-01-01 RX ADMIN — ATORVASTATIN CALCIUM 40 MG: 40 TABLET, FILM COATED ORAL at 21:17

## 2020-01-01 RX ADMIN — NEPHROCAP 1 CAPSULE: 1 CAP ORAL at 11:36

## 2020-01-01 RX ADMIN — HEPARIN SODIUM 12 UNITS/KG/HR: 10000 INJECTION, SOLUTION INTRAVENOUS at 14:23

## 2020-01-01 RX ADMIN — LORAZEPAM 1 MG: 1 TABLET ORAL at 19:51

## 2020-01-01 RX ADMIN — ALUMINUM HYDROXIDE AND MAGNESIUM HYDROXIDE 15 ML: 200; 200 SUSPENSION ORAL at 12:36

## 2020-01-01 RX ADMIN — ATORVASTATIN CALCIUM 40 MG: 40 TABLET, FILM COATED ORAL at 21:26

## 2020-01-01 RX ADMIN — PANTOPRAZOLE SODIUM 40 MG: 40 INJECTION, POWDER, FOR SOLUTION INTRAVENOUS at 21:00

## 2020-01-01 RX ADMIN — CEFEPIME 2 G: 2 INJECTION, POWDER, FOR SOLUTION INTRAVENOUS at 17:56

## 2020-01-01 RX ADMIN — ACETAMINOPHEN 650 MG: 325 TABLET ORAL at 06:25

## 2020-01-01 RX ADMIN — Medication 81 MG: at 14:28

## 2020-01-01 RX ADMIN — SODIUM CHLORIDE 5 MG/HR: 900 INJECTION, SOLUTION INTRAVENOUS at 11:43

## 2020-01-01 RX ADMIN — SENNOSIDES AND DOCUSATE SODIUM 1 TABLET: 8.6; 5 TABLET ORAL at 09:43

## 2020-01-01 RX ADMIN — ATORVASTATIN CALCIUM 10 MG: 10 TABLET, FILM COATED ORAL at 23:39

## 2020-01-01 RX ADMIN — DEXTROSE MONOHYDRATE 100 ML/HR: 5 INJECTION, SOLUTION INTRAVENOUS at 11:31

## 2020-01-01 RX ADMIN — SUGAMMADEX 200 MG: 100 INJECTION, SOLUTION INTRAVENOUS at 12:04

## 2020-01-01 RX ADMIN — SODIUM CHLORIDE 40 MG: 9 INJECTION, SOLUTION INTRAMUSCULAR; INTRAVENOUS; SUBCUTANEOUS at 22:33

## 2020-01-01 RX ADMIN — HEPARIN SODIUM 3000 UNITS: 1000 INJECTION, SOLUTION INTRAVENOUS; SUBCUTANEOUS at 07:24

## 2020-01-01 RX ADMIN — SODIUM BICARBONATE 650 MG TABLET 650 MG: at 10:08

## 2020-01-01 RX ADMIN — ACETAMINOPHEN 650 MG: 325 TABLET ORAL at 05:23

## 2020-01-01 RX ADMIN — HYDROCORTISONE ACETATE 25 MG: 25 SUPPOSITORY RECTAL at 21:17

## 2020-01-01 RX ADMIN — ALUMINUM HYDROXIDE AND MAGNESIUM HYDROXIDE 15 ML: 200; 200 SUSPENSION ORAL at 18:43

## 2020-01-01 RX ADMIN — OXYCODONE 5 MG: 5 TABLET ORAL at 16:22

## 2020-01-01 RX ADMIN — METOPROLOL TARTRATE 2.5 MG: 5 INJECTION INTRAVENOUS at 01:11

## 2020-01-01 RX ADMIN — ACETAMINOPHEN 650 MG: 325 TABLET ORAL at 21:32

## 2020-01-01 RX ADMIN — PANTOPRAZOLE SODIUM 40 MG: 40 TABLET, DELAYED RELEASE ORAL at 07:01

## 2020-01-01 RX ADMIN — ACETAMINOPHEN 1000 MG: 500 TABLET, FILM COATED ORAL at 15:54

## 2020-01-01 RX ADMIN — NEPHROCAP 1 CAPSULE: 1 CAP ORAL at 08:58

## 2020-01-01 RX ADMIN — AMIODARONE HYDROCHLORIDE 150 MG: 1.5 INJECTION, SOLUTION INTRAVENOUS at 14:14

## 2020-01-01 RX ADMIN — METOPROLOL TARTRATE 12.5 MG: 25 TABLET, FILM COATED ORAL at 22:33

## 2020-01-01 RX ADMIN — METRONIDAZOLE 500 MG: 500 INJECTION, SOLUTION INTRAVENOUS at 09:50

## 2020-01-01 RX ADMIN — OXYCODONE 5 MG: 5 TABLET ORAL at 14:00

## 2020-01-01 RX ADMIN — ALLOPURINOL 100 MG: 100 TABLET ORAL at 10:27

## 2020-01-01 RX ADMIN — DEXMEDETOMIDINE 6 MCG: 100 INJECTION, SOLUTION, CONCENTRATE INTRAVENOUS at 12:06

## 2020-01-01 RX ADMIN — Medication 81 MG: at 09:00

## 2020-01-01 RX ADMIN — ACETAMINOPHEN 1000 MG: 500 TABLET, FILM COATED ORAL at 05:57

## 2020-01-01 RX ADMIN — LIDOCAINE HYDROCHLORIDE 100 MG: 20 INJECTION, SOLUTION EPIDURAL; INFILTRATION; INTRACAUDAL; PERINEURAL at 11:15

## 2020-01-01 RX ADMIN — HYDROCORTISONE ACETATE 25 MG: 25 SUPPOSITORY RECTAL at 09:11

## 2020-01-01 RX ADMIN — NEPHROCAP 1 CAPSULE: 1 CAP ORAL at 09:52

## 2020-01-01 RX ADMIN — ALLOPURINOL 100 MG: 100 TABLET ORAL at 10:08

## 2020-01-01 RX ADMIN — SENNOSIDES AND DOCUSATE SODIUM 1 TABLET: 8.6; 5 TABLET ORAL at 08:49

## 2020-01-01 RX ADMIN — ALUMINUM HYDROXIDE AND MAGNESIUM HYDROXIDE 15 ML: 200; 200 SUSPENSION ORAL at 19:33

## 2020-01-01 RX ADMIN — PANTOPRAZOLE SODIUM 40 MG: 40 TABLET, DELAYED RELEASE ORAL at 08:28

## 2020-01-01 RX ADMIN — ACETAMINOPHEN 650 MG: 325 TABLET ORAL at 06:01

## 2020-01-01 RX ADMIN — METRONIDAZOLE 500 MG: 500 INJECTION, SOLUTION INTRAVENOUS at 08:58

## 2020-01-01 RX ADMIN — ACETAMINOPHEN 650 MG: 325 TABLET ORAL at 13:00

## 2020-01-01 RX ADMIN — HYDROCORTISONE ACETATE 25 MG: 25 SUPPOSITORY RECTAL at 10:28

## 2020-01-01 RX ADMIN — METOPROLOL TARTRATE 12.5 MG: 25 TABLET, FILM COATED ORAL at 08:59

## 2020-01-01 RX ADMIN — MORPHINE SULFATE 2 MG: 2 INJECTION, SOLUTION INTRAMUSCULAR; INTRAVENOUS at 12:00

## 2020-01-01 RX ADMIN — ACETAMINOPHEN 650 MG: 325 TABLET ORAL at 15:03

## 2020-01-01 RX ADMIN — LORAZEPAM 1 MG: 1 TABLET ORAL at 21:18

## 2020-01-01 RX ADMIN — METOPROLOL TARTRATE 5 MG: 5 INJECTION INTRAVENOUS at 12:38

## 2020-01-01 RX ADMIN — ENOXAPARIN SODIUM 30 MG: 30 INJECTION SUBCUTANEOUS at 09:57

## 2020-01-01 RX ADMIN — METHIMAZOLE 10 MG: 5 TABLET ORAL at 21:26

## 2020-01-01 RX ADMIN — ALUMINUM HYDROXIDE AND MAGNESIUM HYDROXIDE 15 ML: 200; 200 SUSPENSION ORAL at 05:44

## 2020-01-01 RX ADMIN — ONDANSETRON 4 MG: 2 INJECTION INTRAMUSCULAR; INTRAVENOUS at 13:25

## 2020-01-01 RX ADMIN — NEPHROCAP 1 CAPSULE: 1 CAP ORAL at 08:28

## 2020-01-01 RX ADMIN — CALCITRIOL CAPSULES 0.25 MCG 0.25 MCG: 0.25 CAPSULE ORAL at 21:38

## 2020-01-01 RX ADMIN — NEPHROCAP 1 CAPSULE: 1 CAP ORAL at 09:02

## 2020-01-01 RX ADMIN — SODIUM BICARBONATE 650 MG TABLET 650 MG: at 15:47

## 2020-01-01 RX ADMIN — DEXTROSE MONOHYDRATE 100 ML/HR: 5 INJECTION, SOLUTION INTRAVENOUS at 07:40

## 2020-01-01 RX ADMIN — METOPROLOL TARTRATE 12.5 MG: 25 TABLET, FILM COATED ORAL at 20:54

## 2020-01-01 RX ADMIN — LORAZEPAM 0.5 MG: 0.5 TABLET ORAL at 08:56

## 2020-01-01 RX ADMIN — METHIMAZOLE 10 MG: 5 TABLET ORAL at 08:24

## 2020-01-01 RX ADMIN — HYDROCORTISONE ACETATE 25 MG: 25 SUPPOSITORY RECTAL at 21:59

## 2020-01-01 RX ADMIN — SODIUM CHLORIDE 40 MG: 9 INJECTION, SOLUTION INTRAMUSCULAR; INTRAVENOUS; SUBCUTANEOUS at 21:27

## 2020-01-01 RX ADMIN — ATORVASTATIN CALCIUM 40 MG: 40 TABLET, FILM COATED ORAL at 21:55

## 2020-01-01 RX ADMIN — METOPROLOL TARTRATE 25 MG: 25 TABLET, FILM COATED ORAL at 10:58

## 2020-01-01 RX ADMIN — ALLOPURINOL 100 MG: 100 TABLET ORAL at 08:55

## 2020-01-01 RX ADMIN — METOPROLOL TARTRATE 12.5 MG: 25 TABLET, FILM COATED ORAL at 10:08

## 2020-01-01 RX ADMIN — SODIUM BICARBONATE 650 MG TABLET 650 MG: at 22:44

## 2020-01-01 RX ADMIN — LORAZEPAM 1 MG: 1 TABLET ORAL at 08:50

## 2020-01-01 RX ADMIN — AMIODARONE HYDROCHLORIDE 200 MG: 200 TABLET ORAL at 08:29

## 2020-01-01 RX ADMIN — METHIMAZOLE 10 MG: 5 TABLET ORAL at 09:10

## 2020-01-01 RX ADMIN — HYDROCORTISONE ACETATE 25 MG: 25 SUPPOSITORY RECTAL at 21:27

## 2020-01-01 RX ADMIN — METHIMAZOLE 10 MG: 5 TABLET ORAL at 08:59

## 2020-01-01 RX ADMIN — ACETAMINOPHEN 650 MG: 325 TABLET ORAL at 13:03

## 2020-01-01 RX ADMIN — Medication 81 MG: at 09:10

## 2020-01-01 RX ADMIN — CEFEPIME 2 G: 2 INJECTION, POWDER, FOR SOLUTION INTRAVENOUS at 17:50

## 2020-01-01 RX ADMIN — ACETAMINOPHEN 650 MG: 325 TABLET ORAL at 22:03

## 2020-01-01 RX ADMIN — LORAZEPAM 1 MG: 1 TABLET ORAL at 21:24

## 2020-01-01 RX ADMIN — POLYETHYLENE GLYCOL 3350 17 G: 17 POWDER, FOR SOLUTION ORAL at 09:03

## 2020-01-01 RX ADMIN — ASPIRIN 325 MG ORAL TABLET 325 MG: 325 PILL ORAL at 08:07

## 2020-01-01 RX ADMIN — ACETAMINOPHEN 650 MG: 325 TABLET ORAL at 21:16

## 2020-01-01 RX ADMIN — SODIUM BICARBONATE 650 MG TABLET 650 MG: at 08:59

## 2020-01-01 RX ADMIN — PANTOPRAZOLE SODIUM 40 MG: 40 TABLET, DELAYED RELEASE ORAL at 10:09

## 2020-01-01 RX ADMIN — HYDROCORTISONE ACETATE 25 MG: 25 SUPPOSITORY RECTAL at 21:00

## 2020-01-01 RX ADMIN — METOPROLOL TARTRATE 25 MG: 25 TABLET, FILM COATED ORAL at 08:24

## 2020-01-01 RX ADMIN — ATORVASTATIN CALCIUM 40 MG: 40 TABLET, FILM COATED ORAL at 22:33

## 2020-01-01 RX ADMIN — DEXTROSE MONOHYDRATE AND SODIUM CHLORIDE 100 ML/HR: 5; .9 INJECTION, SOLUTION INTRAVENOUS at 04:00

## 2020-01-01 RX ADMIN — ATORVASTATIN CALCIUM 40 MG: 40 TABLET, FILM COATED ORAL at 22:44

## 2020-01-01 RX ADMIN — DEXMEDETOMIDINE 6 MCG: 100 INJECTION, SOLUTION, CONCENTRATE INTRAVENOUS at 11:54

## 2020-01-01 RX ADMIN — METOPROLOL TARTRATE 25 MG: 25 TABLET, FILM COATED ORAL at 08:08

## 2020-01-01 RX ADMIN — DEXMEDETOMIDINE 6 MCG: 100 INJECTION, SOLUTION, CONCENTRATE INTRAVENOUS at 11:49

## 2020-01-01 RX ADMIN — ALLOPURINOL 100 MG: 100 TABLET ORAL at 09:02

## 2020-01-01 RX ADMIN — ACETAMINOPHEN 650 MG: 325 TABLET ORAL at 05:42

## 2020-01-01 RX ADMIN — ONDANSETRON 8 MG: 2 INJECTION INTRAMUSCULAR; INTRAVENOUS at 11:36

## 2020-01-01 RX ADMIN — ALLOPURINOL 100 MG: 100 TABLET ORAL at 09:43

## 2020-01-01 RX ADMIN — SODIUM CHLORIDE 500 ML: 900 INJECTION, SOLUTION INTRAVENOUS at 22:16

## 2020-01-01 RX ADMIN — MORPHINE SULFATE 1 MG: 2 INJECTION, SOLUTION INTRAMUSCULAR; INTRAVENOUS at 09:10

## 2020-01-01 RX ADMIN — NEPHROCAP 1 CAPSULE: 1 CAP ORAL at 10:34

## 2020-01-01 RX ADMIN — METRONIDAZOLE 500 MG: 500 INJECTION, SOLUTION INTRAVENOUS at 23:35

## 2020-01-01 RX ADMIN — PANTOPRAZOLE SODIUM 40 MG: 40 TABLET, DELAYED RELEASE ORAL at 17:32

## 2020-01-01 RX ADMIN — SODIUM CHLORIDE 50 ML/HR: 900 INJECTION, SOLUTION INTRAVENOUS at 22:18

## 2020-01-01 RX ADMIN — ACETAMINOPHEN 650 MG: 325 TABLET ORAL at 15:39

## 2020-01-01 RX ADMIN — METOPROLOL TARTRATE 25 MG: 25 TABLET, FILM COATED ORAL at 14:29

## 2020-01-01 RX ADMIN — LORAZEPAM 0.5 MG: 0.5 TABLET ORAL at 17:50

## 2020-01-01 RX ADMIN — SODIUM BICARBONATE 650 MG TABLET 650 MG: at 08:28

## 2020-01-01 RX ADMIN — SODIUM CHLORIDE 40 MG: 9 INJECTION, SOLUTION INTRAMUSCULAR; INTRAVENOUS; SUBCUTANEOUS at 21:31

## 2020-01-01 RX ADMIN — ATORVASTATIN CALCIUM 10 MG: 10 TABLET, FILM COATED ORAL at 22:43

## 2020-01-01 RX ADMIN — METHIMAZOLE 10 MG: 5 TABLET ORAL at 14:05

## 2020-01-01 RX ADMIN — SODIUM POLYSTYRENE SULFONATE 15 G: 15 SUSPENSION ORAL; RECTAL at 15:47

## 2020-01-01 RX ADMIN — ACETAMINOPHEN 650 MG: 325 TABLET ORAL at 21:11

## 2020-01-01 RX ADMIN — SODIUM BICARBONATE 650 MG TABLET 650 MG: at 18:08

## 2020-01-01 RX ADMIN — DEXMEDETOMIDINE 8 MCG: 100 INJECTION, SOLUTION, CONCENTRATE INTRAVENOUS at 12:00

## 2020-01-01 RX ADMIN — ACETAMINOPHEN 650 MG: 325 TABLET ORAL at 14:43

## 2020-01-01 RX ADMIN — ACETAMINOPHEN 650 MG: 325 TABLET ORAL at 06:16

## 2020-01-01 RX ADMIN — SODIUM BICARBONATE 650 MG TABLET 650 MG: at 09:52

## 2020-01-01 RX ADMIN — OXYCODONE 5 MG: 5 TABLET ORAL at 00:05

## 2020-01-01 RX ADMIN — SENNOSIDES AND DOCUSATE SODIUM 1 TABLET: 8.6; 5 TABLET ORAL at 09:10

## 2020-01-01 RX ADMIN — ALLOPURINOL 100 MG: 100 TABLET ORAL at 09:36

## 2020-01-01 RX ADMIN — ALUMINUM HYDROXIDE AND MAGNESIUM HYDROXIDE 15 ML: 200; 200 SUSPENSION ORAL at 22:45

## 2020-01-01 RX ADMIN — SODIUM CHLORIDE 40 MG: 9 INJECTION, SOLUTION INTRAMUSCULAR; INTRAVENOUS; SUBCUTANEOUS at 08:53

## 2020-01-01 RX ADMIN — ACETAMINOPHEN 1000 MG: 500 TABLET, FILM COATED ORAL at 21:31

## 2020-01-01 RX ADMIN — FENTANYL CITRATE 12.5 MCG: 50 INJECTION INTRAMUSCULAR; INTRAVENOUS at 13:22

## 2020-01-01 RX ADMIN — ASPIRIN 325 MG ORAL TABLET 325 MG: 325 PILL ORAL at 09:44

## 2020-01-01 RX ADMIN — ACETAMINOPHEN 650 MG: 325 TABLET ORAL at 21:01

## 2020-01-01 RX ADMIN — METHIMAZOLE 10 MG: 5 TABLET ORAL at 21:38

## 2020-01-01 RX ADMIN — LORAZEPAM 0.5 MG: 0.5 TABLET ORAL at 10:34

## 2020-01-01 RX ADMIN — ALLOPURINOL 100 MG: 100 TABLET ORAL at 08:06

## 2020-01-01 RX ADMIN — SODIUM CHLORIDE 40 MG: 9 INJECTION, SOLUTION INTRAMUSCULAR; INTRAVENOUS; SUBCUTANEOUS at 10:28

## 2020-01-01 RX ADMIN — ACETAMINOPHEN 650 MG: 325 TABLET ORAL at 14:05

## 2020-01-01 RX ADMIN — CALCITRIOL CAPSULES 0.25 MCG 0.25 MCG: 0.25 CAPSULE ORAL at 22:02

## 2020-01-01 RX ADMIN — OXYCODONE 5 MG: 5 TABLET ORAL at 09:55

## 2020-01-01 RX ADMIN — LORAZEPAM 1 MG: 1 TABLET ORAL at 08:00

## 2020-01-01 RX ADMIN — LORAZEPAM 1 MG: 1 TABLET ORAL at 08:08

## 2020-01-01 RX ADMIN — SENNOSIDES AND DOCUSATE SODIUM 1 TABLET: 8.6; 5 TABLET ORAL at 09:35

## 2020-01-01 RX ADMIN — ASPIRIN 325 MG ORAL TABLET 325 MG: 325 PILL ORAL at 17:23

## 2020-01-01 RX ADMIN — DEXTROSE MONOHYDRATE 100 ML/HR: 5 INJECTION, SOLUTION INTRAVENOUS at 21:32

## 2020-01-01 RX ADMIN — OXYCODONE 5 MG: 5 TABLET ORAL at 08:05

## 2020-01-01 RX ADMIN — HYDROCORTISONE ACETATE 25 MG: 25 SUPPOSITORY RECTAL at 09:01

## 2020-01-01 RX ADMIN — DILTIAZEM HYDROCHLORIDE 16 MG: 5 INJECTION INTRAVENOUS at 01:28

## 2020-01-01 RX ADMIN — SODIUM CHLORIDE 40 MG: 9 INJECTION, SOLUTION INTRAMUSCULAR; INTRAVENOUS; SUBCUTANEOUS at 09:01

## 2020-01-01 RX ADMIN — SODIUM CHLORIDE 40 MG: 9 INJECTION, SOLUTION INTRAMUSCULAR; INTRAVENOUS; SUBCUTANEOUS at 21:12

## 2020-01-01 RX ADMIN — METOPROLOL TARTRATE 12.5 MG: 25 TABLET, FILM COATED ORAL at 08:28

## 2020-01-01 RX ADMIN — METOPROLOL TARTRATE 25 MG: 25 TABLET, FILM COATED ORAL at 17:50

## 2020-01-01 RX ADMIN — ACETAMINOPHEN 650 MG: 325 TABLET ORAL at 05:18

## 2020-01-01 RX ADMIN — METRONIDAZOLE 500 MG: 500 INJECTION, SOLUTION INTRAVENOUS at 15:22

## 2020-01-01 RX ADMIN — MORPHINE SULFATE 2 MG: 2 INJECTION, SOLUTION INTRAMUSCULAR; INTRAVENOUS at 20:01

## 2020-01-01 RX ADMIN — ACETAMINOPHEN 1000 MG: 500 TABLET, FILM COATED ORAL at 15:24

## 2020-01-01 RX ADMIN — PANTOPRAZOLE SODIUM 40 MG: 40 TABLET, DELAYED RELEASE ORAL at 09:00

## 2020-01-01 RX ADMIN — METOPROLOL TARTRATE 12.5 MG: 25 TABLET, FILM COATED ORAL at 21:55

## 2020-01-01 RX ADMIN — CALCITRIOL CAPSULES 0.25 MCG 0.25 MCG: 0.25 CAPSULE ORAL at 21:16

## 2020-01-01 RX ADMIN — METOPROLOL TARTRATE 5 MG: 5 INJECTION INTRAVENOUS at 05:49

## 2020-01-01 RX ADMIN — ACETAMINOPHEN 650 MG: 325 TABLET ORAL at 13:36

## 2020-01-01 RX ADMIN — ALLOPURINOL 100 MG: 100 TABLET ORAL at 09:00

## 2020-01-01 RX ADMIN — Medication 25 MG: at 11:31

## 2020-01-01 RX ADMIN — METRONIDAZOLE 500 MG: 500 INJECTION, SOLUTION INTRAVENOUS at 01:00

## 2020-01-01 RX ADMIN — POLYETHYLENE GLYCOL 3350 17 G: 17 POWDER, FOR SOLUTION ORAL at 08:59

## 2020-01-01 RX ADMIN — LORAZEPAM 0.5 MG: 0.5 TABLET ORAL at 09:02

## 2020-01-01 RX ADMIN — HEPARIN SODIUM 3000 UNITS: 1000 INJECTION, SOLUTION INTRAVENOUS; SUBCUTANEOUS at 21:59

## 2020-01-01 RX ADMIN — ATORVASTATIN CALCIUM 40 MG: 40 TABLET, FILM COATED ORAL at 21:00

## 2020-01-01 RX ADMIN — ACETAMINOPHEN 650 MG: 325 TABLET ORAL at 07:07

## 2020-01-01 RX ADMIN — METOPROLOL TARTRATE 12.5 MG: 25 TABLET, FILM COATED ORAL at 09:03

## 2020-01-01 RX ADMIN — PROPOFOL 100 MG: 10 INJECTION, EMULSION INTRAVENOUS at 11:15

## 2020-01-01 RX ADMIN — ASPIRIN 325 MG ORAL TABLET 325 MG: 325 PILL ORAL at 17:32

## 2020-01-01 RX ADMIN — AMIODARONE HYDROCHLORIDE 200 MG: 200 TABLET ORAL at 09:00

## 2020-01-01 RX ADMIN — CALCITRIOL CAPSULES 0.25 MCG 0.25 MCG: 0.25 CAPSULE ORAL at 22:32

## 2020-01-01 RX ADMIN — ACETAMINOPHEN 1000 MG: 500 TABLET, FILM COATED ORAL at 14:47

## 2020-01-01 RX ADMIN — SENNOSIDES AND DOCUSATE SODIUM 1 TABLET: 8.6; 5 TABLET ORAL at 08:08

## 2020-01-01 RX ADMIN — AMIODARONE HYDROCHLORIDE 200 MG: 200 TABLET ORAL at 08:56

## 2020-01-01 RX ADMIN — ACETAMINOPHEN 650 MG: 325 TABLET ORAL at 22:43

## 2020-01-01 RX ADMIN — SODIUM CHLORIDE 200 MG: 900 INJECTION, SOLUTION INTRAVENOUS at 15:25

## 2020-01-01 RX ADMIN — ACETAMINOPHEN 650 MG: 325 TABLET ORAL at 13:46

## 2020-01-01 RX ADMIN — METOPROLOL TARTRATE 12.5 MG: 25 TABLET, FILM COATED ORAL at 12:51

## 2020-01-01 RX ADMIN — METHIMAZOLE 10 MG: 5 TABLET ORAL at 08:08

## 2020-01-01 RX ADMIN — FUROSEMIDE 20 MG: 10 INJECTION, SOLUTION INTRAMUSCULAR; INTRAVENOUS at 13:00

## 2020-01-01 RX ADMIN — Medication 81 MG: at 08:55

## 2020-01-01 RX ADMIN — METRONIDAZOLE 500 MG: 500 INJECTION, SOLUTION INTRAVENOUS at 15:55

## 2020-01-01 RX ADMIN — ACETAMINOPHEN 650 MG: 325 TABLET ORAL at 07:01

## 2020-01-01 RX ADMIN — HYDROCORTISONE ACETATE 25 MG: 25 SUPPOSITORY RECTAL at 22:33

## 2020-01-01 RX ADMIN — PANTOPRAZOLE SODIUM 40 MG: 40 TABLET, DELAYED RELEASE ORAL at 17:00

## 2020-01-01 RX ADMIN — SODIUM CHLORIDE 200 MG: 900 INJECTION, SOLUTION INTRAVENOUS at 14:39

## 2020-01-01 RX ADMIN — HEPARIN SODIUM 5000 UNITS: 5000 INJECTION INTRAVENOUS; SUBCUTANEOUS at 23:39

## 2020-01-01 RX ADMIN — SODIUM BICARBONATE 650 MG TABLET 650 MG: at 17:09

## 2020-01-01 RX ADMIN — ONDANSETRON 8 MG: 2 INJECTION INTRAMUSCULAR; INTRAVENOUS at 09:01

## 2020-01-01 RX ADMIN — CEFTRIAXONE SODIUM 2 G: 2 INJECTION, POWDER, FOR SOLUTION INTRAMUSCULAR; INTRAVENOUS at 15:20

## 2020-01-01 RX ADMIN — DILTIAZEM HYDROCHLORIDE 5 MG: 5 INJECTION INTRAVENOUS at 05:23

## 2020-01-01 RX ADMIN — ATORVASTATIN CALCIUM 40 MG: 40 TABLET, FILM COATED ORAL at 21:37

## 2020-01-01 RX ADMIN — HYDROCORTISONE ACETATE 25 MG: 25 SUPPOSITORY RECTAL at 10:09

## 2020-01-01 RX ADMIN — PANTOPRAZOLE SODIUM 40 MG: 40 INJECTION, POWDER, FOR SOLUTION INTRAVENOUS at 09:44

## 2020-01-01 RX ADMIN — SODIUM POLYSTYRENE SULFONATE 30 G: 15 SUSPENSION ORAL; RECTAL at 12:14

## 2020-01-01 RX ADMIN — SODIUM CHLORIDE: 900 INJECTION, SOLUTION INTRAVENOUS at 11:10

## 2020-01-01 RX ADMIN — MORPHINE SULFATE 2 MG: 2 INJECTION, SOLUTION INTRAMUSCULAR; INTRAVENOUS at 08:40

## 2020-01-01 RX ADMIN — ATORVASTATIN CALCIUM 40 MG: 40 TABLET, FILM COATED ORAL at 22:06

## 2020-01-01 RX ADMIN — HYDROCORTISONE ACETATE 25 MG: 25 SUPPOSITORY RECTAL at 09:00

## 2020-01-01 RX ADMIN — HYDROCORTISONE ACETATE 25 MG: 25 SUPPOSITORY RECTAL at 09:44

## 2020-01-01 RX ADMIN — SODIUM CHLORIDE 40 MG: 9 INJECTION, SOLUTION INTRAMUSCULAR; INTRAVENOUS; SUBCUTANEOUS at 09:11

## 2020-01-01 RX ADMIN — ACETAMINOPHEN 650 MG: 325 TABLET ORAL at 05:49

## 2020-01-01 RX ADMIN — MORPHINE SULFATE 2 MG: 2 INJECTION, SOLUTION INTRAMUSCULAR; INTRAVENOUS at 03:56

## 2020-01-01 RX ADMIN — METOPROLOL TARTRATE 25 MG: 25 TABLET, FILM COATED ORAL at 17:23

## 2020-01-01 RX ADMIN — ATORVASTATIN CALCIUM 10 MG: 10 TABLET, FILM COATED ORAL at 21:10

## 2020-01-01 RX ADMIN — AMIODARONE HYDROCHLORIDE 200 MG: 200 TABLET ORAL at 17:50

## 2020-01-01 RX ADMIN — PANTOPRAZOLE SODIUM 40 MG: 40 TABLET, DELAYED RELEASE ORAL at 15:47

## 2020-01-01 RX ADMIN — AMIODARONE HYDROCHLORIDE 200 MG: 200 TABLET ORAL at 09:52

## 2020-01-01 RX ADMIN — OXYCODONE 5 MG: 5 TABLET ORAL at 08:00

## 2020-01-01 RX ADMIN — POLYETHYLENE GLYCOL 3350 17 G: 17 POWDER, FOR SOLUTION ORAL at 09:43

## 2020-01-01 RX ADMIN — FUROSEMIDE 20 MG: 10 INJECTION, SOLUTION INTRAMUSCULAR; INTRAVENOUS at 10:34

## 2020-01-01 RX ADMIN — METHIMAZOLE 10 MG: 5 TABLET ORAL at 10:07

## 2020-01-01 RX ADMIN — OXYCODONE 5 MG: 5 TABLET ORAL at 03:55

## 2020-01-01 RX ADMIN — ATORVASTATIN CALCIUM 40 MG: 40 TABLET, FILM COATED ORAL at 21:16

## 2020-01-01 RX ADMIN — ALLOPURINOL 100 MG: 100 TABLET ORAL at 09:47

## 2020-01-01 RX ADMIN — HYDROCORTISONE ACETATE 25 MG: 25 SUPPOSITORY RECTAL at 23:45

## 2020-01-01 RX ADMIN — AMIODARONE HYDROCHLORIDE 200 MG: 200 TABLET ORAL at 10:27

## 2020-01-01 RX ADMIN — ONDANSETRON 4 MG: 2 INJECTION INTRAMUSCULAR; INTRAVENOUS at 12:01

## 2020-01-01 RX ADMIN — SODIUM BICARBONATE 650 MG TABLET 650 MG: at 17:32

## 2020-01-01 RX ADMIN — PANTOPRAZOLE 40 MG: 40 TABLET, DELAYED RELEASE ORAL at 07:30

## 2020-01-01 RX ADMIN — POLYETHYLENE GLYCOL 3350 17 G: 17 POWDER, FOR SOLUTION ORAL at 08:51

## 2020-01-01 RX ADMIN — Medication 81 MG: at 09:52

## 2020-01-01 RX ADMIN — METRONIDAZOLE 500 MG: 500 INJECTION, SOLUTION INTRAVENOUS at 08:26

## 2020-01-01 RX ADMIN — METHIMAZOLE 10 MG: 5 TABLET ORAL at 22:02

## 2020-01-01 RX ADMIN — HYDROCORTISONE ACETATE 25 MG: 25 SUPPOSITORY RECTAL at 08:25

## 2020-01-01 RX ADMIN — AMIODARONE HYDROCHLORIDE 200 MG: 200 TABLET ORAL at 21:11

## 2020-01-01 RX ADMIN — ASPIRIN 325 MG ORAL TABLET 325 MG: 325 PILL ORAL at 17:24

## 2020-01-01 RX ADMIN — ALLOPURINOL 100 MG: 100 TABLET ORAL at 09:10

## 2020-01-01 RX ADMIN — METHIMAZOLE 10 MG: 5 TABLET ORAL at 09:01

## 2020-01-01 RX ADMIN — AMIODARONE HYDROCHLORIDE 200 MG: 200 TABLET ORAL at 12:28

## 2020-01-01 RX ADMIN — ALLOPURINOL 100 MG: 100 TABLET ORAL at 08:39

## 2020-01-01 RX ADMIN — AMIODARONE HYDROCHLORIDE 200 MG: 200 TABLET ORAL at 21:38

## 2020-01-01 RX ADMIN — HYDROCORTISONE ACETATE 25 MG: 25 SUPPOSITORY RECTAL at 21:55

## 2020-01-01 RX ADMIN — SODIUM BICARBONATE 650 MG TABLET 650 MG: at 21:55

## 2020-04-11 PROBLEM — S72.009A HIP FRACTURE REQUIRING OPERATIVE REPAIR (HCC): Status: ACTIVE | Noted: 2020-01-01

## 2020-04-11 NOTE — ED TRIAGE NOTES
Per EMS, patient fell while walking in kitchen after tripping over her feet. States right leg externally rotated. Patient unable to bear weight on right leg. Denies LOC

## 2020-04-11 NOTE — ED PROVIDER NOTES
HPI  Patient is a 80 yo female who tripped and fell while walking in kitchen. She landed on her right hip. She was unable to get up. No other complaints. No trauma to her head, chest or abdomen. Past Medical History:  
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   
 Nonspecific abnormal electrocardiogram (ECG) (EKG)  Obesity, unspecified  Other and unspecified hyperlipidemia  Renal insufficiency Past Surgical History:  
Procedure Laterality Date [address] [place] [address] UNLISTED Explor lap  COLONOSCOPY N/A 5/31/2018 COLONOSCOPY w polypectomy performed by Deepak Alarcon MD at [address] [facility] Left  HX CATARACT REMOVAL INSERT PROSTHETIC LENS: LEFT,RIGHT  HX GYN    
 HX ORTHOPAEDIC Left Rotator cuff repair  HX PARTIAL THYROIDECTOMY  CT BIOPSY OF BREAST, INCISIONAL    
 rt & lt 12 yrs ago History reviewed. No pertinent family history. Social History Socioeconomic History  Marital status:  Spouse name: Not on file  Number of children: Not on file  Years of education: Not on file  Highest education level: Not on file Occupational History  Not on file Social Needs  Financial resource strain: Not on file  Food insecurity Worry: Not on file Inability: Not on file  Transportation needs Medical: Not on file Non-medical: Not on file Tobacco Use  Smoking status: Former Smoker  Smokeless tobacco: Never Used  Tobacco comment: REMOTELY QUIT 1980'S Substance and Sexual Activity  Alcohol use: No  
 Drug use: No  
 Sexual activity: Not on file Lifestyle  Physical activity Days per week: Not on file Minutes per session: Not on file  Stress: Not on file Relationships  Social connections Talks on phone: Not on file Gets together: Not on file Attends Protestant service: Not on file Active member of club or organization: Not on file Attends meetings of clubs or organizations: Not on file Relationship status: Not on file  Intimate partner violence Fear of current or ex partner: Not on file Emotionally abused: Not on file Physically abused: Not on file Forced sexual activity: Not on file Other Topics Concern  Not on file Social History Narrative  Not on file ALLERGIES: Ciprofibrate; Darvon [propoxyphene]; and Penicillins Review of Systems Constitutional: Negative. HENT: Negative. Eyes: Negative. Respiratory: Negative. Cardiovascular: Negative. Gastrointestinal: Negative. Endocrine: Negative. Genitourinary: Negative. Musculoskeletal: Positive for arthralgias (right hip). Skin: Negative. Allergic/Immunologic: Negative. Neurological: Negative. Hematological: Negative. Psychiatric/Behavioral: Negative. All other systems reviewed and are negative. Vitals:  
 04/11/20 1851 BP: 174/53 Pulse: 66 Resp: 18 Temp: 97.6 °F (36.4 °C) SpO2: 97% Weight: 63.5 kg (140 lb) Height: 5' (1.524 m) Physical Exam 
Vitals signs and nursing note reviewed. Constitutional:   
   General: She is not in acute distress. Appearance: She is well-developed. She is not diaphoretic. HENT:  
   Head: Normocephalic. Right Ear: External ear normal.  
   Left Ear: External ear normal.  
   Mouth/Throat:  
   Pharynx: No oropharyngeal exudate. Eyes:  
   General: No scleral icterus. Right eye: No discharge. Left eye: No discharge. Conjunctiva/sclera: Conjunctivae normal.  
   Pupils: Pupils are equal, round, and reactive to light. Neck: Musculoskeletal: Normal range of motion and neck supple. Thyroid: No thyromegaly. Vascular: No JVD. Trachea: No tracheal deviation. Cardiovascular: Rate and Rhythm: Normal rate and regular rhythm. Heart sounds: Normal heart sounds. No murmur. No friction rub. No gallop. Pulmonary:  
   Effort: Pulmonary effort is normal. No respiratory distress. Breath sounds: Normal breath sounds. No stridor. No wheezing or rales. Chest:  
   Chest wall: No tenderness. Abdominal:  
   General: Bowel sounds are normal. There is no distension. Palpations: Abdomen is soft. There is no mass. Tenderness: There is no abdominal tenderness. There is no guarding or rebound. Musculoskeletal: Normal range of motion. General: No tenderness. Comments: RIGHT HIP: external rotated and shortened. Normal pulses and sensory. Limited movement secondary to pain. Lymphadenopathy:  
   Cervical: No cervical adenopathy. Skin: 
   General: Skin is warm and dry. Coloration: Skin is not pale. Findings: No erythema or rash. Neurological:  
   Mental Status: She is alert and oriented to person, place, and time. Cranial Nerves: No cranial nerve deficit. Motor: No abnormal muscle tone. Coordination: Coordination normal.  
   Deep Tendon Reflexes: Reflexes normal.  
 
  
 
MDM Number of Diagnoses or Management Options Amount and/or Complexity of Data Reviewed Clinical lab tests: ordered and reviewed Tests in the radiology section of CPT®: ordered and reviewed Risk of Complications, Morbidity, and/or Mortality Presenting problems: high Diagnostic procedures: high Management options: high Dif Dx: fx hip, dislocated hip, contusion hip Procedures Hospital course: Patient main stable Consultation: Case discussed with Dr. Sita Morton. Patient will be admitted to hospital service with him at consultant. Consultation: Case discussed with Dr. Giacomo Iniguez. She accepted patient for admission. Dx: Fx hip Disp: admit Dictation disclaimer:  Please note that this dictation was completed with Dragon, the computer voice recognition software. Quite often unanticipated grammatical, syntax, homophones, and other interpretive errors are inadvertently transcribed by the computer software. Please disregard these errors. Please excuse any errors that have escaped final proofreading.

## 2020-04-11 NOTE — ED NOTES
Assumed care of pt. Resting on stretcher, A&Ox3, no acute distress. External rotation of RLE, +pulse/motor/sensation. Call bell in reach.

## 2020-04-12 NOTE — PERIOP NOTES
TRANSFER - OUT REPORT: 
 
Verbal report given to Sarah(name) on [address] [facility]  being transferred to [facility](unit) for routine post - op Report consisted of patients Situation, Background, Assessment and  
Recommendations(SBAR). Information from the following report(s) SBAR, OR Summary and MAR was reviewed with the receiving nurse. Lines:  
Peripheral IV 04/11/20 Right Antecubital (Active) Site Assessment Clean, dry, & intact 4/12/2020 12:26 PM  
Phlebitis Assessment 0 4/12/2020 12:26 PM  
Infiltration Assessment 0 4/12/2020 12:26 PM  
Dressing Status Clean, dry, & intact 4/12/2020 12:26 PM  
Dressing Type Tape;Transparent 4/12/2020 12:26 PM  
Hub Color/Line Status Pink; Infusing 4/12/2020 12:26 PM  
Alcohol Cap Used Yes 4/12/2020  7:39 AM  
  
 
Opportunity for questions and clarification was provided. Patient transported with: 
 Registered Nurse

## 2020-04-12 NOTE — BRIEF OP NOTE
Brief Postoperative Note Patient: Carol Lowe YOB: 1935 MRN: [id number] Date of Procedure: 4/12/2020 Pre-Op Diagnosis: intertrochanteric femur fracture right Post-Op Diagnosis: Same as preoperative diagnosis. Procedure(s): RIGHT FEMORAL INTERTROCHANTERIC NAIL INSERTION Surgeon(s): 
Sophia Mackey MD 
 
Surgical Assistant: None Anesthesia: General  
 
Estimated Blood Loss (mL): less than 50 Complications: None Specimens: * No specimens in log * Implants:  
Implant Name Type Inv. Item Serial No.  Lot No. LRB No. Used Action NAIL JEB [id number] STRL -- TFNA - [id number]  NAIL JEB 130D [id number] STRL -- Eulis Rather [place] [id number] Right 1 Implanted BLADE HELCL TFNA 100MM --  - [id number]  BLADE HELCL TFNA 100MM --   SYNTHES [place] N/A Right 1 Implanted SCR BNE LCK T25 F/IM NL [id number] --  - [id number]  SCR BNE LCK T25 F/IM NL [id number] --   SYNTHES [place] N/A Right 1 Implanted Drains: * No LDAs found * Findings: as above Electronically Signed by Farncisco Chau MD on 4/12/2020 at 12:06 PM

## 2020-04-12 NOTE — PROGRESS NOTES
1045: TRANSFER - IN REPORT: 
 
Verbal report received from [facility]) on [address]  being received from [facility](unit) for ordered procedure Report consisted of patients Situation, Background, Assessment and  
Recommendations(SBAR). Information from the following report(s) SBAR was reviewed with the receiving nurse. Opportunity for questions and clarification was provided. Assessment completed upon patients arrival to unit and care assumed.

## 2020-04-12 NOTE — ED PROVIDER NOTES
HPI  Patient is a 80 yo female who fell while walking in kitchen. She landed on her right hip. She was unable to get up. No other complaints. Past Medical History:  
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   
 Nonspecific abnormal electrocardiogram (ECG) (EKG)  Obesity, unspecified  Other and unspecified hyperlipidemia  Renal insufficiency Past Surgical History:  
Procedure Laterality Date [address] [place] UNLISTED Explor lap  COLONOSCOPY N/A 5/31/2018 COLONOSCOPY w polypectomy performed by Wale Cuadra MD at [address] [facility] Left  HX CATARACT REMOVAL INSERT PROSTHETIC LENS: LEFT,RIGHT  HX GYN    
 HX ORTHOPAEDIC Left Rotator cuff repair  HX PARTIAL THYROIDECTOMY  [place] BIOPSY OF BREAST, INCISIONAL    
 rt & lt 12 yrs ago History reviewed. No pertinent family history. Social History Socioeconomic History  Marital status:  Spouse name: Not on file  Number of children: Not on file  Years of education: Not on file  Highest education level: Not on file Occupational History  Not on file Social Needs  Financial resource strain: Not on file  Food insecurity Worry: Not on file Inability: Not on file  Transportation needs Medical: Not on file Non-medical: Not on file Tobacco Use  Smoking status: Former Smoker  Smokeless tobacco: Never Used  Tobacco comment: REMOTELY QUIT 1980'S Substance and Sexual Activity  Alcohol use: No  
 Drug use: No  
 Sexual activity: Not on file Lifestyle  Physical activity Days per week: Not on file Minutes per session: Not on file  Stress: Not on file Relationships  Social connections Talks on phone: Not on file Gets together: Not on file Attends Restorationist service: Not on file Active member of club or organization: Not on file Attends meetings of clubs or organizations: Not on file Relationship status: Not on file  Intimate partner violence Fear of current or ex partner: Not on file Emotionally abused: Not on file Physically abused: Not on file Forced sexual activity: Not on file Other Topics Concern  Not on file Social History Narrative  Not on file ALLERGIES: Ciprofibrate; Darvon [propoxyphene]; and Penicillins Review of Systems Constitutional: Negative. HENT: Negative. Eyes: Negative. Respiratory: Negative. Cardiovascular: Negative. Gastrointestinal: Negative. Endocrine: Negative. Genitourinary: Negative. Musculoskeletal: Positive for arthralgias (right hip). Skin: Negative. Allergic/Immunologic: Negative. Neurological: Negative. Hematological: Negative. Psychiatric/Behavioral: Negative. All other systems reviewed and are negative. Vitals:  
 04/11/20 1851 BP: 174/53 Pulse: 66 Resp: 18 Temp: 97.6 °F (36.4 °C) SpO2: 97% Weight: 63.5 kg (140 lb) Height: 5' (1.524 m) Physical Exam 
Vitals signs and nursing note reviewed. Constitutional:   
   General: She is not in acute distress. Appearance: She is well-developed. She is not diaphoretic. HENT:  
   Head: Normocephalic. Right Ear: External ear normal.  
   Left Ear: External ear normal.  
   Mouth/Throat:  
   Pharynx: No oropharyngeal exudate. Eyes:  
   General: No scleral icterus. Right eye: No discharge. Left eye: No discharge. Conjunctiva/sclera: Conjunctivae normal.  
   Pupils: Pupils are equal, round, and reactive to light. Neck: Musculoskeletal: Normal range of motion and neck supple. Thyroid: No thyromegaly. Vascular: No JVD. Trachea: No tracheal deviation. Cardiovascular:  
   Rate and Rhythm: Normal rate and regular rhythm. Heart sounds: Normal heart sounds. No murmur. No friction rub. No gallop. Pulmonary:  
   Effort: Pulmonary effort is normal. No respiratory distress. Breath sounds: Normal breath sounds. No stridor. No wheezing or rales. Chest:  
   Chest wall: No tenderness. Abdominal:  
   General: Bowel sounds are normal. There is no distension. Palpations: Abdomen is soft. There is no mass. Tenderness: There is no abdominal tenderness. There is no guarding or rebound. Musculoskeletal: Normal range of motion. General: No tenderness. Comments: RIGHT HIP: external rotated and shortened. Normal pulses and sensory. Limited movement secondary to pain. Lymphadenopathy:  
   Cervical: No cervical adenopathy. Skin: 
   General: Skin is warm and dry. Coloration: Skin is not pale. Findings: No erythema or rash. Neurological:  
   Mental Status: She is alert and oriented to person, place, and time. Cranial Nerves: No cranial nerve deficit. Motor: No abnormal muscle tone. Coordination: Coordination normal.  
   Deep Tendon Reflexes: Reflexes normal.  
 
  
 
MDM Number of Diagnoses or Management Options Amount and/or Complexity of Data Reviewed Clinical lab tests: ordered and reviewed Tests in the radiology section of CPT®: ordered and reviewed Risk of Complications, Morbidity, and/or Mortality Presenting problems: high Diagnostic procedures: high Management options: high Dif Dx: fx hip, dislocated hip, contusion hip Procedures Hospital course:patient remained stable Case discussed with   
 
Dx: Fx Hip Disp:Admit Dictation disclaimer:  Please note that this dictation was completed with [employer], the computer voice recognition software. Quite often unanticipated grammatical, syntax, homophones, and other interpretive errors are inadvertently transcribed by the computer software.   Please disregard these errors. Please excuse any errors that have escaped final proofreading.

## 2020-04-12 NOTE — PROGRESS NOTES
Date of Surgery Update: 
Alli Mercedes was seen and examined. History and physical has been reviewed. The patient has been examined. There have been no significant clinical changes since the completion of the originally dated History and Physical. 
 
Signed By: Conrad Serrato MD   
 April 12, 2020 10:58 AM   
  
The above patient was independently seen and examined by myself. The case was then discussed and a proper diagnosis/plan was made. I agree with the above assessment.

## 2020-04-12 NOTE — CONSULTS
[address] [facility] Consult Note Patient: Viki Lucio               Sex: female          DOA: 4/11/2020 YOB: 1935      Age:  81 y.o.        LOS:  LOS: 1 day HPI:  
 
Viki Lucio is a 81 y.o. female who has been seen for right hip pain. Patient fell yesterday while doing laundry. Denies LOC. Has pain when she tries to move her hip. States it is sharp and stabbing. She is \"OK\" this am.  
 
Past Medical History:  
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   
 Nonspecific abnormal electrocardiogram (ECG) (EKG)  Obesity, unspecified  Other and unspecified hyperlipidemia  Renal insufficiency Past Surgical History:  
Procedure Laterality Date [address] [place] [address] UNLISTED Explor lap  COLONOSCOPY N/A 5/31/2018 COLONOSCOPY w polypectomy performed by Antonio Baca MD at [address] Left  HX CATARACT REMOVAL INSERT PROSTHETIC LENS: LEFT,RIGHT  HX GYN    
 HX ORTHOPAEDIC Left Rotator cuff repair  HX PARTIAL THYROIDECTOMY  [place] BIOPSY OF BREAST, INCISIONAL    
 rt & lt 12 yrs ago History reviewed. No pertinent family history. Social History Socioeconomic History  Marital status:  Spouse name: Not on file  Number of children: Not on file  Years of education: Not on file  Highest education level: Not on file Tobacco Use  Smoking status: Former Smoker  Smokeless tobacco: Never Used  Tobacco comment: REMOTELY QUIT 1980'S Substance and Sexual Activity  Alcohol use: No  
 Drug use: No  
 
 
Prior to Admission medications Medication Sig Start Date End Date Taking? Authorizing Provider  
amLODIPine (NORVASC) 5 mg tablet Take 5 mg by mouth daily. Yes Other, MD Max  
allopurinoL (ZYLOPRIM) 100 mg tablet Take 100 mg by mouth daily.    Yes Other, MD Max  
simvastatin (ZOCOR) 20 mg tablet Take 20 mg by mouth nightly. Yes Other, MD Max  
furosemide (LASIX) 20 mg tablet Take 20 mg by mouth every other day. Yes Jabier, MD Max  
losartan (COZAAR) 50 mg tablet Take 50 mg by mouth daily. Yes Provider, Historical  
FOLIC ACID/VITAMIN B COMP W-C (VIKTOR-LUCERO PO) Take 1 Tab by mouth daily. Yes Provider, Historical  
omeprazole (PRILOSEC) 20 mg capsule Take 20 mg by mouth daily. Yes Provider, Historical  
aspirin 81 mg tablet Take 81 mg by mouth. Yes Provider, Historical  
LORazepam (ATIVAN) 1 mg tablet Take  by mouth two (2) times a day. Yes Provider, Historical  
 
 
Allergies Allergen Reactions  Ciprofibrate Unable to [employer] Jose  Darvon [Propoxyphene] Unable to Obtain  Penicillins Unable to Obtain Review of Systems Negative for any fever, chills, sweats, headache, blurred vision, cough, congestion, SOB, abdominal pain, bleeding, bruising, numbness, or tingling. 12 point ROS otherwise negative other than noted in the HPI Physical Exam:  
  
Visit Vitals /65 (BP Patient Position: At rest) Pulse 69 Temp 97.1 °F (36.2 °C) Resp 16 Ht 5' (1.524 m) Wt 140 lb (63.5 kg) SpO2 97% BMI 27.34 kg/m² Physical Exam: 
General: Well developed, well nourished, 81 y.o. female in  NAD Vitals: Blood pressure 135/65, pulse 69, temperature 97.1 °F (36.2 °C), resp. rate 16, height 5' (1.524 m), weight 140 lb (63.5 kg), SpO2 97 %. Skin: No rashs, ulcers, icterus, or other obvious lesions/ lacerations Eyes: EOM intact, PERRLA  
ENM: Without obvious lesions. Nares patent. Moist mucous membranes. Neck: Supple, FROM Lungs: CTAB Heart: RRR, normal S1, S2. No murmurs, rubs, or gallops Abdomen: Soft, NT, bowel sounds present all 4 quadrants Musculoskeletal: ttp right hip No abrasions Unable to assess motion secondary to fracture Neuro: AAOx3. Without obvious deficits IMAGIN view pelvis show intertrochanteric fracture right hip Labs Reviewed: 
BMP:  
Lab Results Component Value Date/Time  2020 08:05 PM  
 K 4.8 2020 08:05 PM  
  2020 08:05 PM  
 CO2 24 2020 08:05 PM  
 AGAP 6 2020 08:05 PM  
  (H) 2020 08:05 PM  
 BUN 32 (H) 2020 08:05 PM  
 CREA 1.89 (H) 2020 08:05 PM  
 GFRAA 31 (L) 2020 08:05 PM  
 GFRNA 25 (L) 2020 08:05 PM  
 
CMP:  
Lab Results Component Value Date/Time  2020 08:05 PM  
 K 4.8 2020 08:05 PM  
  2020 08:05 PM  
 CO2 24 2020 08:05 PM  
 AGAP 6 2020 08:05 PM  
  (H) 2020 08:05 PM  
 BUN 32 (H) 2020 08:05 PM  
 CREA 1.89 (H) 2020 08:05 PM  
 GFRAA 31 (L) 2020 08:05 PM  
 GFRNA 25 (L) 2020 08:05 PM  
 CA 10.0 2020 08:05 PM  
 ALB 3.3 (L) 2020 08:05 PM  
 TP 7.2 2020 08:05 PM  
 GLOB 3.9 2020 08:05 PM  
 AGRAT 0.8 2020 08:05 PM  
 SGOT 13 2020 08:05 PM  
 ALT 13 2020 08:05 PM  
 
CBC:  
Lab Results Component Value Date/Time WBC 13.1 2020 08:05 PM  
 HGB 9.8 (L) 2020 08:05 PM  
 HCT 31.5 (L) 2020 08:05 PM  
  2020 08:05 PM  
 
All Cardiac Markers in the last 24 hours: No results found for: CPK, CK, CKMMB, CKMB, RCK3, CKMBT, CKNDX, CKND1, MICHELLE, TROPT, TROIQ, DARA, TROPT, TNIPOC, BNP, BNPP 
COAGS:  
Lab Results Component Value Date/Time PTP 15.8 (H) 2020 08:05 PM  
 INR 1.3 (H) 2020 08:05 PM  
 
 
Assessment/Plan [unfilled] Active Problems: 
  Hip fracture requiring operative repair ([facility] [address] (2020) Pt. Stable Pt. NPO x meds Consent Pt. For short troch nail right hip to be done this afternoon time pending surgery schedule. I discussed the risks and benefits and potential adverse outcomes of both operative vs non operative treatment of right intertroch fracture with the patient. Risks of operative intervention include but not limited to bleeding, infection, deep vein thrombosis, pulmonary embolism, death, limb length discrepancy, reflexive sympathetic dystrophy, fat embolism syndrome,damage to blood vessels and nerves, malunion, non-union, delayed union, failure of hardware, post traumatic arthritis, stroke, heart attack, and death. Patient understands that infection may arise and may require numerous surgeries. Medical consultation for Pre-/post-operative medical care. Will place patient on lovenox post operatively for DVT prophylaxis Tania Chase PA-C [facility] [phone] [facility]  
[phone]

## 2020-04-12 NOTE — ROUTINE PROCESS
End of Shift Note Bedside and verbal shift change report given to Kayleigh Luu RN (On coming nurse) by Justyn Lerma RN (Off going nurse). Report included the following information:  
   --Procedure Summary 
   --MAR, 
   --Recent Results --Med Rec Status SBAR Recommendations: surg? Issues for Provider to address placement Activity This Shift [x] Bed Rest Order 
 [] Refused 
 [] Dangled  
 [] TDWB Ambulating: 
   [] Bathroom [] BSC [] Room/Hallway Up in Chair for meals []Yes [] No  
Voiding       [] Yes  [] No 
Mathur          [x] Yes  [] No 
Incontinent [] Yes  [] No 
 
DUE TO VOID POUR        [] Yes [] No 
Purewick    [] Yes [] No 
New Onset [] Yes [] No Straight Cath   []Yes  [] No 
Condom Cath  [] Yes [] No 
MD Called      [] Yes  [] No  
Blood Sugars Managed []Yes [x] No   
Bowels Moved [] Yes [x] No 
 
Incontinent     [] Yes [] No Passed Gas []Yes [x] No 
 
New Onset  []Yes [] No 
  
 
 MD Called []Yes  [] No 
  
CHG Bath Done Before Surgery After Surgery  
  
[x] Yes  [] No 
[] Yes  [x] No   
  
Drain Removed [] Yes  [] No [x] N/A Dressing Changed [] Yes   [] No [x] N/A Nausea/Vomiting [] Yes   [x] No    
Ice Packs Changed [] Yes   [] No  [x] N/A Incentive Spirometer  [] Yes  [x] No     
SCD Pumps On Ankle Pumping  [] Yes   [x] No  
  
[] Yes   [x] No    
  
Telemetry Monitoring [] Yes   [x] No   Rhythm

## 2020-04-12 NOTE — PROGRESS NOTES
Reason for Admission:  Hip fracture requiring operative repair ([facility] [address] Tanvi Huff RRAT Score:    12 Plan for utilizing home health:    NO Likelihood of Readmission:   LOW Transition of Care Plan:         
 
 
Initial assessment completed with DAUGHTER Cognitive status of patient: PATIENT IN OR. Face sheet information confirmed:  yes. The patient designates Es Moyer [phone] to participate in her discharge plan and to receive any needed information. This patient lives in a single family home with patient and daughter. Patient is not able to navigate steps as needed. Prior to hospitalization, patient was considered to be independent with ADLs/IADLS : yes . Patient has a current ACP document on file: no The patient and other:  BLS will be available to transport patient home upon discharge. The patient already has none reported,  medical equipment available in the home. Patient is not currently active with home health. Patient has not stayed in a skilled nursing facility or rehaB. This patient is on dialysis :no 
 
List of available SNF agencies were provided and reviewed with the patient prior to discharge. Freedom of choice signed: yes, for #1 [facility], [facility]2 [facility] [place] AND #3 [address] [place] [address]. MATCHED IN [facility] AND [facility] Currently, the discharge plan is SNF. The patient states that she can obtain her medications from the pharmacy, and take her medications as directed. Patient's current insurance is [employer] Care Management Interventions PCP Verified by CM: Yes Mode of Transport at Discharge: BLS Current Support Network: Relative's Home(daughter) Confirm Follow Up Transport: Family The Plan for Transition of Care is Related to the Following Treatment Goals : Skilled Nursing The Patient and/or Patient Representative was Provided with a Choice of Provider and Agrees with the Discharge Plan?: Yes Name of the Patient Representative Who was Provided with a Choice of Provider and Agrees with the Discharge Plan: [facility] of Choice List was Provided with Basic Dialogue that Supports the Patient's Individualized Plan of Care/Goals, Treatment Preferences and Shares the Quality Data Associated with the Providers?: Yes Discharge Location Discharge Placement: Skilled nursing facility Daniel Mujica RN BSN Care Manager [phone]

## 2020-04-12 NOTE — ROUTINE PROCESS
Bedside and Verbal shift change report given to GREER Jhaveri (oncoming nurse) by David Stanley (offgoing nurse). Report included the following information SBAR, Kardex, Intake/Output, MAR and Recent Results.

## 2020-04-12 NOTE — PROGRESS NOTES
[facility] Progress Note Patient: Dano Nazario Age: 81 y.o. : 1935 MR#: [id number] SSN: [id number] Date/Time: 2020 Subjective: Patient feels fine, still has pain in the right hip area on movement. Denies any chest pain or shortness of breath. No orthopnea or PND. Patient states her fall was mechanical, did not loss of consciousness, no chest pain or dizziness prior to fall. Patient walks around by herself at home, no exertional chest pain or shortness of breath. She did have a thyroid surgery but does not know if she takes any thyroid medications. Assessment/Plan: 1. Hip fracture due to #2: Plan for ORIF today, patient is moderate cardiovascular risk for low risk surgery. Benefits outweigh risks. 2. Mechanical fall PT OT eval post surgery: 3. CKD III, at baseline creat, will monitor and Dr. Herlinda Mosher is nephrologist 
4. Goiter  w/ partial thyroidectomy, pt states she is on thyroid medication but does not know name/dose, will get TSH 
5. HTN: BP stable, will monitor off medications given episode of hypotension in ED. 6. Anemia: We will monitor H&H post surgery. 7. DVT prophylaxis per Ortho team  
8. full code Discussed with the patient and daughter over the phone in length in detail about risk and benefits of surgery, both understand and wants to proceed with the surgery. Records from nephrology office noted, patient not on any thyroid medication. Pill bottles reviewed at the bedside. I spent 40 minutes with the patient in face-to-face consultation, of which greater than 50% was spent in counseling and coordination of care as described above. Case discussed with:  [x]Patient  [x]Family  []Nursing  []Case Management DVT Prophylaxis:  []Lovenox  []Hep SQ  [x]SCDs  []Coumadin   []Eliquis/Xarelto Objective:  
VS:  
Visit Vitals /57 (BP Patient Position: At rest) Pulse 62 Temp 97 °F (36.1 °C) Resp 16  
 Ht 5' (1.524 m) Wt 63.5 kg (140 lb) SpO2 97% BMI 27.34 kg/m² Tmax/24hrs: Temp (24hrs), Av.4 °F (36.3 °C), Min:97 °F (36.1 °C), Max:98 °F (36.7 °C) IOBRIEF Intake/Output Summary (Last 24 hours) at 2020 1106 Last data filed at 2020 2349 Gross per 24 hour Intake 1240 ml Output  Net 1240 ml General:  Alert, cooperative, no acute distress HEENT: PERRLA, anicteric sclerae. Pulmonary:  CTA Bilaterally. No Wheezing/Rales. Cardiovascular: Regular rate and Rhythm. GI:  Soft, Non distended, Non tender. + Bowel sounds. Extremities:  No edema. No calf tenderness. Psych: Good insight. Not anxious or agitated. Neurologic: Alert and oriented X 3. Limited movement right lower extremity due to pain. Additional: 
 
Medications:  
Current Facility-Administered Medications Medication Dose Route Frequency  acetaminophen (TYLENOL) tablet 1,000 mg  1,000 mg Oral ONCE  
 vancomycin (VANCOCIN) 1500 mg in  ml infusion  1,500 mg IntraVENous ON CALL TO OR  
 allopurinoL (ZYLOPRIM) tablet 100 mg  100 mg Oral DAILY  LORazepam (ATIVAN) tablet 1 mg  1 mg Oral BID PRN  pantoprazole (PROTONIX) tablet 40 mg  40 mg Oral ACB  atorvastatin (LIPITOR) tablet 10 mg  10 mg Oral QHS  oxyCODONE-acetaminophen (PERCOCET) 5-325 mg per tablet 1-2 Tab  1-2 Tab Oral Q4H PRN Labs:   
Recent Results (from the past 24 hour(s)) EKG, 12 LEAD, SUBSEQUENT Collection Time: 20  7:48 PM  
Result Value Ref Range Ventricular Rate 58 BPM  
 Atrial Rate 58 BPM  
 P-R Interval 142 ms QRS Duration 72 ms Q-T Interval 392 ms QTC Calculation (Bezet) 384 ms Calculated P Axis 77 degrees Calculated R Axis 22 degrees Calculated T Axis 17 degrees Diagnosis Sinus bradycardia Junctional ST depression, probably normal 
Borderline ECG When compared with ECG of 31-MAY-2018 09:22, No significant change was found Confirmed by Adrianna Grissom ([id number]) on 4/12/2020 10:48:45 AM 
  
CBC WITH AUTOMATED DIFF Collection Time: 04/11/20  8:05 PM  
Result Value Ref Range WBC 13.1 4.6 - 13.2 K/uL  
 RBC 3.15 (L) 4.20 - 5.30 M/uL HGB 9.8 (L) 12.0 - 16.0 g/dL HCT 31.5 (L) 35.0 - 45.0 % .0 (H) 74.0 - 97.0 FL  
 MCH 31.1 24.0 - 34.0 PG  
 MCHC 31.1 31.0 - 37.0 g/dL  
 RDW 13.8 11.6 - 14.5 % PLATELET 668 965 - 573 K/uL MPV 12.1 (H) 9.2 - 11.8 FL  
 NEUTROPHILS 83 (H) 40 - 73 % LYMPHOCYTES 12 (L) 21 - 52 % MONOCYTES 5 3 - 10 % EOSINOPHILS 0 0 - 5 % BASOPHILS 0 0 - 2 %  
 ABS. NEUTROPHILS 10.8 (H) 1.8 - 8.0 K/UL  
 ABS. LYMPHOCYTES 1.5 0.9 - 3.6 K/UL  
 ABS. MONOCYTES 0.7 0.05 - 1.2 K/UL  
 ABS. EOSINOPHILS 0.1 0.0 - 0.4 K/UL  
 ABS. BASOPHILS 0.0 0.0 - 0.1 K/UL  
 DF AUTOMATED PROTHROMBIN TIME + INR Collection Time: 04/11/20  8:05 PM  
Result Value Ref Range Prothrombin time 15.8 (H) 11.5 - 15.2 sec INR 1.3 (H) 0.8 - 1.2 METABOLIC PANEL, COMPREHENSIVE Collection Time: 04/11/20  8:05 PM  
Result Value Ref Range Sodium 138 136 - 145 mmol/L Potassium 4.8 3.5 - 5.5 mmol/L Chloride 108 100 - 111 mmol/L  
 CO2 24 21 - 32 mmol/L Anion gap 6 3.0 - 18 mmol/L Glucose 131 (H) 74 - 99 mg/dL BUN 32 (H) 7.0 - 18 MG/DL Creatinine 1.89 (H) 0.6 - 1.3 MG/DL  
 BUN/Creatinine ratio 17 12 - 20 GFR est AA 31 (L) >60 ml/min/1.73m2 GFR est non-AA 25 (L) >60 ml/min/1.73m2 Calcium 10.0 8.5 - 10.1 MG/DL Bilirubin, total 0.4 0.2 - 1.0 MG/DL  
 ALT (SGPT) 13 13 - 56 U/L  
 AST (SGOT) 13 10 - 38 U/L Alk. phosphatase 128 (H) 45 - 117 U/L Protein, total 7.2 6.4 - 8.2 g/dL Albumin 3.3 (L) 3.4 - 5.0 g/dL Globulin 3.9 2.0 - 4.0 g/dL A-G Ratio 0.8 0.8 - 1.7 URINALYSIS W/MICROSCOPIC Collection Time: 04/12/20  6:00 AM  
Result Value Ref Range Color YELLOW Appearance CLEAR Specific gravity 1.014 1.005 - 1.030    
 pH (UA) 5.5 5.0 - 8.0 Protein Negative NEG mg/dL Glucose Negative NEG mg/dL Ketone Negative NEG mg/dL Bilirubin Negative NEG Blood Negative NEG Urobilinogen 0.2 0.2 - 1.0 EU/dL Nitrites Negative NEG Leukocyte Esterase Negative NEG    
 WBC 1 to 2 0 - 4 /hpf  
 RBC Negative 0 - 5 /hpf Epithelial cells FEW 0 - 5 /lpf Bacteria FEW (A) NEG /hpf Signed By: Mark Canales MD   
 April 12, 2020 Disclaimer: Sections of this note are dictated using utilizing voice recognition software. Minor typographical errors may be present. If questions arise, please do not hesitate to contact me or call our department.

## 2020-04-12 NOTE — ANESTHESIA POSTPROCEDURE EVALUATION
Procedure(s): RIGHT FEMORAL INTERTROCHANTERIC NAIL INSERTION. general 
 
Anesthesia Post Evaluation Multimodal analgesia: multimodal analgesia used between 6 hours prior to anesthesia start to PACU discharge Patient location during evaluation: bedside Patient participation: complete - patient participated Level of consciousness: awake Pain management: adequate Airway patency: patent Anesthetic complications: no 
Cardiovascular status: stable Respiratory status: acceptable Hydration status: acceptable Post anesthesia nausea and vomiting:  controlled Vitals Value Taken Time /47 4/12/2020  1:56 PM  
Temp 36.4 °C (97.6 °F) 4/12/2020  1:25 PM  
Pulse 81 4/12/2020  2:04 PM  
Resp 25 4/12/2020  2:04 PM  
SpO2 99 % 4/12/2020  2:04 PM  
Vitals shown include unvalidated device data.

## 2020-04-12 NOTE — H&P
Hospitalist Admission History and Physical 
 
NAME:  Farheen Rausch :   1935 MRN:   [id number] PCP:  Umair Troncoso MD 
Date/Time:  2020 11:17 PM 
Subjective: CHIEF COMPLAINT:  Fall and hip pain HISTORY OF PRESENT ILLNESS:    
Jerald De Dios is a 81 y.o.   female w/ h/o HTN, CKD who presents after a fall w/ c/o right leg pain. Fall happened around 4pm on date of admission. Pt stated that she was doing laundry when she fell and blames her fall on the slippers she was wearing. She landed on her back. No LOC. No c/o chest pain and sob lately. She denies recent illnesses/hospitalizations. In the ED, she was noted to have evidence of right hip fx and she has been admitted for further eval and management. Past Medical History:  
Diagnosis Date  Bell's palsy  Essential hypertension, benign CONTROLLED  Mitral valve disorders(424.0)  TRACE MR   
 Nonspecific abnormal electrocardiogram (ECG) (EKG)  Obesity, unspecified  Other and unspecified hyperlipidemia  Renal insufficiency Goiter, she appears to have had partial thyroidectomy, she states she is on thyroid medication, but does not know name and dose of drug. Past Surgical History:  
Procedure Laterality Date   [place] UNLISTED Explor lap  COLONOSCOPY N/A 2018 COLONOSCOPY w polypectomy performed by Jong Ferrell MD at [address] [facility] Left  HX CATARACT REMOVAL INSERT PROSTHETIC LENS: LEFT,RIGHT  HX GYN    
 HX ORTHOPAEDIC Left Rotator cuff repair  HX PARTIAL THYROIDECTOMY  MT BIOPSY OF BREAST, INCISIONAL    
 rt & lt 12 yrs ago Social History Tobacco Use  Smoking status: Former Smoker  Smokeless tobacco: Never Used  Tobacco comment: REMOTELY QUIT  Substance Use Topics  Alcohol use: No  
  
 
History reviewed. No pertinent family history. Allergies Allergen Reactions  Ciprofibrate Unable to Consolidated Jose  Darvon [Propoxyphene] Unable to Obtain  Penicillins Unable to Obtain Prior to Admission Medications Prescriptions Last Dose Informant Patient Reported? Taking? FOLIC ACID/VITAMIN B COMP W-C (VIKTOR-LUCERO PO)   Yes Yes Sig: Take 1 Tab by mouth daily. LORazepam (ATIVAN) 1 mg tablet   Yes Yes Sig: Take  by mouth two (2) times a day. allopurinoL (ZYLOPRIM) 100 mg tablet   Yes Yes Sig: Take 100 mg by mouth daily. amLODIPine (NORVASC) 5 mg tablet   Yes Yes Sig: Take 5 mg by mouth daily. aspirin 81 mg tablet   Yes Yes Sig: Take 81 mg by mouth. furosemide (LASIX) 20 mg tablet   Yes Yes Sig: Take 20 mg by mouth every other day. losartan (COZAAR) 50 mg tablet   Yes Yes Sig: Take 50 mg by mouth daily. omeprazole (PRILOSEC) 20 mg capsule   Yes Yes Sig: Take 20 mg by mouth daily. simvastatin (ZOCOR) 20 mg tablet   Yes Yes Sig: Take 20 mg by mouth nightly. Facility-Administered Medications: None Pt states she is on medicine for thyroid but does not know name/dose of drug. REVIEW OF SYSTEMS:   
Please see above otw 10 point ROS checked and negative. Objective: VITALS:   
Visit Vitals /57 (BP Patient Position: At rest) Pulse (!) 57 Temp 98 °F (36.7 °C) Resp 20 Ht 5' (1.524 m) Wt 63.5 kg (140 lb) SpO2 97% BMI 27.34 kg/m² Temp (24hrs), Av.8 °F (36.6 °C), Min:97.6 °F (36.4 °C), Max:98 °F (36.7 °C) PHYSICAL EXAM:  
General:    Alert, cooperative, no distress, appears stated age. Head:   Normocephalic, without obvious abnormality, atraumatic. Eyes:   Conjunctivae clear, anicteric sclerae. Pupils are equal 
Nose:  Nares normal. No drainage. Throat:    Lips, mucosa, and tongue normal.  No Thrush Neck:  Supple, symmetrical,  no adenopathy,  
   and no JVD. Lungs:   Clear to auscultation bilaterally. No Wheezing or Rhonchi. No rales. Chest wall:  No tenderness or deformity. No Accessory muscle use. Heart:   Regular rate and rhythm,  no murmur, rub or gallop. Abdomen:   Soft, non-tender. Not distended. Bowel sounds normal. No masses Extremities: Right lower ext w/ limited ROM due to hip fx, otherwise extremities normal, atraumatic, No cyanosis. No edema. No clubbing Skin:     Texture, turgor normal. No rashes or lesions. Not Jaundiced Lymph nodes: Cervical, supraclavicular normal. 
Psych:  Good insight. Not depressed. Not anxious or agitated. Neurologic: EOMs intact. No facial asymmetry. No aphasia or slurred speech. Normal   strength, Alert and oriented X 3. LAB DATA REVIEWED:   
No components found for: [place] Recent Labs 04/11/20 2005   
K 4.8  
 CO2 24 BUN 32* CREA 1.89* * CA 10.0 ALB 3.3* WBC 13.1 HGB 9.8* HCT 31.5*  IMAGING RESULTS: 
 []  I have personally reviewed the actual   [x]hip XR  []CT scan Pelvic XR: right hip fx CXR : no acute findings Assessment/Plan: Active Problems: 
  Hip fracture requiring operative repair ([facility] [address] (4/11/2020) Mechanical fall HISTORY OF: 
-CKD III, at baseline creat 
-What sounds like goieter w/ partial thyroidectomy, pt states she is on thyroid medication but does not know name/dose -HTN 
-Anemia, at baseline hgb 
___________________________________________________ PLAN:   
 
-Hip fx management per ortho 
-Hold bp meds and lasix given episode of hypotension w/ IV narcotic 
-cont statin, ppi 
-oral narcotics for now -PT/OT Risk of deterioration:  []Low    [x]Moderate  []High Prophylaxis:  []Lovenox  []Coumadin  []Hep SQ  []SCDs  []H2B/PPI Disposition:  []Home w/ Family   [] PT,OT,RN   [x]SNF/LTC   []SAH/Rehab Discussed Code Status:    [x]Full Code      []DNR    
___________________________________________________ Care Plan discussed with: 
  [x]Patient   []Family    []ED Care Manager  [x]ED Doc   []Specialist : 
 
 Total Time Coordinating Admission:      minutes []Total Critical Care Time:    
___________________________________________________ Admitting Physician: Mohan Leigh MD

## 2020-04-12 NOTE — ANESTHESIA PREPROCEDURE EVALUATION
Anesthetic History No history of anesthetic complications Review of Systems / Medical History Patient summary reviewed and pertinent labs reviewed Pulmonary Within defined limits Neuro/Psych Within defined limits Cardiovascular Hypertension: well controlled Exercise tolerance: >4 METS 
  
GI/Hepatic/Renal 
Within defined limits Endo/Other Morbid obesity Other Findings Comments: Documentation of current medication Current medications obtained, documented and obtained? YES Risk Factors for Postoperative nausea/vomiting: 
     History of postoperative nausea/vomiting? NO Female? YES Motion sickness? NO Intended opioid administration for postoperative analgesia? NO Smoking Abstinence: 
Current Smoker? NO Elective Surgery? YES Seen preoperatively by anesthesiologist or proxy prior to day of surgery? YES Pt abstained from smoking 24 hours prior to anesthesia? N/A Preventive care/screening for High Blood Pressure: 
Aged 18 years and older: YES Screened for high blood pressure: YES Patients with high blood pressure referred to primary care provider 
 for BP management: YES Physical Exam 
 
Airway Mallampati: III 
TM Distance: 4 - 6 cm Neck ROM: decreased range of motion Mouth opening: Diminished (comment) Cardiovascular Rhythm: regular Rate: normal 
 
 
 
 Dental 
 
Dentition: Caps/crowns and Poor dentition Pulmonary Breath sounds clear to auscultation Abdominal 
GI exam deferred Other Findings Anesthetic Plan ASA: 3, emergent Anesthesia type: general 
 
 
 
 
Induction: Intravenous Anesthetic plan and risks discussed with: Patient

## 2020-04-12 NOTE — ED NOTES
TRANSFER - OUT REPORT: 
 
Verbal report given to Emilio at [facility] ([address]) on [address] [facility]  being transferred to [address] [place] [address](unit) for routine progression of care Report consisted of patients Situation, Background, Assessment and  
Recommendations(SBAR). Information from the following report(s) ED Summary was reviewed with the receiving nurse. Lines:  
Peripheral IV 04/11/20 Right Antecubital (Active) Site Assessment Clean, dry, & intact 4/11/2020  8:28 PM  
Phlebitis Assessment 0 4/11/2020  8:28 PM  
Infiltration Assessment 0 4/11/2020  8:28 PM  
Dressing Status Clean, dry, & intact 4/11/2020  8:28 PM  
Dressing Type Transparent 4/11/2020  8:28 PM  
Hub Color/Line Status Pink 4/11/2020  8:28 PM  
Alcohol Cap Used No 4/11/2020  8:28 PM  
  
 
Opportunity for questions and clarification was provided. Patient transported with: 
Coco Em (clothing, cell phone), discharged with S transport.

## 2020-04-12 NOTE — ED NOTES
Pt became hypotensive approximately 30 minutes s/p morphine administration. Updated Dr. Christa Leung; received order for 1L normal saline bolus. Pt's BP responsive at this time.

## 2020-04-13 NOTE — PROGRESS NOTES
Discharge planning Reviewed chart. Spoke with Stefany Gaines with Admissions at [facility], patient's first choice. Per Stefany Gaines, admissions are on hold at this time. She will let me after her morning meeting, if facility will accept patients. Patient will need PT/OT evaluations to assist with discharge planning. CM will continue to assist with transitional needs. OSWALD JimenezN, RN Pager # [phone] Care Manager

## 2020-04-13 NOTE — PROGRESS NOTES
[facility] Progress Note Patient: Roxy Phillip Age: 81 y.o. : 1935 MR#: [id number] SSN: [id number] Date/Time: 2020 Subjective: Patient is morning had an episode of panic attack, started feeling anxious and nervous. Patient also had some epigastric discomfort during that time. During my visit, patient feels lot better, mild pain in the right hip area. Denies any chest pain or shortness of breath. No orthopnea or PND. Patient states her epigastric discomfort was due to her panic attack and also due to her GI symptoms. She has been feeling more belching and burping. She started feeling better after taking her Ativan. Patient denies any lightheadedness or dizziness now. Assessment/Plan: 1. Hip fracture due to #2: s/p ORIF. 2. Acute blood loss anemia due to # 1: will transfuse 1 unit of blood, monitor H&H. No signs of bleeding at the surgical site. 3. Mechanical fall PT OT eval post surgery: 4. CKD III, at baseline creat, will monitor and Dr. Aly Barboza is nephrologist 
5. Goiter  w/ partial thyroidectomy, pt states she is on thyroid medication but does not know name/dose, TSH high, will get T4 free and consider starting Synthroid based on T4 levels. 6. HTN: BP stable, monitor off medications given episode of hypotension in ED. 7. GERD: Continue PPI and add Maalox as needed. 8. DVT prophylaxis per Ortho team  
9. Full code Discussed with the patient and daughter over the phone in length in detail about risk and benefits of transfusion, both understand and wants to proceed with transfusion. Addendum 
2 PM 
RN called me that patient does not have IV access. Multiple attempts were made by nursing staff on the floor, PACU nurse, nursing supervisor and ED nurse but unsuccessful. Discussed with IR HIEN Jensen for midline, IR to place midline today. Addendum TSH high, free T4 low, hypothyroidism,? Undertreated versus not treated. Patient states she follows with Dr. Dank Ying. We will consult endocrine. Start IV Synthroid and monitor free T4. Addendum: D/w endocrine, pt was on methimazole, not sure if still taking or not. No need for synthroid for now. Case discussed with:  [x]Patient  [x]Family  []Nursing  []Case Management DVT Prophylaxis:  [x]Lovenox  []Hep SQ  [x]SCDs  []Coumadin   []Eliquis/Xarelto Objective:  
VS:  
Visit Vitals /59 (BP 1 Location: Left arm, BP Patient Position: At rest) Pulse 70 Temp 97.2 °F (36.2 °C) Resp 16 Ht 5' (1.524 m) Wt 63.5 kg (140 lb) SpO2 95% BMI 27.34 kg/m² Tmax/24hrs: Temp (24hrs), Av.2 °F (36.2 °C), Min:96.7 °F (35.9 °C), Max:97.7 °F (36.5 °C) IOBRIEF Intake/Output Summary (Last 24 hours) at 2020 1148 Last data filed at 2020 7309 Gross per 24 hour Intake 580 ml Output 325 ml Net 255 ml General:  Alert, cooperative, no acute distress Pulmonary:  CTA Bilaterally. No Wheezing/Rales. Cardiovascular: Regular rate and Rhythm. GI:  Soft, Non distended, Non tender. + Bowel sounds. Extremities:  No edema. Neurologic: Alert and oriented X 3. Limited movement right lower extremity due to pain. Additional: Right hip surgical site dressing clean, no swelling or hematoma. Medications:  
Current Facility-Administered Medications Medication Dose Route Frequency  sodium polystyrene (KAYEXALATE) 15 gram/60 mL oral suspension 30 g  30 g Oral NOW  acetaminophen (TYLENOL) tablet 1,000 mg  1,000 mg Oral Q8H  
 enoxaparin (LOVENOX) injection 30 mg  30 mg SubCUTAneous Q24H  
 naloxone (NARCAN) injection 1 mg  1 mg IntraVENous ONCE PRN  polyethylene glycol (MIRALAX) packet 17 g  17 g Oral DAILY  senna-docusate (PERICOLACE) 8.6-50 mg per tablet 1 Tab  1 Tab Oral DAILY  morphine injection 1-2 mg  1-2 mg IntraVENous Q4H PRN  
 oxyCODONE IR (ROXICODONE) tablet 5 mg  5 mg Oral Q4H PRN  
  ondansetron (ZOFRAN) injection 8 mg  8 mg IntraVENous Q6H PRN  
 allopurinoL (ZYLOPRIM) tablet 100 mg  100 mg Oral DAILY  LORazepam (ATIVAN) tablet 1 mg  1 mg Oral BID PRN  pantoprazole (PROTONIX) tablet 40 mg  40 mg Oral ACB  atorvastatin (LIPITOR) tablet 10 mg  10 mg Oral QHS Labs:   
Recent Results (from the past 24 hour(s)) TSH 3RD GENERATION Collection Time: 04/13/20  5:18 AM  
Result Value Ref Range TSH 48.20 (H) 0.36 - 3.74 uIU/mL METABOLIC PANEL, BASIC Collection Time: 04/13/20  5:18 AM  
Result Value Ref Range Sodium 138 136 - 145 mmol/L Potassium 5.9 (H) 3.5 - 5.5 mmol/L Chloride 110 100 - 111 mmol/L  
 CO2 22 21 - 32 mmol/L Anion gap 6 3.0 - 18 mmol/L Glucose 122 (H) 74 - 99 mg/dL BUN 34 (H) 7.0 - 18 MG/DL Creatinine 1.93 (H) 0.6 - 1.3 MG/DL  
 BUN/Creatinine ratio 18 12 - 20 GFR est AA 30 (L) >60 ml/min/1.73m2 GFR est non-AA 25 (L) >60 ml/min/1.73m2 Calcium 9.2 8.5 - 10.1 MG/DL  
CBC WITH AUTOMATED DIFF Collection Time: 04/13/20  5:18 AM  
Result Value Ref Range WBC 9.6 4.6 - 13.2 K/uL  
 RBC 2.09 (L) 4.20 - 5.30 M/uL HGB 6.6 (L) 12.0 - 16.0 g/dL HCT 20.6 (L) 35.0 - 45.0 % MCV 98.6 (H) 74.0 - 97.0 FL  
 MCH 31.6 24.0 - 34.0 PG  
 MCHC 32.0 31.0 - 37.0 g/dL  
 RDW 14.3 11.6 - 14.5 % PLATELET 253 702 - 614 K/uL MPV 12.1 (H) 9.2 - 11.8 FL  
 NEUTROPHILS 77 (H) 40 - 73 % LYMPHOCYTES 14 (L) 21 - 52 % MONOCYTES 9 3 - 10 % EOSINOPHILS 0 0 - 5 % BASOPHILS 0 0 - 2 %  
 ABS. NEUTROPHILS 7.5 1.8 - 8.0 K/UL  
 ABS. LYMPHOCYTES 1.3 0.9 - 3.6 K/UL  
 ABS. MONOCYTES 0.8 0.05 - 1.2 K/UL  
 ABS. EOSINOPHILS 0.0 0.0 - 0.4 K/UL  
 ABS. BASOPHILS 0.0 0.0 - 0.1 K/UL  
 DF AUTOMATED    
HGB & HCT Collection Time: 04/13/20 10:56 AM  
Result Value Ref Range HGB 6.3 (L) 12.0 - 16.0 g/dL HCT 20.4 (L) 35.0 - 45.0 % Signed By: Goran Castellanos MD   
 April 13, 2020 Disclaimer: Sections of this note are dictated using utilizing voice recognition software. Minor typographical errors may be present. If questions arise, please do not hesitate to contact me or call our department.

## 2020-04-13 NOTE — PROGRESS NOTES
Bedside and Verbal shift change report given to Kemar Albarado RN (oncoming nurse) by Annamarie Blandon RN (offgoing nurse). Report included the following information SBAR and Kardex.

## 2020-04-13 NOTE — PROGRESS NOTES
Problem: Self Care Deficits Care Plan (Adult) Goal: *Acute Goals and Plan of Care (Insert Text) Description: Occupational Therapy Goals Initiated 4/13/2020 within 7 day(s). 1.  Patient will perform bed mobility in preparation for selfcare with minimal assistance/contact guard assist.  
2.  Patient will perform functional activity seated EOB for 4-7 minutes with modified independence and F+ balance. 3.  Patient will perform lower body dressing with supervision/set-up using AE prn. 
4.  Patient will perform toilet transfers with supervision/set-up. 5.  Patient will perform all aspects of toileting with supervision/set-up. 6.  Patient will participate in upper extremity therapeutic exercise/activities with modified independence for 8 minutes to increase ROM/strength for selfcare tasks. 7.  Patient will utilize energy conservation techniques during functional activities with verbal cues. Prior Level of Function: Patient lives with daughter in 2 story condo, 8 steps to bedroom and was independent with self-care and functional mobility PTA. Patient reports having shower chair at home Outcome: Progressing Towards Goal 
  
OCCUPATIONAL THERAPY EVALUATION Patient: Esteban Lacy (21 y.o. female) Date: 4/13/2020 Primary Diagnosis: Hip fracture requiring operative repair ([facility] [address] erence Floresita Procedure(s) (LRB): 
RIGHT FEMORAL INTERTROCHANTERIC NAIL INSERTION (Right) 1 Day Post-Op Precautions:  Fall, Skin, PWB(RLE) ASSESSMENT AND RECOMMENDATIONS: 
Upon entering the room, the patient was semi-reclined in bed and alert with PT and RN present. Patient anxious and agreeable to co-treatment in order to maximize patient safety, participation, and functional mobility in preparation for self-care tasks. Patient educated on the role of OT, evaluation process, and WB status, with patient verbalizing understanding.  Patient very anxious this session and benefits best from instructions prior to movements. While semi-reclined in bed, patient reported feeling dizzy, /50, O2 98%-100%, and HR 73. Patient educated on energy conservation techniques such as pursed lip breathing and keeping eyes open with focus on steady object. Patient reported dizziness improved. Patient performed objective assessment at bed level demonstrating decreased AROM of R shoulder. Patient reports she has had imaging done PTA in R shoulder 2/2 pain, however verbalizes new pain this admission. Patient may benefit from additional testing of RUE as patient with limited AROM and increased pain s/p mechanical fall. Patient able to reposition trunk in bed, stand by assist and lift LLE independently, however required assist for lifting RLE. Patient with increased pain in RLE this session impacting functional mobility, sitting EOB and functional transfers deferred. Based on the objective data described below, the patient presents with decreased strength, decreased independence, decreased AROM, decreased functional balance, and decreased functional mobility, which impedes pt performance in basic self-care/ADL tasks. Pt would benefit from skilled OT to restore PLOF and maximize function. Discharge Recommendations: Dimas Mccracken Further Equipment Recommendations for Discharge: bedside commode and rolling walker SUBJECTIVE:  
Patient stated i'm sorry. The anticipation and not knowing that you guys were coming this morning is just a lot OBJECTIVE DATA SUMMARY:  
 
Past Medical History:  
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   
 Nonspecific abnormal electrocardiogram (ECG) (EKG)  Obesity, unspecified  Other and unspecified hyperlipidemia  Renal insufficiency Past Surgical History:  
Procedure Laterality Date [address] [place] [address] UNLISTED Explor lap  COLONOSCOPY N/A 5/31/2018 COLONOSCOPY w polypectomy performed by Wale Cuadra MD at [address] Left  HX CATARACT REMOVAL INSERT PROSTHETIC LENS: LEFT,RIGHT  HX GYN    
 HX ORTHOPAEDIC Left Rotator cuff repair  HX PARTIAL THYROIDECTOMY  MT BIOPSY OF BREAST, INCISIONAL    
 rt & lt 12 yrs ago Barriers to Learning/Limitations: None Compensate with: visual, verbal, tactile, kinesthetic cues/model Home Situation:  
Home Situation Home Environment: Private residence One/Two Story Residence: Two story Living Alone: No 
Support Systems: Child(do) Patient Expects to be Discharged to[de-identified] Rehabilitation facility Current DME Used/Available at Home: None Tub or Shower Type: Tub/Shower combination 
[x]     Right hand dominant   []     Left hand dominant Cognitive/Behavioral Status: 
Neurologic State: Alert Orientation Level: Oriented X4 Cognition: Follows commands Safety/Judgement: Fall prevention Skin: Visible skin intact Edema: None noted in BUE, swelling observed in R ankle Vision/Perceptual:   
Acuity: Within Defined Limits;Able to read clock/calendar on wall without difficulty Coordination: BUE Fine Motor Skills-Upper: Left Intact; Right Intact Gross Motor Skills-Upper: Left Intact; Right Impaired(additional time needed and comensation observed with RUE) Strength: BUE Strength: Grossly decreased, non-functional 
 
Tone & Sensation: BUE Tone: Normal 
Sensation: Intact Range of Motion: BUE 
AROM: Generally decreased, functional(R shoulder) PROM: Generally decreased, functional(R shoulder) Functional Mobility and Transfers for ADLs: 
Bed Mobility: 
Supine to Sit: (deferred 2/2 increased pain in RLE with movement) Scooting: (patient able to readjust trunk in bed using rails SBA) Transfers: 
Sit to Stand: (not safe at this time as patient's pain limits bed mobility) ADL Assessment: 
Feeding: Modified independent Oral Facial Hygiene/Grooming: Stand-by assistance Bathing: Moderate assistance Upper Body Dressing: Stand-by assistance Lower Body Dressing: Maximum assistance Toileting: Maximum assistance ADL Intervention: 
Feeding Feeding Assistance: Modified independent Drink to Mouth: Modified independent Grooming Grooming Assistance: Stand-by assistance Position Performed: (bed level) Brushing/Combing Hair: Stand-by assistance Upper Body Dressing Assistance Dressing Assistance: Stand-by assistance Shirt simulation with hospital gown: Stand-by assistance; Compensatory technique training(limited R shoulder AROM) Toileting Toileting Assistance: (cath inserted) Cognitive Retraining Safety/Judgement: Fall prevention Pain: 
Pain level pre-treatment: mild pain, in R ankle Pain level post-treatment: 8/10, RLE (groin) with movement Pain Intervention(s): Medication (see MAR); Response to intervention: Nurse notified, See doc flow Activity Tolerance:  
Patient demonstrated decreased activity tolerance during OT evaluation. Please refer to the flowsheet for vital signs taken during this treatment. After treatment:  
[]  Patient left in no apparent distress sitting up in chair 
[x]  Patient left in no apparent distress in bed 
[x]  Call bell left within reach [x]  Nursing notified 
[]  Caregiver present 
[]  Bed alarm activated COMMUNICATION/EDUCATION:  
[x]      Role of Occupational Therapy in the acute care setting 
[x]      Home safety education was provided and the patient/caregiver indicated understanding. [x]      Patient/family have participated as able and agree with findings and recommendations. []      Patient is unable to participate in plan of care at this time. Thank you for this referral. 
Ever Yeager OTR/L Time Calculation: 23 mins Eval Complexity: History: LOW Complexity : Brief history review ;  
 Examination: MEDIUM Complexity : 3-5 performance deficits relating to physical, cognitive , or psychosocial skils that result in activity limitations and / or participation restrictions; Decision Making:MEDIUM Complexity : Patient may present with comorbidities that affect occupational performnce. Miniml to moderate modification of tasks or assistance (eg, physical or verbal ) with assesment(s) is necessary to enable patient to complete evaluation

## 2020-04-13 NOTE — PROGRESS NOTES
Problem: Mobility Impaired (Adult and Pediatric) Goal: *Acute Goals and Plan of Care (Insert Text) Description: Physical Therapy Goals Initiated 4/13/2020 and to be accomplished within 7 day(s) 1. Patient will move from supine to sit and sit to supine , scoot up and down and roll side to side in bed with supervision/set-up. 2.  Patient will transfer from bed to chair and chair to bed with supervision/set-up using the least restrictive device. 3.  Patient will perform sit to stand with supervision/set-up. 4.  Patient will ambulate with supervision/set-up for 50 feet with the least restrictive device. 5.  Patient will ascend/descend 8 stairs with unilateral handrail(s) with minimal assistance/contact guard assist. 
 
PLOF: Patient reports she is independent with ADLs and functional mobility. Outcome: Progressing Towards Goal 
 
PHYSICAL THERAPY EVALUATION Patient: Martha Hummel (52 y.o. female) Date: 4/13/2020 Primary Diagnosis: Hip fracture requiring operative repair ([facility] [address] Burgess Esquivel Procedure(s) (LRB): 
RIGHT FEMORAL INTERTROCHANTERIC NAIL INSERTION (Right) 1 Day Post-Op Precautions:   Fall, Skin, PWB(RLE) ASSESSMENT : 
RN cleared pt to participate in skilled PT evaluation/treatment. Patient semi reclined in bed and reports that she is anxious and is dizzy. Pt reports history of anxiety attacks at home and states she would pray and have family help calm her down. Educated patient on deep/pursed lip breathing to assist with decreasing heart rate and to reduce anxiety. Also educated patient on role of PT and WB precautions; pt verbalizes understanding. Her vitals stable; /50 and HR in the 70's bpm. Patient has full AROM of left LE, but limited right secondary to hip & ankle pain. Educated patient on ankle pumps to increase circulation and reduce stiffness. Patient deferred participation in bed mobility at this time secondary to increased right LE pain.  Based on the objective data described below, the patient presents with decreased strength, impaired balance, decreased activity tolerance, and decreased independence with functional mobility. Patient will benefit from skilled PT to maximize mobility and restore PLOF. Patient will benefit from skilled intervention to address the above impairments. Patient's rehabilitation potential is considered to be Good Factors which may influence rehabilitation potential include:  
[]         None noted 
[]         Mental ability/status [x]         Medical condition 
[]         Home/family situation and support systems 
[]         Safety awareness [x]         Pain tolerance/management 
[]         Other: PLAN : 
Recommendations and Planned Interventions:  
[x]           Bed Mobility Training             [x]    Neuromuscular Re-Education 
[x]           Transfer Training                   []    Orthotic/Prosthetic Training 
[x]           Gait Training                          []    Modalities [x]           Therapeutic Exercises           []    Edema Management/Control 
[x]           Therapeutic Activities            [x]    Family Training/Education 
[x]           Patient Education 
[]           Other (comment): Frequency/Duration: Patient will be followed by physical therapy 1-2 times per day/4-7 days per week to address goals. Discharge Recommendations: Dimas Mccracken Further Equipment Recommendations for Discharge: TBD SUBJECTIVE:  
Patient stated I am anxious, my heart is racing.  OBJECTIVE DATA SUMMARY:  
 
Past Medical History:  
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   
 Nonspecific abnormal electrocardiogram (ECG) (EKG) Obesity, unspecified Other and unspecified hyperlipidemia Renal insufficiency Past Surgical History:  
Procedure Laterality Date ABDOMEN SURGERY PROC UNLISTED Explor lap COLONOSCOPY N/A 5/31/2018 COLONOSCOPY w polypectomy performed by Vic Samson MD at [address] [place] [address] Left HX CATARACT REMOVAL INSERT PROSTHETIC LENS: LEFT,RIGHT  
 HX GYN    
 HX ORTHOPAEDIC Left Rotator cuff repair HX PARTIAL THYROIDECTOMY    
 MI BIOPSY OF BREAST, INCISIONAL    
 rt & lt 12 yrs ago Barriers to Learning/Limitations: None Compensate with: N/A Home Situation: 
Home Situation Home Environment: Private residence One/Two Story Residence: Two story Living Alone: No 
Support Systems: Child(do) Patient Expects to be Discharged to[de-identified] Rehabilitation facility Current DME Used/Available at Home: None Tub or Shower Type: Tub/Shower combination Critical Behavior: 
Neurologic State: Alert Orientation Level: Oriented X4 Cognition: Follows commands Safety/Judgement: Fall prevention Psychosocial 
Patient Behaviors: Anxious; Cooperative Strength:   
Strength: Generally decreased, functional 
 
Tone & Sensation:  
Tone: Normal 
Sensation: Intact Range Of Motion: 
AROM: Generally decreased, functional(right hip) PROM: Generally decreased, functional(R shoulder) Functional Mobility: 
Bed Mobility: 
Supine to Sit: (deferred secondary to increase RLE pain with mobility ) Scooting: (patient able to readjust trunk in bed using rails SBA) Transfers: 
Sit to Stand: (not safe at this time as patient's pain limits bed mobility) Pain: 
Pain level pre-treatment: 7/10 right hip and ankle pain Pain level post-treatment: 7/10 Pain Intervention(s) : Medication (see MAR); Rest, Ice, Repositioning Response to intervention: Nurse notified, See doc flow Activity Tolerance:  
Limited Please refer to the flowsheet for vital signs taken during this treatment. After treatment:  
[]         Patient left in no apparent distress sitting up in chair 
[x]         Patient left in no apparent distress in bed 
[x]         Call bell left within reach [x]         Nursing notified 
[]         Caregiver present 
[]         Bed alarm activated 
[]         SCDs applied COMMUNICATION/EDUCATION:  
[x]         Role of Physical Therapy in the acute care setting. [x]         Fall prevention education was provided and the patient/caregiver indicated understanding. [x]         Patient/family have participated as able in goal setting and plan of care. []         Patient/family agree to work toward stated goals and plan of care. []         Patient understands intent and goals of therapy, but is neutral about his/her participation. []         Patient is unable to participate in goal setting/plan of care: ongoing with therapy staff. 
[]         Other: Thank you for this referral. 
Denita Cherry, PT Time Calculation: 27 mins Eval Complexity: History: MEDIUM  Complexity : 1-2 comorbidities / personal factors will impact the outcome/ POC Exam:MEDIUM Complexity : 3 Standardized tests and measures addressing body structure, function, activity limitation and / or participation in recreation  Presentation: MEDIUM Complexity : Evolving with changing characteristics  Clinical Decision Making:Medium Complexity    Overall Complexity:MEDIUM

## 2020-04-13 NOTE — PROGRESS NOTES
[facility] [address] Progress Note Patient: Raad Mcmanus               Sex: female          DOA: 4/11/2020 YOB: 1935      Age:  81 y.o.        LOS:  LOS: 2 days S/P  Procedure(s): RIGHT FEMORAL INTERTROCHANTERIC NAIL INSERTION Subjective:  
 
Mild pain. Some chest discomfort. Denies cp, sob, abd pain Objective:  
  
Blood pressure 107/56, pulse 84, temperature 97.2 °F (36.2 °C), resp. rate 20, height 5' (1.524 m), weight 140 lb (63.5 kg), SpO2 96 %. Well developed, Well Nourished alert, cooperative, no distress Incision clean, dry, no drainage, dressing in place 
swelling and tenderness noted in right lower extremity Sensory is intact Motor is intact 
nv intact 2+distal pulses Neg calf tenderness Labs: 
CBC 
@ 
CBC:  
Lab Results Component Value Date/Time WBC 9.6 04/13/2020 05:18 AM  
 RBC 2.09 (L) 04/13/2020 05:18 AM  
 HGB 6.6 (L) 04/13/2020 05:18 AM  
 HCT 20.6 (L) 04/13/2020 05:18 AM  
 PLATELET 112 79/53/5696 05:18 AM  
@ Coagulation Lab Results Component Value Date INR 1.3 (H) 04/11/2020 Basic Metabolic Profile Lab Results Component Value Date  04/13/2020 CO2 22 04/13/2020 BUN 34 (H) 04/13/2020 Medications Reviewed Assessment/Plan Active Problems: 
  Hip fracture requiring operative repair ([facility] [address] (4/11/2020) Procedure(s): RIGHT FEMORAL INTERTROCHANTERIC NAIL INSERTION :  
 
1. PT and/or OT Consults: YES continue PT/OT: oob to chair, gentle rom                         PWB 2. Incision Care:  Routine Incision Care and Dressing Changes as necessary: dressing changes POD#2 and as needed 3. Pain control 4. lovenox 30mg sq daily for DVT prophylaxis 5. Acute blood loss anemia and chest discomfort - H&H recheck. Discussed with Dr. Huber Gonzales. Awaiting recheck of labs. Appreciate medicine input on patients medical issues 4.  DISCHARGE PLANNING 
 
  Intervention : Dimas Mccracken ([facility]) Sheron Dandy, PA-C [phone] 
[phone]

## 2020-04-13 NOTE — PROGRESS NOTES
Discharge planning Writer met with patient concerning [address] [facility] unable to accept to SNF. Explained to patient that her daughter, Alka Calvert, gave choices for hospitals and [facility]. These facilities are ready to accept. Patient stated \" I want to go to [address]. \"  Writer obtained verbal consent for Freedom of choice for [address] and placed in cc/Link. Patient asked CM to call daughter and explain that the situation. Writer spoke with Alka Calvert per patient's request. Alka Jonesia stated \" If that's where she wants to go, it okay with me. \"  Zoila Kennedy explained that patient receives too much money a month and doesn't qualify for medicaid. Mary Anne Kelsey, BSN, RN Pager # [phone] Care Manager

## 2020-04-13 NOTE — ROUTINE PROCESS
End of Shift Note Bedside and verbal shift change report given to GREER Brunson (On coming nurse) by Brody Slaughter RN (Off going nurse). Report included the following information:  
   --Procedure Summary 
   --MAR, 
   --Recent Results --Med Rec Status SBAR Recommendations: out of bed Issues for Provider to address placement Activity This Shift [x] Bed Rest Order 
 [] Refused 
 [] Dangled  
 [] TDWB Ambulating: 
   [] Bathroom [] BSC [] Room/Hallway Up in Chair for meals []Yes [] No  
Voiding       [] Yes  [] No 
Mathur          [x] Yes  [] No 
Incontinent [] Yes  [] No 
 
DUE TO VOID POUR        [] Yes [] No 
Purewick    [] Yes [] No 
New Onset [] Yes [] No Straight Cath   []Yes  [] No 
Condom Cath  [] Yes [] No 
MD Called      [] Yes  [] No  
Blood Sugars Managed []Yes [x] No   
Bowels Moved [] Yes [x] No 
 
Incontinent     [] Yes [] No Passed Gas []Yes [x] No 
 
New Onset  []Yes [] No 
  
 
 MD Called []Yes  [] No 
  
CHG Bath Done Before Surgery After Surgery  
  
[] Yes  [x] No 
[] Yes  [x] No   
  
Drain Removed [] Yes  [] No [x] N/A Dressing Changed [] Yes   [] No [x] N/A Nausea/Vomiting [] Yes   [x] No    
Ice Packs Changed [] Yes   [] No  [x] N/A Incentive Spirometer  [] Yes  [x] No     
SCD Pumps On Ankle Pumping  [] Yes   [x] No  
  
[] Yes   [x] No    
  
Telemetry Monitoring [] Yes   [x] No   Rhythm

## 2020-04-13 NOTE — PROGRESS NOTES
conducted an initial consultation and Spiritual Assessment for Lisa Meehan, who is a 81 y.o.,female. Patients Primary Language is: [place]. According to the patients EMR Muslim Affiliation is: Confucianism. The reason the Patient came to the hospital is:  
Patient Active Problem List  
 Diagnosis Date Noted  Hip fracture requiring operative repair ([facility] [address] 04/11/2020  Mitral valve disorders(424.0)  Other and unspecified hyperlipidemia  Essential hypertension, benign  Obesity, unspecified  Nonspecific abnormal electrocardiogram (ECG) (EKG)  Renal insufficiency The  provided the following Interventions: 
Initiated a relationship of care and support. Provided information about Spiritual Care Services. Chart reviewed. The following outcomes where achieved: 
Patient expressed gratitude for 's visit. Assessment: 
Patient does not have any Mu-ism/cultural needs that will affect patients preferences in health care. There are no spiritual or Mu-ism issues which require intervention at this time. Plan: 
Chaplains will continue to follow and will provide pastoral care on an as needed/requested basis.  recommends bedside caregivers page  on duty if patient shows signs of acute spiritual or emotional distress. [address] [facility] 
[phone])

## 2020-04-13 NOTE — PROGRESS NOTES
1700 Blood transfusion started. 1718 No transfusion reacted suspected. 1935 Blood transfusion completed. VS stable no signs of distress.

## 2020-04-13 NOTE — PROGRESS NOTES
conducted a Follow up consultation and Spiritual Assessment for Dano Nazario, who is a 81 y.o.,female. The  provided the following Interventions: 
Continued the relationship of care and support. Listened empathically. Offered prayer and assurance of continued prayer on patient's behalf. Patient received holy communion. Chart reviewed. The following outcomes were achieved: 
Patient expressed gratitude for 's visit. Assessment: 
There are no further spiritual or Jain issues which require Spiritual Care Services interventions at this time. Plan: 
Chaplains will continue to follow and will provide pastoral care on an as needed/requested basis.  recommends bedside caregivers page  on duty if patient shows signs of acute spiritual or emotional distress. [facility] [address] [facility]  
[phone]

## 2020-04-13 NOTE — PROGRESS NOTES
Patient alert and oriented. VS stable. Patient stated \"I feel very anxious like I am having a panic attack. Patient show no signs of respiratory distress. Calmly talked to the patient and administered Ativan 1mg. No signs of neurovascular changes. Drsg to hip is dry, clean and intact. Will continue to monitor.

## 2020-04-13 NOTE — PROGRESS NOTES
96 [id number] Blood transfusion started. 
1300. No transfusion reaction suspected. VS stable. Patient stable. 1400 patient's IV access infiltrated. Blood stopped. Provider notified. Orders given for Midline awaiting IR.

## 2020-04-14 NOTE — PROGRESS NOTES
Problem: Mobility Impaired (Adult and Pediatric) Goal: *Acute Goals and Plan of Care (Insert Text) Description: Physical Therapy Goals Initiated 4/13/2020 and to be accomplished within 7 day(s) 1. Patient will move from supine to sit and sit to supine , scoot up and down and roll side to side in bed with supervision/set-up. 2.  Patient will transfer from bed to chair and chair to bed with supervision/set-up using the least restrictive device. 3.  Patient will perform sit to stand with supervision/set-up. 4.  Patient will ambulate with supervision/set-up for 50 feet with the least restrictive device. 5.  Patient will ascend/descend 8 stairs with unilateral handrail(s) with minimal assistance/contact guard assist. 
 
PLOF: Patient reports she is independent with ADLs and functional mobility. Outcome: Progressing Towards Goal 
 
PHYSICAL THERAPY TREATMENT Patient: Esteban Lacy (31 y.o. female) Date: 4/14/2020 Diagnosis: Hip fracture requiring operative repair ([facility] [address] Lawerence Floresita <principal problem not specified> Procedure(s) (LRB): 
RIGHT FEMORAL INTERTROCHANTERIC NAIL INSERTION (Right) 2 Days Post-Op Precautions: Fall, Skin, PWB(right LE) ASSESSMENT: 
Patient semi reclined in bed agreeable to skilled therapy session. Pt seen in conjunction with OT to maximize pt safety with functional mobility. Educated pt on weight bearing precautions and she verbalizes understanding. Patient is nervous to engage in OOB mobility, pt reassured that the plan is take it slowly going step by step. Patient requires max A x2 with bed mobility and transfers this session due to impaired balance, weakness, and pain. Requires assist to lift RLE for supine to sit transfers with leg fully supported until sitting EOB. Pt c/o dizziness siting EOB, BP /64 and heart rate 112 bpm; cues provided for pursed lip breathing.  Pt leaning away from painful right hip, but about to correct posture with verbal cues. Educated pt on safe hand placement during sit to stand transfers and sequencing for bed<>chair using RW. During SPT, pt had difficulty clearing B feet off the ground, had to slide feet using heel toe. Pt leaning forward heavily on RW cued to press up from her forearms to safely hold onto RW. Educated pt to perform B ankle pumps to increase circulation and she demos improved right ankle AROM. Patient left in recliner with all needs addressed. Discussed with RN that pt would need 2 person assist for return to bed. Progression toward goals:  
[x]      Improving appropriately and progressing toward goals 
[]      Improving slowly and progressing toward goals 
[]      Not making progress toward goals and plan of care will be adjusted PLAN: 
Patient continues to benefit from skilled intervention to address the above impairments. Continue treatment per established plan of care. Discharge Recommendations:  Dimas Mccracken Further Equipment Recommendations for Discharge: TBD at next level of care SUBJECTIVE:  
Patient stated Berle Piles you for being patient .  OBJECTIVE DATA SUMMARY:  
Critical Behavior: 
Neurologic State: Alert Orientation Level: Oriented X4 Cognition: Appropriate decision making Safety/Judgement: Fall prevention Functional Mobility Training: 
Bed Mobility: 
Supine to Sit: Maximum assistance;Assist x2 Scooting: Maximum assistance;Assist x2 Transfers: 
Sit to Stand: Maximum assistance;Assist x2 Stand to Sit: Maximum assistance Bed to Chair: Maximum assistance;Assist x2 Balance: 
Sitting: Impaired; With support Sitting - Static: Fair (occasional)((+)) Sitting - Dynamic: Fair (occasional) Standing: Impaired; With support Standing - Static: Fair Standing - Dynamic : Fair;Poor Therapeutic Exercises:  
 
 
 
EXERCISE Sets Reps Active Active Assist  
Passive Self ROM Comments Ankle Pumps 1 10  [x] [] [] [] Quad Sets/Glut Sets    [] [] [] [] Hold for 5 secs Hamstring Sets   [] [] [] [] Short Arc Quads   [] [] [] [] Heel Slides   [] [] [] [] Straight Leg Raises   [] [] [] [] Hip Add   [] [] [] [] Hold for 5 secs, w/ pillow squeeze Long Arc Quads   [] [] [] [] Seated Marching   [] [] [] []   
Standing Marching   [] [] [] []   
   [] [] [] []   
 
 
Pain: 
Pain level pre-treatment: 5/10 right LE 
Pain level post-treatment: 5/10 Pain Intervention(s): Medication (see MAR); Rest, Ice, Repositioning Response to intervention: Nurse notified, See doc flow Activity Tolerance:  
Fair Please refer to the flowsheet for vital signs taken during this treatment. After treatment:  
[x] Patient left in no apparent distress sitting up in recliner 
[] Patient left in no apparent distress in bed 
[x] Call bell left within reach [x] Nursing notified 
[] Caregiver present 
[] Bed alarm activated 
[] SCDs applied COMMUNICATION/EDUCATION:  
[x]         Role of Physical Therapy in the acute care setting. [x]         Fall prevention education was provided and the patient/caregiver indicated understanding. [x]         Patient/family have participated as able in working toward goals and plan of care. []         Patient/family agree to work toward stated goals and plan of care. []         Patient understands intent and goals of therapy, but is neutral about his/her participation. []         Patient is unable to participate in stated goals/plan of care: ongoing with therapy staff. 
[]         Other: 
 
   
Yulisa Pérez, PT Time Calculation: 25 mins

## 2020-04-14 NOTE — PROGRESS NOTES
[facility] Patient: Lexii Rai Age: 81 y.o. : 1935 MR#: [id number] SSN: [id number] Date/Time: 2020 Subjective: Patient feels lot better today, not anxious and did not have any panic attack. Pain well controlled. Assessment/Plan: 1. Hip fracture due to #2: s/p ORIF. 2. Acute blood loss anemia due to # 1: Repeat H&H 6.2 today, patient getting 1 more unit of transfusion. monitor H&H. We will also get stool occult blood no signs of bleeding at the surgical site. 3. Mechanical fall PT OT eval post surgery: 4. CKD III, at baseline creat, will monitor and Dr. Gabbi Ravi is nephrologist 
5. Goiter w/ partial thyroidectomy, pt states she is on thyroid medication but does not know name/dose, TSH high, free T4 low, concern for hypothyroid versus overtreated hyper. Discussed with endocrine Dr. Miguel Romero she will be seeing the patient and adjust medications further.,  
6. HTN: BP stable, monitor off medications. 7. GERD: Continue PPI and add Maalox as needed. 8. DVT prophylaxis per Ortho team  
9. Full code Discussed with the patient and daughter over the phone in length in detail about risk and benefits of transfusion, both understand and wants to proceed with transfusion. Addendum 2:30 PM 
Repeat Hb 7.9, will repeat H&H tomorrow again We will DC Mathur catheter tomorrow. If H&H remains stable possible transfer to SNF tomorrow. Case discussed with:  [x]Patient  [x]Family  []Nursing  []Case Management DVT Prophylaxis:  [x]Lovenox  []Hep SQ  [x]SCDs  []Coumadin   []Eliquis/Xarelto Objective:  
VS:  
Visit Vitals /56 Pulse 93 Temp 97.7 °F (36.5 °C) Resp 18 Ht 5' (1.524 m) Wt 63.5 kg (140 lb) SpO2 95% BMI 27.34 kg/m² Tmax/24hrs: Temp (24hrs), Av.3 °F (36.3 °C), Min:96 °F (35.6 °C), Max:98.1 °F (36.7 °C) IOBRIEF Intake/Output Summary (Last 24 hours) at 2020 1109 Last data filed at 4/14/2020 0621 Gross per 24 hour Intake 2260 ml Output 360 ml Net 1900 ml General:  Alert, cooperative, no acute distress Pulmonary:  CTA Bilaterally. No Wheezing/Rales. Cardiovascular: Regular rate and Rhythm. GI:  Soft, Non distended, Non tender. + Bowel sounds. Extremities:  No edema. Neurologic: Alert and oriented X 3. Limited movement right lower extremity due to pain. Additional: Right hip surgical site dressing clean, no swelling or hematoma. Medications:  
Current Facility-Administered Medications Medication Dose Route Frequency  0.9% sodium chloride infusion 250 mL  250 mL IntraVENous PRN  
 aluminum-magnesium hydroxide (MAALOX) oral suspension 15 mL  15 mL Oral QID PRN  
 0.9% sodium chloride infusion 250 mL  250 mL IntraVENous PRN  
 0.9% sodium chloride infusion 250 mL  250 mL IntraVENous PRN  
 acetaminophen (TYLENOL) tablet 1,000 mg  1,000 mg Oral Q8H  
 enoxaparin (LOVENOX) injection 30 mg  30 mg SubCUTAneous Q24H  polyethylene glycol (MIRALAX) packet 17 g  17 g Oral DAILY  senna-docusate (PERICOLACE) 8.6-50 mg per tablet 1 Tab  1 Tab Oral DAILY  morphine injection 1-2 mg  1-2 mg IntraVENous Q4H PRN  
 oxyCODONE IR (ROXICODONE) tablet 5 mg  5 mg Oral Q4H PRN  
 ondansetron (ZOFRAN) injection 8 mg  8 mg IntraVENous Q6H PRN  
 allopurinoL (ZYLOPRIM) tablet 100 mg  100 mg Oral DAILY  LORazepam (ATIVAN) tablet 1 mg  1 mg Oral BID PRN  pantoprazole (PROTONIX) tablet 40 mg  40 mg Oral ACB  atorvastatin (LIPITOR) tablet 10 mg  10 mg Oral QHS Labs:   
Recent Results (from the past 24 hour(s)) T4, FREE Collection Time: 04/13/20  2:53 PM  
Result Value Ref Range T4, Free 0.5 (L) 0.7 - 1.5 NG/DL  
POTASSIUM Collection Time: 04/13/20  8:16 PM  
Result Value Ref Range Potassium 5.3 3.5 - 5.5 mmol/L  
CBC W/O DIFF Collection Time: 04/14/20  5:14 AM  
Result Value Ref Range WBC 8.3 4.6 - 13.2 K/uL RBC 2.02 (L) 4.20 - 5.30 M/uL HGB 6.1 (L) 12.0 - 16.0 g/dL HCT 18.9 (L) 35.0 - 45.0 % MCV 93.6 74.0 - 97.0 FL  
 MCH 30.2 24.0 - 34.0 PG  
 MCHC 32.3 31.0 - 37.0 g/dL  
 RDW 17.5 (H) 11.6 - 14.5 % PLATELET 410 (L) 179 - 420 K/uL MPV 12.3 (H) 9.2 - 04.8 FL  
METABOLIC PANEL, BASIC Collection Time: 04/14/20  5:14 AM  
Result Value Ref Range Sodium 137 136 - 145 mmol/L Potassium 4.2 3.5 - 5.5 mmol/L Chloride 107 100 - 111 mmol/L  
 CO2 24 21 - 32 mmol/L Anion gap 6 3.0 - 18 mmol/L Glucose 119 (H) 74 - 99 mg/dL BUN 47 (H) 7.0 - 18 MG/DL Creatinine 2.18 (H) 0.6 - 1.3 MG/DL  
 BUN/Creatinine ratio 22 (H) 12 - 20 GFR est AA 26 (L) >60 ml/min/1.73m2 GFR est non-AA 22 (L) >60 ml/min/1.73m2 Calcium 7.9 (L) 8.5 - 10.1 MG/DL  
T4, FREE Collection Time: 04/14/20  5:14 AM  
Result Value Ref Range T4, Free 0.7 0.7 - 1.5 NG/DL Signed By: Orly Gonzalez MD   
 April 14, 2020 Disclaimer: Sections of this note are dictated using utilizing voice recognition software. Minor typographical errors may be present. If questions arise, please do not hesitate to contact me or call our department.

## 2020-04-14 NOTE — PROGRESS NOTES
4/13/2020 
9:28 PM 
 
Patient complaint of gas pain - locates pain at upper abdomen. Notes that she is passing gas but that it hasn't helped. Patient does not want to turn when offered to help patient reposition. 9:47 PM 
Hospitalist on call paged at this time. Patient's bp on lower side. Initially assessed at 89/48; reassessed 2 minutes later in same (left) arm as 94/55. Visit Vitals BP 94/55 Pulse 85 Temp 97.4 °F (36.3 °C) Resp 16 Ht 5' (1.524 m) Wt 63.5 kg (140 lb) SpO2 97% BMI 27.34 kg/m² And Urine output low at 60 ml over last 4 hours. Recently received RBCs unit. Patient alert and oriented; drinking fluids. 5:49 AM 
Output improving. 100 ml in last 3 hours. 6:07 AM 
Hbg 6.1 - down from 6.3. Patient received 1 UPRBCs on 4/13/2020. MD on call paged to make aware of low Hgb.  
 
6:16 AM 
Dr. Kavon Shelton returned page; one unit PRBC ordered at this time. No evidence of bleeding or drainage at this time.

## 2020-04-14 NOTE — PROGRESS NOTES
[facility] [address] Progress Note Patient: Anita Swan               Sex: female          DOA: 4/11/2020 YOB: 1935      Age:  81 y.o.        LOS:  LOS: 3 days S/P  Procedure(s): RIGHT FEMORAL INTERTROCHANTERIC NAIL INSERTION Subjective:  
 
Mild pain. Some chest discomfort. Denies cp, sob, abd pain Objective:  
  
Blood pressure 112/59, pulse 89, temperature 97.2 °F (36.2 °C), resp. rate 18, height 5' (1.524 m), weight 140 lb (63.5 kg), SpO2 95 %. Well developed, Well Nourished alert, cooperative, no distress Incision clean, dry, no drainage, dressing in place 
swelling and tenderness noted in right lower extremity Sensory is intact Motor is intact 
nv intact 2+distal pulses Neg calf tenderness Labs: 
CBC 
@ 
CBC:  
Lab Results Component Value Date/Time WBC 8.3 04/14/2020 05:14 AM  
 RBC 2.02 (L) 04/14/2020 05:14 AM  
 HGB 6.1 (L) 04/14/2020 05:14 AM  
 HCT 18.9 (L) 04/14/2020 05:14 AM  
 PLATELET 442 (L) 46/80/3569 05:14 AM  
@ Coagulation Lab Results Component Value Date INR 1.3 (H) 04/11/2020 Basic Metabolic Profile Lab Results Component Value Date  04/14/2020 CO2 24 04/14/2020 BUN 47 (H) 04/14/2020 Medications Reviewed Assessment/Plan Active Problems: 
  Hip fracture requiring operative repair ([facility] [address] (4/11/2020) Procedure(s): RIGHT FEMORAL INTERTROCHANTERIC NAIL INSERTION :  
 
1. PT and/or OT Consults: YES continue PT/OT: oob to chair, gentle rom                         PWB 2. Incision Care:  Routine Incision Care and Dressing Changes as necessary: dressing changes POD#2 and as needed 3. Pain control 4. lovenox 30mg sq daily for DVT prophylaxis 5. Acute blood loss anemia - getting transfusion today. Appreciate medicine input on patients medical issues 4.  DISCHARGE PLANNING 
 
  Intervention : [facility] PEREZ [address] [facility] 
[phone]

## 2020-04-14 NOTE — ROUTINE PROCESS
4/14/2020 
7:42 AM 
 
End of Shift Note Bedside and verbal shift change report given to Homero Campos RN (On coming nurse) by Luly Jhaveri RN (Off going nurse). Report included the following information:  
   --Procedure Summary 
   --MAR, 
   --Recent Results --Med Rec Status SBAR Recommendations:  
 
Issues for Provider to address: Activity This Shift [x] Bed Rest Order 
 [] Refused 
 [] Dangled  
 [] TDWB Ambulating: 
   [] Bathroom [] BSC [] Room/Hallway Up in Chair for meals []Yes [] No  
Voiding       [] Yes  [] No 
Mathur          [x] Yes  [] No 
Incontinent [] Yes  [] No 
 
DUE TO VOID POUR        [] Yes [] No 
Purewick    [] Yes [] No 
New Onset [] Yes [] No Straight Cath   []Yes  [] No 
Condom Cath  [] Yes [] No 
MD Called      [] Yes  [] No  
Blood Sugars Managed []Yes [x] No   
Bowels Moved [] Yes [x] No 
 
Incontinent     [] Yes [] No Passed Gas [x]Yes [] No 
 
New Onset  []Yes [] No 
  
 
 MD Called []Yes  [] No 
  
CHG Bath Done Before Surgery After Surgery  
  
[] Yes  [x] No 
[] Yes  [x] No   
  
Drain Removed [] Yes  [] No [x] N/A Dressing Changed [] Yes   [x] No [] N/A Nausea/Vomiting [] Yes   [x] No    
Ice Packs Changed [x] Yes   [] No  [] N/A Incentive Spirometer  [x] Yes  [] No     
SCD Pumps On Ankle Pumping  [x] Yes   [] No  
  
[x] Yes   [] No    
  
Telemetry Monitoring [x] Yes   [] No   Rhythm

## 2020-04-14 NOTE — INTERDISCIPLINARY ROUNDS
Interdisciplinary Round Note Patient Information: Patient Information: Keyonna Diaz [address] Reason for Admission: Hip fracture requiring operative repair ([facility] [address] Odalis Max Attending Provider:   Marlena Andrade Past Medical History:  
Past Medical History:  
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   
 Nonspecific abnormal electrocardiogram (ECG) (EKG)  Obesity, unspecified  Other and unspecified hyperlipidemia  Renal insufficiency Hospital day: 3 Estimated discharge date: Possible 4/15/2020 RRAT Score: Low Risk   
      
 5 Total Score   
  
 5 Pt. Coverage (Medicare=5 , Medicaid, or Self-Pay=4) Criteria that do not apply:  
 Has Seen PCP in Last 6 Months (Yes=3, No=0) . Living with Significant Other. Assisted Living. LTAC. SNF. or  
Rehab Patient Length of Stay (>5 days = 3) IP Visits Last 12 Months (1-3=4, 4=9, >4=11) Charlson Comorbidity Score (Age + Comorbid Conditions) Goals for Today: Gearing patient up for a discharge for tomorrow (4/15/2020). VITAL SIGNS Vitals:  
 04/14/20 0742 04/14/20 0815 04/14/20 1028 04/14/20 1038 BP: 114/54 112/59 134/66 126/56 Pulse: 93 89 92 93 Resp: 18 18 18 18 Temp: 97.8 °F (36.6 °C) 97.2 °F (36.2 °C) 98.1 °F (36.7 °C) 97.7 °F (36.5 °C) SpO2: 95% 95% 94% 95% Weight:      
Height:      
 
 
 
 Lines, Drains, & Airways [REMOVED] Peripheral IV 04/11/20 Right Antecubital-Site Assessment: Clean, dry, & intact Midline Catheter 04/13/20 Right;Brachial-Site Assessment: Clean, dry, & intact VTE Prophylaxis Sequential/Intermittent Compression Device: Bilateral 
     Graduated Compression Stockings: Bilateral 
        
Intake and Output:  
04/12 1901 - 04/14 0700 In: 1897 [P.O.:2240; I.V.:500] Out: 360 [Urine:360] No intake/output data recorded. Current Diet: DIET REGULAR 
DIET NUTRITIONAL SUPPLEMENTS All Meals; Zoraida Mancilla Abdominal  
Last Bowel Movement Date: 04/10/20 Recent Glucose Results:  
Lab Results Component Value Date/Time  (H) 04/14/2020 05:14 AM  
 
 
  
IV Antibiotics? NO Activity Level: Activity Level: Up with Assistance Needs assistance with ADLs: YES 
PT Consult Status: YES Current Immunizations: There is no immunization history on file for this patient. Recommendations:  
Discharge Disposition: SNF Medication Reconciliation Completed: NO 
 
Cardiac/Pulmonary Rehab Ordered:  NO 
 
Needs for Discharge: [facility], once medically stable for discharge. Recommendations from IDR team: Skilled nursing facility Janine Sears. MSW Care Manager Pager#: [phone]

## 2020-04-14 NOTE — PROGRESS NOTES
LTSS screening: This writer completed the LTSS screening for long term care. The screening was submitted for processing. Care management will continue to monitor for discharge planning. Janine Hampton. MS Care Manager Pager#: [phone]

## 2020-04-14 NOTE — PROGRESS NOTES
Discharge planning: 
 
Patient has been accepted to [facility] and it is anticipated that patient will discharge tomorrow (4/15/2020) to the SNF. Tiffany, with [facility], verified that patient can transfer tomorrow. Care management will monitor for discharge planning to ensure a safe discharge plan from [facility]. Janine Perez. MS Care Manager Pager#: [phone]

## 2020-04-14 NOTE — PROGRESS NOTES
conducted an initial consultation and Spiritual Assessment for Maqruise Oneil, who is a 81 y.o.,female. Patient's Primary Language is: [place]. According to the patient's EMR Yazidi Affiliation is: Adventism. The reason the Patient came to the hospital is:  
Patient Active Problem List  
 Diagnosis Date Noted  Hip fracture requiring operative repair ([facility] [address] 04/11/2020  Mitral valve disorders(424.0)  Other and unspecified hyperlipidemia  Essential hypertension, benign  Obesity, unspecified  Nonspecific abnormal electrocardiogram (ECG) (EKG)  Renal insufficiency The  provided the following Interventions: 
Initiated a relationship of care and support. Explored issues of geno, belief, spirituality and Zoroastrian/ritual needs while hospitalized. Listened empathically. Given Anointing of the sick and  [employer]. Offered prayer and assurance of continued prayers on patient's behalf. The following outcomes where achieved: 
Patient shared limited information about both their medical narrative and spiritual journey/beliefs.  confirmed Patient's Yazidi Affiliation. Patient processed feeling about current hospitalization. Patient expressed gratitude for 's visit. Assessment: 
Patient does not have any Zoroastrian/cultural needs that will affect patient's preferences in health care. There are no spiritual or Zoroastrian issues which require intervention at this time. Plan: 
Chaplains will continue to follow and will provide pastoral care on an as needed/requested basis.  recommends bedside caregivers page  on duty if patient shows signs of acute spiritual or emotional distress. [facility]  
[phone]

## 2020-04-14 NOTE — PROGRESS NOTES
Attempted to see pt at [phone] for skilled session, but pt receiving blood transfusion. Will follow up as schedule permits.  
 
Amilcar Shafer PT, DPT

## 2020-04-15 NOTE — PROGRESS NOTES
Problem: Mobility Impaired (Adult and Pediatric) Goal: *Acute Goals and Plan of Care (Insert Text) Description: Physical Therapy Goals Initiated 4/13/2020 and to be accomplished within 7 day(s) 1. Patient will move from supine to sit and sit to supine , scoot up and down and roll side to side in bed with supervision/set-up. 2.  Patient will transfer from bed to chair and chair to bed with supervision/set-up using the least restrictive device. 3.  Patient will perform sit to stand with supervision/set-up. 4.  Patient will ambulate with supervision/set-up for 50 feet with the least restrictive device. 5.  Patient will ascend/descend 8 stairs with unilateral handrail(s) with minimal assistance/contact guard assist. 
 
PLOF: Patient reports she is independent with ADLs and functional mobility. Outcome: Progressing Towards Goal 
  
PHYSICAL THERAPY TREATMENT Patient: Marquise Oneil (60 y.o. female) Date: 4/15/2020 Diagnosis: Hip fracture requiring operative [facility] Phyllis Crimes <principal problem not specified> Procedure(s) (LRB): 
RIGHT FEMORAL INTERTROCHANTERIC NAIL INSERTION (Right) 3 Days Post-Op Precautions: Fall, Skin, PWB(right LE) ASSESSMENT: 
Pt received in bed after just getting cleaned up from BM, cleared by nursing and agreeable to participate. Pt continues to need assist to move RLE for bed mobility and required max A x 1 to come to sitting EOB. Once sitting she states she has the urge to have a BM but is agreeble to standing to see about a BSC, however after coming to standing with max A x 1 and use of RW she states no BM anymore and is able to stand pivot to recliner next to bed with attempted to keep weight off of her RLE to maintain her PWB. Pt continues to have difficulty picking up R foot off the floor but was able to pivot this date, verbal cues given for sitting into chair and positioned for comfort.  Educated on glute sets and ankle pumps while sitting to promote blood flow. Pt educated on need for heavy assist or lift team to get back to bed and pt left with all needs within reach. Will continue to follow per POC in the acute setting. Progression toward goals:  
[]      Improving appropriately and progressing toward goals [x]      Improving slowly and progressing toward goals 
[]      Not making progress toward goals and plan of care will be adjusted PLAN: 
Patient continues to benefit from skilled intervention to address the above impairments. Continue treatment per established plan of care. Discharge Recommendations:  Dimas Mccracken Further Equipment Recommendations for Discharge:  walker if does not own SUBJECTIVE:  
Patient stated I feel like I need to have a BM.  OBJECTIVE DATA SUMMARY:  
Critical Behavior: 
Neurologic State: (P) Alert, Appropriate for age(periodic confusion) Orientation Level: (P) Oriented X4(periodic confusion) Cognition: (P) Appropriate decision making, Appropriate for age attention/concentration, Appropriate safety awareness, Follows commands Safety/Judgement: Fall prevention Functional Mobility Training: 
Bed Mobility: 
  
Supine to Sit: Maximum assistance;Assist x1 Scooting: Maximum assistance; Moderate assistance;Assist x1 Transfers: 
Sit to Stand: Maximum assistance;Assist x1 Stand to Sit: Moderate assistance;Assist x1 Bed to Chair: Maximum assistance; Moderate assistance;Assist x1 Balance: 
Sitting: Impaired; With support Standing: Impaired; With support Pain: 
Pain level pre-treatment: 0/10 Pain level post-treatment: 0/10 Activity Tolerance:  
Good Please refer to the flowsheet for vital signs taken during this treatment. After treatment:  
[x] Patient left in no apparent distress sitting up in chair 
[] Patient left in no apparent distress in bed 
[x] Call bell left within reach [x] Nursing notified 
[] Caregiver present [] Bed alarm activated 
[] SCDs applied COMMUNICATION/EDUCATION:  
[x]         Role of Physical Therapy in the acute care setting. [x]         Fall prevention education was provided and the patient/caregiver indicated understanding. [x]         Patient/family have participated as able in working toward goals and plan of care. [x]         Patient/family agree to work toward stated goals and plan of care. []         Patient understands intent and goals of therapy, but is neutral about his/her participation. []         Patient is unable to participate in stated goals/plan of care: ongoing with therapy staff. 
[]         Other: 
 
   
Colin Serra Time Calculation: 11 mins

## 2020-04-15 NOTE — PROGRESS NOTES
Patient has not been able to void since 0600 this morning when the poon catheter was pulled. Bladder scan showed patient has 522 ml in her bladder. Notified Dr. Huber Gonzales. New orders to insert a poon catheter and leave the catheter in place if 500 ml drained, but remove the catheter as a straight cath would be removed if only 100 or 200 ml drained.

## 2020-04-15 NOTE — OP NOTES
[facility] 
OPERATIVE REPORT Name:  Tremaine Noble 
MR#:   [id number] :  1935 ACCOUNT #:  [de-identified] DATE OF SERVICE:  2020 PREOPERATIVE DIAGNOSIS:  Displaced intertrochanteric femur fracture. POSTOPERATIVE DIAGNOSIS:  Displaced intertrochanteric femur fracture. PROCEDURE PERFORMED:  Open reduction internal fixation of right intertrochanteric femur fracture with a short trochanteric nail. SURGEON:  Shawna Banuelos MD 
 
ASSISTANT:  Eli Viera ANESTHESIA:  General. 
 
COMPLICATIONS:  None. SPECIMENS REMOVED:  None. IMPLANTS:  Per brief op note. ESTIMATED BLOOD LOSS:  Less than 50 mL. FINDINGS:  As above. BRIEF HISTORY:  The patient has had significant problems with falls, sustaining a comminuted intertrochanteric femur fracture, was consented for surgery after having discussed at length possible risks and complications of surgery including infection, bleeding, recurrence of pain among other possible problems. PROCEDURE IN DETAIL:  The patient was taken to the operating room, placed under general endotracheal anesthesia by the anesthesia staff, placed in a standard Ceres table. The right leg was prepped with ChloraPrep solution and draped as a free sterile field after a reduction maneuver. A 3 cm incision proximal to the trochanter was made. Subcutaneous tissues dissected sharply to the level of the IT band, which was incised in line with the incision. Guide pin was placed in the middle of the trochanter in the AP and lateral planes. This was followed by a proximal reamer. A 11-mm short nail was then inserted through the targeting device and a 1 inch incision, the guide pin was placed in the middle of the head and neck towards the inferoposterior quadrant. This was followed by lateral reamer and then reamer at 95 mm. A 95-mm spiral blade was then impacted into place and locked.   Through the stab incision, the distal screw trocar was placed and drilled and distal screw was secured. Very stable construct achieved in this manner. The IT band was closed with 0 Vicryl, subcutaneous tissues with 2-0 Vicryl, and skin with staples. Sterile dressings were applied. The patient tolerated the procedure well and was taken to recovery room without problems. Kalpesh Payne MD 
 
 
[id number] 
D:  04/15/2020 12:41 T:  04/15/2020 14:33 
JOB #:  [id number]

## 2020-04-15 NOTE — CONSULTS
[address] [facility], [facility] Phone: [phone] Urology Consult Note 1. Closed fracture of right hip, initial encounter ([facility] Assessment & Plan Assessment Lisa Meehan is an 81year old female with: 
 
Right Hip fracture s/p ORIF 4/12/2020 Post op Urinary Retention - s/p failed VT x 1, poon replaced 4/15/2020 with 425 mls urine return - UA 4/15/2020 neg Hx of CKD III  
- creat at baseline Plan: 
Maintain poon x 7-10 days or until patient more mobile Defer flomax given falls Will do VT as outpt in coordination with SNF Follow Up arranged: YES message sent to office to arrange outpt VT in 7-10 days and f/up with provider in 4-6 weeks Ok to d/c with poon in place form urological standpoint Will sign off at this time. Please contact with any questions or concerns. \ [facility] Pager # [phone] Available M-F 7:30- 5pm .  After 5pm and weekends please call answering service at [phone] Chief Complaint Patient presents with  Fall  Hip Injury HISTORY OF PRESENT ILLNESS:   
Lisa Meehan is a 81 y.o. female who is seen in consultation as referred by Dr. Kofi Garcia  for post op urinary retention. Patient has not been seen by a urologist and is not currently receiving urological care. Urological history is significant for none. HPI: Patient originally presented to the ER on 4/11/2020 with c/o right hip pain s/p mechanical fall at home. Found to have right hip fx and was admitted for further management/ treatment. She is s/p ORIF 4/12/2020 and had poon placed post op. Attempted VT today however failed. poon replaced 4/15/2020 with 425 mls urine return.  She denies issues with retention in the past. Reports Urgency with UUI at baseline. No dysuria or gross hematuria. Limited mobility at this time. Reports issues with constipation which is now resolved. Location bladder Duration days Associated signs/symptoms as above Modifying factors poon Severity unknown No flowsheet data found. Past Medical History:  
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   
 Nonspecific abnormal electrocardiogram (ECG) (EKG)  Obesity, unspecified  Other and unspecified hyperlipidemia  Renal insufficiency Past Surgical History:  
Procedure Laterality Date [address] UNLISTED Explor lap  COLONOSCOPY N/A 5/31/2018 COLONOSCOPY w polypectomy performed by Praful Weaver MD at [facility] Left  HX CATARACT REMOVAL INSERT PROSTHETIC LENS: LEFT,RIGHT  HX GYN    
 HX ORTHOPAEDIC Left Rotator cuff repair  HX PARTIAL THYROIDECTOMY  AL BIOPSY OF BREAST, INCISIONAL    
 rt & lt 12 yrs ago Social History Tobacco Use  Smoking status: Former Smoker  Smokeless tobacco: Never Used  Tobacco comment: REMOTELY QUIT 1980'S Substance Use Topics  Alcohol use: No  
 Drug use: No  
 
 
Allergies Allergen Reactions  Ciprofibrate Unable to Consolidated Jose  Darvon [Propoxyphene] Unable to Obtain  Penicillins Unable to Obtain History reviewed. No pertinent family history. Current Facility-Administered Medications Medication Dose Route Frequency Provider Last Rate Last Dose  
 0.9% sodium chloride infusion 250 mL  250 mL IntraVENous PRN Brandon Troy MD      
 pantoprazole (PROTONIX) injection 40 mg  40 mg IntraVENous Q12H Brandon Troy MD      
 acetaminophen (TYLENOL) tablet 650 mg  650 mg Oral Q8H Garrett Hashimoto, MD      
  0.9% sodium chloride infusion 250 mL  250 mL IntraVENous PRN Anne Troy MD      
 aluminum-magnesium hydroxide (MAALOX) oral suspension 15 mL  15 mL Oral QID PRN Michelle Hall MD   15 mL at 04/14/20 2245  
 0.9% sodium chloride infusion 250 mL  250 mL IntraVENous PRN Anne Troy MD      
 0.9% sodium chloride infusion 250 mL  250 mL IntraVENous PRN Anne Troy MD      
 polyethylene glycol (MIRALAX) packet 17 g  17 g Oral DAILY Judyth Hammans, [place]   17 g at 04/15/20 0900  
 senna-docusate (PERICOLACE) 8.6-50 mg per tablet 1 Tab  1 Tab Oral DAILY Judyth Hammans, [place]   1 Tab at 04/15/20 2201  morphine injection 1-2 mg  1-2 mg IntraVENous Q4H PRN Judyth Hammans, [place]   1 mg at 04/13/20 0910  
 oxyCODONE IR (ROXICODONE) tablet 5 mg  5 mg Oral Q4H PRN Judyth Hammans PA   5 mg at 04/13/20 7964  ondansetron (ZOFRAN) injection 8 mg  8 mg IntraVENous Q6H PRN Judyth Hammans PA      
 allopurinoL (ZYLOPRIM) tablet 100 mg  100 mg Oral DAILY Judyth Hammans, [place]   100 mg at 04/15/20 0311  LORazepam (ATIVAN) tablet 1 mg  1 mg Oral BID PRN [facility]   1 mg at 04/14/20 0295  
 atorvastatin (LIPITOR) tablet 10 mg  10 mg Oral QHS Judyth Hammans, [place]   10 mg at 04/14/20 2243 Review of Systems Pertinent items are noted in the History of Present Illness. PHYSICAL EXAMINATION:  
Visit Vitals /62 (BP 1 Location: Left arm, BP Patient Position: At rest) Pulse 87 Temp 97.7 °F (36.5 °C) Resp 18 Ht 5' (1.524 m) Wt 140 lb (63.5 kg) SpO2 93% BMI 27.34 kg/m² Constitutional: Well developed, well nourished elderly female. No acute distress. HEENT: Normocephalic, Atraumatic CV:  RRR Pulm: No respiratory distress or difficulties breathing GI:  No abdominal masses or tenderness. :  No CVA tenderness Mathur draining clear yellow urine Skin: No evidence of jaundice. Normal color Neuro/Psych:  Alert and oriented. Affect appropriate. Lymphatic:  No inguinal lymphadenopathy MSK:  Limited ROM 
 
 
 
REVIEW OF LABS AND IMAGING:   
 
 
Labs: Results:  
Chemistry Recent Labs 04/15/20 
9904 04/14/20 
8409 04/13/20 2016 04/13/20 
0518 * 119*  --  122*  137  --  138  
K 4.3 4.2 5.3 5.9*  
 107  --  110 CO2 23 24  --  22 BUN 63* 47*  --  34* CREA 1.97* 2.18*  --  1.93* CA 8.6 7.9*  --  9.2 AGAP 7 6  --  6  
BUCR 32* 22*  --  18  
AP  --  76  --   --   
TP  --  5.0*  --   --   
ALB  --  2.1*  --   --   
GLOB  --  2.9  --   --   
AGRAT  --  0.7*  --   --   
  
CBC w/Diff Recent Labs 04/15/20 
0945 04/15/20 
9119 04/14/20 
1356 04/14/20 
0514  04/13/20 
0518 WBC  --  8.4  --  8.3  --  9.6 RBC  --  2.16*  --  2.02*  --  2.09* HGB 6.5* 6.6* 7.9* 6.1*   < > 6.6* HCT 19.6* 20.0* 24.7* 18.9*   < > 20.6* PLT  --  140  --  130*  --  150 GRANS  --  65  --   --   --  77* LYMPH  --  24  --   --   --  14* EOS  --  2  --   --   --  0  
 < > = values in this interval not displayed. Cultures No results for input(s): CULT in the last 72 hours. All Micro Results None Urinalysis Color Date Value Ref Range Status 04/12/2020 YELLOW   Final  
 
Appearance Date Value Ref Range Status 04/12/2020 CLEAR   Final  
 
Specific gravity Date Value Ref Range Status 04/12/2020 1.014 1.005 - 1.030   Final  
 
pH (UA) Date Value Ref Range Status 04/12/2020 5.5 5.0 - 8.0   Final  
 
Protein Date Value Ref Range Status 04/12/2020 Negative NEG mg/dL Final  
 
Ketone Date Value Ref Range Status 04/12/2020 Negative NEG mg/dL Final  
 
Bilirubin Date Value Ref Range Status 04/12/2020 Negative NEG   Final  
 
Blood Date Value Ref Range Status 04/12/2020 Negative NEG   Final  
 
Urobilinogen Date Value Ref Range Status 04/12/2020 0.2 0.2 - 1.0 EU/dL Final  
 
Nitrites Date Value Ref Range Status 04/12/2020 Negative NEG   Final  
 
Leukocyte Esterase Date Value Ref Range Status 04/12/2020 Negative NEG   Final  
 
Potassium Date Value Ref Range Status 04/15/2020 4.3 3.5 - 5.5 mmol/L Final  
 
Creatinine Date Value Ref Range Status 04/15/2020 1.97 (H) 0.6 - 1.3 MG/DL Final  
 
BUN Date Value Ref Range Status 04/15/2020 63 (H) 7.0 - 18 MG/DL Final  
  
PSA No results for input(s): PSA in the last 72 hours. Coagulation Lab Results Component Value Date/Time  Prothrombin time 15.8 (H) 04/11/2020 08:05 PM  
 INR 1.3 (H) 04/11/2020 08:05 PM

## 2020-04-15 NOTE — ROUTINE PROCESS
Bedside shift change report given to Yaneth Stephen RN (oncoming nurse) by [facility], RN (offgoing nurse). Report included the following information SBAR, Kardex, OR Summary, Procedure Summary, Intake/Output, MAR, Recent Results and Med Rec Status.

## 2020-04-15 NOTE — PROGRESS NOTES
Problem: Self Care Deficits Care Plan (Adult) Goal: *Acute Goals and Plan of Care (Insert Text) Description: Occupational Therapy Goals Initiated 4/13/2020 within 7 day(s). 1.  Patient will perform bed mobility in preparation for selfcare with minimal assistance/contact guard assist.  
2.  Patient will perform functional activity seated EOB for 4-7 minutes with modified independence and F+ balance. 3.  Patient will perform lower body dressing with supervision/set-up using AE prn. 
4.  Patient will perform toilet transfers with supervision/set-up. 5.  Patient will perform all aspects of toileting with supervision/set-up. 6.  Patient will participate in upper extremity therapeutic exercise/activities with modified independence for 8 minutes to increase ROM/strength for selfcare tasks. 7.  Patient will utilize energy conservation techniques during functional activities with verbal cues. Prior Level of Function: Patient lives with daughter in 2 story condo, 8 steps to bedroom and was independent with self-care and functional mobility PTA. Patient reports having shower chair at home Outcome: Progressing Towards Goal 
 OCCUPATIONAL THERAPY TREATMENT Patient: Raad Mcmanus (68 y.o. female) Date: 4/14/2020 Diagnosis: Hip fracture requiring operative repair ([facility] [address] Vennie Area <principal problem not specified> Procedure(s) (LRB): 
RIGHT FEMORAL INTERTROCHANTERIC NAIL INSERTION (Right) 2 Days Post-Op Precautions: Fall, Skin, PWB(right LE) Chart, occupational therapy assessment, plan of care, and goals were reviewed. ASSESSMENT: 
Patient able to sit on EOB from supine with max assist x2 given extra time. She was seen with PT to maximize functional mobility, safety and patient comfort. Patient with fear of falling and pain in right hip. PWB precautions reviewed with her and she verbalized understanding. Extra time also needed to gain trunk control/sitting balance once on EOB. Patient c/o dizziness while seated; BP taken at 110/64. Increased HR at 112 bpm.  Encouragement given to decrease anxiety of sitting up on EOB. She stood using a RW and max assist x2 in prep for functional task and then transferred to chair also with max assist x2 for safety. BUE therapeutic exercises completed while seated. Continue to recommend further therapy at SNF to maximize her functional independence. Progression toward goals: 
[]          Improving appropriately and progressing toward goals [x]          Improving slowly and progressing toward goals 
[]          Not making progress toward goals and plan of care will be adjusted PLAN: 
Patient continues to benefit from skilled intervention to address the above impairments. Continue treatment per established plan of care. Discharge Recommendations:  Dimas Mccracken Further Equipment Recommendations for Discharge:  TBD at next level of care SUBJECTIVE:  
Patient stated I don't think I can stand.  OBJECTIVE DATA SUMMARY:  
Cognitive/Behavioral Status: 
Neurologic State: Alert Orientation Level: Disoriented to person, Disoriented to place, Disoriented to situation Cognition: Follows commands Safety/Judgement: Fall prevention Functional Mobility and Transfers for ADLs: 
 Bed Mobility: 
Supine to Sit: Maximum assistance;Assist x2 Scooting: Maximum assistance;Assist x2 Transfers: 
Sit to Stand: Maximum assistance;Assist x2 Stand to Sit: Maximum assistance Bed to Chair: Maximum assistance;Assist x2 Balance: 
Sitting: Impaired; With support Sitting - Static: Fair (occasional)((+)) Sitting - Dynamic: Fair (occasional) Standing: Impaired; With support Standing - Static: Fair Standing - Dynamic : Fair;Poor UE Therapeutic Exercises:  
Pt was able to perform BUE: 
 
EXERCISE Sets Reps Active Active Assist  
Passive Self- assisted ROM Comments Shoulder horizontal abduction     []  []  []  [] Shoulder flexion 1  10 [x]  []  []  [] Shoulder extension  1 10  [x]  []  []  [] Forearm extension/flexion 1 10 [x]  []  []  [] Shoulder external rotation     []  []  []  [] Shoulder internal rotation     []  []  []  []     
Wrist flexion/extension 1   10 [x]  []  []  [] Finger flexion/extension 1  10  [x]  []  []  [] To increase strength/endurance for ADLs. Pain: 
Pain level pre-treatment: 5/10, in right hip Pain level post-treatment: 5/10, in right hip Pain Intervention(s): Medication (see MAR); Rest, Ice, Repositioning Response to intervention: Nurse notified, See doc flow Activity Tolerance:   
Good Please refer to the flowsheet for vital signs taken during this treatment. After treatment:  
[x]  Patient left in no apparent distress sitting up in chair 
[]  Patient left in no apparent distress in bed 
[x]  Call bell left within reach [x]  Nursing notified 
[]  Caregiver present 
[]  Bed alarm activated COMMUNICATION/EDUCATION:  
[x] Role of Occupational Therapy in the acute care setting 
[x] Home safety education was provided and the patient/caregiver indicated understanding. [x] Patient/family have participated as able in working towards goals and plan of care. [x] Patient/family agree to work toward stated goals and plan of care. [] Patient understands intent and goals of therapy, but is neutral about his/her participation. [] Patient is unable to participate in goal setting and plan of care. Thank you for this referral. 
iNcolasa Kowalski MS OTR/L Time Calculation: 25 mins

## 2020-04-15 NOTE — PROGRESS NOTES
[facility] Patient: Raad Mcmanus Age: 81 y.o. : 1935 MR#: [id number] SSN: [id number] Date/Time: 4/15/2020 Subjective: Patient feels good, no complaints. Pain well controlled. Assessment/Plan: 1. Hip fracture due to #2: s/p ORIF. 2. Acute blood loss anemia due to # 1: H&H 6.6 today, repeated again at 6.5, brown stool but heme positive, will transfuse 1 more unit of blood, start PPI IV, get GI eval.  Will hold Lovenox. Discussed with Ortho, does not think anemia due to bleeding at the surgical site. 3. Mechanical fall PT OT eval post surgery: 4. CKD III, at baseline creat, will monitor and Dr. Jose Alfredo Navarro is nephrologist 
5. Goiter w/ partial thyroidectomy, pt states she is on thyroid medication but does not know name/dose, TSH high, free T4 low, concern for hypothyroid versus overtreated hyper. Discussed with endocrine Dr. Charmian Councilman, patient on methimazole at home but recommend to hold for 2 weeks and then resume at lower dose. Endocrine input awaited. 6. HTN: BP stable, monitor off medications. 7. GERD: Continue PPI and add Maalox as needed. 8. DVT prophylaxis with SCD, hold Lovenox due to acute blood loss anemia. 9. Full code Discussed with the patient and daughter over the phone in length in detail about risk and benefits of transfusion, both understand and wants to proceed with transfusion. Needs SNF placement once acute issues resolve. Discussed with RN Discussed with  Case discussed with:  [x]Patient  [x]Family  []Nursing  []Case Management DVT Prophylaxis:  []Lovenox  []Hep SQ  [x]SCDs  []Coumadin   []Eliquis/Xarelto Objective:  
VS:  
Visit Vitals /53 Pulse 88 Temp 97.1 °F (36.2 °C) Resp 18 Ht 5' (1.524 m) Wt 63.5 kg (140 lb) SpO2 94% BMI 27.34 kg/m² Tmax/24hrs: Temp (24hrs), Av.5 °F (36.4 °C), Min:97.1 °F (36.2 °C), Max:97.8 °F (36.6 °C) YOEL 
 
 Intake/Output Summary (Last 24 hours) at 4/15/2020 1226 Last data filed at 4/15/2020 1808 Gross per 24 hour Intake 1080 ml Output 1350 ml Net -270 ml General:  Alert, cooperative, no acute distress Pulmonary:  CTA Bilaterally. No Wheezing/Rales. Cardiovascular: Regular rate and Rhythm. GI:  Soft, Non distended, Non tender. + Bowel sounds. Extremities:  No edema. Neurologic: Alert and oriented X 3. Limited movement right lower extremity due to pain. Additional: Right hip surgical site dressing slightly soaked, minimum swelling, slightly tense the skin but no hematoma. Medications:  
Current Facility-Administered Medications Medication Dose Route Frequency  0.9% sodium chloride infusion 250 mL  250 mL IntraVENous PRN  pantoprazole (PROTONIX) injection 40 mg  40 mg IntraVENous Q12H  
 acetaminophen (TYLENOL) tablet 650 mg  650 mg Oral Q8H  
 0.9% sodium chloride infusion 250 mL  250 mL IntraVENous PRN  
 aluminum-magnesium hydroxide (MAALOX) oral suspension 15 mL  15 mL Oral QID PRN  
 0.9% sodium chloride infusion 250 mL  250 mL IntraVENous PRN  
 0.9% sodium chloride infusion 250 mL  250 mL IntraVENous PRN  polyethylene glycol (MIRALAX) packet 17 g  17 g Oral DAILY  senna-docusate (PERICOLACE) 8.6-50 mg per tablet 1 Tab  1 Tab Oral DAILY  morphine injection 1-2 mg  1-2 mg IntraVENous Q4H PRN  
 oxyCODONE IR (ROXICODONE) tablet 5 mg  5 mg Oral Q4H PRN  
 ondansetron (ZOFRAN) injection 8 mg  8 mg IntraVENous Q6H PRN  
 allopurinoL (ZYLOPRIM) tablet 100 mg  100 mg Oral DAILY  LORazepam (ATIVAN) tablet 1 mg  1 mg Oral BID PRN  
 atorvastatin (LIPITOR) tablet 10 mg  10 mg Oral QHS Labs:   
Recent Results (from the past 24 hour(s)) HGB & HCT Collection Time: 04/14/20  1:56 PM  
Result Value Ref Range HGB 7.9 (L) 12.0 - 16.0 g/dL HCT 24.7 (L) 35.0 - 45.0 % CBC WITH AUTOMATED DIFF Collection Time: 04/15/20  5:23 AM  
Result Value Ref Range WBC 8.4 4.6 - 13.2 K/uL  
 RBC 2.16 (L) 4.20 - 5.30 M/uL HGB 6.6 (L) 12.0 - 16.0 g/dL HCT 20.0 (L) 35.0 - 45.0 % MCV 92.6 74.0 - 97.0 FL  
 MCH 30.6 24.0 - 34.0 PG  
 MCHC 33.0 31.0 - 37.0 g/dL  
 RDW 16.6 (H) 11.6 - 14.5 % PLATELET 584 026 - 785 K/uL MPV 11.7 9.2 - 11.8 FL  
 NEUTROPHILS 65 40 - 73 % LYMPHOCYTES 24 21 - 52 % MONOCYTES 9 3 - 10 % EOSINOPHILS 2 0 - 5 % BASOPHILS 0 0 - 2 %  
 ABS. NEUTROPHILS 5.5 1.8 - 8.0 K/UL  
 ABS. LYMPHOCYTES 2.0 0.9 - 3.6 K/UL  
 ABS. MONOCYTES 0.7 0.05 - 1.2 K/UL  
 ABS. EOSINOPHILS 0.2 0.0 - 0.4 K/UL  
 ABS. BASOPHILS 0.0 0.0 - 0.1 K/UL  
 DF AUTOMATED METABOLIC PANEL, BASIC Collection Time: 04/15/20  5:23 AM  
Result Value Ref Range Sodium 139 136 - 145 mmol/L Potassium 4.3 3.5 - 5.5 mmol/L Chloride 109 100 - 111 mmol/L  
 CO2 23 21 - 32 mmol/L Anion gap 7 3.0 - 18 mmol/L Glucose 106 (H) 74 - 99 mg/dL BUN 63 (H) 7.0 - 18 MG/DL Creatinine 1.97 (H) 0.6 - 1.3 MG/DL  
 BUN/Creatinine ratio 32 (H) 12 - 20 GFR est AA 29 (L) >60 ml/min/1.73m2 GFR est non-AA 24 (L) >60 ml/min/1.73m2 Calcium 8.6 8.5 - 10.1 MG/DL  
OCCULT BLOOD, STOOL Collection Time: 04/15/20  7:25 AM  
Result Value Ref Range Occult blood, stool Positive (A) NEG    
HGB & HCT Collection Time: 04/15/20  9:45 AM  
Result Value Ref Range HGB 6.5 (L) 12.0 - 16.0 g/dL HCT 19.6 (L) 35.0 - 45.0 % Signed By: Ritesh Arriola MD   
 April 15, 2020 Disclaimer: Sections of this note are dictated using utilizing voice recognition software. Minor typographical errors may be present. If questions arise, please do not hesitate to contact me or call our department.

## 2020-04-15 NOTE — ROUTINE PROCESS
4/14/2020 
10:08 PM 
 
End of Shift Note Bedside and verbal shift change report given to [facility], RN (On coming nurse) by Nunu Rome RN (Off going nurse). Report included the following information:  
   --Procedure Summary 
   --MAR, 
   --Recent Results --Med Rec Status SBAR Recommendations:  
 
Issues for Provider to address Activity This Shift 
 
 [] Bed Rest Order 
 [] Refused 
 [] Dangled  
 [] TDWB Ambulating: 
   [] Bathroom [] BSC [] Room/Hallway Up in Chair for meals []Yes [] No  
Voiding       [] Yes  [] No 
Mathur          [x] Yes  [] No 
Incontinent [] Yes  [] No 
 
DUE TO VOId POUR        [] Yes [] No 
Purewick    [] Yes [] No 
New Onset [] Yes [] No Straight Cath   []Yes  [] No 
Condom Cath  [] Yes [] No 
MD Called      [] Yes  [] No  
Blood Sugars Managed []Yes [x] No   
Bowels Moved [] Yes [x] No 
 
Incontinent     [] Yes [] No Passed Gas [x]Yes [] No 
 
New Onset  []Yes [] No 
  
 
 MD Called []Yes  [] No 
  
CHG Bath Done Before Surgery After Surgery  
  
[] Yes  [x] No 
[] Yes  [x] No   
  
Drain Removed [] Yes  [] No [x] N/A Dressing Changed [] Yes   [x] No [] N/A Nausea/Vomiting [] Yes   [x] No    
Ice Packs Changed [x] Yes   [] No  [] N/A Incentive Spirometer  [x] Yes  [] No     
SCD Pumps On Ankle Pumping  [x] Yes   [] No  
  
[x] Yes   [] No    
  
Telemetry Monitoring [x] Yes   [] No   Rhythm NSR

## 2020-04-15 NOTE — CONSULTS
[email or web address] 
[phone] GASTROENTEROLOGY CONSULT Impression: 1. Acute blood loss anemia- noted to be hgb 9.8 on admission with drop noted 2 days later to hgb 6.6 s/p blood tranfusion 2. Positive FOB- brown stool on rectal exam- has ? Internal hemorrhoids. Of note had positive FOB in 2018 and colonoscopy performed. 3. Hemorrhoids- will treat locally with topical treatment 4. R hip fracture s/p ORIF- per ortho 5. CKD III 6. Goiter with partial thyroidectomy 7. HTN 
8. GERD- controlled on PPI; no warning symptoms 9. H/o colon polyps- TA noted on colonoscopy in 2018- due to age no further surveillance recommended Plan: 1. Monitor for overt signs of GI bleeding 2. Monitor H/H and transfuse per protocol 3. Continue PPI 4. Will place order for hemorrhoid treatment 5. No plans for scopes at this time 6. Medical management per primary team.  
 
 
Chief Complaint: anemia, positive FOB 
 
 
HPI: 
Brian Estrada is a 81 y.o. female who I am being asked to see in consultation for an opinion regarding the above. Patient was admitted for R hip fracture s/p fall and underwent ORIF the following day (4/12/20)  She was noted to have mild anemia on admission but noted drop of Hgb to 6.6 on post op day one. She denies abdominal pain. She is currently having loose brown stools and admits to some rectal pain that feels like her hemorrhoids when they flare. No episodes of hematochezia or melena. She reports heartburn which she has h/o- controlled with PPI. No N/V. Last colonoscopy was in 2018 and had one polypectomy which was TA on pathology. PMH:  
Past Medical History:  
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   
 Nonspecific abnormal electrocardiogram (ECG) (EKG)  Obesity, unspecified  Other and unspecified hyperlipidemia  Renal insufficiency PSH:  
Past Surgical History: Procedure Laterality Date [address] [place] UNLISTED Explor lap  COLONOSCOPY N/A 5/31/2018 COLONOSCOPY w polypectomy performed by Brandi Osorio MD at [address] Left  HX CATARACT REMOVAL INSERT PROSTHETIC LENS: LEFT,RIGHT  HX GYN    
 HX ORTHOPAEDIC Left Rotator cuff repair  HX PARTIAL THYROIDECTOMY  VA BIOPSY OF BREAST, INCISIONAL    
 rt & lt 12 yrs ago Social HX:  
Social History Socioeconomic History  Marital status:  Spouse name: Not on file  Number of children: Not on file  Years of education: Not on file  Highest education level: Not on file Occupational History  Not on file Social Needs  Financial resource strain: Not on file  Food insecurity Worry: Not on file Inability: Not on file  Transportation needs Medical: Not on file Non-medical: Not on file Tobacco Use  Smoking status: Former Smoker  Smokeless tobacco: Never Used  Tobacco comment: REMOTELY QUIT 1980'S Substance and Sexual Activity  Alcohol use: No  
 Drug use: No  
 Sexual activity: Not on file Lifestyle  Physical activity Days per week: Not on file Minutes per session: Not on file  Stress: Not on file Relationships  Social connections Talks on phone: Not on file Gets together: Not on file Attends Restorationism service: Not on file Active member of club or organization: Not on file Attends meetings of clubs or organizations: Not on file Relationship status: Not on file  Intimate partner violence Fear of current or ex partner: Not on file Emotionally abused: Not on file Physically abused: Not on file Forced sexual activity: Not on file Other Topics Concern  Not on file Social History Narrative  Not on file FHX:  
History reviewed. No pertinent family history. Allergy: Allergies Allergen Reactions  Ciprofibrate Unable to Consolidated Jose  Darvon [Propoxyphene] Unable to Obtain  Penicillins Unable to Obtain Patient Active Problem List  
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  
 Obesity, unspecified E66.9  Nonspecific abnormal electrocardiogram (ECG) (EKG) R94.31  
 Renal insufficiency N28.9  [facility] S72.009A Home Medications:  
 
Medications Prior to Admission Medication Sig  
 amLODIPine (NORVASC) 5 mg tablet Take 5 mg by mouth daily.  allopurinoL (ZYLOPRIM) 100 mg tablet Take 100 mg by mouth daily.  simvastatin (ZOCOR) 20 mg tablet Take 20 mg by mouth nightly.  furosemide (LASIX) 20 mg tablet Take 20 mg by mouth every other day.  losartan (COZAAR) 50 mg tablet Take 50 mg by mouth daily.  FOLIC ACID/VITAMIN B COMP W-C (VIKTOR-LUCERO PO) Take 1 Tab by mouth daily.  omeprazole (PRILOSEC) 20 mg capsule Take 20 mg by mouth daily.  aspirin 81 mg tablet Take 81 mg by mouth.  LORazepam (ATIVAN) 1 mg tablet Take  by mouth two (2) times a day. Review of Systems:  
 
Constitutional: No fevers, chills, weight loss, fatigue. Skin: No rashes, pruritis, jaundice, ulcerations, erythema. HENT: No headaches, nosebleeds, sinus pressure, rhinorrhea, sore throat. Eyes: No visual changes, blurred vision, eye pain, photophobia, jaundice. Cardiovascular: No chest pain, heart palpitations. Respiratory: No cough, SOB, wheezing, chest discomfort, orthopnea. Gastrointestinal: See HPI Genitourinary: No dysuria, bleeding, discharge, pyuria. Musculoskeletal: No weakness, arthralgias, wasting. Endo: No sweats. Heme: No bruising, easy bleeding. Allergies: As noted. Neurological: Cranial nerves intact. Alert and oriented. Gait not assessed. Psychiatric:  No anxiety, depression, hallucinations. Visit Vitals /62 (BP 1 Location: Left arm, BP Patient Position: At rest) Pulse 87 Temp 97.7 °F (36.5 °C) Resp 18 Ht 5' (1.524 m) Wt 63.5 kg (140 lb) SpO2 93% BMI 27.34 kg/m² Physical Assessment:  
 
constitutional: appearance: well developed, well nourished, normal habitus, no deformities, in no acute distress. skin: inspection: no rashes, ulcers, icterus or other lesions; no clubbing or telangiectasias. palpation: no induration or subcutaneos nodules. eyes: inspection: normal conjunctivae and lids; no jaundice pupils: normal 
ENMT: mouth: normal oral mucosa,lips and gums; good dentition. oropharynx: normal tongue, hard and soft palate; posterior pharynx without erithema, exudate or lesions. neck: thyroid: normal size, consistency and position; no masses or tenderness. respiratory: effort: normal chest excursion; no intercostal retraction or accessory muscle use. cardiovascular: abdominal aorta: normal size and position; no bruits. palpation: PMI of normal size and position; normal rhythm; no thrill or murmurs. abdominal: abdomen: normal consistency; no tenderness or masses. hernias: no hernias appreciated. liver: normal size and consistency. spleen: not palpable. Mathur cath in place 
rectal: hemoccult/guaiac: brown stool; no external hemorrhoids but appreciate internal hemorrhoids on BERNIE  
musculoskeletal: digits and nails: no clubbing, cyanosis, petechiae or other inflammatory conditions. head and neck: normal range of motion; no pain, crepitation or contracture. spine/ribs/pelvis: normal range of motion; no pain, deformity or contracture. neurologic: cranial nerves: II-XII normal. Pupils intact. psychiatric: judgement/insight: within normal limits. memory: within normal limits for recent and remote events. mood and affect: no evidence of depression, anxiety or agitation. orientation: oriented to time, space and person. Basic Metabolic Profile Recent Labs 04/15/20 0523  
  
K 4.3  CO2 23 BUN 63* * CA 8.6  
  
  
CBC w/Diff Recent Labs 04/15/20 
0945 04/15/20 
3087 WBC  --  8.4  
RBC  --  2.16* HGB 6.5* 6.6* HCT 19.6* 20.0* MCV  --  92.6 MCH  --  30.6 MCHC  --  33.0  
RDW  --  16.6*  
PLT  --  140 Recent Labs 04/15/20 
4131 GRANS 65 LYMPH 24 EOS 2 Hepatic Function Recent Labs 04/14/20 
2382 ALB 2.1*  
TP 5.0* TBILI 0.5 SGOT 18 AP 76 Coags No results for input(s): PTP, INR, APTT, INREXT in the last 72 hours. Ania De Paz NP. [facility] [address] Cell: [phone] [email or web address]

## 2020-04-15 NOTE — ROUTINE PROCESS
End of Shift Note Bedside and verbal shift change report given to Nawaf Rogers RN (On coming nurse) by Katerina Barron RN (Off going nurse). Report included the following information:  
   --Procedure Summary 
   --MAR, 
   --Recent Results --Med Rec Status SBAR Recommendations: Monitor H and H Issues for Provider to address N/A Activity This Shift 
 
 [] Bed Rest Order 
 [] Refused 
 [] Dangled  
 [] TDWB Ambulating: 
   [] Bathroom [] BSC [x] Room/Hallway Up in Chair for meals 
  [x]Yes [] No  
Voiding       [] Yes  [x] No 
Mathur          [x] Yes  [] No 
Incontinent [x] Yes  [] No 
 
DUE TO VOID N/A POUR        [] Yes [x] No 
Purewick    [] Yes [x] No 
New Onset [] Yes [x] No Straight Cath   []Yes  [x] No 
Condom Cath  [] Yes [x] No 
MD Called      [] Yes  [x] No  
Blood Sugars Managed []Yes [x] No   
Bowels Moved [x] Yes [] No 
 
Incontinent     [x] Yes [] No Passed Gas [x]Yes [] No 
 
New Onset  []Yes [x] No 
  
 
 MD Called []Yes  [x] No 
  
CHG Bath Done Before Surgery After Surgery  
  
[] Yes  [] No 
[] Yes  [] No   
  
Drain Removed [] Yes  [] No [x] N/A Dressing Changed [x] Yes   [] No [] N/A Nausea/Vomiting [] Yes   [x] No    
Ice Packs Changed [x] Yes   [] No  [] N/A Incentive Spirometer  [x] Yes  [] No     
SCD Pumps On Ankle Pumping  [x] Yes   [] No  
  
[x] Yes   [] No    
  
Telemetry Monitoring [x] Yes   [] No   Rhythm Sinus Tach

## 2020-04-15 NOTE — CONSULTS
Endocrinology Consultation Note NAME:  Elisa Partiad :   1935 MRN:   [id number] Date/Time:  4/15/2020 11:50 AM 
 
Subjective: CHIEF COMPLAINT:   
 
 
HISTORY OF PRESENT ILLNESS:    
This is an 81 y.o. AA female with a medical history HTN, HLD, CKD, osteoporosis, and MNG, s/p hemithyroidectomy ~ 12-13 years ago for nodules. Pt was admitted after fall,resulting in hip fracture, s/p R ORIF of intertrochanteric femur fracture. Labs done while inpt demonstrated a TSH of 45 and FT4 of 0.5. Pt is followed by Dr. Judge Painter since 2017 for MNG, s/p repeated FNAs that have demonstrated benign colloid nodules (last FNA 2020). In , she has a change in thyroid function and had TSH of < 0.006, FT4 2.64. Previously had levels that were consistent with a subclinical hyperthyroidism. She was started on methimazole (MMI) 10 mg po bid. She had issues of GI upset and it has been noted that she stated that she was taking only one tablet per day. Pt states that she still is taking only one tablet a day (however she doesn't remember the name and confused it with metoprolol a number of times during interview). She states that she has had unintentional weight loss of ~ 40 lbs over the last few months. She states that she occasionally has some swallowing issues, feels that pills get caught in throat. (-) SOB,  She has + cold intolerance, + constipation (-) palpitations, (-) family history of thyroid issues. Of note, she had plastic bag with medication bottles as well as pill box. She had no bottle with MMI, but pill box had tablets that were consistent with MMI. She did recall that she was out of her MMI and was scheduled to  a refill from her pharmacy ([address] in [place] which is different from [place] [address] mail order). Past Medical History:  
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   
 Nonspecific abnormal electrocardiogram (ECG) (EKG)  Obesity, unspecified  Other and unspecified hyperlipidemia  Renal insufficiency Social History Tobacco Use  Smoking status: Former Smoker  Smokeless tobacco: Never Used  Tobacco comment: REMOTELY QUIT 1980'S Substance Use Topics  Alcohol use: No  
  
 
History reviewed. No pertinent family history. Allergies Allergen Reactions  Ciprofibrate Unable to Consolidated Jose  Darvon [Propoxyphene] Unable to Obtain  Penicillins Unable to Obtain Prior to Admission medications Medication Sig Start Date End Date Taking? Authorizing Provider  
amLODIPine (NORVASC) 5 mg tablet Take 5 mg by mouth daily. Yes Other, MD Max  
allopurinoL (ZYLOPRIM) 100 mg tablet Take 100 mg by mouth daily. Yes Other, MD Max  
simvastatin (ZOCOR) 20 mg tablet Take 20 mg by mouth nightly. Yes Other, MD Max  
furosemide (LASIX) 20 mg tablet Take 20 mg by mouth every other day. Yes Other, MD Max  
losartan (COZAAR) 50 mg tablet Take 50 mg by mouth daily. Yes Provider, Historical  
FOLIC ACID/VITAMIN B COMP W-C (VIKTOR-LUCERO PO) Take 1 Tab by mouth daily. Yes Provider, Historical  
omeprazole (PRILOSEC) 20 mg capsule Take 20 mg by mouth daily. Yes Provider, Historical  
aspirin 81 mg tablet Take 81 mg by mouth. Yes Provider, Historical  
LORazepam (ATIVAN) 1 mg tablet Take  by mouth two (2) times a day. Yes Provider, Historical  
 
 
REVIEW OF SYSTEMS:   
As per HPI, otherwise negative Objective: VITALS:  
 
.vis Visit Vitals /53 Pulse 88 Temp 97.1 °F (36.2 °C) Resp 18 Ht 5' (1.524 m) Wt 63.5 kg (140 lb) SpO2 94% BMI 27.34 kg/m² O2 Flow Rate (L/min): 6 l/min O2 Device: Room air PHYSICAL EXAM:  
General:    Lying in bed in no acute distress. HEENT:  NC/AT EOMI, slight lid lag Thyroid: ~ 35 g right sided thyroid, firm, smooth with no discrete nodule edge felt. CV:                  RR, + 3/6 systolic murmur appreciated, best heard at LUSB Lungs:   Clear to auscultation bilaterally. Abdomen:   + Bowel signs, NT ND Extremities: No rashes Neurologic: Alert and oriented X 3., good reflexes, with no delays Skin:                Warm and dry. No rashes. Psych:  Good mood, normal affect LAB DATA REVIEWED:   
 
Results for Leanna Unger (MRN [id number]) as of 4/16/2020 05:14 Ref. Range 4/13/2020 05:18 4/13/2020 14:53 4/14/2020 05:14  
T4, Free Latest Ref Range: 0.7 - 1.5 NG/DL  0.5 (L) 0.7 TSH Latest Ref Range: 0.36 - 3.74 uIU/mL 48.20 (H) Recent Results (from the past 24 hour(s)) HGB & HCT Collection Time: 04/14/20  1:56 PM  
Result Value Ref Range HGB 7.9 (L) 12.0 - 16.0 g/dL HCT 24.7 (L) 35.0 - 45.0 % CBC WITH AUTOMATED DIFF Collection Time: 04/15/20  5:23 AM  
Result Value Ref Range WBC 8.4 4.6 - 13.2 K/uL  
 RBC 2.16 (L) 4.20 - 5.30 M/uL HGB 6.6 (L) 12.0 - 16.0 g/dL HCT 20.0 (L) 35.0 - 45.0 % MCV 92.6 74.0 - 97.0 FL  
 MCH 30.6 24.0 - 34.0 PG  
 MCHC 33.0 31.0 - 37.0 g/dL  
 RDW 16.6 (H) 11.6 - 14.5 % PLATELET 779 944 - 630 K/uL MPV 11.7 9.2 - 11.8 FL  
 NEUTROPHILS 65 40 - 73 % LYMPHOCYTES 24 21 - 52 % MONOCYTES 9 3 - 10 % EOSINOPHILS 2 0 - 5 % BASOPHILS 0 0 - 2 %  
 ABS. NEUTROPHILS 5.5 1.8 - 8.0 K/UL  
 ABS. LYMPHOCYTES 2.0 0.9 - 3.6 K/UL  
 ABS. MONOCYTES 0.7 0.05 - 1.2 K/UL  
 ABS. EOSINOPHILS 0.2 0.0 - 0.4 K/UL  
 ABS. BASOPHILS 0.0 0.0 - 0.1 K/UL  
 DF AUTOMATED METABOLIC PANEL, BASIC Collection Time: 04/15/20  5:23 AM  
Result Value Ref Range Sodium 139 136 - 145 mmol/L Potassium 4.3 3.5 - 5.5 mmol/L Chloride 109 100 - 111 mmol/L  
 CO2 23 21 - 32 mmol/L Anion gap 7 3.0 - 18 mmol/L Glucose 106 (H) 74 - 99 mg/dL BUN 63 (H) 7.0 - 18 MG/DL Creatinine 1.97 (H) 0.6 - 1.3 MG/DL  
 BUN/Creatinine ratio 32 (H) 12 - 20 GFR est AA 29 (L) >60 ml/min/1.73m2 GFR est non-AA 24 (L) >60 ml/min/1.73m2 Calcium 8.6 8.5 - 10.1 MG/DL  
OCCULT BLOOD, STOOL Collection Time: 04/15/20  7:25 AM  
Result Value Ref Range Occult blood, stool Positive (A) NEG    
HGB & HCT Collection Time: 04/15/20  9:45 AM  
Result Value Ref Range HGB 6.5 (L) 12.0 - 16.0 g/dL HCT 19.6 (L) 35.0 - 45.0 % Assessment/Plan: This is an 82 yo AAF with a medical history significant for MNG and overt hyperthyroidism, on MMI for treatment. Most likely hyperthyroidism is secondary to one of more toxic thyroid nodules. She states that she only take MMI 10 mg po daily instead of her prescribed 10mg po bid, but does seem to get confused about the names of her medications and fills her own pill boxes with her, multiple, medications. She is followed by Dr. Yoli Way for her MNG and has been recommended for completion thyroidectomy. Given that she has some compressive symptoms and may be having some difficulty with medication compliance, she may benefit from surgery. This can be done as an outpt. 
___________________________________________________ PLAN:   
 
-will hold MMI for ~ 2 weeks to allow for thyroid function to normalize 
 
-would restart MMI at 5 mg po daily. She can split her current 10 mg tablets to take 5 mg po daily. -will need follow up with Dr. Ciaran Sánchez  In ~ 4-6 weeks to have TSH/FT4 recheck to see if any further dose adjustments are needed. Address is [address] [place] [address]. Contact phone number is [phone]. Risk of deterioration:  [x]Low    []Moderate  []High 
 
___________________________________________________ Care Plan discussed with: 
  [x]Patient   []Family    []ED Care Manager  []ED Doc   [x]Hosptialist 
  
Total visit time: 70  minutes. Of this time >50% was spent counseling and/or coordinating care.  Counseling elements included diagnostic results and/or recommended diagnostic studies, prognosis, education about the natural history and management of their condition, risks and benefits of management (treatment) options, and instructions for management (treatment) and/or follow-up. 
___________________________________________________ Endocrinologist: Yosef Cabrera MD

## 2020-04-15 NOTE — PROGRESS NOTES
Problem: Pressure Injury - Risk of 
Goal: *Prevention of pressure injury Description: Document Thierry Scale and appropriate interventions in the flowsheet. Outcome: Progressing Towards Goal 
Note: Pressure Injury Interventions: 
  
 
Moisture Interventions: Minimize layers, Assess need for specialty bed, Check for incontinence Q2 hours and as needed Activity Interventions: Increase time out of bed, PT/OT evaluation Mobility Interventions: Pressure redistribution bed/mattress (bed type), PT/OT evaluation Nutrition Interventions: Document food/fluid/supplement intake, Offer support with meals,snacks and hydration Friction and Shear Interventions: Lift sheet, Minimize layers Problem: Patient Education: Go to Patient Education Activity Goal: Patient/Family Education Outcome: Progressing Towards Goal 
  
Problem: Falls - Risk of 
Goal: *Absence of Falls Description: Document Archana Merino Fall Risk and appropriate interventions in the flowsheet. Outcome: Progressing Towards Goal 
Note: Fall Risk Interventions: 
Mobility Interventions: Communicate number of staff needed for ambulation/transfer, Patient to call before getting OOB, Utilize walker, cane, or other assistive device Mentation Interventions: Adequate sleep, hydration, pain control, Door open when patient unattended, More frequent rounding, Room close to nurse's station, Update white board Medication Interventions: Assess postural VS orthostatic hypotension, Patient to call before getting OOB, Teach patient to arise slowly Elimination Interventions: Call light in reach, Toileting schedule/hourly rounds History of Falls Interventions: Room close to nurse's station, Vital signs minimum Q4HRs X 24 hrs (comment for end date), Door open when patient unattended Problem: Patient Education: Go to Patient Education Activity Goal: Patient/Family Education Outcome: Progressing Towards Goal 
  
 Problem: Patient Education: Go to Patient Education Activity Goal: Patient/Family Education Outcome: Progressing Towards Goal 
  
Problem: Hip Fracture:Day of Admission Pre-op Goal: Activity/Safety Outcome: Progressing Towards Goal 
Goal: Consults, if ordered Outcome: Progressing Towards Goal 
Goal: Diagnostic Test/Procedures Outcome: Progressing Towards Goal 
Goal: Nutrition/Diet Outcome: Progressing Towards Goal 
Goal: Medications Outcome: Progressing Towards Goal 
Goal: Respiratory Outcome: Progressing Towards Goal 
Goal: Treatments/Interventions/Procedures Outcome: Progressing Towards Goal 
Goal: Psychosocial 
Outcome: Progressing Towards Goal 
Goal: *Labs/Tests Within Defined Limits in Preparation for Surgery Outcome: Progressing Towards Goal 
Goal: *Optimal pain control at patient's stated goal 
Outcome: Progressing Towards Goal 
Goal: *Hemodynamically stable Outcome: Progressing Towards Goal 
  
Problem: Hip Fracture: Day of Surgery Post-op Care Goal: Off Pathway (Use only if patient is Off Pathway) Outcome: Progressing Towards Goal 
Goal: Activity/Safety Outcome: Progressing Towards Goal 
Goal: Consults, if ordered Outcome: Progressing Towards Goal 
Goal: Diagnostic Test/Procedures Outcome: Progressing Towards Goal 
Goal: Nutrition/Diet Outcome: Progressing Towards Goal 
Goal: Medications Outcome: Progressing Towards Goal 
Goal: Respiratory Outcome: Progressing Towards Goal 
Goal: Treatments/Interventions/Procedures Outcome: Progressing Towards Goal 
Goal: Psychosocial 
Outcome: Progressing Towards Goal 
Goal: *Absence of skin breakdown Outcome: Progressing Towards Goal 
Goal: *Optimal pain control at patient's stated goal 
Outcome: Progressing Towards Goal 
Goal: *Hemodynamically stable Outcome: Progressing Towards Goal 
  
Problem: Hip Fracture: Post-Op Day 1 Goal: Off Pathway (Use only if patient is Off Pathway) Outcome: Progressing Towards Goal 
Goal: Activity/Safety Outcome: Progressing Towards Goal 
Goal: Diagnostic Test/Procedures Outcome: Progressing Towards Goal 
Goal: Nutrition/Diet Outcome: Progressing Towards Goal 
Goal: Medications Outcome: Progressing Towards Goal 
Goal: Respiratory Outcome: Progressing Towards Goal 
Goal: Treatments/Interventions/Procedures Outcome: Progressing Towards Goal 
Goal: Psychosocial 
Outcome: Progressing Towards Goal 
Goal: Discharge Planning Outcome: Progressing Towards Goal 
Goal: *Demonstrates progressive activity Outcome: Progressing Towards Goal 
Goal: *Optimal pain control at patient's stated goal 
Outcome: Progressing Towards Goal 
Goal: *Hemodynamically stable Outcome: Progressing Towards Goal 
Goal: *Discharge plan identified Outcome: Progressing Towards Goal 
Goal: *Absence of skin breakdown Outcome: Progressing Towards Goal 
  
Problem: Hip Fracture: Post-Op Day 2 Goal: Off Pathway (Use only if patient is Off Pathway) Outcome: Progressing Towards Goal 
Goal: Activity/Safety Outcome: Progressing Towards Goal 
Goal: Diagnostic Test/Procedures Outcome: Progressing Towards Goal 
Goal: Nutrition/Diet Outcome: Progressing Towards Goal 
Goal: Medications Outcome: Progressing Towards Goal 
Goal: Respiratory Outcome: Progressing Towards Goal 
Goal: Treatments/Interventions/Procedures Outcome: Progressing Towards Goal 
Goal: Psychosocial 
Outcome: Progressing Towards Goal 
Goal: Discharge Planning Outcome: Progressing Towards Goal 
Goal: *Optimal pain control at patient's stated goal 
Outcome: Progressing Towards Goal 
Goal: *Hemodynamically stable Outcome: Progressing Towards Goal 
Goal: *Adequate oxygenation Outcome: Progressing Towards Goal 
Goal: *Tolerates increased activity Outcome: Progressing Towards Goal 
Goal: *Tolerates nutrition therapy Outcome: Progressing Towards Goal 
Goal: *Absence of skin breakdown Outcome: Progressing Towards Goal 
  
Problem: Hip Fracture: Post-Op Day 3 
 Goal: Off Pathway (Use only if patient is Off Pathway) Outcome: Progressing Towards Goal 
Goal: Activity/Safety Outcome: Progressing Towards Goal 
Goal: Diagnostic Test/Procedures Outcome: Progressing Towards Goal 
Goal: Nutrition/Diet Outcome: Progressing Towards Goal 
Goal: Medications Outcome: Progressing Towards Goal 
Goal: Respiratory Outcome: Progressing Towards Goal 
Goal: Treatments/Interventions/Procedures Outcome: Progressing Towards Goal 
Goal: Psychosocial 
Outcome: Progressing Towards Goal 
Goal: Discharge Planning Outcome: Progressing Towards Goal 
Goal: *Met physical therapy criteria for discharge to next level of care Outcome: Progressing Towards Goal 
Goal: *Optimal pain control at patient's stated goal 
Outcome: Progressing Towards Goal 
Goal: *Hemodynamically stable Outcome: Progressing Towards Goal 
Goal: *Tolerating diet Outcome: Progressing Towards Goal 
Goal: *Active bowel function Outcome: Progressing Towards Goal 
Goal: *Adequate urinary output Outcome: Progressing Towards Goal 
Goal: *Absence of skin breakdown Outcome: Progressing Towards Goal 
Goal: *Patient verbalizes understanding of discharge instructions Outcome: Progressing Towards Goal 
  
Problem: Hip Fracture: Post-Op Day 4 Goal: Off Pathway (Use only if patient is Off Pathway) Outcome: Progressing Towards Goal 
Goal: Activity/Safety Outcome: Progressing Towards Goal 
Goal: Diagnostic Test/Procedures Outcome: Progressing Towards Goal 
Goal: Nutrition/Diet Outcome: Progressing Towards Goal 
Goal: Medications Outcome: Progressing Towards Goal 
Goal: Respiratory Outcome: Progressing Towards Goal 
Goal: Treatments/Interventions/Procedures Outcome: Progressing Towards Goal 
Goal: Psychosocial 
Outcome: Progressing Towards Goal 
Goal: Discharge Planning Outcome: Progressing Towards Goal 
Goal: *Met physical therapy criteria for discharge to next level of care Outcome: Progressing Towards Goal 
 Goal: *Optimal pain control at patient's stated goal 
Outcome: Progressing Towards Goal 
Goal: *Hemodynamically stable Outcome: Progressing Towards Goal 
Goal: *Tolerating diet Outcome: Progressing Towards Goal 
Goal: *Active bowel function Outcome: Progressing Towards Goal 
Goal: *Adequate urinary output Outcome: Progressing Towards Goal 
Goal: *Absence of skin breakdown Outcome: Progressing Towards Goal 
Goal: *Patient verbalizes understanding of discharge instructions Outcome: Progressing Towards Goal 
  
Problem: Patient Education: Go to Patient Education Activity Goal: Patient/Family Education Outcome: Progressing Towards Goal 
  
Problem: Falls - Risk of 
Goal: *Absence of Falls Description: Document Primitivo Sol Fall Risk and appropriate interventions in the flowsheet. Outcome: Progressing Towards Goal 
Note: Fall Risk Interventions: 
Mobility Interventions: Communicate number of staff needed for ambulation/transfer, Patient to call before getting OOB, Utilize walker, cane, or other assistive device Mentation Interventions: Adequate sleep, hydration, pain control, Door open when patient unattended, More frequent rounding, Room close to nurse's station, Update white board Medication Interventions: Assess postural VS orthostatic hypotension, Patient to call before getting OOB, Teach patient to arise slowly Elimination Interventions: Call light in reach, Toileting schedule/hourly rounds History of Falls Interventions: Room close to nurse's station, Vital signs minimum Q4HRs X 24 hrs (comment for end date), Door open when patient unattended Problem: Patient Education: Go to Patient Education Activity Goal: Patient/Family Education Outcome: Progressing Towards Goal 
  
Problem: Patient Education: Go to Patient Education Activity Goal: Patient/Family Education Outcome: Progressing Towards Goal

## 2020-04-15 NOTE — PROGRESS NOTES
[facility] [address] [address] Progress Note Patient: Dano Nazario               Sex: female          DOA: 4/11/2020 YOB: 1935      Age:  81 y.o.        LOS:  LOS: 4 days S/P  Procedure(s): RIGHT FEMORAL INTERTROCHANTERIC NAIL INSERTION Subjective:  
 
Mild pain. Feels better today. Up in chair today. Denies cp, sob, abd pain Objective:  
  
Blood pressure 106/52, pulse 89, temperature 97.8 °F (36.6 °C), resp. rate 16, height 5' (1.524 m), weight 140 lb (63.5 kg), SpO2 94 %. Well developed, Well Nourished alert, cooperative, no distress Incision clean, dry, no drainage, dressing in place Mild swelling and tenderness noted in right lower extremity. No significant evidence of active bleeding Sensory is intact Motor is intact 
nv intact 2+distal pulses Neg calf tenderness Labs: 
CBC 
@ 
CBC:  
Lab Results Component Value Date/Time WBC 8.4 04/15/2020 05:23 AM  
 RBC 2.16 (L) 04/15/2020 05:23 AM  
 HGB 6.6 (L) 04/15/2020 05:23 AM  
 HCT 20.0 (L) 04/15/2020 05:23 AM  
 PLATELET 672 58/59/7438 05:23 AM  
@ Coagulation Lab Results Component Value Date INR 1.3 (H) 04/11/2020 Basic Metabolic Profile Lab Results Component Value Date  04/15/2020 CO2 23 04/15/2020 BUN 63 (H) 04/15/2020 Medications Reviewed Assessment/Plan Active Problems: 
  Hip fracture requiring operative repair ([facility] [address] (4/11/2020) Procedure(s): RIGHT FEMORAL INTERTROCHANTERIC NAIL INSERTION :  
 
1. PT and/or OT Consults: YES continue PT/OT: oob to chair, gentle rom                         PWB 2. Incision Care:  Routine Incision Care and Dressing Changes as necessary: dressing changes POD#2 and as needed 3. Pain control 4. lovenox 30mg sq daily for DVT prophylaxis- will hold today 5. Acute blood loss anemia - 1 unit PRBCs today. Appreciate medicine input on patients medical issues 4. DISCHARGE PLANNING  Intervention : [facility] ([facility]) [facility] 
[phone]

## 2020-04-16 NOTE — PROGRESS NOTES
Received vm message from yesterday from Mr Chon Quan at [facility] saying he has the signed court order (?) and asked for a call back. Called and left message with call back number. Called and left message for James at [facility]. Joseph Goodwin RN - Outcomes Manager  [phone]

## 2020-04-16 NOTE — PROGRESS NOTES
[facility] [address] Progress Note Patient: Keyonna Rico               Sex: female          DOA: 4/11/2020 YOB: 1935      Age:  81 y.o.        LOS:  LOS: 5 days S/P  Procedure(s): RIGHT FEMORAL INTERTROCHANTERIC NAIL INSERTION Subjective:  
 
Mild pain. States she did not sleep well. Still has poon secondary to urinary retention Denies cp, sob, abd pain Objective:  
  
Blood pressure 113/66, pulse (!) 108, temperature 97.7 °F (36.5 °C), resp. rate 18, height 5' (1.524 m), weight 140 lb (63.5 kg), SpO2 94 %. Well developed, Well Nourished alert, cooperative, no distress Incision clean, dry, no drainage, dressing in place Mild swelling and tenderness noted in right lower extremity. No significant evidence of active bleeding Sensory is intact Motor is intact 
nv intact 2+distal pulses Neg calf tenderness Labs: 
CBC 
@ 
CBC:  
Lab Results Component Value Date/Time WBC 8.8 04/16/2020 05:24 AM  
 RBC 2.63 (L) 04/16/2020 05:24 AM  
 HGB 7.8 (L) 04/16/2020 05:24 AM  
 HCT 24.1 (L) 04/16/2020 05:24 AM  
 PLATELET 093 64/75/6799 05:24 AM  
@ Coagulation Lab Results Component Value Date INR 1.3 (H) 04/11/2020 Basic Metabolic Profile Lab Results Component Value Date  04/16/2020 CO2 25 04/16/2020 BUN 52 (H) 04/16/2020 Medications Reviewed Assessment/Plan Active Problems: 
  Hip fracture requiring operative repair ([facility] [address] (4/11/2020) Procedure(s): RIGHT FEMORAL INTERTROCHANTERIC NAIL INSERTION :  
 
1. PT and/or OT Consults: YES continue PT/OT: oob to chair, gentle rom                         PWB 2. Incision Care:  Routine Incision Care and Dressing Changes as necessary: dressing changes POD#2 and as needed 3. Pain control 4. Will stop lovenox and place patient on Aspirin 325mg BID for DVT prophylaxis 5. Acute blood loss anemia - stable today Appreciate medicine input on patients medical issues 4. DISCHARGE PLANNING  Intervention : [place] ([facility]) [place] for transfer per ortho standpoint [facility] 
[phone]

## 2020-04-16 NOTE — PROGRESS NOTES
[facility] Progress Note Patient: Tiana Sorensen Age: 81 y.o. : 1935 MR#: [id number] SSN: [id number] Date/Time: 2020 Subjective:   
Pt states that her pain is controlled, complains of frequent loose stools overnight. Assessment/Plan: 1. Hip fracture  s/p ORIF. 2. Acute blood loss anemia due to # 1: H&H improved after PRBC transfusion 4/15. Also had guiac +ve brown stool, s/p GI eval w/ recs made for no endoscopy. No clear overt GI bleeding at this time. Follow H+H and transfuse PRN. 3. Mechanical fall 4. CKD III, at baseline creat, Dr. Joe Koenig is nephrologist 
5. Goiter w/ partial thyroidectomy, pt states she is on thyroid medication but does not know name/dose, TSH high, free T4 low, concern for hypothyroid versus overtreated hyper. Discussed with endocrine Dr. Zenaida Guerin, patient on methimazole at home but recommend to hold for 2 weeks and then resume at lower dose. Endocrine recommends iodine uptake test, first part to be done today . 6. HTN: BP stable, monitor off medications. 7. GERD: Continue PPI and add Maalox as needed. 8. DVT prophylaxis with SCD, hold Lovenox due to acute blood loss anemia. 9. Full code Needs SNF placement once acute issues resolve. Discussed with  Case discussed with:  [x]Patient  [x]Family  []Nursing  []Case Management DVT Prophylaxis:  []Lovenox  []Hep SQ  [x]SCDs  []Coumadin   []Eliquis/Xarelto Objective:  
VS:  
Visit Vitals /53 Pulse 94 Temp 97 °F (36.1 °C) Resp 18 Ht 5' (1.524 m) Wt 63.5 kg (140 lb) SpO2 94% BMI 27.34 kg/m² Tmax/24hrs: Temp (24hrs), Av.3 °F (36.3 °C), Min:97 °F (36.1 °C), Max:97.7 °F (36.5 °C) IOBRIEF Intake/Output Summary (Last 24 hours) at 2020 1508 Last data filed at 2020 1430 Gross per 24 hour Intake 768.8 ml Output 1400 ml Net -631.2 ml General:  Alert, cooperative, no acute distress Pulmonary:  CTA Bilaterally. No Wheezing/Rales. Cardiovascular: Regular rate and Rhythm. GI:  Soft, Non distended, Non tender. + Bowel sounds. Extremities:  No edema. Neurologic: Alert and oriented X 3. Medications:  
Current Facility-Administered Medications Medication Dose Route Frequency  aspirin tablet 325 mg  325 mg Oral BID  pantoprazole (PROTONIX) injection 40 mg  40 mg IntraVENous Q12H  
 acetaminophen (TYLENOL) tablet 650 mg  650 mg Oral Q8H  
 hydrocortisone (ANUSOL-HC) suppository 25 mg  25 mg Rectal Q12H  
 aluminum-magnesium hydroxide (MAALOX) oral suspension 15 mL  15 mL Oral QID PRN  polyethylene glycol (MIRALAX) packet 17 g  17 g Oral DAILY  senna-docusate (PERICOLACE) 8.6-50 mg per tablet 1 Tab  1 Tab Oral DAILY  morphine injection 1-2 mg  1-2 mg IntraVENous Q4H PRN  
 oxyCODONE IR (ROXICODONE) tablet 5 mg  5 mg Oral Q4H PRN  
 ondansetron (ZOFRAN) injection 8 mg  8 mg IntraVENous Q6H PRN  
 allopurinoL (ZYLOPRIM) tablet 100 mg  100 mg Oral DAILY  LORazepam (ATIVAN) tablet 1 mg  1 mg Oral BID PRN  
 atorvastatin (LIPITOR) tablet 10 mg  10 mg Oral QHS Labs:   
Recent Results (from the past 24 hour(s)) HGB & HCT Collection Time: 04/15/20  4:54 PM  
Result Value Ref Range HGB 7.8 (L) 12.0 - 16.0 g/dL HCT 23.6 (L) 35.0 - 45.0 % METABOLIC PANEL, BASIC Collection Time: 04/16/20  5:24 AM  
Result Value Ref Range Sodium 140 136 - 145 mmol/L Potassium 4.4 3.5 - 5.5 mmol/L Chloride 110 100 - 111 mmol/L  
 CO2 25 21 - 32 mmol/L Anion gap 5 3.0 - 18 mmol/L Glucose 125 (H) 74 - 99 mg/dL BUN 52 (H) 7.0 - 18 MG/DL Creatinine 1.60 (H) 0.6 - 1.3 MG/DL  
 BUN/Creatinine ratio 33 (H) 12 - 20 GFR est AA 37 (L) >60 ml/min/1.73m2 GFR est non-AA 31 (L) >60 ml/min/1.73m2 Calcium 9.4 8.5 - 10.1 MG/DL  
CBC WITH AUTOMATED DIFF Collection Time: 04/16/20  5:24 AM  
Result Value Ref Range WBC 8.8 4.6 - 13.2 K/uL RBC 2.63 (L) 4.20 - 5.30 M/uL HGB 7.8 (L) 12.0 - 16.0 g/dL HCT 24.1 (L) 35.0 - 45.0 % MCV 91.6 74.0 - 97.0 FL  
 MCH 29.7 24.0 - 34.0 PG  
 MCHC 32.4 31.0 - 37.0 g/dL  
 RDW 16.1 (H) 11.6 - 14.5 % PLATELET 912 559 - 928 K/uL MPV 11.3 9.2 - 11.8 FL  
 NEUTROPHILS 70 40 - 73 % LYMPHOCYTES 16 (L) 21 - 52 % MONOCYTES 12 (H) 3 - 10 % EOSINOPHILS 2 0 - 5 % BASOPHILS 0 0 - 2 %  
 ABS. NEUTROPHILS 6.3 1.8 - 8.0 K/UL  
 ABS. LYMPHOCYTES 1.4 0.9 - 3.6 K/UL  
 ABS. MONOCYTES 1.0 0.05 - 1.2 K/UL  
 ABS. EOSINOPHILS 0.1 0.0 - 0.4 K/UL  
 ABS. BASOPHILS 0.0 0.0 - 0.1 K/UL  
 DF AUTOMATED Signed By: Hazel Brown MD   
 April 16, 2020 Disclaimer: Sections of this note are dictated using utilizing voice recognition software. Minor typographical errors may be present. If questions arise, please do not hesitate to contact me or call our department.

## 2020-04-16 NOTE — PROGRESS NOTES
Problem: Pressure Injury - Risk of 
Goal: *Prevention of pressure injury Description: Document Thierry Scale and appropriate interventions in the flowsheet. Outcome: Progressing Towards Goal 
Note: Pressure Injury Interventions: 
  
 
Moisture Interventions: Minimize layers, Assess need for specialty bed, Check for incontinence Q2 hours and as needed Activity Interventions: Increase time out of bed, PT/OT evaluation Mobility Interventions: Pressure redistribution bed/mattress (bed type), PT/OT evaluation Nutrition Interventions: Document food/fluid/supplement intake, Offer support with meals,snacks and hydration Friction and Shear Interventions: Lift sheet, Minimize layers Problem: Patient Education: Go to Patient Education Activity Goal: Patient/Family Education Outcome: Progressing Towards Goal 
  
Problem: Patient Education: Go to Patient Education Activity Goal: Patient/Family Education Outcome: Progressing Towards Goal 
  
Problem: Patient Education: Go to Patient Education Activity Goal: Patient/Family Education Outcome: Progressing Towards Goal 
  
Problem: Hip Fracture:Day of Admission Pre-op Goal: Activity/Safety Outcome: Progressing Towards Goal 
Goal: Consults, if ordered Outcome: Progressing Towards Goal 
Goal: Diagnostic Test/Procedures Outcome: Progressing Towards Goal 
Goal: Nutrition/Diet Outcome: Progressing Towards Goal 
Goal: Medications Outcome: Progressing Towards Goal 
Goal: Respiratory Outcome: Progressing Towards Goal 
Goal: Treatments/Interventions/Procedures Outcome: Progressing Towards Goal 
Goal: Psychosocial 
Outcome: Progressing Towards Goal 
Goal: *Labs/Tests Within Defined Limits in Preparation for Surgery Outcome: Progressing Towards Goal 
Goal: *Optimal pain control at patient's stated goal 
Outcome: Progressing Towards Goal 
Goal: *Hemodynamically stable Outcome: Progressing Towards Goal

## 2020-04-16 NOTE — PROGRESS NOTES
Problem: Pressure Injury - Risk of 
Goal: *Prevention of pressure injury Description: Document Thierry Scale and appropriate interventions in the flowsheet. Outcome: Progressing Towards Goal 
Note: Pressure Injury Interventions: 
Sensory Interventions: Keep linens dry and wrinkle-free, Minimize linen layers Moisture Interventions: Absorbent underpads, Internal/External urinary devices, Minimize layers Activity Interventions: Increase time out of bed, PT/OT evaluation Mobility Interventions: Pressure redistribution bed/mattress (bed type), PT/OT evaluation Nutrition Interventions: Document food/fluid/supplement intake, Offer support with meals,snacks and hydration Friction and Shear Interventions: Lift sheet, Minimize layers Problem: Patient Education: Go to Patient Education Activity Goal: Patient/Family Education Outcome: Progressing Towards Goal 
  
Problem: Falls - Risk of 
Goal: *Absence of Falls Description: Document Thora Bare Fall Risk and appropriate interventions in the flowsheet. Outcome: Progressing Towards Goal 
Note: Fall Risk Interventions: 
Mobility Interventions: Communicate number of staff needed for ambulation/transfer, Patient to call before getting OOB, Utilize walker, cane, or other assistive device Mentation Interventions: Adequate sleep, hydration, pain control Medication Interventions: Assess postural VS orthostatic hypotension, Patient to call before getting OOB, Teach patient to arise slowly Elimination Interventions: Call light in reach, Toileting schedule/hourly rounds History of Falls Interventions: Room close to nurse's station, Vital signs minimum Q4HRs X 24 hrs (comment for end date), Door open when patient unattended Problem: Patient Education: Go to Patient Education Activity Goal: Patient/Family Education Outcome: Progressing Towards Goal 
  
Problem: Patient Education: Go to Patient Education Activity Goal: Patient/Family Education Outcome: Progressing Towards Goal 
  
Problem: Hip Fracture:Day of Admission Pre-op Goal: Activity/Safety Outcome: Progressing Towards Goal 
Goal: Consults, if ordered Outcome: Progressing Towards Goal 
Goal: Diagnostic Test/Procedures Outcome: Progressing Towards Goal 
Goal: Nutrition/Diet Outcome: Progressing Towards Goal 
Goal: Medications Outcome: Progressing Towards Goal 
Goal: Respiratory Outcome: Progressing Towards Goal 
Goal: Treatments/Interventions/Procedures Outcome: Progressing Towards Goal 
Goal: Psychosocial 
Outcome: Progressing Towards Goal 
Goal: *Labs/Tests Within Defined Limits in Preparation for Surgery Outcome: Progressing Towards Goal 
Goal: *Optimal pain control at patient's stated goal 
Outcome: Progressing Towards Goal 
Goal: *Hemodynamically stable Outcome: Progressing Towards Goal 
  
Problem: Hip Fracture: Day of Surgery Post-op Care Goal: Off Pathway (Use only if patient is Off Pathway) Outcome: Progressing Towards Goal 
Goal: Activity/Safety Outcome: Progressing Towards Goal 
Goal: Consults, if ordered Outcome: Progressing Towards Goal 
Goal: Diagnostic Test/Procedures Outcome: Progressing Towards Goal 
Goal: Nutrition/Diet Outcome: Progressing Towards Goal 
Goal: Medications Outcome: Progressing Towards Goal 
Goal: Respiratory Outcome: Progressing Towards Goal 
Goal: Treatments/Interventions/Procedures Outcome: Progressing Towards Goal 
Goal: Psychosocial 
Outcome: Progressing Towards Goal 
Goal: *Absence of skin breakdown Outcome: Progressing Towards Goal 
Goal: *Optimal pain control at patient's stated goal 
Outcome: Progressing Towards Goal 
Goal: *Hemodynamically stable Outcome: Progressing Towards Goal 
  
Problem: Hip Fracture: Post-Op Day 1 Goal: Off Pathway (Use only if patient is Off Pathway) Outcome: Progressing Towards Goal 
Goal: Activity/Safety Outcome: Progressing Towards Goal 
Goal: Diagnostic Test/Procedures Outcome: Progressing Towards Goal 
Goal: Nutrition/Diet Outcome: Progressing Towards Goal 
Goal: Medications Outcome: Progressing Towards Goal 
Goal: Respiratory Outcome: Progressing Towards Goal 
Goal: Treatments/Interventions/Procedures Outcome: Progressing Towards Goal 
Goal: Psychosocial 
Outcome: Progressing Towards Goal 
Goal: Discharge Planning Outcome: Progressing Towards Goal 
Goal: *Demonstrates progressive activity Outcome: Progressing Towards Goal 
Goal: *Optimal pain control at patient's stated goal 
Outcome: Progressing Towards Goal 
Goal: *Hemodynamically stable Outcome: Progressing Towards Goal 
Goal: *Discharge plan identified Outcome: Progressing Towards Goal 
Goal: *Absence of skin breakdown Outcome: Progressing Towards Goal 
  
Problem: Hip Fracture: Post-Op Day 2 Goal: Off Pathway (Use only if patient is Off Pathway) Outcome: Progressing Towards Goal 
Goal: Activity/Safety Outcome: Progressing Towards Goal 
Goal: Diagnostic Test/Procedures Outcome: Progressing Towards Goal 
Goal: Nutrition/Diet Outcome: Progressing Towards Goal 
Goal: Medications Outcome: Progressing Towards Goal 
Goal: Respiratory Outcome: Progressing Towards Goal 
Goal: Treatments/Interventions/Procedures Outcome: Progressing Towards Goal 
Goal: Psychosocial 
Outcome: Progressing Towards Goal 
Goal: Discharge Planning Outcome: Progressing Towards Goal 
Goal: *Optimal pain control at patient's stated goal 
Outcome: Progressing Towards Goal 
Goal: *Hemodynamically stable Outcome: Progressing Towards Goal 
Goal: *Adequate oxygenation Outcome: Progressing Towards Goal 
Goal: *Tolerates increased activity Outcome: Progressing Towards Goal 
Goal: *Tolerates nutrition therapy Outcome: Progressing Towards Goal 
Goal: *Absence of skin breakdown Outcome: Progressing Towards Goal 
  
Problem: Hip Fracture: Post-Op Day 3 Goal: Off Pathway (Use only if patient is Off Pathway) Outcome: Progressing Towards Goal 
 Goal: Activity/Safety Outcome: Progressing Towards Goal 
Goal: Diagnostic Test/Procedures Outcome: Progressing Towards Goal 
Goal: Nutrition/Diet Outcome: Progressing Towards Goal 
Goal: Medications Outcome: Progressing Towards Goal 
Goal: Respiratory Outcome: Progressing Towards Goal 
Goal: Treatments/Interventions/Procedures Outcome: Progressing Towards Goal 
Goal: Psychosocial 
Outcome: Progressing Towards Goal 
Goal: Discharge Planning Outcome: Progressing Towards Goal 
Goal: *Met physical therapy criteria for discharge to next level of care Outcome: Progressing Towards Goal 
Goal: *Optimal pain control at patient's stated goal 
Outcome: Progressing Towards Goal 
Goal: *Hemodynamically stable Outcome: Progressing Towards Goal 
Goal: *Tolerating diet Outcome: Progressing Towards Goal 
Goal: *Active bowel function Outcome: Progressing Towards Goal 
Goal: *Adequate urinary output Outcome: Progressing Towards Goal 
Goal: *Absence of skin breakdown Outcome: Progressing Towards Goal 
Goal: *Patient verbalizes understanding of discharge instructions Outcome: Progressing Towards Goal 
  
Problem: Hip Fracture: Post-Op Day 4 Goal: Off Pathway (Use only if patient is Off Pathway) Outcome: Progressing Towards Goal 
Goal: Activity/Safety Outcome: Progressing Towards Goal 
Goal: Diagnostic Test/Procedures Outcome: Progressing Towards Goal 
Goal: Nutrition/Diet Outcome: Progressing Towards Goal 
Goal: Medications Outcome: Progressing Towards Goal 
Goal: Respiratory Outcome: Progressing Towards Goal 
Goal: Treatments/Interventions/Procedures Outcome: Progressing Towards Goal 
Goal: Psychosocial 
Outcome: Progressing Towards Goal 
Goal: Discharge Planning Outcome: Progressing Towards Goal 
Goal: *Met physical therapy criteria for discharge to next level of care Outcome: Progressing Towards Goal 
Goal: *Optimal pain control at patient's stated goal 
Outcome: Progressing Towards Goal 
 Goal: *Hemodynamically stable Outcome: Progressing Towards Goal 
Goal: *Tolerating diet Outcome: Progressing Towards Goal 
Goal: *Active bowel function Outcome: Progressing Towards Goal 
Goal: *Adequate urinary output Outcome: Progressing Towards Goal 
Goal: *Absence of skin breakdown Outcome: Progressing Towards Goal 
Goal: *Patient verbalizes understanding of discharge instructions Outcome: Progressing Towards Goal 
  
Problem: Patient Education: Go to Patient Education Activity Goal: Patient/Family Education Outcome: Progressing Towards Goal 
  
Problem: Falls - Risk of 
Goal: *Absence of Falls Description: Document [facility] Fall Risk and appropriate interventions in the flowsheet. Outcome: Progressing Towards Goal 
Note: Fall Risk Interventions: 
Mobility Interventions: Communicate number of staff needed for ambulation/transfer, Patient to call before getting OOB, Utilize walker, cane, or other assistive device Mentation Interventions: Adequate sleep, hydration, pain control Medication Interventions: Assess postural VS orthostatic hypotension, Patient to call before getting OOB, Teach patient to arise slowly Elimination Interventions: Call light in reach, Toileting schedule/hourly rounds History of Falls Interventions: Room close to nurse's station, Vital signs minimum Q4HRs X 24 hrs (comment for end date), Door open when patient unattended Problem: Patient Education: Go to Patient Education Activity Goal: Patient/Family Education Outcome: Progressing Towards Goal 
  
Problem: Patient Education: Go to Patient Education Activity Goal: Patient/Family Education Outcome: Progressing Towards Goal

## 2020-04-16 NOTE — ROUTINE PROCESS
Bedside and Verbal shift change report given to [employer] (oncoming nurse) by Laury Shaw RN (offgoing nurse). Report included the following information SBAR, ED Summary, Intake/Output, MAR and Recent Results.

## 2020-04-16 NOTE — PROGRESS NOTES
Spoke with nuclear medicine. They are putting in transport to come get patient to complete the thyroid scan. Informed nuclear med of patient's dizziness and provided the most recent set of vitals.

## 2020-04-16 NOTE — PROGRESS NOTES
Problem: Mobility Impaired (Adult and Pediatric) Goal: *Acute Goals and Plan of Care (Insert Text) Description: Physical Therapy Goals Initiated 4/13/2020 and to be accomplished within 7 day(s) 1. Patient will move from supine to sit and sit to supine , scoot up and down and roll side to side in bed with supervision/set-up. 2.  Patient will transfer from bed to chair and chair to bed with supervision/set-up using the least restrictive device. 3.  Patient will perform sit to stand with supervision/set-up. 4.  Patient will ambulate with supervision/set-up for 50 feet with the least restrictive device. 5.  Patient will ascend/descend 8 stairs with unilateral handrail(s) with minimal assistance/contact guard assist. 
 
PLOF: Patient reports she is independent with ADLs and functional mobility. Outcome: Progressing Towards Goal 
  
PHYSICAL THERAPY TREATMENT Patient: Mercedes Poon (54 y.o. female) Date: 4/16/2020 Diagnosis: Hip fracture requiring operative repair ([facility] [address] Mathieu Laguerre <principal problem not specified> Procedure(s) (LRB): 
RIGHT FEMORAL INTERTROCHANTERIC NAIL INSERTION (Right) 4 Days Post-Op Precautions: Fall, Skin, PWB(right LE) ASSESSMENT: 
Pt received in bed, agreeable to PT tx session. Upon observation pt was with pillow under R knee and she states it has been there since last night, educated pt on proper positioning of pillow to prevent muscle tightness while prolonged in bed. Pt reports increased anxiety and nervousness resulting in not feeling right/dizziness as she has a thyroid test today as well as not feeling like herself due to her hip fracture. Pt was apprehensive to movement this date and required max encouragement to get to EOB. Pt first warmed up with There[employer] to get LEs moving in prep for mobility. Pt continues to require mod to max A x 1 to get to EOB with assist at trunk and RLE movement.  Pt sitting EOB and needing constant support and unable to scoot to EOB as well as come to midline this date due to increased pain and anxiety. Pt requests to come back to supine after sitting approx 2 min and was repositioned for comfort with all needs met and within reach. Pt continues to be a good acute candidate, will continue to follow. Recommend SNF placement upon discharge, cont per POC. Progression toward goals:  
[]      Improving appropriately and progressing toward goals [x]      Improving slowly and progressing toward goals 
[]      Not making progress toward goals and plan of care will be adjusted PLAN: 
Patient continues to benefit from skilled intervention to address the above impairments. Continue treatment per established plan of care. Discharge Recommendations:  Dimas Mccracken Further Equipment Recommendations for Discharge:  N/A  
 
SUBJECTIVE:  
Patient stated I am so nervous.  OBJECTIVE DATA SUMMARY:  
Critical Behavior: 
Neurologic State: Alert, Appropriate for age Orientation Level: Oriented X4 Cognition: Appropriate decision making, Appropriate for age attention/concentration, Appropriate safety awareness, Follows commands Safety/Judgement: Fall prevention Functional Mobility Training: 
Bed Mobility: 
  
Supine to Sit: Maximum assistance; Moderate assistance Sit to Supine: Maximum assistance; Moderate assistance Scooting: Maximum assistance; Moderate assistance Balance: 
Sitting: Impaired; With support Standing: Impaired; With support Therapeutic Exercises:  
Supine in bed: AAROM RLE, AROM LLE  
 
 
EXERCISE Sets Reps Active Active Assist  
Passive Self ROM Comments Ankle Pumps 2 10  [x] [x] [] [] Quad Sets/Glut Sets 2 10  [x] [x] [] [] Hold for 5 secs Hamstring Sets   [] [] [] [] Short Arc Quads   [] [] [] [] Heel Slides 2 10 [x] [x] [] [] Straight Leg Raises   [] [] [] [] Hip Add   [] [] [] [] Hold for 5 secs, w/ pillow squeeze Long Arc Quads   [] [] [] [] Seated Marching   [] [] [] []   
Standing Marching   [] [] [] [] Hip abd slides  2 10 [x] [x] [] []   
 
 
Pain: 
Pain level pre-treatment: not rated- appears mild to moderate Pain level post-treatment: \"  \"  
Pain Intervention(s): Medication (see MAR); Rest,  Repositioning Response to intervention: Nurse notified Activity Tolerance:  
Good- limited by anxiety and fear this date Please refer to the flowsheet for vital signs taken during this treatment. After treatment:  
[] Patient left in no apparent distress sitting up in chair 
[x] Patient left in no apparent distress in bed 
[x] Call bell left within reach [x] Nursing notified 
[] Caregiver present 
[] Bed alarm activated 
[] SCDs applied COMMUNICATION/EDUCATION:  
[x]         Role of Physical Therapy in the acute care setting. [x]         Fall prevention education was provided and the patient/caregiver indicated understanding. [x]         Patient/family have participated as able in working toward goals and plan of care. [x]         Patient/family agree to work toward stated goals and plan of care. []         Patient understands intent and goals of therapy, but is neutral about his/her participation. []         Patient is unable to participate in stated goals/plan of care: ongoing with therapy staff. 
[]         Other: 
 
   
Debbie Mullins Time Calculation: 26 mins

## 2020-04-16 NOTE — PROGRESS NOTES
Problem: Self Care Deficits Care Plan (Adult) Goal: *Acute Goals and Plan of Care (Insert Text) Description: Occupational Therapy Goals Initiated 4/13/2020 within 7 day(s). 1.  Patient will perform bed mobility in preparation for selfcare with minimal assistance/contact guard assist.  
2.  Patient will perform functional activity seated EOB for 4-7 minutes with modified independence and F+ balance. 3.  Patient will perform lower body dressing with supervision/set-up using AE prn. 
4.  Patient will perform toilet transfers with supervision/set-up. 5.  Patient will perform all aspects of toileting with supervision/set-up. 6.  Patient will participate in upper extremity therapeutic exercise/activities with modified independence for 8 minutes to increase ROM/strength for selfcare tasks. 7.  Patient will utilize energy conservation techniques during functional activities with verbal cues. Prior Level of Function: Patient lives with daughter in 2 story condo, 8 steps to bedroom and was independent with self-care and functional mobility PTA. Patient reports having shower chair at home Outcome: Progressing Towards Goal 
 OCCUPATIONAL THERAPY TREATMENT Patient: Elisa Partida (25 y.o. female) Date: 4/16/2020 Diagnosis: Hip fracture requiring operative repair ([facility] [address] Rahat Silva <principal problem not specified> Procedure(s) (LRB): 
RIGHT FEMORAL INTERTROCHANTERIC NAIL INSERTION (Right) 4 Days Post-Op Precautions: Fall, Skin, PWB(right LE) Chart, occupational therapy assessment, plan of care, and goals were reviewed. ASSESSMENT: 
Pt is pleasant and cooperative. Pt requires encouragement for EOB activity 2/2 increase anxiety. Pt requires increase time and assist w/RLE w/bed mobility to maneuver to EOB. Pt reports dizziness at EOB requiring seated rest break and vc's for breathing. Pt tolerates sitting EOB ~ 10 minutes performing ADL grooming tasks.  Pt educated on energy conservation techniques w/ADLs and benefits of SNF. Pt c/o RLE pain 6/10 requesting return to supine. Pt requires assist w/RLE and increase time w/return to supine. Pt left w/all items within reach. Progression toward goals: 
[]          Improving appropriately and progressing toward goals [x]          Improving slowly and progressing toward goals 
[]          Not making progress toward goals and plan of care will be adjusted PLAN: 
Patient continues to benefit from skilled intervention to address the above impairments. Continue treatment per established plan of care. Discharge Recommendations:  Dimas Mccracken Further Equipment Recommendations for Discharge:  possibly AD as determined by PT  
 
SUBJECTIVE:  
Patient stated Kaela Scott was in a plane crash when I was only [de-identified] years old.  OBJECTIVE DATA SUMMARY:  
Cognitive/Behavioral Status: 
Neurologic State: Alert Orientation Level: Oriented X4 Cognition: Follows commands Safety/Judgement: Fall prevention Functional Mobility and Transfers for ADLs: 
 Bed Mobility: 
Supine to Sit: Minimum assistance; Additional time(w/HOB raised and SR and assist w/RLE) Sit to Supine: Minimum assistance; Additional time(assist w/RLE) Scooting: Minimum assistance(at EOB) Balance: 
Sitting: Impaired Sitting - Static: Fair (occasional) Sitting - Dynamic: Fair (occasional) Standing: Impaired; With support ADL Intervention: 
Grooming Position Performed: Seated edge of bed Washing Face: Set-up Washing Hands: Stand-by assistance Brushing Teeth: Stand-by assistance Pain: 
Pain level pre-treatment: 6/10 Pain level post-treatment: 6/10 Pain Intervention(s): Medication (see MAR), Repositioning Response to intervention: Nurse notified, Raiza Gamble Activity Tolerance:   
Fair 2/2 increase anxiety Please refer to the flowsheet for vital signs taken during this treatment. After treatment:  
[]  Patient left in no apparent distress sitting up in chair [x]  Patient left in no apparent distress in bed 
[x]  Call bell left within reach [x]  Nursing, Jagjit Tang, notified 
[]  Caregiver present 
[]  Bed alarm activated COMMUNICATION/EDUCATION:  
[] Role of Occupational Therapy in the acute care setting 
[] Home safety education was provided and the patient/caregiver indicated understanding. [] Patient/family have participated as able in working towards goals and plan of care. [x] Patient/family agree to work toward stated goals and plan of care. [] Patient understands intent and goals of therapy, but is neutral about his/her participation. [] Patient is unable to participate in goal setting and plan of care. Thank you for this referral. 
JANEL Brower Time Calculation: 25 mins

## 2020-04-16 NOTE — PROGRESS NOTES
Patient complaining of feeling dizzy and stated she felt like she was going to pass out. Assessed vitals. All vitals are within normal limits (documented in flow sheet) and the patient is not showing any physical signs of distress. Patient AOx4. The patient took 5 mg of Roxicodone at 1630 and began to feel this way after taking the pain medication. I assisted the patient in eating her dinner and drinking an ensure. When I left the room the patient stated that she was beginning to feel better. I left the call bell in reach of the patient and told her that I would check back in with her, but to call if she began to feel dizzy or like she might pass out again.

## 2020-04-16 NOTE — PROGRESS NOTES
conducted a Follow up consultation and Spiritual Assessment for Chuckie Jeter, who is a 81 y.o.,female. The  provided the following Interventions: 
Continued the relationship of care and support. Listened empathically. Offered prayer and assurance of continued prayer on patients behalf. Given [employer]. The following outcomes were achieved: 
Patient expressed gratitude for 's visit. Assessment: 
There are no further spiritual or Bahai issues which require Spiritual Care Services interventions at this time. Plan: 
Chaplains will continue to follow and will provide pastoral care on an as needed/requested basis.  recommends bedside caregivers page  on duty if patient shows signs of acute spiritual or emotional distress. Chaplain [facility] Spiritual Care  
[phone]

## 2020-04-16 NOTE — PROGRESS NOTES
Pt accepted to [facility]. Spoke with Dee Beltran; they have a bed available. Discharge summary (very brief on from ortho) has been dictated but no d/c order noted. Dee Beltran is requesting to set up transportation for 10am tomorrow morning. Spoke with Lore Graham RN who states pt is down for a procedure right now and will need a follow up tomorrow morning; not sure if pt will be discharged tomorrow or not. Updated Dee Waters RN - Outcomes Manager  [phone]

## 2020-04-16 NOTE — DISCHARGE SUMMARY
Ortho Discharge instructions: 
PT/OT: PWB x 3 weeks then advance to WBAT with walker Aspirin 325mg BID with last dose on 4/23 Staples removed on 4/23 F/u with ortho in 8 weeks

## 2020-04-16 NOTE — ROUTINE PROCESS
End of Shift Note Bedside and verbal shift change report given to Karen Dick RN (On coming nurse) by Becca Sutherland RN (Off going nurse). Report included the following information:  
   --Procedure Summary 
   --MAR, 
   --Recent Results --Med Rec Status SBAR Recommendations: Monitor dizziness Issues for Provider to address N/A Activity This Shift 
 
 [] Bed Rest Order 
 [] Refused 
 [] Dangled  
 [] TDWB Ambulating: 
   [] Bathroom [] BSC [] Room/Hallway Up in Chair for meals []Yes [x] No  
Voiding       [] Yes  [x] No 
Mathur          [x] Yes  [] No 
Incontinent [] Yes  [x] No 
 
DUE TO VOID N/A POUR        [] Yes [x] No 
Purewick    [] Yes [x] No 
New Onset [] Yes [x] No Straight Cath   []Yes  [x] No 
Condom Cath  [] Yes [x] No 
MD Called      [] Yes  [x] No  
Blood Sugars Managed []Yes [x] No   
Bowels Moved [] Yes [x] No 
 
Incontinent     [x] Yes [] No Passed Gas [x]Yes [] No 
 
New Onset  []Yes [x] No 
  
 
 MD Called []Yes  [x] No 
  
CHG Bath Done Before Surgery After Surgery  
  
[] Yes  [] No 
[] Yes  [] No   
  
Drain Removed [] Yes  [] No [x] N/A Dressing Changed [x] Yes   [] No [] N/A Nausea/Vomiting [] Yes   [x] No    
Ice Packs Changed [x] Yes   [] No  [] N/A Incentive Spirometer  [x] Yes  [] No     
SCD Pumps On Ankle Pumping  [x] Yes   [] No  
  
[x] Yes   [] No    
  
Telemetry Monitoring [x] Yes   [] No   Rhythm NSR

## 2020-04-16 NOTE — PROGRESS NOTES
[email or web address] 
[phone] Gastroenterology follow up-Progress note Impression: 1. Acute blood loss anemia- H/H have been stable for 24 hours. No signs of overt GI bleeding 2. Positive FOB- brown stool on rectal exam- has ? Internal hemorrhoids. Of note had positive FOB in 2018 and colonoscopy performed. 3. Hemorrhoids- will treat locally with topical treatment 4. R hip fracture s/p ORIF- per ortho 5. CKD III 6. Goiter with partial thyroidectomy 7. HTN 
8. GERD- controlled on PPI; no warning symptoms 9. H/o colon polyps- TA noted on colonoscopy in 2018- due to age no further surveillance recommended Plan: 1. Monitor for overt signs of GI bleeding 2. Monitor H/H and transfuse per protocol 3. Continue PPI 4. Continue suppositories for hemorrhoids 5. No plans for scopes at this time 6. Medical management per primary team.  
7. Will sign off, please call if there are signs of overt GI bleeding or there are questions or concerns. Chief Complaint:  Anemia, positive FOB Subjective:  No rectal pain, abdominal pain. No BMs overnight. Did not sleep well. Ortho PA in room- ? Plans for transfer to rehab later today ROS: Denies any fevers, chills, rash. Eyes: conjunctiva normal, EOM normal  
Neck: ROM normal, supple and trachea normal  
Cardiovascular: heart normal, intact distal pulses, normal rate and regular rhythm Pulmonary/Chest Wall: breath sounds normal and effort normal  
Abdominal: appearance normal, bowel sounds normal and soft, non-acute, non-tender; poon in place Patient Active Problem List  
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  
 Obesity, unspecified E66.9  Nonspecific abnormal electrocardiogram (ECG) (EKG) R94.31  
 Renal insufficiency N28.9  Hip fracture requiring operative repair ([facility] [address] S72.009A Visit Vitals /66 Pulse (!) 108 Temp 97.7 °F (36.5 °C) Resp 18 Ht 5' (1.524 m) Wt 63.5 kg (140 lb) SpO2 94% BMI 27.34 kg/m² Intake/Output Summary (Last 24 hours) at 4/16/2020 3555 Last data filed at 4/16/2020 1750 Gross per 24 hour Intake 1128.8 ml Output 1825 ml Net -696.2 ml  
 
 
CBC w/Diff Lab Results Component Value Date/Time WBC 8.8 04/16/2020 05:24 AM  
 RBC 2.63 (L) 04/16/2020 05:24 AM  
 HGB 7.8 (L) 04/16/2020 05:24 AM  
 HCT 24.1 (L) 04/16/2020 05:24 AM  
 MCV 91.6 04/16/2020 05:24 AM  
 MCH 29.7 04/16/2020 05:24 AM  
 MCHC 32.4 04/16/2020 05:24 AM  
 RDW 16.1 (H) 04/16/2020 05:24 AM  
  04/16/2020 05:24 AM  
 Lab Results Component Value Date/Time GRANS 70 04/16/2020 05:24 AM  
 LYMPH 16 (L) 04/16/2020 05:24 AM  
 EOS 2 04/16/2020 05:24 AM  
 BASOS 0 04/16/2020 05:24 AM  
  
Basic Metabolic Profile Recent Labs 04/16/20 
7078   
K 4.4  
 CO2 25 BUN 52*  
CA 9.4 Hepatic Function Lab Results Component Value Date/Time ALB 2.1 (L) 04/14/2020 05:14 AM  
 TP 5.0 (L) 04/14/2020 05:14 AM  
 AP 76 04/14/2020 05:14 AM  
 Lab Results Component Value Date/Time SGOT 18 04/14/2020 05:14 AM  
  
 
 
Coags No results for input(s): PTP, INR, APTT, INREXT in the last 72 hours. Davis Landau, NP [facility]. [email or web address] Phone: [phone] Pager: [phone]

## 2020-04-16 NOTE — CDMP QUERY
Patient admitted with s/p RIGHT FEMORAL INTERTROCHANTERIC NAIL INSERTION noted to have K+ increased postop  . If possible, please document in progress notes and d/c summary if you are evaluating and/or treating any of the following:   
 Hyperkalemia  Other, please specify  Unable to Determine The medical record reflects the following: 
  Risk Factors: 81 y.o.   female w/ h/o HTN, CKD who presents after a fall and s/p rt femoral intertrochanteric    nailing Clinical Indicators:K+ 5.9  and after kayexlate ordered and  given K+ 4.4 presently Treatment: João Nava Thank you Cali [place]TER[place]   [facility] [address]   [phone]

## 2020-04-17 NOTE — PROGRESS NOTES
[facility] Hospitalist Group Progress Note Patient: Dano Nazario Age: 81 y.o. : 1935 MR#: [id number] SSN: [id number] Date: 2020 Subjective:  
 
Pt is awaiting SNF placement. Assessment/Plan: This is an 80 yo AAF with a medical history significant for MNG and overt hyperthyroidism, on MMI for treatment. Most likely hyperthyroidism is secondary to one of more toxic thyroid nodules. She states that she only take MMI 10 mg po daily instead of her prescribed 10mg po bid, but does seem to get confused about the names of her medications and fills her own pill boxes with her, multiple, medications. She is followed by Dr. Ravindra Kelly for her MNG and has been recommended for completion thyroidectomy. Given that she has some compressive symptoms and may be having some difficulty with medication compliance, she may benefit from surgery. This can be done as an outpt. Obtaining a thyroid scan and uptake may help to see if her overt hyperthyroidism is due to autonomous hormone production from her existing nodules and she would benefit from definitive therapy. 
___________________________________________________ PLAN:   
 
-will order thyroid scan and uptake to be done while inpt since pt is awaiting SNF placement.  
  
-will hold MMI for ~ 2 weeks to allow for thyroid function to normalize 
  
-would restart MMI at 5 mg po daily. She can split her current 10 mg tablets to take 5 mg po daily. 
  
Disposition: will need follow up with Dr. Jolie Cobb  In ~ 4-6 weeks to have TSH/FT4 recheck to see if any further dose adjustments are needed. Address is [address] [place] [address]. Contact phone number is [phone]. 
  
  
 
Objective:  
VS:  
Visit Vitals /73 (BP 1 Location: Left arm, BP Patient Position: At rest) Pulse 98 Temp 97.4 °F (36.3 °C) Resp 18 Ht 5' (1.524 m) Wt 63.5 kg (140 lb) SpO2 98% BMI 27.34 kg/m² Tmax/24hrs: Temp (24hrs), Av.6 °F (36.4 °C), Min:97 °F (36.1 °C), Max:99 °F (37.2 °C) Intake/Output Summary (Last 24 hours) at 2020 Last data filed at 2020 2962 Gross per 24 hour Intake 870 ml Output 1875 ml Net -1005 ml Labs:   
 
Results for Sita Mims (MRN [id number]) as of 2020 05:14 
  Ref. Range 2020 05:18 2020 14:53 2020 05:14  
T4, Free Latest Ref Range: 0.7 - 1.5 NG/DL   0.5 (L) 0.7 TSH Latest Ref Range: 0.36 - 3.74 uIU/mL 48.20 (H)      
  
 
Recent Results (from the past 24 hour(s)) METABOLIC PANEL, BASIC Collection Time: 20  5:24 AM  
Result Value Ref Range Sodium 140 136 - 145 mmol/L Potassium 4.4 3.5 - 5.5 mmol/L Chloride 110 100 - 111 mmol/L  
 CO2 25 21 - 32 mmol/L Anion gap 5 3.0 - 18 mmol/L Glucose 125 (H) 74 - 99 mg/dL BUN 52 (H) 7.0 - 18 MG/DL Creatinine 1.60 (H) 0.6 - 1.3 MG/DL  
 BUN/Creatinine ratio 33 (H) 12 - 20 GFR est AA 37 (L) >60 ml/min/1.73m2 GFR est non-AA 31 (L) >60 ml/min/1.73m2 Calcium 9.4 8.5 - 10.1 MG/DL  
CBC WITH AUTOMATED DIFF Collection Time: 20  5:24 AM  
Result Value Ref Range WBC 8.8 4.6 - 13.2 K/uL  
 RBC 2.63 (L) 4.20 - 5.30 M/uL HGB 7.8 (L) 12.0 - 16.0 g/dL HCT 24.1 (L) 35.0 - 45.0 % MCV 91.6 74.0 - 97.0 FL  
 MCH 29.7 24.0 - 34.0 PG  
 MCHC 32.4 31.0 - 37.0 g/dL  
 RDW 16.1 (H) 11.6 - 14.5 % PLATELET 100 209 - 897 K/uL MPV 11.3 9.2 - 11.8 FL  
 NEUTROPHILS 70 40 - 73 % LYMPHOCYTES 16 (L) 21 - 52 % MONOCYTES 12 (H) 3 - 10 % EOSINOPHILS 2 0 - 5 % BASOPHILS 0 0 - 2 %  
 ABS. NEUTROPHILS 6.3 1.8 - 8.0 K/UL  
 ABS. LYMPHOCYTES 1.4 0.9 - 3.6 K/UL  
 ABS. MONOCYTES 1.0 0.05 - 1.2 K/UL  
 ABS. EOSINOPHILS 0.1 0.0 - 0.4 K/UL  
 ABS. BASOPHILS 0.0 0.0 - 0.1 K/UL  
 DF AUTOMATED Signed By: Ania Bob MD   
 2020 8:21 PM

## 2020-04-17 NOTE — ROUTINE PROCESS
TRANSFER - OUT REPORT: 
 
Verbal report given to Rashad RN(name) on Vasquez Mealing  being transferred to [address] [place] [address](unit) for change in patient condition(irregular heart rate) Report consisted of patients Situation, Background, Assessment and  
Recommendations(SBAR). Information from the following report(s) SBAR, Kardex, [facility] and Recent Results was reviewed with the receiving nurse. Lines:   Mid line Opportunity for questions and clarification was provided. Patient transported with: 
 Monitor O2 @ 2 liters Registered Nurse

## 2020-04-17 NOTE — PROGRESS NOTES
[facility] Hospitalist Group Progress Note Patient: Chuckie Jeter Age: 81 y.o. : 1935 MR#: [id number] SSN: [id number] Date/Time: 2020 Subjective:   
Alert and awake and oriented No CP No SOB No NVD No Cough Assessment/Plan: 1. Hip fracture  s/p ORIF. 2. Acute blood loss anemia due to # 1: H&H improved after PRBC transfusion 4/15. Also had guiac +ve brown stool, s/p GI eval w/ recs made for no endoscopy. No clear overt GI bleeding at this time. Follow H+H and transfuse PRN. 3. Mechanical fall 4. CKD III, at baseline creat, Dr. Wava Gaucher is nephrologist 
5. Goiter w/ partial thyroidectomy, pt states she is on thyroid medication but does not know name/dose, TSH high, free T4 low, concern for hypothyroid versus overtreated hyper. Discussed with endocrine Dr. Virginia Al, patient on methimazole at home but recommend to hold for 2 weeks and then resume at lower dose. Endocrine recommends iodine uptake test, first part to be done today . 6. HTN: BP stable, monitor off medications. 7. GERD: Continue PPI and add Maalox as needed. 8. DVT prophylaxis with SCD, hold Lovenox due to acute blood loss anemia. 9. New onset of Afib with RVR - On Cardizem gtt. For rate control  - Consult cardiology 10. Full code PLAN  
- Thyroid uptake scan for to day - Follow ECHO and cardiology recommendations Discussed with  Case discussed with:  [x]Patient  [x]Family  []Nursing  []Case Management DVT Prophylaxis:  []Lovenox  []Hep SQ  [x]SCDs  []Coumadin   []Eliquis/Xarelto Objective:  
VS:  
Visit Vitals /53 (BP 1 Location: Left arm, BP Patient Position: At rest) Pulse 83 Temp 97.5 °F (36.4 °C) Resp 19 Ht 5' (1.524 m) Wt 72 kg (158 lb 11.7 oz) SpO2 99% BMI 31.00 kg/m² Tmax/24hrs: Temp (24hrs), Av.7 °F (36.5 °C), Min:97 °F (36.1 °C), Max:99 °F (37.2 °C) IOBRIEF Intake/Output Summary (Last 24 hours) at 2020 0946 Last data filed at 4/17/2020 4204 Gross per 24 hour Intake 670 ml Output 1525 ml Net -855 ml General:  Alert, cooperative, no acute distress Pulmonary:  CTA Bilaterally. No Wheezing/Rales. Cardiovascular: Regular rate and Rhythm. GI:  Soft, Non distended, Non tender. + Bowel sounds. Extremities:  No edema. Neurologic: Alert and oriented X 3. Medications:  
Current Facility-Administered Medications Medication Dose Route Frequency  metoprolol (LOPRESSOR) 5 mg/5 mL injection  dilTIAZem (CARDIZEM) 100 mg in 0.9% sodium chloride (MBP/ADV) 100 mL infusion  5 mg/hr IntraVENous CONTINUOUS  
 dilTIAZem (CARDIZEM) 5 mg/mL injection  aspirin tablet 325 mg  325 mg Oral BID  pantoprazole (PROTONIX) injection 40 mg  40 mg IntraVENous Q12H  
 acetaminophen (TYLENOL) tablet 650 mg  650 mg Oral Q8H  
 hydrocortisone (ANUSOL-HC) suppository 25 mg  25 mg Rectal Q12H  
 aluminum-magnesium hydroxide (MAALOX) oral suspension 15 mL  15 mL Oral QID PRN  polyethylene glycol (MIRALAX) packet 17 g  17 g Oral DAILY  senna-docusate (PERICOLACE) 8.6-50 mg per tablet 1 Tab  1 Tab Oral DAILY  morphine injection 1-2 mg  1-2 mg IntraVENous Q4H PRN  
 oxyCODONE IR (ROXICODONE) tablet 5 mg  5 mg Oral Q4H PRN  
 ondansetron (ZOFRAN) injection 8 mg  8 mg IntraVENous Q6H PRN  
 allopurinoL (ZYLOPRIM) tablet 100 mg  100 mg Oral DAILY  LORazepam (ATIVAN) tablet 1 mg  1 mg Oral BID PRN  
 atorvastatin (LIPITOR) tablet 10 mg  10 mg Oral QHS Labs:   
Recent Results (from the past 24 hour(s)) GLUCOSE, POC Collection Time: 04/17/20 12:59 AM  
Result Value Ref Range Glucose (POC) 134 (H) 70 - 110 mg/dL EKG, 12 LEAD, SUBSEQUENT Collection Time: 04/17/20  1:07 AM  
Result Value Ref Range Ventricular Rate 183 BPM  
 Atrial Rate 197 BPM  
 QRS Duration 84 ms Q-T Interval 256 ms  
 QTC Calculation (Bezet) 446 ms Calculated R Axis 76 degrees Calculated T Axis -83 degrees Diagnosis Atrial fibrillation with rapid ventricular response with premature  
ventricular or aberrantly conducted complexes Marked ST abnormality, possible inferior subendocardial injury Abnormal ECG When compared with ECG of 11-APR-2020 19:48, Atrial fibrillation has replaced Sinus rhythm Vent. rate has increased  BPM 
ST now depressed in Inferior leads ST now depressed in Lateral leads T wave inversion now evident in Inferior leads T wave inversion now evident in Anterior leads Confirmed by Devon Langford ([phone]) on 4/17/2020 9:11:41 AM 
  
HGB & HCT Collection Time: 04/17/20  1:13 AM  
Result Value Ref Range HGB 8.1 (L) 12.0 - 16.0 g/dL HCT 25.0 (L) 35.0 - 45.0 % METABOLIC PANEL, BASIC Collection Time: 04/17/20  1:13 AM  
Result Value Ref Range Sodium 140 136 - 145 mmol/L Potassium 4.5 3.5 - 5.5 mmol/L Chloride 109 100 - 111 mmol/L  
 CO2 25 21 - 32 mmol/L Anion gap 6 3.0 - 18 mmol/L Glucose 128 (H) 74 - 99 mg/dL BUN 49 (H) 7.0 - 18 MG/DL Creatinine 1.56 (H) 0.6 - 1.3 MG/DL  
 BUN/Creatinine ratio 31 (H) 12 - 20 GFR est AA 38 (L) >60 ml/min/1.73m2 GFR est non-AA 32 (L) >60 ml/min/1.73m2 Calcium 9.7 8.5 - 10.1 MG/DL  
CARDIAC PANEL,(CK, CKMB & TROPONIN) Collection Time: 04/17/20  1:13 AM  
Result Value Ref Range CK 47 26 - 192 U/L  
 CK - MB 1.3 <3.6 ng/ml CK-MB Index 2.8 0.0 - 4.0 % Troponin-I, QT 0.20 (H) 0.0 - 0.045 NG/ML  
MAGNESIUM Collection Time: 04/17/20  1:13 AM  
Result Value Ref Range Magnesium 1.9 1.6 - 2.6 mg/dL CBC WITH AUTOMATED DIFF Collection Time: 04/17/20  1:13 AM  
Result Value Ref Range WBC 9.5 4.6 - 13.2 K/uL  
 RBC 2.67 (L) 4.20 - 5.30 M/uL HGB 8.1 (L) 12.0 - 16.0 g/dL HCT 25.0 (L) 35.0 - 45.0 % MCV 94.0 74.0 - 97.0 FL  
 MCH 30.0 24.0 - 34.0 PG  
 MCHC 31.9 31.0 - 37.0 g/dL  
 RDW 16.3 (H) 11.6 - 14.5 % PLATELET 425 760 - 499 K/uL MPV 11.0 9.2 - 11.8 FL  
 NEUTROPHILS 57 40 - 73 % LYMPHOCYTES 28 21 - 52 % MONOCYTES 13 (H) 3 - 10 % EOSINOPHILS 2 0 - 5 % BASOPHILS 0 0 - 2 %  
 ABS. NEUTROPHILS 5.4 1.8 - 8.0 K/UL  
 ABS. LYMPHOCYTES 2.7 0.9 - 3.6 K/UL  
 ABS. MONOCYTES 1.2 0.05 - 1.2 K/UL  
 ABS. EOSINOPHILS 0.2 0.0 - 0.4 K/UL  
 ABS. BASOPHILS 0.0 0.0 - 0.1 K/UL  
 DF AUTOMATED T4, FREE Collection Time: 04/17/20  1:13 AM  
Result Value Ref Range T4, Free 3.4 (H) 0.7 - 1.5 NG/DL  
EKG, 12 LEAD, SUBSEQUENT Collection Time: 04/17/20  1:43 AM  
Result Value Ref Range Ventricular Rate 136 BPM  
 Atrial Rate 163 BPM  
 QRS Duration 84 ms Q-T Interval 288 ms QTC Calculation (Bezet) 433 ms Calculated R Axis 65 degrees Calculated T Axis -44 degrees Diagnosis Atrial fibrillation with rapid ventricular response with premature  
ventricular or aberrantly conducted complexes ST & T wave abnormality, consider inferior ischemia or digitalis effect Abnormal ECG When compared with ECG of 17-APR-2020 01:07, 
T wave inversion no longer evident in Anterior leads Confirmed by La Harrington ([id number]) on 4/17/2020 9:07:02 AM 
  
 
 
Signed By: Morgan Keys MD   
 April 17, 2020 Disclaimer: Sections of this note are dictated using utilizing voice recognition software. Minor typographical errors may be present. If questions arise, please do not hesitate to contact me or call our department.

## 2020-04-17 NOTE — ROUTINE PROCESS
Bedside and Verbal shift change report given to Ramo Soria RN (oncoming nurse) by Val Richardson RN (offgoing nurse). Report included the following information SBAR, Kardex and MAR.

## 2020-04-17 NOTE — ROUTINE PROCESS
TRANSFER - IN REPORT: 
 
Verbal report received from Hadley Blank RN(name) on [address] [place] [address], Ne  being received from 5S(unit) for change in patient condition(heart rate increae, drip started) Report consisted of patients Situation, Background, Assessment and  
Recommendations(SBAR). Information from the following report(s) SBAR, Kardex and MAR was reviewed with the receiving nurse. Opportunity for questions and clarification was provided. Assessment completed upon patients arrival to unit and care assumed.

## 2020-04-17 NOTE — PROGRESS NOTES
[facility] Hospitalist Group Progress Note Patient: Esteban Lacy Age: 81 y.o. : 1935 MR#: [id number] SSN: [id number] Date: 2020 Subjective:  
 
Pt is awaiting SNF placement. FT4 now elevated at 3.4. Overnight had Rapid Response called due to afib with RVR. Assessment/Plan: This is an 82 yo AAF with a medical history significant for MNG and overt hyperthyroidism, on MMI for treatment. Most likely hyperthyroidism is secondary to one of more toxic thyroid nodules. She states that she only take MMI 10 mg po daily instead of her prescribed 10mg po bid, but does seem to get confused about the names of her medications and fills her own pill boxes with her, multiple, medications. She is followed by Dr. Eliana Santana for her MNG and has been recommended for completion thyroidectomy. Given that she has some compressive symptoms and may be having some difficulty with medication compliance, she may benefit from surgery. This can be done as an outpt. Thyroid scan and uptake may help to see if her overt hyperthyroidism is due to autonomous hormone production from her existing nodules and she would benefit from definitive therapy. demonstrated increased uptake that is consistent with hyperthyroidism, though no focal uptake suggestive of single toxic nodule, so may likely have more than one nodule producing excess thyroid hormone. Afib with RVR may be due to her hyperthyroidism, and once thyroid function is in normal range, if related to hyperthyroidism, symptoms should resolve. 
___________________________________________________ PLAN:   
 
 -will  restart MMI at 10 mg po bid for 24-48 hours , then decrease to MMI 10 mg po daily. SHe can be discharged on this dose. -MMI will take a few days to have an effect, so can use a  beta blocker, such as propranolol to help with decreasing T4 to T3 conversion to help with symptoms.  
 
-will check FT4 daily to monitor trend.  
 
 Thank you for the consult, will continue to follow pt with you while admitted to the hospital. 
 
Disposition: will need follow up with Dr. Kevan Mason  In ~ 4-6 weeks to have TSH/FT4 recheck to see if any further dose adjustments are needed. Address is [address] [place] [address]. Contact phone number is [phone]. 
  
  
 
Objective:  
VS:  
Visit Vitals /46 (BP 1 Location: Left arm, BP Patient Position: At rest) Pulse 67 Temp 97.9 °F (36.6 °C) Resp 18 Ht 5' (1.524 m) Wt 71.7 kg (158 lb) SpO2 96% BMI 30.86 kg/m² Tmax/24hrs: Temp (24hrs), Av.8 °F (36.6 °C), Min:97.4 °F (36.3 °C), Max:98.5 °F (36.9 °C) Intake/Output Summary (Last 24 hours) at 2020 1736 Last data filed at 2020 1400 Gross per 24 hour Intake 1437.48 ml Output 825 ml Net 612.48 ml IMAGING 
 
2020 Thyroid scan and uptake COMPARISON: 2011. 
  
CORRELATION: None. 
  
DESCRIPTION: After ingestion of 257 microcuries 123Iodine, uptake of radioiodine 
by the thyroid gland was measured at 1, 6 and 24 hours. Images of the thyroid 
gland were obtained in anterior and bilateral anterior oblique projections using 
pinhole collimator, and additional anterior image was obtained with size markers 
in place. 
  
FINDINGS:   
  
Thyroid uptake is calculated as approximately 20% in one hour, 67% at 6 hour and 
40% in 24 hours.   
  
There is absence of the left thyroid gland. The right thyroid gland is large and 
heterogeneous. No dominant area of focal increased or decreased activity seen. Overall pattern appears similar to that of . .   
  
IMPRESSION IMPRESSION: 
  
1. Elevated uptake values in keeping with hyperthyroidism. Similar heterogeneous 
scan appearance of the remnant enlarged right thyroid gland. Overall findings 
suggestive of toxic multinodular goiter.  
 
Labs:   
Results for Kvng Stapleton (MRN [id number]) as of 2020 22:18 
 Ref. Range 4/13/2020 14:53 4/14/2020 05:14 4/17/2020 01:13  
T4, Free Latest Ref Range: 0.7 - 1.5 NG/DL 0.5 (L) 0.7 3.4 (H)  
 
  
 
Recent Results (from the past 24 hour(s)) GLUCOSE, POC Collection Time: 04/17/20 12:59 AM  
Result Value Ref Range Glucose (POC) 134 (H) 70 - 110 mg/dL EKG, 12 LEAD, SUBSEQUENT Collection Time: 04/17/20  1:07 AM  
Result Value Ref Range Ventricular Rate 183 BPM  
 Atrial Rate 197 BPM  
 QRS Duration 84 ms Q-T Interval 256 ms  
 QTC Calculation (Bezet) 446 ms Calculated R Axis 76 degrees Calculated T Axis -83 degrees Diagnosis Atrial fibrillation with rapid ventricular response with premature  
ventricular or aberrantly conducted complexes Marked ST abnormality, possible inferior subendocardial injury Abnormal ECG When compared with ECG of 11-APR-2020 19:48, Atrial fibrillation has replaced Sinus rhythm Vent. rate has increased  BPM 
ST now depressed in Inferior leads ST now depressed in Lateral leads T wave inversion now evident in Inferior leads T wave inversion now evident in Anterior leads Confirmed by La Harrington ([phone]) on 4/17/2020 9:11:41 AM 
  
HGB & HCT Collection Time: 04/17/20  1:13 AM  
Result Value Ref Range HGB 8.1 (L) 12.0 - 16.0 g/dL HCT 25.0 (L) 35.0 - 45.0 % METABOLIC PANEL, BASIC Collection Time: 04/17/20  1:13 AM  
Result Value Ref Range Sodium 140 136 - 145 mmol/L Potassium 4.5 3.5 - 5.5 mmol/L Chloride 109 100 - 111 mmol/L  
 CO2 25 21 - 32 mmol/L Anion gap 6 3.0 - 18 mmol/L Glucose 128 (H) 74 - 99 mg/dL BUN 49 (H) 7.0 - 18 MG/DL Creatinine 1.56 (H) 0.6 - 1.3 MG/DL  
 BUN/Creatinine ratio 31 (H) 12 - 20 GFR est AA 38 (L) >60 ml/min/1.73m2 GFR est non-AA 32 (L) >60 ml/min/1.73m2 Calcium 9.7 8.5 - 10.1 MG/DL  
CARDIAC PANEL,(CK, CKMB & TROPONIN) Collection Time: 04/17/20  1:13 AM  
Result Value Ref Range CK 47 26 - 192 U/L  
 CK - MB 1.3 <3.6 ng/ml CK-MB Index 2.8 0.0 - 4.0 % Troponin-I, QT 0.20 (H) 0.0 - 0.045 NG/ML  
MAGNESIUM Collection Time: 04/17/20  1:13 AM  
Result Value Ref Range Magnesium 1.9 1.6 - 2.6 mg/dL CBC WITH AUTOMATED DIFF Collection Time: 04/17/20  1:13 AM  
Result Value Ref Range WBC 9.5 4.6 - 13.2 K/uL  
 RBC 2.67 (L) 4.20 - 5.30 M/uL HGB 8.1 (L) 12.0 - 16.0 g/dL HCT 25.0 (L) 35.0 - 45.0 % MCV 94.0 74.0 - 97.0 FL  
 MCH 30.0 24.0 - 34.0 PG  
 MCHC 31.9 31.0 - 37.0 g/dL  
 RDW 16.3 (H) 11.6 - 14.5 % PLATELET 498 180 - 147 K/uL MPV 11.0 9.2 - 11.8 FL  
 NEUTROPHILS 57 40 - 73 % LYMPHOCYTES 28 21 - 52 % MONOCYTES 13 (H) 3 - 10 % EOSINOPHILS 2 0 - 5 % BASOPHILS 0 0 - 2 %  
 ABS. NEUTROPHILS 5.4 1.8 - 8.0 K/UL  
 ABS. LYMPHOCYTES 2.7 0.9 - 3.6 K/UL  
 ABS. MONOCYTES 1.2 0.05 - 1.2 K/UL  
 ABS. EOSINOPHILS 0.2 0.0 - 0.4 K/UL  
 ABS. BASOPHILS 0.0 0.0 - 0.1 K/UL  
 DF AUTOMATED T4, FREE Collection Time: 04/17/20  1:13 AM  
Result Value Ref Range T4, Free 3.4 (H) 0.7 - 1.5 NG/DL  
EKG, 12 LEAD, SUBSEQUENT Collection Time: 04/17/20  1:43 AM  
Result Value Ref Range Ventricular Rate 136 BPM  
 Atrial Rate 163 BPM  
 QRS Duration 84 ms Q-T Interval 288 ms QTC Calculation (Bezet) 433 ms Calculated R Axis 65 degrees Calculated T Axis -44 degrees Diagnosis Atrial fibrillation with rapid ventricular response with premature  
ventricular or aberrantly conducted complexes ST & T wave abnormality, consider inferior ischemia or digitalis effect Abnormal ECG When compared with ECG of 17-APR-2020 01:07, 
T wave inversion no longer evident in Anterior leads Confirmed by Mikey Ryan ([id number] [phone]) on 4/17/2020 9:07:02 AM 
  
TROPONIN I Collection Time: 04/17/20 10:44 AM  
Result Value Ref Range Troponin-I, QT 0.15 (H) 0.0 - 0.045 NG/ML  
ECHO ADULT COMPLETE Collection Time: 04/17/20  1:09 PM  
Result Value Ref Range LA Volume 67.66 22 - 52 mL Ao Root D 3.08 cm LVIDd 4.31 3.9 - 5.3 cm  
 LVPWd 1.04 (A) 0.6 - 0.9 cm LVIDs 2.32 cm IVSd 1.00 (A) 0.6 - 0.9 cm  
 LVOT d 1.92 cm  
 MV A Christopher 122.37 cm/s  
 MV E Christopher 98.21 cm/s  
 MV E/A 0.80 RVIDd 4.02 cm LA Vol 4C 53.24 (A) 22 - 52 mL  
 LA Vol 2C 65.88 (A) 22 - 52 mL  
 LA Area 4C 21.0 cm2 LV Mass .1 (A) 67 - 162 g  
 LV Mass AL Index 83.4 43 - 95 g/m2 Mitral Valve E Wave Deceleration Time 267.8 ms Triscuspid Valve Regurgitation Peak Gradient 50.8 mmHg Pulmonary Vein \"A\" Wave Velocity 32.30 cm/s P Vein A Dur 133.8 ms  
 TR Max Velocity 356.25 cm/s  
 LA Vol Index 40.07 16 - 28 ml/m2 LA Vol Index 39.01 16 - 28 ml/m2 LA Vol Index 31.53 16 - 28 ml/m2 Left Ventricular Fractional Shortening by 2D 75.798727436 % Mitral Valve Deceleration [place] [id number] Left Ventricular End Diastolic Volume by Teichholz Method 3.94763640705 mL Left Ventricular End Systolic Volume by Teichholz Method 3.50430519610 mL Left Ventricular Stroke Volume by Teichholz Method 77.926708683 mL EKG, 12 LEAD, SUBSEQUENT Collection Time: 04/17/20  3:29 PM  
Result Value Ref Range Ventricular Rate 78 BPM  
 Atrial Rate 78 BPM  
 P-R Interval 146 ms  
 QRS Duration 78 ms Q-T Interval 344 ms QTC Calculation (Bezet) 392 ms Calculated P Axis 52 degrees Calculated R Axis 43 degrees Calculated T Axis 47 degrees Diagnosis Sinus rhythm with premature atrial complexes Otherwise normal ECG When compared with ECG of 17-APR-2020 01:43, Sinus rhythm has replaced Atrial fibrillation Vent. rate has decreased BY  58 BPM 
ST no longer depressed in Inferior leads T wave inversion no longer evident in Inferior leads Nonspecific T wave abnormality no longer evident in Lateral leads Confirmed by Jamila Suarez ([id number]) on 4/17/2020 4:42:19 PM 
  
 
 
Signed By: Areli Guerra MD   
 April 17, 2020 8:21 PM

## 2020-04-17 NOTE — PROGRESS NOTES
Patient appears to be in SVT heart rate increasing to 150,s - 180\"s attempted to get patient to valsalva manuver but heart rate remains elevated, Rapid response initiated.

## 2020-04-17 NOTE — PROGRESS NOTES
Heart monitor alarming, heart rate increasing from low 100's to 130-140's, no c/o chest pain or shortness of breath. \" I just feel nervous and scared. \"

## 2020-04-17 NOTE — PROGRESS NOTES
NUTRITION Nutrition Screen RECOMMENDATIONS / PLAN:  
 
- Continue current nutrition interventions - Encourage and monitor po intake - Continue RD inpatient monitoring and evaluation. NUTRITION INTERVENTIONS & DIAGNOSIS:  
 
- Meals/snacks: general/healthful diet - Medical food supplement therapy: continue Nutrition Diagnosis: Inadequate oral intake related to decreased appetite as evidenced by poor meal intake since admission ASSESSMENT:  
 
Pt off unit at time of visit. Only drank the Ensure this morning at breakfast per CNA. Poor meal intake per chart documentation. Noted wt loss in past several years PTA per chart hx. Admitted due to hip fracture; s/p open reduction internal fixation on 4/15. Nutritional intake adequate to meet patients estimated nutritional needs:  No 
 
Diet: DIET REGULAR 
DIET NUTRITIONAL SUPPLEMENTS All Meals; Leona Dines Food Allergies:  None known Current Appetite: Fair per chart Appetite/meal intake prior to admission: Unable to determine at this time Feeding Limitations:  [] Swallowing difficulty    [] Chewing difficulty    [] Other: 
Current Meal Intake:  
Patient Vitals for the past 100 hrs: 
 % Diet Eaten 04/16/20 1750 25 % 04/15/20 0604 100 % 04/13/20 1905 0 % 04/13/20 1224 25 % BM: 4/16, 4/15 Skin Integrity: right hip surgical incision Edema:   [x] No     [] Yes Pertinent Medications: Reviewed: maalox, atorvastatin, ondansetron, pantoprazole, miralax, pericolace Recent Labs 04/17/20 
0113 04/16/20 
5632 04/15/20 
8715  140 139  
K 4.5 4.4 4.3  110 109 CO2 25 25 23 * 125* 106* BUN 49* 52* 63* CREA 1.56* 1.60* 1.97* CA 9.7 9.4 8.6 MG 1.9  --   -- Intake/Output Summary (Last 24 hours) at 4/17/2020 1322 Last data filed at 4/17/2020 1000 Gross per 24 hour Intake 1197.48 ml Output 1525 ml Net -327.52 ml Anthropometrics: 
Ht Readings from Last 1 Encounters: 04/17/20 5' (1.524 m) Last 3 Recorded Weights in this Encounter 04/11/20 1851 04/17/20 0419 04/17/20 1250 Weight: 63.5 kg (140 lb) 72 kg (158 lb 11.7 oz) 71.7 kg (158 lb) Body mass index is 30.86 kg/m². Obese, Class I Weight History:   -13 lb, 7.6% x 7 years PTA Weight Metrics 4/17/2020 5/31/2018 7/10/2013 Weight 158 lb 155 lb 9.6 oz 171 lb BMI 30.86 kg/m2 30.39 kg/m2 32.33 kg/m2 Admitting Diagnosis: Hip fracture requiring operative repair ([facility] [address] Parker Bolaños Pertinent PMHx: bell's palsy, HTN, renal insufficiency, partial thyroidectomy, goiter Education Needs:        [x] None identified  [] Identified - Not appropriate at this time  []  Identified and addressed - refer to education log Learning Limitations:   [x] None identified  [] Identified Cultural, Islam & ethnic food preferences:  [x] None identified    [] Identified and addressed ESTIMATED NUTRITION NEEDS:  
 
Calories: 4967-5242 kcal (MSJx1.2-1.4) based on  [x] Actual BW: 72 kg      [] IBW Protein: 58-86 gm (0.8-1.2 gm/kg) based on  [x] Actual BW      [] IBW Fluid: 1 mL/kcal 
  
MONITORING & EVALUATION:  
 
Nutrition Goal(s):  
- PO nutrition intake will meet >75% of patient estimated nutritional needs within the next 7 days. Outcome: New/Initial goal  
 
Monitoring:   [x] Food and nutrient intake   [x] Food and nutrient administration  [x] Comparative standards   [x] Nutrition-focused physical findings   [x] Anthropometric Measurements   [x] Treatment/therapy   [x] Biochemical data, medical tests, and procedures Previous Recommendations (for follow-up assessments only):  Not Applicable Discharge Planning: No nutritional discharge needs at this time. Participated in care planning, discharge planning, & interdisciplinary rounds as appropriate. Suzanne Cazares RD Pager: [phone]

## 2020-04-17 NOTE — PROGRESS NOTES
Rapid Response Note [address] Patient: Yahaira Martinez 81 y.o. female [id number] 
1935 Admit Date: 4/11/2020 Admission Diagnosis: Hip fracture requiring operative repair ([facility] [address] Jose Khan RAPID RESPONSE Rapid response called for HR in the 180s-190s. EKG consistent with A-fib. Pt endorsing palpitations, however denies SOB. Metoprolol 2.5mg IV was administered. HR improved to 110s initially however increased again to the 150s. BP dropped to 98/52. Considered Amiodarone bolus and drip however given pt's history of thyroid disorder held off on this. Administered Cardizem 16mg IV bolus x2 and HR improved to 90s-120s. Started Cardizem gtt when HR increased again to around the 130s. Repeat EKG still consisted with A Fib. Continued Pt on Cardizem gtt and transferred pt to 4th floor where Cardizem gtt can be managed per nursing supervisor. Medications Reviewed-No medications that are specifically known to precipitate A fib. OBJECTIVE Patient Vitals for the past 12 hrs: 
 Temp Pulse Resp BP SpO2  
04/17/20 0156 98.5 °F (36.9 °C) (!) 135 22 102/61 98 % 04/17/20 0148 97.4 °F (36.3 °C) (!) 105 19 108/55 96 % 04/17/20 0146  99 18 98/52 98 % 04/17/20 0134  (!) 138 22 118/70 97 % 04/17/20 0130 98 °F (36.7 °C) (!) 133 21 115/66 97 % 04/17/20 0129 98 °F (36.7 °C) (!) 105 24 103/62 98 % 04/17/20 0121 97.7 °F (36.5 °C) (!) 160 24 128/76 98 % 04/17/20 0118  (!) 126 22 112/64 99 % 04/17/20 0114  (!) 129 22 98/57 98 % 04/17/20 0112 97.8 °F (36.6 °C) (!) 123 24 121/73 99 % 04/17/20 0111  (!) 175  122/76   
04/16/20 2214 97.6 °F (36.4 °C) 94 18 151/60 97 % 04/16/20 1949 97.4 °F (36.3 °C) 98 18 129/73 98 % 04/16/20 1834 97.8 °F (36.6 °C) 98 22 158/62 97 % 04/16/20 1733 99 °F (37.2 °C) 96 20 136/65 98 % 04/16/20 1508 97 °F (36.1 °C) 85 16 106/51 94 % PHYSICAL: 
General:  Alert and Responsive. CV:  Irregularly, irregular rhythm, no murmurs, rubs, or gallops. RESP:  Unlabored breathing. CTAB. Neuro: A+Ox3. EKG: Initially A fib with rate in the 190s, Repeat A fib with rate in the 140s ASSESSMENT, PLAN & DISPOSITION Bhanu Call is a 81y.o. year old female admitted for Hip fracture requiring operative repair ([facility] [address] Andrew Miller. Rapid response called for rapid HR in the 180s-190s. Patient condition currently: stable Medications Administered: Metoprolol IV 2.5mg,  Cardizem IV 16 mgx2, Cardizem gtt at 5mg/hr Labs ordered/Pending: BMP, Mag, Phos, Cardiac panel Please consider repeat cardiac panel as deemed necessary. Disposition: Telemetry transferred from Orthopedics unit Attending Dr. Joey Pierson notified of rapid response. In agreement with plan. Primary team resuming care. Senior resident Dr. Sofía Patel present during RRT and evaluation MD Domenico Chaidez Ala PGY-1 
1:59 AM 04/17/20

## 2020-04-17 NOTE — PROGRESS NOTES
Chart, physical therapy assessment, plan of care, and goals were reviewed. PT treatment attempted at 1050 and 1209. Pt is off the unit on both attempts. Will continue to follow up as schedule allows. Thank you. Alena Bergeron Check

## 2020-04-17 NOTE — CONSULTS
[facility] - Consult Note Date of  Admission: 4/11/2020  6:47 PM 
  
Primary Care Physician:  Hilliard Buerger, MD  
 
 Plan: 1. Paroxymal atrial fibrillation- converted to NSR. No an ideal candidate for anticoagulation due to recent  Rectal bleeding and significant drop in H&H requiring multiple transfusion. Would  Continue with rate control with bb,  can be titrate as bp tolerates it. Continue asa. Follow up echo to assess lvf ,rvf or any significant vhd 
2. indeterminate troponin- likely demand ischemia in the setting severe anemia, afib with RVR. We do not suspect an ACS 3. Hypertension-labile. Will monitor and adjust medications as needed 4. Severe anemia- noted hgb drop pos op-s/p 4 units PRBC's - H&H improved 5. Rectal bleeding- possible due to hemorrhoids - resolved GI following 6. s/p hemithyroidectomy ~ 12-13 years ago for nodules- Endocrino following patient on tapazole 7. Right hip fracture s/p ORIF 8. CKD- stable creatinine Patient seen for PAF- likely in the setting of hyperthyroidism/ post op status. Mildly elevated troponin from demand ischemia. Recommend low dose lopressor. Aspirin 81mg when possible. Will consider oral antiarrhythymic if Afib recurs. XR Results (most recent): 
Results from [facility] Encounter encounter on 04/11/20 XR HIP RT W OR WO PELV 2-3 VWS Narrative EXAM:  XR HIP RT W OR WO PELV 2-3 VWS 
 
INDICATION:   Closed fracture of right hip, right fermoral intertrochanteric 
nail insertion COMPARISON: 4/11/2020. FINDINGS: 4 images of the right hip obtained intraoperatively. Fixation hardware 
of the right hip fracture. Displacement of the lesser trochanter. Hardware 
appears intact. Impression IMPRESSION: As above. Assessment:  
 
Hospital Problems  Never Reviewed Codes Class Noted POA Hip fracture requiring operative repair [facility]) ICD-10-CM: F83.578G ICD-9-CM: 820.8  4/11/2020 Unknown History of Present Illness: This is a 81 y.o. female admitted for Hip fracture requiring [facility] Shaina Pollockomari. Patient with PMHx of HTN, CKD. The patient was admitted on 4/11/20  after a fall patient c/o right leg pain. Patient underwent Open reduction internal fixation of right intertrochanteric femur fracture with a short trochanteric nail on 4/15/20. Today the patient was noted to be on atrial fibrillation with RVR. She was placed on Cardizem drip. She converted to NSR. She was noted to have mild anemia on admission but noted drop of Hgb to 6.6 on post op day one. She was c/o loose brown stools and admits to some rectal pain that feels like her hemorrhoids when they flare. No episodes of hematochezia or melena. Denies any active bleeding. No chest pain chest pressure or SOB. Right hip pain improving. Reports palpitations of and on in the last few months. No recent CVA. no fever or chills Past Medical History:  
 
Past Medical History:  
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   
 Nonspecific abnormal electrocardiogram (ECG) (EKG)  Obesity, unspecified  Other and unspecified hyperlipidemia  Renal insufficiency Social History:  
 
Social History Socioeconomic History  Marital status: UNKNOWN Spouse name: Not on file  Number of children: Not on file  Years of education: Not on file  Highest education level: Not on file Tobacco Use  Smoking status: Former Smoker  Smokeless tobacco: Never Used  Tobacco comment: REMOTELY QUIT 1980'S Substance and Sexual Activity  Alcohol use: No  
 Drug use: No  
 
 
 Family History:  
History reviewed. No pertinent family history. Medications: Allergies Allergen Reactions  Ciprofibrate Unable to Consolidated Jose  Darvon [Propoxyphene] Unable to Obtain  Penicillins Unable to Obtain Current Facility-Administered Medications Medication Dose Route Frequency  metoprolol (LOPRESSOR) 5 mg/5 mL injection  dilTIAZem (CARDIZEM) 100 mg in 0.9% sodium chloride (MBP/ADV) 100 mL infusion  5 mg/hr IntraVENous CONTINUOUS  
 dilTIAZem (CARDIZEM) 5 mg/mL injection  metoprolol tartrate (LOPRESSOR) tablet 25 mg  25 mg Oral BID  atorvastatin (LIPITOR) tablet 40 mg  40 mg Oral QHS  methIMAzole (TAPAZOLE) tablet 10 mg  10 mg Oral Q12H  
 amiodarone (CORDARONE) tablet 200 mg  200 mg Oral BID  aspirin tablet 325 mg  325 mg Oral BID  pantoprazole (PROTONIX) injection 40 mg  40 mg IntraVENous Q12H  
 acetaminophen (TYLENOL) tablet 650 mg  650 mg Oral Q8H  
 hydrocortisone (ANUSOL-HC) suppository 25 mg  25 mg Rectal Q12H  
 aluminum-magnesium hydroxide (MAALOX) oral suspension 15 mL  15 mL Oral QID PRN  polyethylene glycol (MIRALAX) packet 17 g  17 g Oral DAILY  senna-docusate (PERICOLACE) 8.6-50 mg per tablet 1 Tab  1 Tab Oral DAILY  morphine injection 1-2 mg  1-2 mg IntraVENous Q4H PRN  
 oxyCODONE IR (ROXICODONE) tablet 5 mg  5 mg Oral Q4H PRN  
 ondansetron (ZOFRAN) injection 8 mg  8 mg IntraVENous Q6H PRN  
 allopurinoL (ZYLOPRIM) tablet 100 mg  100 mg Oral DAILY  LORazepam (ATIVAN) tablet 1 mg  1 mg Oral BID PRN Review Of Systems:  
 
 
 
 
Constitutional: No fever, no chills, no weight loss, no night sweats HEENT: No epistaxis, no nasal drainage, no difficulty in swallowing, no redness in eyes Respiratory: negative for cough, sputum, hemoptysis, pleurisy/chest pain, wheezing, dyspnea on exertion or emphysema Cardiovascular:  palpitations Gastrointestinal: bleeding symptoms Genitourinary: No urinary symptoms or hematuria Integument/breast: No ulcers or rashes Musculoskeletal: no muscle pain, no weakness Neurological: No focal weakness, no seizures, no headaches Behvioral/Psych: No anxiety, no depression Physical Exam:  
 
Visit Vitals /53 Pulse 83 Temp 97.5 °F (36.4 °C) Resp 19 Ht 5' (1.524 m) Wt 71.7 kg (158 lb) SpO2 99% BMI 30.86 kg/m² BP Readings from Last 3 Encounters:  
04/17/20 158/53  
05/31/18 147/62  
10/20/11 126/60 Pulse Readings from Last 3 Encounters:  
04/17/20 83  
05/31/18 (!) 54  
10/20/11 68 Wt Readings from Last 3 Encounters:  
04/17/20 71.7 kg (158 lb) 05/31/18 70.6 kg (155 lb 9.6 oz)  
06/20/17 70.3 kg (155 lb) General:  alert, cooperative, no distress, appears stated age Skin: Warm and dry, acyanotic, normal color. Head: Normocephalic, atraumatic. Eyes: Sclerae anicteric, conjunctivae without injection. Neck:  nontender, no nuchal rigidity, no masses, no stridor, no carotid bruit, no JVD Lungs:  clear to auscultation bilaterally Heart:  regular rate and rhythm, S1, S2 normal, no S3 or S4, no click, no rub Abdomen:  abdomen is soft without significant tenderness, masses, organomegaly or guarding Extremities:  extremities normal, atraumatic, no cyanosis or edema. Neurological: grossly intact. No focal abnormalities, moves all extremities well. Psychiatric Affect: The patient is awake, alert and oriented x3. Torrie Adams is interactive and appropriate. Data Review:  
 
Recent Results (from the past 48 hour(s)) URINALYSIS W/MICROSCOPIC Collection Time: 04/15/20  2:12 PM  
Result Value Ref Range Color YELLOW Appearance CLEAR Specific gravity 1.014 1.005 - 1.030    
 pH (UA) 5.0 5.0 - 8.0 Protein Negative NEG mg/dL Glucose Negative NEG mg/dL Ketone Negative NEG mg/dL Bilirubin Negative NEG Blood Negative NEG Urobilinogen 0.2 0.2 - 1.0 EU/dL Nitrites Negative NEG Leukocyte Esterase Negative NEG    
 WBC 0 to 3 0 - 5 /hpf  
 RBC 0 to 3 0 - 5 /hpf Epithelial cells 1+ 0 - 5 /lpf HGB & HCT Collection Time: 04/15/20  4:54 PM  
Result Value Ref Range HGB 7.8 (L) 12.0 - 16.0 g/dL HCT 23.6 (L) 35.0 - 45.0 % METABOLIC PANEL, BASIC Collection Time: 04/16/20  5:24 AM  
Result Value Ref Range Sodium 140 136 - 145 mmol/L Potassium 4.4 3.5 - 5.5 mmol/L Chloride 110 100 - 111 mmol/L  
 CO2 25 21 - 32 mmol/L Anion gap 5 3.0 - 18 mmol/L Glucose 125 (H) 74 - 99 mg/dL BUN 52 (H) 7.0 - 18 MG/DL Creatinine 1.60 (H) 0.6 - 1.3 MG/DL  
 BUN/Creatinine ratio 33 (H) 12 - 20 GFR est AA 37 (L) >60 ml/min/1.73m2 GFR est non-AA 31 (L) >60 ml/min/1.73m2 Calcium 9.4 8.5 - 10.1 MG/DL  
CBC WITH AUTOMATED DIFF Collection Time: 04/16/20  5:24 AM  
Result Value Ref Range WBC 8.8 4.6 - 13.2 K/uL  
 RBC 2.63 (L) 4.20 - 5.30 M/uL HGB 7.8 (L) 12.0 - 16.0 g/dL HCT 24.1 (L) 35.0 - 45.0 % MCV 91.6 74.0 - 97.0 FL  
 MCH 29.7 24.0 - 34.0 PG  
 MCHC 32.4 31.0 - 37.0 g/dL  
 RDW 16.1 (H) 11.6 - 14.5 % PLATELET 295 886 - 600 K/uL MPV 11.3 9.2 - 11.8 FL  
 NEUTROPHILS 70 40 - 73 % LYMPHOCYTES 16 (L) 21 - 52 % MONOCYTES 12 (H) 3 - 10 % EOSINOPHILS 2 0 - 5 % BASOPHILS 0 0 - 2 %  
 ABS. NEUTROPHILS 6.3 1.8 - 8.0 K/UL  
 ABS. LYMPHOCYTES 1.4 0.9 - 3.6 K/UL  
 ABS. MONOCYTES 1.0 0.05 - 1.2 K/UL  
 ABS. EOSINOPHILS 0.1 0.0 - 0.4 K/UL  
 ABS. BASOPHILS 0.0 0.0 - 0.1 K/UL  
 DF AUTOMATED    
GLUCOSE, POC Collection Time: 04/17/20 12:59 AM  
Result Value Ref Range Glucose (POC) 134 (H) 70 - 110 mg/dL EKG, 12 LEAD, SUBSEQUENT Collection Time: 04/17/20  1:07 AM  
Result Value Ref Range Ventricular Rate 183 BPM  
 Atrial Rate 197 BPM  
 QRS Duration 84 ms Q-T Interval 256 ms  
 QTC Calculation (Bezet) 446 ms Calculated R Axis 76 degrees Calculated T Axis -83 degrees Diagnosis Atrial fibrillation with rapid ventricular response with premature  
ventricular or aberrantly conducted complexes Marked ST abnormality, possible inferior subendocardial injury Abnormal ECG When compared with ECG of 11-APR-2020 19:48, Atrial fibrillation has replaced Sinus rhythm Vent. rate has increased  BPM 
ST now depressed in Inferior leads ST now depressed in Lateral leads T wave inversion now evident in Inferior leads T wave inversion now evident in Anterior leads Confirmed by Bassam Lang ([phone]) on 4/17/2020 9:11:41 AM 
  
HGB & HCT Collection Time: 04/17/20  1:13 AM  
Result Value Ref Range HGB 8.1 (L) 12.0 - 16.0 g/dL HCT 25.0 (L) 35.0 - 45.0 % METABOLIC PANEL, BASIC Collection Time: 04/17/20  1:13 AM  
Result Value Ref Range Sodium 140 136 - 145 mmol/L Potassium 4.5 3.5 - 5.5 mmol/L Chloride 109 100 - 111 mmol/L  
 CO2 25 21 - 32 mmol/L Anion gap 6 3.0 - 18 mmol/L Glucose 128 (H) 74 - 99 mg/dL BUN 49 (H) 7.0 - 18 MG/DL Creatinine 1.56 (H) 0.6 - 1.3 MG/DL  
 BUN/Creatinine ratio 31 (H) 12 - 20 GFR est AA 38 (L) >60 ml/min/1.73m2 GFR est non-AA 32 (L) >60 ml/min/1.73m2 Calcium 9.7 8.5 - 10.1 MG/DL  
CARDIAC PANEL,(CK, CKMB & TROPONIN) Collection Time: 04/17/20  1:13 AM  
Result Value Ref Range CK 47 26 - 192 U/L  
 CK - MB 1.3 <3.6 ng/ml CK-MB Index 2.8 0.0 - 4.0 % Troponin-I, QT 0.20 (H) 0.0 - 0.045 NG/ML  
MAGNESIUM Collection Time: 04/17/20  1:13 AM  
Result Value Ref Range Magnesium 1.9 1.6 - 2.6 mg/dL CBC WITH AUTOMATED DIFF Collection Time: 04/17/20  1:13 AM  
Result Value Ref Range WBC 9.5 4.6 - 13.2 K/uL  
 RBC 2.67 (L) 4.20 - 5.30 M/uL HGB 8.1 (L) 12.0 - 16.0 g/dL HCT 25.0 (L) 35.0 - 45.0 % MCV 94.0 74.0 - 97.0 FL  
 MCH 30.0 24.0 - 34.0 PG  
 MCHC 31.9 31.0 - 37.0 g/dL  
 RDW 16.3 (H) 11.6 - 14.5 % PLATELET 327 714 - 649 K/uL MPV 11.0 9.2 - 11.8 FL  
 NEUTROPHILS 57 40 - 73 % LYMPHOCYTES 28 21 - 52 % MONOCYTES 13 (H) 3 - 10 % EOSINOPHILS 2 0 - 5 % BASOPHILS 0 0 - 2 %  
 ABS. NEUTROPHILS 5.4 1.8 - 8.0 K/UL  
 ABS. LYMPHOCYTES 2.7 0.9 - 3.6 K/UL  
 ABS. MONOCYTES 1.2 0.05 - 1.2 K/UL  
 ABS. EOSINOPHILS 0.2 0.0 - 0.4 K/UL  
 ABS. BASOPHILS 0.0 0.0 - 0.1 K/UL DF AUTOMATED T4, FREE Collection Time: 04/17/20  1:13 AM  
Result Value Ref Range T4, Free 3.4 (H) 0.7 - 1.5 NG/DL  
EKG, 12 LEAD, SUBSEQUENT Collection Time: 04/17/20  1:43 AM  
Result Value Ref Range Ventricular Rate 136 BPM  
 Atrial Rate 163 BPM  
 QRS Duration 84 ms Q-T Interval 288 ms QTC Calculation (Bezet) 433 ms Calculated R Axis 65 degrees Calculated T Axis -44 degrees Diagnosis Atrial fibrillation with rapid ventricular response with premature  
ventricular or aberrantly conducted complexes ST & T wave abnormality, consider inferior ischemia or digitalis effect Abnormal ECG When compared with ECG of 17-APR-2020 01:07, 
T wave inversion no longer evident in Anterior leads Confirmed by Jaskaran Storm ([phone]) on 4/17/2020 9:07:02 AM 
  
TROPONIN I Collection Time: 04/17/20 10:44 AM  
Result Value Ref Range Troponin-I, QT 0.15 (H) 0.0 - 0.045 NG/ML  
ECHO ADULT COMPLETE Collection Time: 04/17/20  1:09 PM  
Result Value Ref Range LA Volume 67.66 22 - 52 mL Ao Root D 3.08 cm LVIDd 4.31 3.9 - 5.3 cm  
 LVPWd 1.04 (A) 0.6 - 0.9 cm LVIDs 2.32 cm IVSd 1.00 (A) 0.6 - 0.9 cm  
 LVOT d 1.92 cm  
 MV A Christopher 122.37 cm/s  
 MV E Christopher 98.21 cm/s  
 MV E/A 0.80 RVIDd 4.02 cm LA Vol 4C 53.24 (A) 22 - 52 mL  
 LA Vol 2C 65.88 (A) 22 - 52 mL  
 LA Area 4C 21.0 cm2 LV Mass .1 (A) 67 - 162 g  
 LV Mass AL Index 83.4 43 - 95 g/m2 Mitral Valve E Wave Deceleration Time 267.8 ms Triscuspid Valve Regurgitation Peak Gradient 50.8 mmHg Pulmonary Vein \"A\" Wave Velocity 32.30 cm/s P Vein A Dur 133.8 ms  
 TR Max Velocity 356.25 cm/s  
 LA Vol Index 40.07 16 - 28 ml/m2 LA Vol Index 39.01 16 - 28 ml/m2 LA Vol Index 31.53 16 - 28 ml/m2 Left Ventricular Fractional Shortening by 2D 36.491282119 % Mitral Valve Deceleration Crisp 4.9772757950 Left Ventricular End Diastolic Volume by Teichholz Method 7.26575224486 mL Left Ventricular End Systolic Volume by Teichholz Method 5.61745688781 mL Left Ventricular Stroke Volume by Teichholz Method 75.974186283 mL Intake/Output Summary (Last 24 hours) at 4/17/2020 1338 Last data filed at 4/17/2020 1000 Gross per 24 hour Intake 1197.48 ml Output 1525 ml Net -327.52 ml Cardiographics:  
 
 
EKG Results Procedure 720 Value Units Date/Time EKG, 12 LEAD, SUBSEQUENT [[id number]] Collected:  04/17/20 0107 Order Status:  Completed Updated:  04/17/20 4518 Ventricular Rate 183 BPM   
  Atrial Rate 197 BPM   
  QRS Duration 84 ms Q-T Interval 256 ms   
  QTC Calculation (Bezet) 446 ms Calculated R Axis 76 degrees Calculated T Axis -83 degrees Diagnosis -- Atrial fibrillation with rapid ventricular response with premature  
ventricular or aberrantly conducted complexes Marked ST abnormality, possible inferior subendocardial injury Abnormal ECG When compared with ECG of 11-APR-2020 19:48, Atrial fibrillation has replaced Sinus rhythm Vent. rate has increased  BPM 
ST now depressed in Inferior leads ST now depressed in Lateral leads T wave inversion now evident in Inferior leads T wave inversion now evident in Anterior leads Confirmed by Holland Oliva ([id number]) on 4/17/2020 9:11:41 AM 
  
 EKG, 12 LEAD, SUBSEQUENT [[id number]] Collected:  04/17/20 0143 Order Status:  Completed Updated:  04/17/20 6063 Ventricular Rate 136 BPM   
  Atrial Rate 163 BPM   
  QRS Duration 84 ms Q-T Interval 288 ms QTC Calculation (Bezet) 433 ms Calculated R Axis 65 degrees Calculated T Axis -44 degrees Diagnosis -- Atrial fibrillation with rapid ventricular response with premature  
ventricular or aberrantly conducted complexes ST & T wave abnormality, consider inferior ischemia or digitalis effect Abnormal ECG When compared with ECG of 17-APR-2020 01:07, 
T wave inversion no longer evident in Anterior leads Confirmed by Frederick Quezada ([id number]) on 4/17/2020 9:07:02 AM 
  
 EKG, 12 LEAD, SUBSEQUENT [[id number]] Order Status:  Canceled EKG, 12 LEAD, SUBSEQUENT [[id number]] Collected:  04/11/20 1948 Order Status:  Completed Updated:  04/12/20 1048 Ventricular Rate 58 BPM   
  Atrial Rate 58 BPM   
  P-R Interval 142 ms QRS Duration 72 ms Q-T Interval 392 ms QTC Calculation (Bezet) 384 ms Calculated P Axis 77 degrees Calculated R Axis 22 degrees Calculated T Axis 17 degrees Diagnosis --  
  Sinus bradycardia Junctional ST depression, probably normal 
Borderline ECG When compared with ECG of 31-MAY-2018 09:22, No significant change was found Confirmed by Nazanin Sampson ([id number]) on 4/12/2020 10:48:45 AM 
  
  
 
  
 
 
Signed By: Olegario MOORE Phone [phone] supervised April 17, 2020 I have independently evaluated and examined the patient. All relevant labs and testing data's are reviewed. Care plan discussed and updated after review.  
 
Jayson Hernandez MD

## 2020-04-18 NOTE — PROGRESS NOTES
[facility] Patient: Joaquin [place], [place] Age: 81 y.o. : 1935 MR#: [id number] SSN: [id number] Date/Time: 2020 Subjective:   
Alert and awake and oriented No CP No SOB No NVD No Cough Assessment/Plan: 1. Hip fracture  s/p ORIF.- on alternate DVT prophylaxis -  Bid - per ortho due to bleed and drop in H/H  
2. Acute blood loss anemia due to # 1: H&H improved after PRBC transfusion 4/15. Also had guiac +ve brown stool, s/p GI eval w/ recs made for no endoscopy. No clear overt GI bleeding at this time. Follow H+H and transfuse PRN. - Monitor H/H - continue PPI 3. Mechanical fall 4. CKD III, at baseline creat, Dr. Alfonso Toledo is nephrologist 
5. Goiter w/ partial thyroidectomy, pt states she is on thyroid medication but does not know name/dose, TSH high, free T4 low, concern for hypothyroid versus overtreated hyper. Discussed with endocrine Dr. Natalee Borden, patient on methimazole at home but recommend to hold for 2 weeks and then resume at lower dose. Endocrine recommends iodine uptake test, first part to be done today . 6. HTN: BP stable, monitor off medications. 7. GERD: Continue PPI and add Maalox as needed. 8. DVT prophylaxis - Per ortho . 9. New onset of Afib with RVR - On Cardizem gtt. For rate control  -Case discussed with cardiology yesterday, currently afebrile appears probably secondary to thyroid dysfunction, once the thyroid function is normalized and if the A. fib reoccurs then further investigation and management can be done we will continue treating rate control and thyroid dysfunction. Patient has a good follow-up with her endocrinologist will continue with currently she has been placed on methimazole 10. Hyperkalemia - Improved 11. Full code PLAN  
- thyroid uptake scan : 
IMPRESSION IMPRESSION: 
  
1. Elevated uptake values in keeping with hyperthyroidism. Similar heterogeneous scan appearance of the remnant enlarged right thyroid gland. Overall findings 
suggestive of toxic multinodular goiter. - Follow ECHO and cardiology recommendations If DVT prophylaxis can be changed to SCD , avoid high dose of ASA / heparin as drop in h/h again noted . D/W ortho . - will change DVT prophylaxis to SCD as patient's H/h dropped again , ortho will place order  
 
 
-If the patient's H&H remained stable, and otherwise remains asymptomatic and she can be discharged we will discuss with case management regarding placement versus going home Case discussed with:  [x]Patient  [x]Family  []Nursing  []Case Management DVT Prophylaxis:  []Lovenox  []Hep SQ  [x]SCDs  []Coumadin   []Eliquis/Xarelto Objective:  
VS:  
Visit Vitals /64 (BP 1 Location: Left arm, BP Patient Position: At rest) Pulse 87 Temp 98.4 °F (36.9 °C) Resp 18 Ht 5' (1.524 m) Wt 71.7 kg (158 lb) SpO2 100% BMI 30.86 kg/m² Tmax/24hrs: Temp (24hrs), Av.9 °F (36.6 °C), Min:97.5 °F (36.4 °C), Max:98.4 °F (36.9 °C) IOBRIEF Intake/Output Summary (Last 24 hours) at 2020 3068 Last data filed at 2020 0000 Gross per 24 hour Intake 647.48 ml Output 1150 ml Net -502.52 ml General:  Alert, cooperative, no acute distress Pulmonary:  CTA Bilaterally. No Wheezing/Rales. Cardiovascular: Regular rate and Rhythm. GI:  Soft, Non distended, Non tender. + Bowel sounds. Extremities:  No edema. Neurologic: Alert and oriented X 3. Medications:  
Current Facility-Administered Medications Medication Dose Route Frequency  metoprolol tartrate (LOPRESSOR) tablet 25 mg  25 mg Oral BID  atorvastatin (LIPITOR) tablet 40 mg  40 mg Oral QHS  methIMAzole (TAPAZOLE) tablet 10 mg  10 mg Oral Q12H  aspirin tablet 325 mg  325 mg Oral BID  pantoprazole (PROTONIX) injection 40 mg  40 mg IntraVENous Q12H  
 acetaminophen (TYLENOL) tablet 650 mg  650 mg Oral Q8H  
  hydrocortisone (ANUSOL-HC) suppository 25 mg  25 mg Rectal Q12H  
 aluminum-magnesium hydroxide (MAALOX) oral suspension 15 mL  15 mL Oral QID PRN  polyethylene glycol (MIRALAX) packet 17 g  17 g Oral DAILY  senna-docusate (PERICOLACE) 8.6-50 mg per tablet 1 Tab  1 Tab Oral DAILY  morphine injection 1-2 mg  1-2 mg IntraVENous Q4H PRN  
 oxyCODONE IR (ROXICODONE) tablet 5 mg  5 mg Oral Q4H PRN  
 ondansetron (ZOFRAN) injection 8 mg  8 mg IntraVENous Q6H PRN  
 allopurinoL (ZYLOPRIM) tablet 100 mg  100 mg Oral DAILY  LORazepam (ATIVAN) tablet 1 mg  1 mg Oral BID PRN Labs:   
Recent Results (from the past 24 hour(s)) TROPONIN I Collection Time: 04/17/20 10:44 AM  
Result Value Ref Range Troponin-I, QT 0.15 (H) 0.0 - 0.045 NG/ML  
ECHO ADULT COMPLETE Collection Time: 04/17/20  1:09 PM  
Result Value Ref Range LA Volume 67.66 22 - 52 mL Ao Root D 3.08 cm LVIDd 4.31 3.9 - 5.3 cm  
 LVPWd 1.04 (A) 0.6 - 0.9 cm LVIDs 2.32 cm IVSd 1.00 (A) 0.6 - 0.9 cm  
 LVOT d 1.92 cm  
 MV A Christopher 122.37 cm/s  
 MV E Christopher 98.21 cm/s  
 MV E/A 0.80 RVIDd 4.02 cm LA Vol 4C 53.24 (A) 22 - 52 mL  
 LA Vol 2C 65.88 (A) 22 - 52 mL  
 LA Area 4C 21.0 cm2 LV Mass .1 (A) 67 - 162 g  
 LV Mass AL Index 100.7 43 - 95 g/m2 Mitral Valve E Wave Deceleration Time 267.8 ms Triscuspid Valve Regurgitation Peak Gradient 50.8 mmHg Pulmonary Vein \"A\" Wave Velocity 32.30 cm/s P Vein A Dur 133.8 ms  
 TR Max Velocity 356.25 cm/s  
 LA Vol Index 40.07 16 - 28 ml/m2 LA Vol Index 39.01 16 - 28 ml/m2 LA Vol Index 31.53 16 - 28 ml/m2 Left Ventricular Fractional Shortening by 2D 07.956296007 % Mitral Valve Deceleration Fajardo 0.0621738695 Left Ventricular End Diastolic Volume by Teichholz Method 4.96867757375 mL Left Ventricular End Systolic Volume by Teichholz Method 7.90479954678 mL Left Ventricular Stroke Volume by Teichholz Method 22.435348014 mL THYROID ANTIBODY PANEL Collection Time: 04/17/20  2:26 PM  
Result Value Ref Range Thyroid peroxidase Ab 8 0 - 34 IU/mL Thyroglobulin Ab PENDING IU/mL EKG, 12 LEAD, SUBSEQUENT Collection Time: 04/17/20  3:29 PM  
Result Value Ref Range Ventricular Rate 78 BPM  
 Atrial Rate 78 BPM  
 P-R Interval 146 ms  
 QRS Duration 78 ms Q-T Interval 344 ms QTC Calculation (Bezet) 392 ms Calculated P Axis 52 degrees Calculated R Axis 43 degrees Calculated T Axis 47 degrees Diagnosis Sinus rhythm with premature atrial complexes Otherwise normal ECG When compared with ECG of 17-APR-2020 01:43, Sinus rhythm has replaced Atrial fibrillation Vent. rate has decreased BY  58 BPM 
ST no longer depressed in Inferior leads T wave inversion no longer evident in Inferior leads Nonspecific T wave abnormality no longer evident in Lateral leads Confirmed by Jaskaran Storm ([id number]) on 4/17/2020 4:42:19 PM 
  
CBC WITH AUTOMATED DIFF Collection Time: 04/18/20  6:25 AM  
Result Value Ref Range WBC 8.7 4.6 - 13.2 K/uL  
 RBC 2.23 (L) 4.20 - 5.30 M/uL HGB 7.0 (L) 12.0 - 16.0 g/dL HCT 21.2 (L) 35.0 - 45.0 % MCV 95.1 74.0 - 97.0 FL  
 MCH 31.4 24.0 - 34.0 PG  
 MCHC 33.0 31.0 - 37.0 g/dL  
 RDW 16.4 (H) 11.6 - 14.5 % PLATELET 815 743 - 246 K/uL MPV 11.0 9.2 - 11.8 FL  
 NEUTROPHILS 74 (H) 40 - 73 % LYMPHOCYTES 12 (L) 21 - 52 % MONOCYTES 13 (H) 3 - 10 % EOSINOPHILS 1 0 - 5 % BASOPHILS 0 0 - 2 %  
 ABS. NEUTROPHILS 6.4 1.8 - 8.0 K/UL  
 ABS. LYMPHOCYTES 1.1 0.9 - 3.6 K/UL  
 ABS. MONOCYTES 1.1 0.05 - 1.2 K/UL  
 ABS. EOSINOPHILS 0.1 0.0 - 0.4 K/UL  
 ABS. BASOPHILS 0.0 0.0 - 0.1 K/UL  
 DF AUTOMATED T4, FREE Collection Time: 04/18/20  6:25 AM  
Result Value Ref Range T4, Free 3.7 (H) 0.7 - 1.5 NG/DL  
METABOLIC PANEL, BASIC Collection Time: 04/18/20  6:25 AM  
Result Value Ref Range Sodium 139 136 - 145 mmol/L  Potassium 4.7 3.5 - 5.5 mmol/L  
 Chloride 109 100 - 111 mmol/L  
 CO2 24 21 - 32 mmol/L Anion gap 6 3.0 - 18 mmol/L Glucose 116 (H) 74 - 99 mg/dL BUN 46 (H) 7.0 - 18 MG/DL Creatinine 1.47 (H) 0.6 - 1.3 MG/DL  
 BUN/Creatinine ratio 31 (H) 12 - 20 GFR est AA 41 (L) >60 ml/min/1.73m2 GFR est non-AA 34 (L) >60 ml/min/1.73m2 Calcium 9.4 8.5 - 10.1 MG/DL Signed By: Sarah Rosado MD   
 April 18, 2020 Disclaimer: Sections of this note are dictated using utilizing voice recognition software. Minor typographical errors may be present. If questions arise, please do not hesitate to contact me or call our department.
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
## 2020-04-18 NOTE — PROGRESS NOTES
[facility] [address] Progress Note Patient: Anita Swan               Sex: female          DOA: 4/11/2020 YOB: 1935      Age:  81 y.o.        LOS:  LOS: 7 days S/P  Procedure(s): RIGHT FEMORAL INTERTROCHANTERIC NAIL INSERTION Subjective: Tolerating diet Moderate pain Voiding q shift. She is having some pain today but overall doing feeling fine. Denies cp, sob, abd pain Objective:  
  
Blood pressure 117/64, pulse 87, temperature 98.4 °F (36.9 °C), resp. rate 18, height 5' (1.524 m), weight 158 lb (71.7 kg), SpO2 100 %. Well developed, Well Nourished alert, cooperative, no distress Incision clean, sero-sanguinous drainage, dressing in place Mild swelling and tenderness noted in right lower extremity (hip) Sensory is intact Motor is intact 
nv intact 2+distal pulses Neg calf tenderness Neg for facial asymmetry Labs: 
CBC 
@ 
CBC:  
Lab Results Component Value Date/Time WBC 8.7 04/18/2020 06:25 AM  
 RBC 2.23 (L) 04/18/2020 06:25 AM  
 HGB 7.0 (L) 04/18/2020 06:25 AM  
 HCT 21.2 (L) 04/18/2020 06:25 AM  
 PLATELET 989 25/97/8006 06:25 AM  
@ Coagulation Lab Results Component Value Date INR 1.3 (H) 04/11/2020 Basic Metabolic Profile Lab Results Component Value Date  04/18/2020 CO2 24 04/18/2020 BUN 46 (H) 04/18/2020 Medications Reviewed Assessment/Plan Active Problems: 
  Hip fracture requiring operative repair ([facility] [address] (4/11/2020) Procedure(s): RIGHT FEMORAL INTERTROCHANTERIC NAIL INSERTION :  
  
1. PT and/or OT Consults: YES continue PT/OT: oob to chair, gentle rom, PWB                       
2. Incision Care:  Routine Incision Care and Dressing Changes as necessary: dressing changes POD#2 and as needed 3. Pain control 4. Medicine holding aspirin due to low H/H, will continue SCD for DVT prophylaxis at this time. 5. Acute blood loss anemia - will continue to monitor 6. RN to change dressing today. 
 
  
4. DISCHARGE PLANNING 
  
 Intervention : Dimas Nawaf ([facility]), stable for discharge from ortho standpoint. [facility] [phone]

## 2020-04-18 NOTE — ROUTINE PROCESS
Bedside shift change report given to Emigdio Starr RN (oncoming nurse) by Elaine Chatterjee RN (offgoing nurse). Report included the following information SBAR, Intake/Output and MAR.

## 2020-04-18 NOTE — PROGRESS NOTES
Patient accidentally pulled out poon at midnight. The nurse tried an external cath  (Pure wick) but patient did not void after six hours and stated she had the urge to void but could not. Bladder scan performed and over 200 ml of residue noted. Another poon was reinserted.

## 2020-04-18 NOTE — PROGRESS NOTES
Problem: Mobility Impaired (Adult and Pediatric) Goal: *Acute Goals and Plan of Care (Insert Text) Description: Physical Therapy Goals Initiated 4/13/2020 and to be accomplished within 7 day(s) 1. Patient will move from supine to sit and sit to supine , scoot up and down and roll side to side in bed with supervision/set-up. 2.  Patient will transfer from bed to chair and chair to bed with supervision/set-up using the least restrictive device. 3.  Patient will perform sit to stand with supervision/set-up. 4.  Patient will ambulate with supervision/set-up for 50 feet with the least restrictive device. 5.  Patient will ascend/descend 8 stairs with unilateral handrail(s) with minimal assistance/contact guard assist. 
 
PLOF: Patient reports she is independent with ADLs and functional mobility. Outcome: Progressing Towards Goal 
 PHYSICAL THERAPY TREATMENT Patient: Farheen Rausch (27 y.o. female) Date: 4/18/2020 Diagnosis: Hip fracture requiring operative repair ([facility] [address] Kamille Lapping <principal problem not specified> Procedure(s) (LRB): 
RIGHT FEMORAL INTERTROCHANTERIC NAIL INSERTION (Right) 6 Days Post-Op Precautions: Fall, Skin, PWB(right LE) ASSESSMENT: 
Nurse consulted prior to treatment. Treatment completed with OT to promote patient safety and therapeutic benefit. Patient received reclined in bed, asleep but easily aroused by voice and agreeable to PT treatment. Patient trained on supine to sit transfer with min to mod assist x 2 people with max verbal cues for sequencing task and assist for RLE. Patient tolerated sitting at EOB x 3-5 minutes with CGA. Patient endorsed lightheadedness at first. PT instructed patient in long arc quads and ankle pumps in sitting. Patient able to complete exercises against gravity but continued to endorse lightheadedness and then nausea. PT went to retrieve emesis bags while OT with patient at edge of bed. Upon PT return.  Patient less responsive and eyes closing. PT and OT completed max assist transfer back to supine. Nurse notified. BP taken 118/50 mmhg. Patient awake and alert but intermittently dozing off in bed. PT and OT respositioned patient in bed with max assist x 2. Boot donned to RLE to protect heel. Patient left reclined in bed, HOB elevated to eat lunch, lunch tray in front of patient, all needs in reach, call bell in reach, in NAD. Last /65 mmHg before ending session. Nurse notified. Progression toward goals:  
[]      Improving appropriately and progressing toward goals [x]      Improving slowly and progressing toward goals 
[]      Not making progress toward goals and plan of care will be adjusted PLAN: 
Patient continues to benefit from skilled intervention to address the above impairments. Continue treatment per established plan of care. Discharge Recommendations:  Dimas Mccracken Further Equipment Recommendations for Discharge:  defer to facility SUBJECTIVE:  
Patient stated I don't feel well today. I haven't really been eating.  OBJECTIVE DATA SUMMARY:  
Critical Behavior: 
Neurologic State: Alert Orientation Level: Oriented X4 Cognition: Follows commands Safety/Judgement: Fall prevention Functional Mobility Training: 
Bed Mobility: 
  
Supine to Sit: Minimum assistance; Moderate assistance(x2 people) Sit to Supine: Maximum assistance(due to hypotension; returned to supine by therapists) Scooting: Minimum assistance Transfers: 
 Unable to assess due to episode of apparent hypotension while sitting EOB Balance: 
Sitting: Impaired Sitting - Static: Fair (occasional) Sitting - Dynamic: Fair (occasional) Standing: (unable to assess today due to hypotension) Therapeutic Exercises:  
 See below. Performed EOB while sitting to promote blood flow and BP in sitting. EXERCISE Sets Reps Active Active Assist  
Passive Self ROM Comments Ankle Pumps    [x] [] [] [] Quad Sets/Glut Sets    [] [] [] [] Hold for 5 secs Hamstring Sets   [] [] [] [] Short Arc Quads   [] [] [] [] Heel Slides   [] [] [] [] Straight Leg Raises   [] [] [] [] Hip Add   [] [] [] [] Hold for 5 secs, w/ pillow squeeze Long Arc Quads   [x] [] [] [] X 10 each Seated Marching   [] [] [] []   
Standing Marching   [] [] [] []   
   [] [] [] []   
 
 
Pain: 
Pain level pre-treatment: 10/10 but in no visible distress Pain level post-treatment: unable to rate but in no visible distress Pain Intervention(s): Medication (see MAR); Rest, Ice, Repositioning Response to intervention: Nurse notified, See doc flow Activity Tolerance:  
Limited today by apparent episode of hypotension Please refer to the flowsheet for vital signs taken during this treatment. After treatment:  
[] Patient left in no apparent distress sitting up in chair 
[x] Patient left in no apparent distress in bed 
[x] Call bell left within reach [x] Nursing notified 
[] Caregiver present 
[] Bed alarm activated 
[] SCDs applied COMMUNICATION/EDUCATION:  
[x]         Role of Physical Therapy in the acute care setting. []         Fall prevention education was provided and the patient/caregiver indicated understanding. []         Patient/family have participated as able in working toward goals and plan of care. [x]         Patient/family agree to work toward stated goals and plan of care. []         Patient understands intent and goals of therapy, but is neutral about his/her participation. []         Patient is unable to participate in stated goals/plan of care: ongoing with therapy staff. 
[]         Other: 
 
   
Ealdio Marino DPT Time Calculation: 27 mins

## 2020-04-18 NOTE — PROGRESS NOTES
Problem: Pressure Injury - Risk of 
Goal: *Prevention of pressure injury Description: Document Thierry Scale and appropriate interventions in the flowsheet. Outcome: Progressing Towards Goal 
Note: Pressure Injury Interventions: 
Sensory Interventions: Assess changes in LOC Moisture Interventions: Absorbent underpads Activity Interventions: Pressure redistribution bed/mattress(bed type) Mobility Interventions: HOB 30 degrees or less Nutrition Interventions: Document food/fluid/supplement intake Friction and Shear Interventions: HOB 30 degrees or less Problem: Patient Education: Go to Patient Education Activity Goal: Patient/Family Education Outcome: Progressing Towards Goal 
  
Problem: Falls - Risk of 
Goal: *Absence of Falls Description: Document [place] Fall Risk and appropriate interventions in the flowsheet. Outcome: Progressing Towards Goal 
Note: Fall Risk Interventions: 
Mobility Interventions: Patient to call before getting OOB, Strengthening exercises (ROM-active/passive) Mentation Interventions: Increase mobility Medication Interventions: Patient to call before getting OOB Elimination Interventions: Call light in reach, Patient to call for help with toileting needs History of Falls Interventions: Consult care management for discharge planning Problem: Patient Education: Go to Patient Education Activity Goal: Patient/Family Education Outcome: Progressing Towards Goal 
  
Problem: Patient Education: Go to Patient Education Activity Goal: Patient/Family Education Outcome: Progressing Towards Goal 
  
Problem: Hip Fracture:Day of Admission Pre-op Goal: Activity/Safety Outcome: Progressing Towards Goal 
Goal: Consults, if ordered Outcome: Progressing Towards Goal 
Goal: Diagnostic Test/Procedures Outcome: Progressing Towards Goal 
Goal: Nutrition/Diet Outcome: Progressing Towards Goal 
Goal: Medications Outcome: Progressing Towards Goal 
 Goal: Respiratory Outcome: Progressing Towards Goal 
Goal: Treatments/Interventions/Procedures Outcome: Progressing Towards Goal 
Goal: Psychosocial 
Outcome: Progressing Towards Goal 
Goal: *Labs/Tests Within Defined Limits in Preparation for Surgery Outcome: Progressing Towards Goal 
Goal: *Optimal pain control at patient's stated goal 
Outcome: Progressing Towards Goal 
Goal: *Hemodynamically stable Outcome: Progressing Towards Goal 
  
Problem: Hip Fracture: Day of Surgery Post-op Care Goal: Off Pathway (Use only if patient is Off Pathway) Outcome: Progressing Towards Goal 
Goal: Activity/Safety Outcome: Progressing Towards Goal 
Goal: Consults, if ordered Outcome: Progressing Towards Goal 
Goal: Diagnostic Test/Procedures Outcome: Progressing Towards Goal 
Goal: Nutrition/Diet Outcome: Progressing Towards Goal 
Goal: Medications Outcome: Progressing Towards Goal 
Goal: Respiratory Outcome: Progressing Towards Goal 
Goal: Treatments/Interventions/Procedures Outcome: Progressing Towards Goal 
Goal: Psychosocial 
Outcome: Progressing Towards Goal 
Goal: *Absence of skin breakdown Outcome: Progressing Towards Goal 
Goal: *Optimal pain control at patient's stated goal 
Outcome: Progressing Towards Goal 
Goal: *Hemodynamically stable Outcome: Progressing Towards Goal 
  
Problem: Hip Fracture: Post-Op Day 1 Goal: Off Pathway (Use only if patient is Off Pathway) Outcome: Progressing Towards Goal 
Goal: Activity/Safety Outcome: Progressing Towards Goal 
Goal: Diagnostic Test/Procedures Outcome: Progressing Towards Goal 
Goal: Nutrition/Diet Outcome: Progressing Towards Goal 
Goal: Medications Outcome: Progressing Towards Goal 
Goal: Respiratory Outcome: Progressing Towards Goal 
Goal: Treatments/Interventions/Procedures Outcome: Progressing Towards Goal 
Goal: Psychosocial 
Outcome: Progressing Towards Goal 
Goal: Discharge Planning Outcome: Progressing Towards Goal 
 Goal: *Demonstrates progressive activity Outcome: Progressing Towards Goal 
Goal: *Optimal pain control at patient's stated goal 
Outcome: Progressing Towards Goal 
Goal: *Hemodynamically stable Outcome: Progressing Towards Goal 
Goal: *Discharge plan identified Outcome: Progressing Towards Goal 
Goal: *Absence of skin breakdown Outcome: Progressing Towards Goal 
  
Problem: Hip Fracture: Post-Op Day 2 Goal: Off Pathway (Use only if patient is Off Pathway) Outcome: Progressing Towards Goal 
Goal: Activity/Safety Outcome: Progressing Towards Goal 
Goal: Diagnostic Test/Procedures Outcome: Progressing Towards Goal 
Goal: Nutrition/Diet Outcome: Progressing Towards Goal 
Goal: Medications Outcome: Progressing Towards Goal 
Goal: Respiratory Outcome: Progressing Towards Goal 
Goal: Treatments/Interventions/Procedures Outcome: Progressing Towards Goal 
Goal: Psychosocial 
Outcome: Progressing Towards Goal 
Goal: Discharge Planning Outcome: Progressing Towards Goal 
Goal: *Optimal pain control at patient's stated goal 
Outcome: Progressing Towards Goal 
Goal: *Hemodynamically stable Outcome: Progressing Towards Goal 
Goal: *Adequate oxygenation Outcome: Progressing Towards Goal 
Goal: *Tolerates increased activity Outcome: Progressing Towards Goal 
Goal: *Tolerates nutrition therapy Outcome: Progressing Towards Goal 
Goal: *Absence of skin breakdown Outcome: Progressing Towards Goal 
  
Problem: Hip Fracture: Post-Op Day 3 Goal: Off Pathway (Use only if patient is Off Pathway) Outcome: Progressing Towards Goal 
Goal: Activity/Safety Outcome: Progressing Towards Goal 
Goal: Diagnostic Test/Procedures Outcome: Progressing Towards Goal 
Goal: Nutrition/Diet Outcome: Progressing Towards Goal 
Goal: Medications Outcome: Progressing Towards Goal 
Goal: Respiratory Outcome: Progressing Towards Goal 
Goal: Treatments/Interventions/Procedures Outcome: Progressing Towards Goal 
Goal: Psychosocial 
 Outcome: Progressing Towards Goal 
Goal: Discharge Planning Outcome: Progressing Towards Goal 
Goal: *Met physical therapy criteria for discharge to next level of care Outcome: Progressing Towards Goal 
Goal: *Optimal pain control at patient's stated goal 
Outcome: Progressing Towards Goal 
Goal: *Hemodynamically stable Outcome: Progressing Towards Goal 
Goal: *Tolerating diet Outcome: Progressing Towards Goal 
Goal: *Active bowel function Outcome: Progressing Towards Goal 
Goal: *Adequate urinary output Outcome: Progressing Towards Goal 
Goal: *Absence of skin breakdown Outcome: Progressing Towards Goal 
Goal: *Patient verbalizes understanding of discharge instructions Outcome: Progressing Towards Goal 
  
Problem: Hip Fracture: Post-Op Day 4 Goal: Off Pathway (Use only if patient is Off Pathway) Outcome: Progressing Towards Goal 
Goal: Activity/Safety Outcome: Progressing Towards Goal 
Goal: Diagnostic Test/Procedures Outcome: Progressing Towards Goal 
Goal: Nutrition/Diet Outcome: Progressing Towards Goal 
Goal: Medications Outcome: Progressing Towards Goal 
Goal: Respiratory Outcome: Progressing Towards Goal 
Goal: Treatments/Interventions/Procedures Outcome: Progressing Towards Goal 
Goal: Psychosocial 
Outcome: Progressing Towards Goal 
Goal: Discharge Planning Outcome: Progressing Towards Goal 
Goal: *Met physical therapy criteria for discharge to next level of care Outcome: Progressing Towards Goal 
Goal: *Optimal pain control at patient's stated goal 
Outcome: Progressing Towards Goal 
Goal: *Hemodynamically stable Outcome: Progressing Towards Goal 
Goal: *Tolerating diet Outcome: Progressing Towards Goal 
Goal: *Active bowel function Outcome: Progressing Towards Goal 
Goal: *Adequate urinary output Outcome: Progressing Towards Goal 
Goal: *Absence of skin breakdown Outcome: Progressing Towards Goal 
Goal: *Patient verbalizes understanding of discharge instructions Outcome: Progressing Towards Goal

## 2020-04-19 NOTE — PROGRESS NOTES
[facility] Hospitalist Group Progress Note Patient: Graham Webb Age: 81 y.o. : 1935 MR#: [id number] SSN: [id number] Date: 2020 Subjective:  
 
Pt is awaiting SNF placement. FT4 now decreasing, with last FT4 at 2.7 Assessment/Plan: This is an 82 yo AAF with a medical history significant for MNG and Graves' disease, on MMI for treatment. She states that she only take MMI 10 mg po daily instead of her prescribed 10mg po bid, but does seem to get confused about the names of her medications and fills her own pill boxes with her, multiple, medications. She is followed by Dr. Amato Search for her MNG and has been recommended for completion thyroidectomy. Given that she has some compressive symptoms and may be having some difficulty with medication compliance, she may benefit from surgery. This can be done as an outpt. 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 She m/l would  benefit from definitive therapy. Afib with RVR may be due to her hyperthyroidism, and once thyroid function is in normal range, if related to hyperthyroidism, symptoms should resolve. 
___________________________________________________ PLAN:   
 
 -will  continue MMI at 10 mg po bid for 24-48 hours , then decrease to MMI 10 mg po daily. She can be discharged on this dose. -MMI will take a few days to have an effect, so can use a  beta blocker, such as propranolol to help with decreasing T4 to T3 conversion to help with symptoms.  
 
-will check FT4 daily to monitor trend.  
 
Thank you for the consult, will continue to follow pt with you while admitted to the hospital. 
 
Disposition: will need follow up with Dr. Nithya Gold  In ~ 4-6 weeks to have TSH/FT4 recheck to see if any further dose adjustments are needed. Address is [address] [place] [address]. Contact phone number is [phone]. 
  
 Discussed plan with Hospitalist, Dr. Can Mary. Objective:  
VS:  
Visit Vitals /65 (BP 1 Location: Left arm, BP Patient Position: At rest) Pulse 89 Temp 98.1 °F (36.7 °C) Resp 18 Ht 5' (1.524 m) Wt 71.7 kg (158 lb) SpO2 100% BMI 30.86 kg/m² Tmax/24hrs: Temp (24hrs), Av °F (36.7 °C), Min:97.5 °F (36.4 °C), Max:98.5 °F (36.9 °C) Intake/Output Summary (Last 24 hours) at 2020 7457 Last data filed at 2020 1840 Gross per 24 hour Intake 840 ml Output 1750 ml Net -910 ml  
 
IMAGING 
 
2020 Thyroid scan and uptake COMPARISON: 2011. 
  
CORRELATION: None. 
  
DESCRIPTION: After ingestion of 257 microcuries 123Iodine, uptake of radioiodine 
by the thyroid gland was measured at 1, 6 and 24 hours. Images of the thyroid 
gland were obtained in anterior and bilateral anterior oblique projections using 
pinhole collimator, and additional anterior image was obtained with size markers 
in place. 
  
FINDINGS:   
  
Thyroid uptake is calculated as approximately 20% in one hour, 67% at 6 hour and 
40% in 24 hours.   
  
There is absence of the left thyroid gland. The right thyroid gland is large and 
heterogeneous. No dominant area of focal increased or decreased activity seen. Overall pattern appears similar to that of . .   
  
IMPRESSION: 
  
1. Elevated uptake values in keeping with hyperthyroidism. Similar heterogeneous 
scan appearance of the remnant enlarged right thyroid gland. Overall findings 
suggestive of toxic multinodular goiter. Labs:   
 
Results for Westley Kowalski (MRN [id number]) as of 2020 09:16 Ref. Range 2020 01:13 2020 06:25 2020 05:50  
T4, Free Latest Ref Range: 0.7 - 1.5 NG/DL 3.4 (H) 3.7 (H) 2.7 (H) T3, total Latest Ref Range: 71 - 180 ng/dL  280 (H)   
 
  
Component Value Flag Ref Range Units Status Thyroid Stim Immunoglobulin 20.00  High   0.00 - 0.55 IU/L Final  
 
Component Value Flag Ref Range Units Status Thyroid peroxidase Ab 8   0 - 34 IU/mL Final  
Thyroglobulin Ab PENDING    IU/mL Incomplete Recent Results (from the past 24 hour(s)) CBC WITH AUTOMATED DIFF Collection Time: 04/19/20  5:50 AM  
Result Value Ref Range WBC 9.8 4.6 - 13.2 K/uL  
 RBC 2.19 (L) 4.20 - 5.30 M/uL HGB 6.8 (L) 12.0 - 16.0 g/dL HCT 21.5 (L) 35.0 - 45.0 % MCV 98.2 (H) 74.0 - 97.0 FL  
 MCH 31.1 24.0 - 34.0 PG  
 MCHC 31.6 31.0 - 37.0 g/dL  
 RDW 16.4 (H) 11.6 - 14.5 % PLATELET 108 338 - 420 K/uL MPV 11.2 9.2 - 11.8 FL  
 NEUTROPHILS 72 40 - 73 % LYMPHOCYTES 15 (L) 21 - 52 % MONOCYTES 12 (H) 3 - 10 % EOSINOPHILS 1 0 - 5 % BASOPHILS 0 0 - 2 %  
 ABS. NEUTROPHILS 7.1 1.8 - 8.0 K/UL  
 ABS. LYMPHOCYTES 1.5 0.9 - 3.6 K/UL  
 ABS. MONOCYTES 1.1 0.05 - 1.2 K/UL  
 ABS. EOSINOPHILS 0.1 0.0 - 0.4 K/UL  
 ABS. BASOPHILS 0.0 0.0 - 0.1 K/UL  
 DF AUTOMATED T4, FREE Collection Time: 04/19/20  5:50 AM  
Result Value Ref Range T4, Free 2.7 (H) 0.7 - 1.5 NG/DL Signed By: Jenna Cornell MD   
 April 19, 2020 8:21 PM

## 2020-04-19 NOTE — PROGRESS NOTES
PT session attempted at [id number]. Pt currently receiving blood. Will follow up as schedule allows.   
 
Clary Boy PT DPT

## 2020-04-19 NOTE — PROGRESS NOTES
Assessment Tracking Number: [id number] Status: Successfully Processed Assessment Date: 04/14/2020 Angel Bravo Information:  
Screener's Name: Ap Paul 
NPI: [id number] Provider Name: [facility]

## 2020-04-19 NOTE — PROGRESS NOTES
[facility] [address] Progress Note Patient: Brian Estrada               Sex: female          DOA: 4/11/2020 YOB: 1935      Age:  81 y.o.        LOS:  LOS: 8 days S/P  Procedure(s): RIGHT FEMORAL INTERTROCHANTERIC NAIL INSERTION Subjective: No complaints Tolerating diet Voiding q shift. She is having some pain today. She wasn't feeling well enough yesterday to get up with PT. She is nervous and anxious since falling. Denies cp, sob, abd pain Objective:  
  
Blood pressure 119/48, pulse 98, temperature 98 °F (36.7 °C), resp. rate 16, height 5' (1.524 m), weight 158 lb (71.7 kg), SpO2 95 %. Well developed, Well Nourished alert, cooperative, no distress Incision clean, dry, no drainage, dressing in place 
swelling and tenderness noted in right lower extremity Sensory is intact Motor is intact 
nv intact 2+distal pulses Neg calf tenderness Neg for facial asymmetry Labs: 
CBC 
@ 
CBC:  
Lab Results Component Value Date/Time WBC 9.8 04/19/2020 05:50 AM  
 RBC 2.19 (L) 04/19/2020 05:50 AM  
 HGB 6.8 (L) 04/19/2020 05:50 AM  
 HCT 21.5 (L) 04/19/2020 05:50 AM  
 PLATELET 087 55/30/0679 05:50 AM  
@ Coagulation Lab Results Component Value Date INR 1.3 (H) 04/11/2020 Basic Metabolic Profile Lab Results Component Value Date  04/18/2020 CO2 24 04/18/2020 BUN 46 (H) 04/18/2020 Medications Reviewed Assessment/Plan Active Problems: 
  Hip fracture requiring operative repair ([facility] [address] (4/11/2020) Procedure(s): RIGHT FEMORAL INTERTROCHANTERIC NAIL INSERTION :  
 
Procedure(s): RIGHT FEMORAL INTERTROCHANTERIC NAIL INSERTION :  
  
1. PT and/or OT Consults: YES continue PT/OT: oob to chair, gentle rom, PWB                       
2. Incision Care:  Routine Incision Care and Dressing Changes as necessary: dressing changes POD#2 and as needed 3. Pain control 4. Medicine holding aspirin due to low H/H, will continue SCD for DVT prophylaxis at this time. 5. Acute blood loss anemia - will continue to monitor, will defer to medicine for transfusion H/H 6.8 and 21.5 
 
  
  
4. DISCHARGE PLANNING 
  
 Intervention : [place] ([facility]), stable for discharge from ortho standpoint [facility] [phone]

## 2020-04-19 NOTE — ROUTINE PROCESS
Bedside and Verbal shift change report given to Margaux Cronin (oncoming nurse) by Alina Adamson (offgoing nurse). Report included the following information SBAR and Kardex.

## 2020-04-19 NOTE — PROGRESS NOTES
Known to me  For long time, has Stage 3 CRF with Secondary Hyperparathyroidism. Her anemia is from Iron deficiency anemia, Iron sat 13 %,normal ferritin, DONNA will not be useful  Until iron depletion is repleted. Recent Fall, Stool needs to be checked if not done. Her Hb is <7 gm , recommend to give 1 unit of PRBC & may give Parental Iron before transferring her to rehab. She remained at risk of Fracture given high PTH from CRF. Make sure to give low dose of Rocaltrol & watch ca. Discussed with .

## 2020-04-19 NOTE — PROGRESS NOTES
[facility] Hospitalist Group Progress Note Patient: Dano Nazario Age: 81 y.o. : 1935 MR#: [id number] SSN: [id number] Date/Time: 2020 Subjective:   
Patient is alert awake oriented Complaining about using urinal, she has a Mathur catheter explained to patient she understood No chest pain no shortness of breath no nausea vomiting diarrhea Assessment/Plan: 1. Hip fracture  s/p ORIF.- on alternate DVT prophylaxis - Acute blood loss anemia due to # 1: H&H improved after PRBC transfusion 4/15. Also had guiac +ve brown stool, s/p GI eval w/ recs made for no endoscopy. No clear overt GI bleeding at this time. Follow H+H and transfuse PRN. - Monitor H/H - continue PPI  
- Again drop in H/H noted - 1 unit PRBC ordered , Renal consulted , monitor h/H - if stable dc in AM  
- Again patient is not on anticoagulation , just SCD 2. Mechanical fall 3. CKD III, at baseline creat, Dr. Herlinda Mosher is nephrologist 
4. Goiter w/ partial thyroidectomy, pt states she is on thyroid medication but does not know name/dose, TSH high, free T4 low, concern for hypothyroid versus overtreated hyper. Discussed with endocrine Dr. Marito Tong, patient on methimazole at home but recommend to hold for 2 weeks and then resume at lower dose. Endocrine recommends iodine uptake test, first part to be done today . 5. HTN: BP stable, monitor off medications. 6. GERD: Continue PPI and add Maalox as needed. 7. DVT prophylaxis - Per ortho . 8. New onset of Afib with RVR - On Cardizem gtt. For rate control  -Case discussed with cardiology yesterday, currently afebrile appears probably secondary to thyroid dysfunction, once the thyroid function is normalized and if the A. fib reoccurs then further investigation and management can be done we will continue treating rate control and thyroid dysfunction.   Patient has a good follow-up with her endocrinologist will continue with currently she has been placed on methimazole 9. Hyperkalemia - Improved 10. Full code PLAN  
- thyroid uptake scan : 
IMPRESSION IMPRESSION: 
  
1. Elevated uptake values in keeping with hyperthyroidism. Similar heterogeneous 
scan appearance of the remnant enlarged right thyroid gland. Overall findings 
suggestive of toxic multinodular goiter. - Follow ECHO and cardiology recommendations If DVT prophylaxis can be changed to SCD , avoid high dose of ASA / heparin as drop in h/h again noted . D/W ortho . - will change DVT prophylaxis to SCD as patient's H/h dropped again , ortho will place order  
 
 
-H&H dropped below 7, 1 unit of PRBC ordered 
-Also due to iron deficiency-IV iron 1 dose will be given prior to discharge home tomorrow 
-Likely rehab placement in a.m. Case discussed with:  [x]Patient  [x]Family  []Nursing  []Case Management DVT Prophylaxis:  []Lovenox  []Hep SQ  [x]SCDs  []Coumadin   []Eliquis/Xarelto Objective:  
VS:  
Visit Vitals /65 (BP 1 Location: Left arm, BP Patient Position: At rest) Pulse 89 Temp 98.1 °F (36.7 °C) Resp 18 Ht 5' (1.524 m) Wt 71.7 kg (158 lb) SpO2 100% BMI 30.86 kg/m² Tmax/24hrs: Temp (24hrs), Av °F (36.7 °C), Min:97.5 °F (36.4 °C), Max:98.5 °F (36.9 °C) IOBRIEF Intake/Output Summary (Last 24 hours) at 2020 1049 Last data filed at 2020 5192 Gross per 24 hour Intake 480 ml Output 1750 ml Net -1270 ml General:  Alert, cooperative, no acute distress Pulmonary:  CTA Bilaterally. No Wheezing/Rales. Cardiovascular: Regular rate and Rhythm. GI:  Soft, Non distended, Non tender. + Bowel sounds. Extremities:  No edema. Neurologic: Alert and oriented X 3. Medications:  
Current Facility-Administered Medications Medication Dose Route Frequency  0.9% sodium chloride infusion 250 mL  250 mL IntraVENous PRN  pantoprazole (PROTONIX) 40 mg in 0.9% sodium chloride 10 mL injection 40 mg IntraVENous Q12H  
 0.9% sodium chloride infusion 250 mL  250 mL IntraVENous PRN  
 metoprolol tartrate (LOPRESSOR) tablet 25 mg  25 mg Oral BID  atorvastatin (LIPITOR) tablet 40 mg  40 mg Oral QHS  methIMAzole (TAPAZOLE) tablet 10 mg  10 mg Oral Q12H  aspirin tablet 325 mg  325 mg Oral BID  acetaminophen (TYLENOL) tablet 650 mg  650 mg Oral Q8H  
 hydrocortisone (ANUSOL-HC) suppository 25 mg  25 mg Rectal Q12H  
 aluminum-magnesium hydroxide (MAALOX) oral suspension 15 mL  15 mL Oral QID PRN  polyethylene glycol (MIRALAX) packet 17 g  17 g Oral DAILY  senna-docusate (PERICOLACE) 8.6-50 mg per tablet 1 Tab  1 Tab Oral DAILY  morphine injection 1-2 mg  1-2 mg IntraVENous Q4H PRN  
 oxyCODONE IR (ROXICODONE) tablet 5 mg  5 mg Oral Q4H PRN  
 ondansetron (ZOFRAN) injection 8 mg  8 mg IntraVENous Q6H PRN  
 allopurinoL (ZYLOPRIM) tablet 100 mg  100 mg Oral DAILY  LORazepam (ATIVAN) tablet 1 mg  1 mg Oral BID PRN Labs:   
Recent Results (from the past 24 hour(s)) CBC WITH AUTOMATED DIFF Collection Time: 04/19/20  5:50 AM  
Result Value Ref Range WBC 9.8 4.6 - 13.2 K/uL  
 RBC 2.19 (L) 4.20 - 5.30 M/uL HGB 6.8 (L) 12.0 - 16.0 g/dL HCT 21.5 (L) 35.0 - 45.0 % MCV 98.2 (H) 74.0 - 97.0 FL  
 MCH 31.1 24.0 - 34.0 PG  
 MCHC 31.6 31.0 - 37.0 g/dL  
 RDW 16.4 (H) 11.6 - 14.5 % PLATELET 740 385 - 054 K/uL MPV 11.2 9.2 - 11.8 FL  
 NEUTROPHILS 72 40 - 73 % LYMPHOCYTES 15 (L) 21 - 52 % MONOCYTES 12 (H) 3 - 10 % EOSINOPHILS 1 0 - 5 % BASOPHILS 0 0 - 2 %  
 ABS. NEUTROPHILS 7.1 1.8 - 8.0 K/UL  
 ABS. LYMPHOCYTES 1.5 0.9 - 3.6 K/UL  
 ABS. MONOCYTES 1.1 0.05 - 1.2 K/UL  
 ABS. EOSINOPHILS 0.1 0.0 - 0.4 K/UL  
 ABS. BASOPHILS 0.0 0.0 - 0.1 K/UL  
 DF AUTOMATED T4, FREE Collection Time: 04/19/20  5:50 AM  
Result Value Ref Range T4, Free 2.7 (H) 0.7 - 1.5 NG/DL Signed By: Jessica Quinones MD   
 April 19, 2020 Disclaimer: Sections of this note are dictated using utilizing voice recognition software. Minor typographical errors may be present. If questions arise, please do not hesitate to contact me or call our department.

## 2020-04-19 NOTE — PROGRESS NOTES
[facility] Patient: Tiana Sorensen Age: 81 y.o. : 1935 MR#: [id number] SSN: [id number] Date: 2020 Subjective:  
 
Pt is awaiting SNF placement. FT4 now elevated at 3.7. Assessment/Plan: This is an 82 yo AAF with a medical history significant for MNG and overt hyperthyroidism, on MMI for treatment. Most likely hyperthyroidism is secondary to one of more toxic thyroid nodules. She states that she only take MMI 10 mg po daily instead of her prescribed 10mg po bid, but does seem to get confused about the names of her medications and fills her own pill boxes with her, multiple, medications. She is followed by Dr. Sylwia Earl for her MNG and has been recommended for completion thyroidectomy. Given that she has some compressive symptoms and may be having some difficulty with medication compliance, she may benefit from surgery. This can be done as an outpt. Thyroid scan and uptake demonstrated increased uptake that is consistent with hyperthyroidism, though no focal uptake suggestive of single toxic nodule, so may likely have more than one nodule producing excess thyroid hormone. She m/l benefit from definitive therapy. Afib with RVR may be due to her hyperthyroidism, and once thyroid function is in normal range, if related to hyperthyroidism, symptoms should resolve. 
___________________________________________________ PLAN:   
 
 -will  continue MMI at 10 mg po bid for 24-48 hours , then decrease to MMI 10 mg po daily. She can be discharged on this dose. -MMI will take a few days to have an effect, so can use a  beta blocker, such as propranolol to help with decreasing T4 to T3 conversion to help with symptoms. -will check FT4 daily to monitor trend.  
 
-will check TSI, TPO/Tg ab to check for possible autoimmune etiology for her hyperthyroidism.  
Thank you for the consult, will continue to follow pt with you while admitted to the hospital. 
 
Disposition: will need follow up with Dr. Mj Kilpatrick  In ~ 4-6 weeks to have TSH/FT4 recheck to see if any further dose adjustments are needed. Address is [address] [place] [address]. Contact phone number is [phone]. 
  
  
 
Objective:  
VS:  
Visit Vitals BP 95/53 Pulse 95 Temp 98.4 °F (36.9 °C) Resp 18 Ht 5' (1.524 m) Wt 71.7 kg (158 lb) SpO2 95% BMI 30.86 kg/m² Tmax/24hrs: Temp (24hrs), Av.1 °F (36.7 °C), Min:97.5 °F (36.4 °C), Max:98.5 °F (36.9 °C) Intake/Output Summary (Last 24 hours) at 2020 0109 Last data filed at 2020 1932 Gross per 24 hour Intake 740 ml Output 1350 ml Net -610 ml  
 
IMAGING 
 
2020 Thyroid scan and uptake COMPARISON: 2011. 
  
CORRELATION: None. 
  
DESCRIPTION: After ingestion of 257 microcuries 123Iodine, uptake of radioiodine 
by the thyroid gland was measured at 1, 6 and 24 hours. Images of the thyroid 
gland were obtained in anterior and bilateral anterior oblique projections using 
pinhole collimator, and additional anterior image was obtained with size markers 
in place. 
  
FINDINGS:   
  
Thyroid uptake is calculated as approximately 20% in one hour, 67% at 6 hour and 
40% in 24 hours.   
  
There is absence of the left thyroid gland. The right thyroid gland is large and 
heterogeneous. No dominant area of focal increased or decreased activity seen. Overall pattern appears similar to that of . .   
  
IMPRESSION IMPRESSION: 
  
1. Elevated uptake values in keeping with hyperthyroidism. Similar heterogeneous 
scan appearance of the remnant enlarged right thyroid gland. Overall findings 
suggestive of toxic multinodular goiter. Labs:   
 
Results for Khushi Chu (MRN [id number]) as of 2020 01:12 Ref.  Range 2020 14:53 2020 05:14 2020 01:13 2020 06:25  
T4, Free Latest Ref Range: 0.7 - 1.5 NG/DL 0.5 (L) 0.7 3.4 (H) 3.7 (H)  
 
 
  
 
 Recent Results (from the past 24 hour(s)) CBC WITH AUTOMATED DIFF Collection Time: 04/18/20  6:25 AM  
Result Value Ref Range WBC 8.7 4.6 - 13.2 K/uL  
 RBC 2.23 (L) 4.20 - 5.30 M/uL HGB 7.0 (L) 12.0 - 16.0 g/dL HCT 21.2 (L) 35.0 - 45.0 % MCV 95.1 74.0 - 97.0 FL  
 MCH 31.4 24.0 - 34.0 PG  
 MCHC 33.0 31.0 - 37.0 g/dL  
 RDW 16.4 (H) 11.6 - 14.5 % PLATELET 676 729 - 182 K/uL MPV 11.0 9.2 - 11.8 FL  
 NEUTROPHILS 74 (H) 40 - 73 % LYMPHOCYTES 12 (L) 21 - 52 % MONOCYTES 13 (H) 3 - 10 % EOSINOPHILS 1 0 - 5 % BASOPHILS 0 0 - 2 %  
 ABS. NEUTROPHILS 6.4 1.8 - 8.0 K/UL  
 ABS. LYMPHOCYTES 1.1 0.9 - 3.6 K/UL  
 ABS. MONOCYTES 1.1 0.05 - 1.2 K/UL  
 ABS. EOSINOPHILS 0.1 0.0 - 0.4 K/UL  
 ABS. BASOPHILS 0.0 0.0 - 0.1 K/UL  
 DF AUTOMATED T4, FREE Collection Time: 04/18/20  6:25 AM  
Result Value Ref Range T4, Free 3.7 (H) 0.7 - 1.5 NG/DL  
METABOLIC PANEL, BASIC Collection Time: 04/18/20  6:25 AM  
Result Value Ref Range Sodium 139 136 - 145 mmol/L Potassium 4.7 3.5 - 5.5 mmol/L Chloride 109 100 - 111 mmol/L  
 CO2 24 21 - 32 mmol/L Anion gap 6 3.0 - 18 mmol/L Glucose 116 (H) 74 - 99 mg/dL BUN 46 (H) 7.0 - 18 MG/DL Creatinine 1.47 (H) 0.6 - 1.3 MG/DL  
 BUN/Creatinine ratio 31 (H) 12 - 20 GFR est AA 41 (L) >60 ml/min/1.73m2 GFR est non-AA 34 (L) >60 ml/min/1.73m2 Calcium 9.4 8.5 - 10.1 MG/DL Signed By: Stefano Ruelas MD   
 April 19, 2020 8:21 PM  
  
 
Addendum Component Value Flag Ref Range Units Status Thyroid Stim Immunoglobulin 20.00  High   0.00 - 0.55 IU/L Final  
 
 
This is consistent with Graves' disease (autoimmune hyperthyroidism). -would continue MMI on discharge 
-would benefit from definitive therapy for both compressive symptoms and Graves' disease.

## 2020-04-20 PROBLEM — D64.9 ANEMIA: Status: ACTIVE | Noted: 2020-01-01

## 2020-04-20 PROBLEM — N25.81 SECONDARY HYPERPARATHYROIDISM OF RENAL ORIGIN (HCC): Status: ACTIVE | Noted: 2020-01-01

## 2020-04-20 PROBLEM — N18.30 CHRONIC RENAL DISEASE, STAGE III (HCC): Status: ACTIVE | Noted: 2020-01-01

## 2020-04-20 NOTE — CONSULTS
[facility] Name:  Chantel Hui 
MR#:   [id number] :  1935 ACCOUNT #:  [de-identified] DATE OF SERVICE:  2020 NEPHROLOGY CONSULTATION 
 
REQUESTING PHYSICIAN:  Eriberto Blancas MD 
 
REASON FOR CONSULTATION:  Hemoglobin low in a patient with renal failure after seeing whether erythrocyte stimulating agent or not. HISTORY OF PRESENT ILLNESS:  This pleasant 45-year-old [facility] [employer] female known to me for long period of time for her renal and medical problem, was admitted on 2020 for fall. Unfortunately, this fall resulted in significant fracture of her right hip and underwent open reduction and internal fixation of right hip. She has a history of hypertension, history of chronic renal failure, secondary hyperparathyroidism and hyperthyroidism with goiter with a history of partial thyroid surgery, also followed with endocrinologist time to time. She also has history of rapid ventricular atrial fibrillation. Since then, surgery is being done. Her hemoglobin is progressively decreasing, her baseline hemoglobin was 11 g few months back, now in between 6 to 7 g. She denies any hematemesis, any melena, any nausea or vomiting. Denies any urinary problem, but the nurse told me following surgery, she failed voiding trial and had retention of urine which appeared to be more than 300 mL and ended up with a Mathur placement. She has seen me in the office, last time was on 2020 and at that time her hemoglobin was around 11 g. PAST MEDICAL HISTORY:  The patient has history of hypertension, hyperlipidemia, secondary hyperparathyroidism, history of chronic renal failure. No history of diabetes mellitus. She has history of left rotator cuff surgery, temporomandibular joint problems, scoliosis, and off and on gout attack. OTHER MEDICAL PROBLEMS:  She has osteoarthritis as well as anxiety problem and gout. LIST OF MEDICATIONS BEFORE ADMISSION:  Allopurinol 100 mg daily , aspirin 81 mg daily. , Christina-Barry once a day, Biotin daily, Zyrtec 10 mg tablet daily, Lasix 40 mg tablet daily, hydrochlorothiazide 25 mg daily, lorazepam 1 mg on need basis for anxiety, Prilosec 20 mg p.o. daily, Zemplar 1 mcg capsule daily, Simvastatin 20 mg tablet daily, and sodium bicarbonate one tablet twice daily, but she does not take that medicine, also she is on methimazole for her thyroid problem, though she was not clear. PAST SURGICAL HISTORY:  Includes abdominal surgery, and listed colonoscopy, breast biopsy, cataract removal, rotator cuff tear, partial thyroid surgery. SOCIAL HISTORY:  Former smoker. Quit smoking in 1980s. No history of any drug or alcohol-related problem. ALLERGIES:  SHE IS ALLERGIC TO ACE INHIBITOR,  DARVON, AND PENICILLIN. PHYSICAL EXAMINATION: 
VITAL SIGNS:  Today, temperature 98.1, heart rate 89, blood pressure 102/65, mean arterial pressure is 77. Before that, blood pressure low at 95/46. Oxygen saturation is 95% to 98% on room air and with supplemental oxygen 2 L of nasal cannula. Her weight is 72 kg. HEENT:  Oral mucosa moist. 
NECK:  Supple. Thyroid easily palpable and prominent. LUNGS:  Good air entry. HEART:  S1, S2 without any gallop or murmur. ABDOMEN:  Soft. No palpable bowel sounds. GENITOURINARY:  Bladder not distended. EXTREMITIES:  Ankle, no pedal edema, but has some ecchymosis on the site of surgery on the right hip. RELEVANT LABORATORY DATA:  WBC of 9.8, hemoglobin of 6.8, hematocrit of 21.5, platelets 725,820. On 04/16/2020, her hemoglobin was 7.8. On 04/13/2020, her hemoglobin was 6.3, in 01/2020 her hemoglobin was 10.6. Anemia workup was done on 04/13/2020. Her vitamin B level was 579, folic acid is more than 20, iron level is 21, TIBC of 162, iron saturation is 13%, and ferritin within normal limit of 371.   Urinalysis was done on 04/12 and 04/15; pH of 5.0, protein negative, glucose negative, nitrite negative, leukocyte esterase negative, wbc's 0 to 3, rbc's 0 to 3. I am unsure if she had any Hemoccult test or not. 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 Echocardiogram done on 04/17 showed ejection fraction around 65% to 70%, moderate tricuspid valve regurgitation, moderate pulmonary hypertension, pulmonary arterial pressure 54 mmHg. Chemistries:  Sodium 139, potassium 4.7, chloride 109, CO2 of 24, glucose of 119, BUN of 46, creatinine of 1.47, EGFR around 41 mL per minute, calcium of 9.4. Troponin is 0.15. Last PTH checked was in 01/2020 and at that time her PTH was quite high at 469. She was supposed to be on Zemplar 1 mcg daily, but apparently she was not sure, she was not taking it. IMPRESSION AND PLAN: 
1. The patient has anemia. Her anemia currently cannot be explained by her renal failure. Definitely, she has iron deficiency anemia. Hemoccult has not been tested and needs to be checked. In the meanwhile I recommend given the low hemoglobin of less than 7, she should be given at least one unit of packed red blood cells and Venofer injections if possible, otherwise oral iron should be supplemented. Erythrocyte stimulating agent is not going to help her at this time until the iron depletion is depleted, corrected. Her renal function remains at her baseline. She has secondary hyperparathyroidism from the renal failure and is at risk for fall and the fracture. To prevent any further fall and fracture, she must take vitamin D analog, Rocaltrol, and that should be given 3 times a week. Her pressure is a little bit low and have to be careful giving the blood pressure medicine.   She also has multinodular goiter and for the time being, medical management should be continued. While she is here, I will follow with her, otherwise she will follow with me as usual in my office. Further recommendations will be given based on the hospital course. MD PRABHA Cervantes/[email or web address]/[email or web address] 
D:  04/19/2020 13:50 T:  04/19/2020 20:15 
JOB #:  [id number]

## 2020-04-20 NOTE — PROGRESS NOTES
Problem: Mobility Impaired (Adult and Pediatric) Goal: *Acute Goals and Plan of Care (Insert Text) Description: Physical Therapy Goals Initiated 4/13/2020 and to be accomplished within 7 day(s) 1. Patient will move from supine to sit and sit to supine , scoot up and down and roll side to side in bed with supervision/set-up. 2.  Patient will transfer from bed to chair and chair to bed with supervision/set-up using the least restrictive device. 3.  Patient will perform sit to stand with supervision/set-up. 4.  Patient will ambulate with supervision/set-up for 50 feet with the least restrictive device. 5.  Patient will ascend/descend 8 stairs with unilateral handrail(s) with minimal assistance/contact guard assist. 
 
PLOF: Patient reports she is independent with ADLs and functional mobility. Outcome: Progressing Towards Goal 
 PHYSICAL THERAPY TREATMENT Patient: Joaquin [place], [place] (66 y.o. female) Date: 4/20/2020 Diagnosis: Hip fracture requiring operative repair ([facility] [address] Artelia Organ <principal problem not specified> Procedure(s) (LRB): 
RIGHT FEMORAL INTERTROCHANTERIC NAIL INSERTION (Right) 8 Days Post-Op Precautions: Fall, Skin, PWB(right LE) ASSESSMENT: 
Pt found seated at EOB in ISLAS care willing to participate w/ both OT and PT together. Co-tx beneficial to maximize safety of pt and staff as well as to facilitate balance and stability while performing mobility and self care tasks. Pt was able to make functional progression this visit, however mobility was difficult as pt had some difficulty w/ following commands and alertness fluctuated. Her level of alertness improved w/ time at EOB, thus [facility] transfer deemed appropriate, however as she had difficulty following all commands, gait training deemed inappropriate as she is PWBing through right LE. Pt stood from EOB for pericare this visit, maintaining PWBing 2/2 pain w/ use of RW.  Pt had poor alignment w/ inability to correct, thus req additional assistance. Pt returned to sitting, and transferred to [address], req max A for stand pivot transfer. Increased time req for attempted BM, voicing increased dizziness intermittently, however w/ breathing techniques and cueing to avoid valsalva. Pt returned to bed via stand pivot transfer and back to supine req max A X2. Pt positioned comfortably w/ all needs in reach in chair position to facilitate eating, BP taken at 114/63 at 76 bpm and at 97% on RA, despite confusion and sleepiness. Pt safely left in room w/ all needs in reach and notified nursing. Progression toward goals: good [x]      Improving appropriately and progressing toward goals 
[]      Improving slowly and progressing toward goals 
[]      Not making progress toward goals and plan of care will be adjusted PLAN: 
Patient continues to benefit from skilled intervention to address the above impairments. Continue treatment per established plan of care. Discharge Recommendations:  Dimas Mccracken Further Equipment Recommendations for Discharge:  bedside commode and rolling walker SUBJECTIVE:  
Patient stated Where am I? OBJECTIVE DATA SUMMARY:  
Critical Behavior: 
Neurologic State: Alert Orientation Level: Oriented X4 Cognition: Follows commands Safety/Judgement: Fall prevention Functional Mobility Training: 
Bed Mobility: 
Rolling: Maximum assistance;Assist x2 Supine to Sit: Maximum assistance;Assist x2 Scooting: Maximum assistance;Assist x2 Transfers: 
Sit to Stand: Maximum assistance;Assist x2 Stand to Sit: Maximum assistance;Assist x2 Bed to Chair: Maximum assistance;Assist x2(bed to [address]) Balance: 
Sitting: Impaired Sitting - Static: Fair (occasional)(+) Sitting - Dynamic: Fair (occasional) Standing: Impaired; With support Standing - Static: Poor Standing - Dynamic : Poor;Constant support Therapeutic Exercises:  
 
 
EXERCISE Sets Reps Active Active Assist  
Passive Self ROM Comments Ankle Pumps 1 10  [x] [] [] [] Quad Sets/Glut Sets    [] [] [] [] Hold for 5 secs Hamstring Sets   [] [] [] [] Short Arc Quads   [] [] [] [] Heel Slides   [] [] [] [] Straight Leg Raises   [] [] [] [] Hip Add   [] [] [] [] Hold for 5 secs, w/ pillow squeeze Long Arc Quads   [] [] [] [] Seated Marching   [] [] [] []   
Standing Marching   [] [] [] []   
   [] [] [] []   
 
 
Pain: 
Pain level pre-treatment: 0/10 Pain level post-treatment: 0/10 Pt voiced slight increase in pain during transfer Activity Tolerance:  
Good Please refer to the flowsheet for vital signs taken during this treatment. After treatment:  
[] Patient left in no apparent distress sitting up in chair 
[x] Patient left in no apparent distress in bed in chair position  
[x] Call bell left within reach [x] Nursing notified 
[] Caregiver present [x] Bed alarm activated 
[] SCDs applied COMMUNICATION/EDUCATION:  
[x]         Role of Physical Therapy in the acute care setting. [x]         Fall prevention education was provided and the patient/caregiver indicated understanding. [x]         Patient/family have participated as able in working toward goals and plan of care. [x]         Patient/family agree to work toward stated goals and plan of care. []         Patient understands intent and goals of therapy, but is neutral about his/her participation. []         Patient is unable to participate in stated goals/plan of care: ongoing with therapy staff. 
[]         Other: 
 
   
Millicent Route, PTA Time Calculation: 23 mins

## 2020-04-20 NOTE — PROGRESS NOTES
NUTRITION Nutrition Consult: General Nutrition Management & Supplements, Diet Education RECOMMENDATIONS / PLAN:  
 
- Modify supplements: Decrease Ensure Enlive to BID; add Magic Cup BID 
- Update food preference - Diet education provided today to pt 
- Encourage po intake of meals/supplements 
- Continue RD inpatient monitoring and evaluation. NUTRITION INTERVENTIONS & DIAGNOSIS:  
 
- Meals/snacks: general/healthful diet - Medical food supplement therapy: modify - Collaboration and referral of nutrition care: discussed with MD recommendation for starting appetite stimulant; not medically appropriate per MD. Medications reevaluated by MD; noted lorazepam and pain medications discontinued due to possible affects on patient's alertness. 
- Nutrition counseling: general healthy diet on 4/20 Nutrition Diagnosis: Inadequate oral intake related to decreased appetite as evidenced by poor meal intake since admission. Food and nutrition related knowledge deficit related to general healthy diet as evidenced by pt requiring additional information/ reinforcement. ASSESSMENT:  
 
4/20: Per RN, pt with poor/no meal intake during the weekend; was able to get pt to drink 2 Ensure drinks yesterday. Pt did not eat breakfast today; lethargic during lunch. Pt had good meal intake last Thursday per RN and pt report. This writer had to wake up pt several times during visit. Food preferences discussed. Agreeable to trying Magic Cup supplement; noted 3-4 unopened bottles of Ensure Enlive at bed side. Pt reported having poor appetite. Discussed with Dr Sarina Ortiz. Provided education on general healthy diet; explained to pt the importance of adequate nutrition and consuming a variety of healthy foods. 4/17: Pt off unit at time of visit. Only drank the Ensure this morning at breakfast per CNA. Poor meal intake per chart documentation.  Noted wt loss in past several years PTA per chart hx. Admitted due to hip fracture; s/p open reduction internal fixation on 4/15. Nutritional intake adequate to meet patients estimated nutritional needs:  No 
 
Diet: DIET REGULAR 
DIET NUTRITIONAL SUPPLEMENTS All Meals; Clarence Donovan Food Allergies:  None known Current Appetite: Fair per chart Appetite/meal intake prior to admission: Unable to determine at this time Feeding Limitations:  [] Swallowing difficulty    [] Chewing difficulty    [] Other: 
Current Meal Intake:  
Patient Vitals for the past 100 hrs: 
 % Diet Eaten 04/19/20 1925 0 % 04/19/20 1808 0 % 04/19/20 1330 25 % 04/19/20 0913 25 % 04/18/20 1009 25 % 04/16/20 1750 25 % BM: 4/17 Skin Integrity: right hip surgical incision Edema:   [x] No     [] Yes Pertinent Medications: Reviewed: NS at 50 mL/hr, maalox, atorvastatin, ondansetron, pantoprazole, miralax, pericolace. Calcitriol ordered Recent Labs  
  04/20/20 
0152 04/18/20 
3531  139  
K 4.5 4.7  109 CO2 25 24 * 116* BUN 60* 46* CREA 1.91* 1.47* CA 9.5 9.4 Intake/Output Summary (Last 24 hours) at 4/20/2020 1400 Last data filed at 4/19/2020 1925 Gross per 24 hour Intake 670 ml Output 0 ml Net 670 ml Anthropometrics: 
Ht Readings from Last 1 Encounters:  
04/19/20 5' (1.524 m) Last 3 Recorded Weights in this Encounter 04/17/20 0419 04/17/20 1250 04/19/20 1925 Weight: 72 kg (158 lb 11.7 oz) 71.7 kg (158 lb) 71.7 kg (158 lb) Body mass index is 30.86 kg/m². Obese, Class I Weight History:   -13 lb, 7.6% x 7 years PTA Weight Metrics 4/19/2020 5/31/2018 7/10/2013 Weight 158 lb 155 lb 9.6 oz 171 lb BMI 30.86 kg/m2 30.39 kg/m2 32.33 kg/m2 Admitting Diagnosis: Hip fracture requiring operative repair ([facility] [address] Charolet Rebel Pertinent PMHx: bell's palsy, HTN, renal insufficiency, partial thyroidectomy, goiter Education Needs:        [] None identified  [] Identified - Not appropriate at this time  [x]  Identified and addressed - refer to education log Learning Limitations:   [x] None identified  [] Identified Cultural, Alevism & ethnic food preferences:  [x] None identified    [] Identified and addressed ESTIMATED NUTRITION NEEDS:  
 
Calories: 5309-2082 kcal (MSJx1.2-1.4) based on  [x] Actual BW: 72 kg      [] IBW Protein: 58-86 gm (0.8-1.2 gm/kg) based on  [x] Actual BW      [] IBW Fluid: 1 mL/kcal 
  
MONITORING & EVALUATION:  
 
Nutrition Goal(s):  
- PO nutrition intake will meet >75% of patient estimated nutritional needs within the next 7 days. Outcome: Progressing towards goal   
- Patient will increase knowledge of appropriate food choices on a general healthy diet prior to discharge. Outcome: New/Initial goal   
 
Monitoring:   [x] Food and nutrient intake   [x] Food and nutrient administration  [x] Comparative standards   [x] Nutrition-focused physical findings   [x] Anthropometric Measurements   [x] Treatment/therapy   [x] Biochemical data, medical tests, and procedures Previous Recommendations (for follow-up assessments only): Implemented Discharge Planning: No nutritional discharge needs at this time. Participated in care planning, discharge planning, & interdisciplinary rounds as appropriate. Radha Masterson RD Pager: [phone]

## 2020-04-20 NOTE — PROGRESS NOTES
Interdisciplinary Round Note Patient Information: Patient Information: Martha Hummel [address] [facility] Reason for Admission: Hip fracture requiring operative repair ([facility] [address] Burgess Esquivel Attending Provider:   Gerardo Stover MD 
 Past Medical History:  
Past Medical History:  
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   
 Nonspecific abnormal electrocardiogram (ECG) (EKG)  Obesity, unspecified  Other and unspecified hyperlipidemia  Renal insufficiency Hospital day: 5 Estimated discharge date: 4/21/20 RRAT Score: Medium Risk 15 Total Score 3 Patient Length of Stay (>5 days = 3)  
 5 Pt. Coverage (Medicare=5 , Medicaid, or Self-Pay=4) 6 Charlson Comorbidity Score (Age + Comorbid Conditions) Criteria that do not apply:  
 Has Seen PCP in Last 6 Months (Yes=3, No=0) . Living with Significant Other. Assisted Living. LTAC. SNF. or  
Rehab  
 IP Visits Last 12 Months (1-3=4, 4=9, >4=11) Goals for Today: placement to snf VITAL SIGNS Vitals:  
 04/19/20 2325 04/20/20 3559 04/20/20 9602 04/20/20 1223 BP: 136/66  120/61 92/47 Pulse: 82 78 82 72 Resp: 16 18 18 18 Temp: 98.1 °F (36.7 °C) 98 °F (36.7 °C) 97.8 °F (36.6 °C) 97.4 °F (36.3 °C) SpO2:   97% 99% Weight:      
Height:      
 
 
 
 Lines, Drains, & Airways [REMOVED] Peripheral IV 04/11/20 Right Antecubital-Site Assessment: Clean, dry, & intact Midline Catheter 04/13/20 Right;Brachial-Site Assessment: Clean, dry, & intact VTE Prophylaxis Sequential/Intermittent Compression Device: Bilateral 
     Graduated Compression Stockings: Bilateral 
     Patient Refused VTE Prophylaxis: Yes Intake and Output:  
04/18 1901 - 04/20 0700 In: 1155 [P.O.:845] Out: 2200 [Urine:2200] No intake/output data recorded. Current Diet: DIET NUTRITIONAL SUPPLEMENTS Lunch, Dinner; [employer] CUPS 
DIET NUTRITIONAL SUPPLEMENTS Breakfast, Lunch; ENSURE ENLIVE 
DIET REGULAR Abdominal  
Last Bowel Movement Date: 04/17/20 Stool Appearance: Soft Abdominal Assessment: Hemorrhoid Appetite: Poor Bowel Sounds: Active Recent Glucose Results:  
Lab Results Component Value Date/Time  (H) 04/20/2020 01:52 AM  
 
 
  
IV Antibiotics? No  
      
Activity Level: Activity Level: Up with Assistance Needs assistance with ADLs: yes PT Consult Status: YES Current Immunizations: There is no immunization history on file for this patient. Recommendations:  
Discharge Disposition: SNF Medication Reconciliation Completed: YES Cardiac/Pulmonary Rehab Ordered:  NO 
 
Needs for Discharge: none Recommendations from IDR team:  
  
 
OSWALD De LeonN RN Care Management Pager: [phone]

## 2020-04-20 NOTE — PROGRESS NOTES
Called [facility] and [address] [place] [address] and left a message. [facility] [facility] accepted pt for placement. Called pt's daughter Debbie Li and she is ok with the acceptance. Notified Dr. Chava Cortez Per Dr. Chava Cortez, pt will be possible discharge tomorrow. Notified [place]. OSWALD RodriguezN RN Care Management Pager: [phone]

## 2020-04-20 NOTE — PROGRESS NOTES
[facility] Hospitalist Group Progress Note Patient: Ian Torres Age: 81 y.o. : 1935 MR#: [id number] SSN: [id number] Date: 2020 Subjective:  
 
Pt is awaiting SNF placement. FT4 now decreasing, with last FT4 at 2.6 Assessment/Plan: This is an 80 yo AAF with a medical history significant for MNG and Graves' disease, on MMI for treatment. She states that she only take MMI 10 mg po daily instead of her prescribed 10mg po bid, but does seem to get confused about the names of her medications and fills her own pill boxes with her, multiple, medications. She is followed by Dr. Sherren Portal for her MNG and has been recommended for completion thyroidectomy. Given that she has some compressive symptoms and may be having some difficulty with medication compliance, she may benefit from surgery. This can be done as an outpt. 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 She m/l would  benefit from definitive therapy. Afib with RVR may be due to her hyperthyroidism, and once thyroid function is in normal range, if related to hyperthyroidism, symptoms should resolve. 
___________________________________________________ PLAN:   
 
 -will  continue MMI at 10 mg po bid while inpatient , then decrease to MMI 10 mg po daily on discharge. 
 
-will check FT4 daily to monitor trend.  
 
Thank you for the consult, will continue to follow pt with you while admitted to the hospital. 
 
Disposition: will need follow up with Dr. Jacqueline Mancia  In ~ 4-6 weeks to have TSH/FT4 recheck to see if any further dose adjustments are needed. Address is [address] [place] [address]. Contact phone number is [phone]. 
  
 Discussed plan with Hospitalist, Dr. Can Mary. Objective:  
VS:  
Visit Vitals /61 (BP 1 Location: Left arm, BP Patient Position: At rest) Pulse 82 Temp 97.8 °F (36.6 °C) Resp 18 Ht 5' (1.524 m) Wt 71.7 kg (158 lb) SpO2 97% BMI 30.86 kg/m² Tmax/24hrs: Temp (24hrs), Av °F (36.7 °C), Min:97.8 °F (36.6 °C), Max:98.1 °F (36.7 °C) Intake/Output Summary (Last 24 hours) at 2020 1142 Last data filed at 2020 1925 Gross per 24 hour Intake 790 ml Output 0 ml Net 790 ml IMAGING 
 
2020 Thyroid scan and uptake COMPARISON: 2011. 
  
CORRELATION: None. 
  
DESCRIPTION: After ingestion of 257 microcuries 123Iodine, uptake of radioiodine 
by the thyroid gland was measured at 1, 6 and 24 hours. Images of the thyroid 
gland were obtained in anterior and bilateral anterior oblique projections using 
pinhole collimator, and additional anterior image was obtained with size markers 
in place. 
  
FINDINGS:   
  
Thyroid uptake is calculated as approximately 20% in one hour, 67% at 6 hour and 
40% in 24 hours.   
  
There is absence of the left thyroid gland. The right thyroid gland is large and 
heterogeneous. No dominant area of focal increased or decreased activity seen. Overall pattern appears similar to that of . .   
  
IMPRESSION: 
  
1. Elevated uptake values in keeping with hyperthyroidism. Similar heterogeneous 
scan appearance of the remnant enlarged right thyroid gland. Overall findings 
suggestive of toxic multinodular goiter. Labs:   
 
Results for Harlan Carrera (MRN [id number]) as of 2020 11:38 Ref. Range 2020 01:13 2020 06:25 2020 05:50 2020 01:52 T4, Free Latest Ref Range: 0.7 - 1.5 NG/DL 3.4 (H) 3.7 (H) 2.7 (H) 2.6 (H)  
 
 
  
Component Value Flag Ref Range Units Status Thyroid Stim Immunoglobulin 20.00  High   0.00 - 0.55 IU/L Final  
 
Component Value Flag Ref Range Units Status Thyroid peroxidase Ab 8   0 - 34 IU/mL Final  
Thyroglobulin Ab PENDING    IU/mL Incomplete Recent Results (from the past 24 hour(s)) OCCULT BLOOD, STOOL Collection Time: 04/19/20  1:00 PM  
Result Value Ref Range Occult blood, stool Positive (A) NEG PROTEIN/CREATININE RATIO, URINE Collection Time: 04/19/20  3:15 PM  
Result Value Ref Range Protein, urine random 60 (H) <11.9 mg/dL Creatinine, urine 100.00 30 - 125 mg/dL Protein/Creat. urine Ratio 0.6 CREATININE, UR, RANDOM Collection Time: 04/19/20  3:15 PM  
Result Value Ref Range Creatinine, urine 101.00 30 - 125 mg/dL PROTEIN URINE, RANDOM Collection Time: 04/19/20  3:15 PM  
Result Value Ref Range Protein, urine random 59 (H) <11.9 mg/dL T4, FREE Collection Time: 04/20/20  1:52 AM  
Result Value Ref Range T4, Free 2.6 (H) 0.7 - 1.5 NG/DL  
CBC WITH AUTOMATED DIFF Collection Time: 04/20/20  1:52 AM  
Result Value Ref Range WBC 11.9 4.6 - 13.2 K/uL  
 RBC 2.82 (L) 4.20 - 5.30 M/uL HGB 8.6 (L) 12.0 - 16.0 g/dL HCT 27.4 (L) 35.0 - 45.0 % MCV 97.2 (H) 74.0 - 97.0 FL  
 MCH 30.5 24.0 - 34.0 PG  
 MCHC 31.4 31.0 - 37.0 g/dL  
 RDW 16.7 (H) 11.6 - 14.5 % PLATELET 942 597 - 846 K/uL MPV 10.6 9.2 - 11.8 FL  
 NEUTROPHILS 77 (H) 42 - 75 % BAND NEUTROPHILS 4 0 - 5 % LYMPHOCYTES 10 (L) 20 - 51 % MONOCYTES 8 2 - 9 % EOSINOPHILS 1 0 - 5 % BASOPHILS 0 0 - 3 % NRBC 1.0 (H) 0  WBC  
 ABS. NEUTROPHILS 9.6 (H) 1.8 - 8.0 K/UL  
 ABS. LYMPHOCYTES 1.2 0.8 - 3.5 K/UL  
 ABS. MONOCYTES 1.0 0 - 1.0 K/UL  
 ABS. EOSINOPHILS 0.1 0.0 - 0.4 K/UL  
 ABS. BASOPHILS 0.0 0.0 - 0.06 K/UL  
 DF MANUAL PLATELET COMMENTS ADEQUATE PLATELETS    
 RBC COMMENTS ANISOCYTOSIS 1+ 
    
 RBC COMMENTS POLYCHROMASIA 1+ 
    
 RBC COMMENTS BASOPHILIC STIPPLING 1+ 
    
 RBC COMMENTS OVALOCYTES 1+ METABOLIC PANEL, BASIC Collection Time: 04/20/20  1:52 AM  
Result Value Ref Range Sodium 141 136 - 145 mmol/L Potassium 4.5 3.5 - 5.5 mmol/L  Chloride 108 100 - 111 mmol/L  
 CO2 25 21 - 32 mmol/L Anion gap 8 3.0 - 18 mmol/L Glucose 130 (H) 74 - 99 mg/dL BUN 60 (H) 7.0 - 18 MG/DL Creatinine 1.91 (H) 0.6 - 1.3 MG/DL  
 BUN/Creatinine ratio 31 (H) 12 - 20 GFR est AA 30 (L) >60 ml/min/1.73m2 GFR est non-AA 25 (L) >60 ml/min/1.73m2 Calcium 9.5 8.5 - 10.1 MG/DL Signed By: Toni Tam MD   
 April 20, 2020 8:21 PM

## 2020-04-20 NOTE — PROGRESS NOTES
1925: Assumed patient care from [place] [id number]. Patient is alert and oriented to person, place, time and situation with periods of confusion noted. Respiratory status Is stable on 2L via nasal cannula. Vital signs are stable. MEWS score is a one. Patient denies any pain, discomfort, nausea vomiting dizziness or anxiety. White board and fall card is updated. Bed is locked and in lowest position. Call bell, water and personal belongings are within reach. Patient has no questions, comments or concerns after bedside shift report. 0700: Patient had an uneventful shift. Respiratory status, vital signs and MEWS score remained stable. Patient was resting quietly with no signs of distress noted. Bed locked and in lowest position. Call bell water and personal belongings were within reach. Patient had no questions, comments or concerns after bedside shift report.  Bedside report given to [facility] RAFAELA

## 2020-04-20 NOTE — PROGRESS NOTES
Progress Note Raad Mcmanus 
81 y.o. Admit Date: 4/11/2020 Active Problems: 
  Essential hypertension, benign () POA: Yes Overview: CONTROLLED Hip fracture requiring operative repair [facility]) (4/11/2020) POA: Unknown Chronic renal disease, stage III ([facility] (4/20/2020) POA: Unknown Anemia (4/20/2020) POA: Unknown Secondary hyperparathyroidism of renal origin ([facility] (4/20/2020) POA: Unknown Subjective:  
 
Patient feels ok,  Received blood transfusion yesterday. ,may go to to Rehab unit today. Jani Fuller A comprehensive review of systems was negative except for that written in the History of Present Illness. Objective:  
 
Visit Vitals BP 92/47 (BP 1 Location: Left arm, BP Patient Position: At rest) Pulse 72 Temp 97.4 °F (36.3 °C) Resp 18 Ht 5' (1.524 m) Wt 71.7 kg (158 lb) SpO2 99% BMI 30.86 kg/m² Intake/Output Summary (Last 24 hours) at 4/20/2020 1306 Last data filed at 4/19/2020 1925 Gross per 24 hour Intake 790 ml Output 0 ml Net 790 ml  
 
 
Current Facility-Administered Medications Medication Dose Route Frequency Provider Last Rate Last Dose  promethazine (PHENERGAN) 25 mg/mL injection  pantoprazole (PROTONIX) 40 mg in 0.9% sodium chloride 10 mL injection  40 mg IntraVENous Q12H Simón Baumann MD   40 mg at 04/20/20 0859  
 0.9% sodium chloride infusion 250 mL  250 mL IntraVENous PRN Simón Baumann MD      
 calcitRIOL (ROCALTROL) capsule 0.25 mcg  0.25 mcg Oral Q MON, WED & Hemanth Krishnan MD      
 B complex-vitaminC-folic acid (NEPHROCAP) cap  1 Cap Oral DAILY Shae De La Garza MD   1 Cap at 04/20/20 2610  metoprolol tartrate (LOPRESSOR) tablet 25 mg  25 mg Oral BID Simón Baumann MD   25 mg at 04/20/20 9385  atorvastatin (LIPITOR) tablet 40 mg  40 mg Oral QHS Simón Baumann MD   40 mg at 04/19/20 4436  
 methIMAzole (TAPAZOLE) tablet 10 mg  10 mg Oral Q12H Karli Newton MD   10 mg at 04/20/20 1493  acetaminophen (TYLENOL) tablet 650 mg  650 mg Oral Q8H Austin Yoon MD   650 mg at 04/20/20 3310  hydrocortisone (ANUSOL-HC) suppository 25 mg  25 mg Rectal Q12H Caroline Osman NP   25 mg at 04/20/20 0901  aluminum-magnesium hydroxide (MAALOX) oral suspension 15 mL  15 mL Oral QID PRN Austin Yoon MD   15 mL at 04/19/20 2382  polyethylene glycol (MIRALAX) packet 17 g  17 g Oral DAILY Equilla Benton, [place]   17 g at 04/20/20 0859  
 senna-docusate (PERICOLACE) 8.6-50 mg per tablet 1 Tab  1 Tab Oral DAILY Equilla Benton, [place]   1 Tab at 04/20/20 1595  morphine injection 1-2 mg  1-2 mg IntraVENous Q4H PRN Equilla Benton, [place]   2 mg at 04/20/20 0840  
 oxyCODONE IR (ROXICODONE) tablet 5 mg  5 mg Oral Q4H PRN Equtolu Carnes, PA   5 mg at 04/20/20 0800  
 ondansetron [facility]) injection 8 mg  8 mg IntraVENous Q6H PRN Equilla Benton, [place]   8 mg at 04/20/20 9609  allopurinoL (ZYLOPRIM) tablet 100 mg  100 mg Oral DAILY Equilla Benton, [place]   100 mg at 04/17/20 9457  LORazepam (ATIVAN) tablet 1 mg  1 mg Oral BID PRN Equilla Benton, [place]   1 mg at 04/20/20 9626 Physical Exam:  
 
Physical Exam:  
General:  Alert, cooperative, no distress, appears stated age. Neck: Supple, symmetrical, trachea midline, no adenopathy, thyroid: no enlargement/tenderness/nodules, no carotid bruit and no JVD. Lungs:   Clear to auscultation bilaterally. Heart:  Regular rate and rhythm, S1, S2 normal, no murmur, click, rub or gallop. Abdomen:   Soft, non-tender. Bowel sounds normal. No masses,  No organomegaly. Extremities: Extremities normal, atraumatic, no cyanosis or edema, mild soreness in right Hip. Skin: Skin color, texture, turgor normal. No rashes or lesions Data Review: CBC w/Diff Recent Labs  
  04/20/20 
0152 04/19/20 
0550 04/18/20 
5276 WBC 11.9 9.8 8.7  
RBC 2.82* 2.19* 2.23* HGB 8.6* 6.8* 7.0*  
HCT 27.4* 21.5* 21.2*  
 MCV 97.2* 98.2* 95.1 MCH 30.5 31.1 31.4 MCHC 31.4 31.6 33.0  
RDW 16.7* 16.4* 16.4* Recent Labs  
  04/20/20 
0152 04/19/20 
0550 04/18/20 
7715 BANDS 4  --   -- MONOS 8 12* 13* EOS 1 1 1  
BASOS 0 0 0  
RDW 16.7* 16.4* 16.4* Comprehensive Metabolic Profile Recent Labs  
  04/20/20 
0152 04/18/20 
9788  139  
K 4.5 4.7  109 CO2 25 24 BUN 60* 46* CREA 1.91* 1.47* Recent Labs  
  04/20/20 
0152 04/18/20 
4776 CA 9.5 9.4 Impression: Active Hospital Problems Diagnosis Date Noted  Chronic renal disease, stage III ([facility] 04/20/2020  Anemia 04/20/2020  Secondary hyperparathyroidism of renal origin ([facility] 04/20/2020  Hip fracture requiring operative repair ([facility] 04/11/2020  Essential hypertension, benign CONTROLLED Bun  & creatinine increased  further  ,with low BP, Pre renal ratio. Stool occult blood is positive  twice Plan: Will Give IVF at 50 cc /hour ,continue PPI, follow H& H  & renal function.  
 
 
Jimmy Snyder MD

## 2020-04-20 NOTE — PROGRESS NOTES
conducted a Follow up consultation and Spiritual Assessment for Diana Deleon, who is a 81 y.o.,female. The  provided the following Interventions: 
Patient was resting quietly but aroused when her name was called out. Continued the relationship of care and support. I noticed patient seemed to be very tired, almost slept in the middle of her meal. 
Listened empathically. Patient admittedly said, \"I got really sick the past few days. I am just so tired. \" Offered prayer, holy communion, and assurance of continued prayer on patient's behalf. Chart reviewed. The following outcomes were achieved: 
Patient expressed gratitude for 's visit. Assessment: 
There are no further spiritual or Gnosticism issues which require Spiritual Care Services interventions at this time. Plan: 
Chaplains will continue to follow and will provide pastoral care on an as needed/requested basis.  recommends bedside caregivers page  on duty if patient shows signs of acute spiritual or emotional distress. [facility], [address] [facility]  
[phone]

## 2020-04-20 NOTE — PROGRESS NOTES
Problem: Self Care Deficits Care Plan (Adult) Goal: *Acute Goals and Plan of Care (Insert Text) Description: Occupational Therapy Goals Initiated 4/13/2020 within 7 day(s). 1.  Patient will perform bed mobility in preparation for selfcare with minimal assistance/contact guard assist.  
2.  Patient will perform functional activity seated EOB for 4-7 minutes with modified independence and F+ balance. 3.  Patient will perform lower body dressing with supervision/set-up using AE prn. 
4.  Patient will perform toilet transfers with supervision/set-up. 5.  Patient will perform all aspects of toileting with supervision/set-up. 6.  Patient will participate in upper extremity therapeutic exercise/activities with modified independence for 8 minutes to increase ROM/strength for selfcare tasks. 7.  Patient will utilize energy conservation techniques during functional activities with verbal cues. Prior Level of Function: Patient lives with daughter in 2 story condo, 8 steps to bedroom and was independent with self-care and functional mobility PTA. Patient reports having shower chair at home Outcome: Progressing Towards Goal 
 OCCUPATIONAL THERAPY TREATMENT Patient: Lisa Meehan (89 y.o. female) Date: 4/20/2020 Diagnosis: Hip fracture requiring operative repair ([facility] [address] <principal problem not specified> Procedure(s) (LRB): 
RIGHT FEMORAL INTERTROCHANTERIC NAIL INSERTION (Right) 8 Days Post-Op Precautions: Fall, Skin, PWB(right LE) Chart, occupational therapy assessment, plan of care, and goals were reviewed. ASSESSMENT: 
Pt sleeping upon entry, arouse easily to voice. Pt confused, oriented to self only. Heart monitor leads off, supplemental O2 removed, PICC line found in bed. Reoriented pt and agreeable to EOB activity. Generalized weakness requires Max Assist w/bed mobility to maneuver to EOB.  Pt tolerates sitting EOB performing simple ADL grooming tasks w/hand over hand assist to initiate. Pt requesting to use bathroom, requiring 2 person assist w/functional transfer to [facility] using stand pivot technique. Poor dynamic standing balance requires Max Assist w/clothing mgt and bowel hygiene. Pt c/o dizziness, returned to supine w/2 person assist. Dizziness relieved in supine. Alerted NSG, Charo, pt's increase confusion and PICC line found in bed. Progression toward goals: 
[]          Improving appropriately and progressing toward goals [x]          Improving slowly and progressing toward goals 
[]          Not making progress toward goals and plan of care will be adjusted PLAN: 
Patient continues to benefit from skilled intervention to address the above impairments. Continue treatment per established plan of care. Discharge Recommendations:  Dimas Mccracken Further Equipment Recommendations for Discharge:  TBD by next level of care SUBJECTIVE:  
Patient stated I think that I need to use the toilet.  OBJECTIVE DATA SUMMARY:  
Cognitive/Behavioral Status: 
Neurologic State: Alert Orientation Level: Oriented to person Cognition: Follows commands Safety/Judgement: Fall prevention Functional Mobility and Transfers for ADLs: 
 Bed Mobility: 
Rolling: Maximum assistance;Assist x2 Supine to Sit: Maximum assistance;Assist x2 Sit to supine:Max Assist, Assist x 2 Scooting: Maximum assistance;Assist x2 Transfers: 
Sit to Stand: Maximum assistance;Assist x2 Toilet Transfer : Maximum assistance;Assist x2(EOB <--> BSC xfer w/SPT) Balance: 
Sitting: Impaired Sitting - Static: Fair (occasional)(+) Sitting - Dynamic: Fair (occasional) Standing: Impaired; With support Standing - Static: Poor Standing - Dynamic : Poor;Constant support ADL Intervention: 
Grooming Position Performed: Seated edge of bed Washing Face: Minimum assistance(requires hand over hand assist to initiate) Washing Hands: Stand-by assistance Toileting Toileting Assistance: Maximum assistance Bowel Hygiene: Maximum assistance Clothing Management: Maximum assistance Pain: 
Pain level pre-treatment: 0/10 Pain level post-treatment: 0/10 Pt c/o RLE pain w/minimal movement Activity Tolerance:   
Fair Please refer to the flowsheet for vital signs taken during this treatment. After treatment:  
[]  Patient left in no apparent distress sitting up in chair 
[x]  Patient left in no apparent distress in bed 
[x]  Call bell left within reach [x]  Nursing notified 
[]  Caregiver present 
[]  Bed alarm activated COMMUNICATION/EDUCATION:  
[] Role of Occupational Therapy in the acute care setting 
[] Home safety education was provided and the patient/caregiver indicated understanding. [] Patient/family have participated as able in working towards goals and plan of care. [x] Patient/family agree to work toward stated goals and plan of care. [] Patient understands intent and goals of therapy, but is neutral about his/her participation. [] Patient is unable to participate in goal setting and plan of care. Thank you for this referral. 
JANEL Rizvi Time Calculation: 32 mins

## 2020-04-20 NOTE — PROGRESS NOTES
[facility] 
 
 
CARDIOLOGY PROGRESS NOTE 
RECS: 
 
 
1. Paroxymal atrial fibrillation- converted to NSR. No an ideal candidate for anticoagulation due to recent  Rectal bleeding and significant drop in H&H requiring multiple transfusion. Would  Continue with rate control with bb. Continue asa. Recent Echo showed preserved EF% 2. indeterminate troponin- likely demand ischemia in the setting severe anemia, afib with RVR. We do not suspect an ACS 3. Hypertension-with intermittent hypotension. Continue ivf 4. Severe anemia- noted hgb drop pos op-s/p 4 units PRBC's - H&H low but stable 5. Rectal bleeding- possible due to hemorrhoids - resolved GI following 6. AMS- confused and agitated at times 7. s/p hemithyroidectomy ~ 12-13 years ago for nodules- Endocrino following patient on tapazole 8. Right hip fracture s/p ORIF 9. CKD- worsening renal function on gently hydration per Renal.  
 
Will f/u peripherally Remains in nsr. Call us as needed ASSESSMENT: 
Hospital Problems  Date Reviewed: 4/20/2020 Codes Class Noted POA Chronic renal disease, stage III (HCC) ICD-10-CM: N18.3 ICD-9-CM: 585.3  4/20/2020 Unknown Anemia ICD-10-CM: D64.9 ICD-9-CM: 285.9  4/20/2020 Unknown Secondary hyperparathyroidism of renal origin [facility]) ICD-10-CM: N25.81 ICD-9-CM: 588.81  4/20/2020 Unknown Hip fracture requiring operative repair [facility]) ICD-10-CM: K45.431E ICD-9-CM: 820.8  4/11/2020 Unknown Essential hypertension, benign ICD-10-CM: I10 
ICD-9-CM: 401.1  Unknown Yes Overview Signed 7/10/2013  6:40 PM by Grecia Aleman SUBJECTIVE: 
Confused Can no remember why she is here in the hospital.  
 
OBJECTIVE: 
 
VS:  
Visit Vitals BP 92/47 (BP 1 Location: Left arm, BP Patient Position: At rest) Pulse 72 Temp 97.4 °F (36.3 °C) Resp 18 Ht 5' (1.524 m) Wt 71.7 kg (158 lb) SpO2 99% BMI 30.86 kg/m² Intake/Output Summary (Last 24 hours) at 4/20/2020 1410 Last data filed at 4/19/2020 1925 Gross per 24 hour Intake 670 ml Output 0 ml Net 670 ml TELE: normal sinus rhythm General: alert, well developed, anxious and in mild distress HENT: Normocephalic, atraumatic. Normal external eye. Neck :  no bruit, no JVD Cardiac:  regular rate and rhythm, S1, S2 normal, no murmur, click, rub or gallop Lungs: clear to auscultation bilaterally Abdomen: Soft, nontender, no masses Extremities:  No c/c/e, peripheral pulses present Labs: Results:  
   
Chemistry Recent Labs  
  04/20/20 
0564 04/18/20 2014 * 116*  139  
K 4.5 4.7  109 CO2 25 24 BUN 60* 46* CREA 1.91* 1.47* CA 9.5 9.4 AGAP 8 6 BUCR 31* 31* CBC w/Diff Recent Labs  
  04/20/20 
0152 04/19/20 
0550 04/18/20 
5015 WBC 11.9 9.8 8.7  
RBC 2.82* 2.19* 2.23* HGB 8.6* 6.8* 7.0*  
HCT 27.4* 21.5* 21.2*  
 186 169 GRANS 77* 72 74* LYMPH 10* 15* 12* EOS 1 1 1 Cardiac Enzymes No results for input(s): CPK, CKND1, MICHELLE in the last 72 hours. No lab exists for component: Autumn Amabile Coagulation No results for input(s): PTP, INR, APTT, INREXT in the last 72 hours. Lipid Panel Lab Results Component Value Date/Time Cholesterol, total 182 01/16/2020 01:39 PM  
 HDL Cholesterol 55 01/16/2020 01:39 PM  
 LDL, calculated 99 01/16/2020 01:39 PM  
 Triglyceride 140 01/16/2020 01:39 PM  
 CHOL/HDL Ratio 3.3 01/16/2020 01:39 PM  
  
BNP No results for input(s): BNPP in the last 72 hours. Liver Enzymes No results for input(s): TP, ALB, TBIL, AP, SGOT, GPT in the last 72 hours. No lab exists for component: DBIL Thyroid Studies Lab Results Component Value Date/Time TSH 48.20 (H) 04/13/2020 05:18 AM  
    
 
 
 
Mariah MOORE Joycelyn:[phone] supervised I have independently evaluated and examined the patient. All relevant labs and testing data's are reviewed. Care plan discussed and updated after review.  
 
Esdras Malhotra MD

## 2020-04-20 NOTE — PROGRESS NOTES
[facility] Hospitalist Group Progress Note Patient: Horace Cr Age: 81 y.o. : 1935 MR#: [id number] SSN: [id number] Date/Time: 2020 Subjective: Today patient is more confused apparently since she had last evening the confusion gradually increased to now currently, she pulled her midline removing her clothes and will get uncooperative drowsy No distress No respiratory distress Assessment/Plan: 1. Hip fracture  s/p ORIF.- on alternate DVT prophylaxis - Acute blood loss anemia due to # 1: H&H improved after PRBC transfusion 4/15. Also had guiac +ve brown stool, s/p GI eval w/ recs made for no endoscopy. No clear overt GI bleeding at this time. Follow H+H and transfuse PRN. - Monitor H/H - continue PPI  
- Again drop in H/H noted - 1 unit PRBC ordered , Renal consulted , monitor h/H - if stable dc in AM  
- Again patient is not on anticoagulation , just SCD 2. Mechanical fall 3. CKD III, at baseline creat, Dr. Blaire Cid is nephrologist 
4. Goiter w/ partial thyroidectomy, pt states she is on thyroid medication but does not know name/dose, TSH high, free T4 low, concern for hypothyroid versus overtreated hyper. Discussed with endocrine Dr. Ivon Chopra, patient on methimazole at home but recommend to hold for 2 weeks and then resume at lower dose. Endocrine recommends iodine uptake test, first part to be done today . 5. HTN: BP stable, monitor off medications. 6. GERD: Continue PPI and add Maalox as needed. 7. DVT prophylaxis - Per ortho . 8. New onset of Afib with RVR - On Cardizem gtt. For rate control  -Case discussed with cardiology yesterday, currently afebrile appears probably secondary to thyroid dysfunction, once the thyroid function is normalized and if the A. fib reoccurs then further investigation and management can be done we will continue treating rate control and thyroid dysfunction. Patient has a good follow-up with her endocrinologist will continue with currently she has been placed on methimazole 9. Hyperkalemia - Improved 10. Full code PLAN  
- thyroid uptake scan : 
IMPRESSION IMPRESSION: 
  
1. Elevated uptake values in keeping with hyperthyroidism. Similar heterogeneous 
scan appearance of the remnant enlarged right thyroid gland. Overall findings 
suggestive of toxic multinodular goiter. - Follow ECHO and cardiology recommendations If DVT prophylaxis can be changed to SCD , avoid high dose of ASA / heparin as drop in h/h again noted . D/W ortho . - will change DVT prophylaxis to SCD as patient's H/h dropped again , ortho will place order  
 
-Patient received 1 unit of PRBC and Venofer dose 
 
-Patient is on multiple narcotic medication and sedating medication we will discontinue morphine discontinue Ativan discontinue Phenergan discontinue any p.o. narcotic medications-monitor patient's mental function. Though stroke could be a possibility very unlikely because the progression is very gradual 
At this point she will not allow us to do any imaging. -IV hydration monitor electrolytes and follow-up with renal functions Case discussed with:  [x]Patient  [x]Family  []Nursing  []Case Management DVT Prophylaxis:  []Lovenox  []Hep SQ  [x]SCDs  []Coumadin   []Eliquis/Xarelto Objective:  
VS:  
Visit Vitals BP 92/47 (BP 1 Location: Left arm, BP Patient Position: At rest) Pulse 72 Temp 97.4 °F (36.3 °C) Resp 18 Ht 5' (1.524 m) Wt 71.7 kg (158 lb) SpO2 99% BMI 30.86 kg/m² Tmax/24hrs: Temp (24hrs), Av.9 °F (36.6 °C), Min:97.4 °F (36.3 °C), Max:98.1 °F (36.7 °C) IOBRIEF Intake/Output Summary (Last 24 hours) at 2020 1443 Last data filed at 2020 1925 Gross per 24 hour Intake 670 ml Output 0 ml Net 670 ml General:  Alert, cooperative, no acute distress Pulmonary:  CTA Bilaterally. No Wheezing/Rales. Cardiovascular: Regular rate and Rhythm. GI:  Soft, Non distended, Non tender. + Bowel sounds. Extremities:  No edema. Neurologic: Alert and oriented X 3. Medications:  
Current Facility-Administered Medications Medication Dose Route Frequency  0.9% sodium chloride infusion  50 mL/hr IntraVENous CONTINUOUS  
 pantoprazole (PROTONIX) 40 mg in 0.9% sodium chloride 10 mL injection  40 mg IntraVENous Q12H  
 0.9% sodium chloride infusion 250 mL  250 mL IntraVENous PRN  
 calcitRIOL (ROCALTROL) capsule 0.25 mcg  0.25 mcg Oral Q MON, WED & FRI  B complex-vitaminC-folic acid (NEPHROCAP) cap  1 Cap Oral DAILY  metoprolol tartrate (LOPRESSOR) tablet 25 mg  25 mg Oral BID  atorvastatin (LIPITOR) tablet 40 mg  40 mg Oral QHS  methIMAzole (TAPAZOLE) tablet 10 mg  10 mg Oral Q12H  
 acetaminophen (TYLENOL) tablet 650 mg  650 mg Oral Q8H  
 hydrocortisone (ANUSOL-HC) suppository 25 mg  25 mg Rectal Q12H  
 aluminum-magnesium hydroxide (MAALOX) oral suspension 15 mL  15 mL Oral QID PRN  polyethylene glycol (MIRALAX) packet 17 g  17 g Oral DAILY  senna-docusate (PERICOLACE) 8.6-50 mg per tablet 1 Tab  1 Tab Oral DAILY  ondansetron (ZOFRAN) injection 8 mg  8 mg IntraVENous Q6H PRN  
 allopurinoL (ZYLOPRIM) tablet 100 mg  100 mg Oral DAILY Labs:   
Recent Results (from the past 24 hour(s)) PROTEIN/CREATININE RATIO, URINE Collection Time: 04/19/20  3:15 PM  
Result Value Ref Range Protein, urine random 60 (H) <11.9 mg/dL Creatinine, urine 100.00 30 - 125 mg/dL Protein/Creat. urine Ratio 0.6 CREATININE, UR, RANDOM Collection Time: 04/19/20  3:15 PM  
Result Value Ref Range Creatinine, urine 101.00 30 - 125 mg/dL PROTEIN URINE, RANDOM Collection Time: 04/19/20  3:15 PM  
Result Value Ref Range Protein, urine random 59 (H) <11.9 mg/dL T4, FREE Collection Time: 04/20/20  1:52 AM  
Result Value Ref Range  T4, Free 2.6 (H) 0.7 - 1.5 NG/DL  
 CBC WITH AUTOMATED DIFF Collection Time: 04/20/20  1:52 AM  
Result Value Ref Range WBC 11.9 4.6 - 13.2 K/uL  
 RBC 2.82 (L) 4.20 - 5.30 M/uL HGB 8.6 (L) 12.0 - 16.0 g/dL HCT 27.4 (L) 35.0 - 45.0 % MCV 97.2 (H) 74.0 - 97.0 FL  
 MCH 30.5 24.0 - 34.0 PG  
 MCHC 31.4 31.0 - 37.0 g/dL  
 RDW 16.7 (H) 11.6 - 14.5 % PLATELET 001 491 - 079 K/uL MPV 10.6 9.2 - 11.8 FL  
 NEUTROPHILS 77 (H) 42 - 75 % BAND NEUTROPHILS 4 0 - 5 % LYMPHOCYTES 10 (L) 20 - 51 % MONOCYTES 8 2 - 9 % EOSINOPHILS 1 0 - 5 % BASOPHILS 0 0 - 3 % NRBC 1.0 (H) 0  WBC  
 ABS. NEUTROPHILS 9.6 (H) 1.8 - 8.0 K/UL  
 ABS. LYMPHOCYTES 1.2 0.8 - 3.5 K/UL  
 ABS. MONOCYTES 1.0 0 - 1.0 K/UL  
 ABS. EOSINOPHILS 0.1 0.0 - 0.4 K/UL  
 ABS. BASOPHILS 0.0 0.0 - 0.06 K/UL  
 DF MANUAL PLATELET COMMENTS ADEQUATE PLATELETS    
 RBC COMMENTS ANISOCYTOSIS 1+ 
    
 RBC COMMENTS POLYCHROMASIA 1+ 
    
 RBC COMMENTS BASOPHILIC STIPPLING 1+ 
    
 RBC COMMENTS OVALOCYTES 1+ METABOLIC PANEL, BASIC Collection Time: 04/20/20  1:52 AM  
Result Value Ref Range Sodium 141 136 - 145 mmol/L Potassium 4.5 3.5 - 5.5 mmol/L Chloride 108 100 - 111 mmol/L  
 CO2 25 21 - 32 mmol/L Anion gap 8 3.0 - 18 mmol/L Glucose 130 (H) 74 - 99 mg/dL BUN 60 (H) 7.0 - 18 MG/DL Creatinine 1.91 (H) 0.6 - 1.3 MG/DL  
 BUN/Creatinine ratio 31 (H) 12 - 20 GFR est AA 30 (L) >60 ml/min/1.73m2 GFR est non-AA 25 (L) >60 ml/min/1.73m2 Calcium 9.5 8.5 - 10.1 MG/DL Signed By: Antonella Crews MD   
 April 20, 2020 Disclaimer: Sections of this note are dictated using utilizing voice recognition software. Minor typographical errors may be present. If questions arise, please do not hesitate to contact me or call our department.

## 2020-04-20 NOTE — PROGRESS NOTES
[address] Progress Note Patient: Graham Webb               Sex: female          DOA: 4/11/2020 YOB: 1935      Age:  81 y.o.        LOS:  LOS: 9 days S/P  Procedure(s): RIGHT FEMORAL INTERTROCHANTERIC NAIL INSERTION Subjective:  
 
Patient working with PT this am. A little fatigued this am 
 
Denies cp, sob, abd pain Objective:  
  
Blood pressure 120/61, pulse 82, temperature 97.8 °F (36.6 °C), resp. rate 18, height 5' (1.524 m), weight 158 lb (71.7 kg), SpO2 97 %. Well developed, Well Nourished alert, cooperative, no distress Incision slight serous drainage, dressing in place Mild swelling and tenderness noted in right lower extremity. No significant evidence of active bleeding Sensory is intact Motor is intact 
nv intact 2+distal pulses Neg calf tenderness Labs: 
CBC 
@ 
CBC:  
Lab Results Component Value Date/Time WBC 11.9 04/20/2020 01:52 AM  
 RBC 2.82 (L) 04/20/2020 01:52 AM  
 HGB 8.6 (L) 04/20/2020 01:52 AM  
 HCT 27.4 (L) 04/20/2020 01:52 AM  
 PLATELET 510 10/39/5111 01:52 AM  
@ Coagulation Lab Results Component Value Date INR 1.3 (H) 04/11/2020 Basic Metabolic Profile Lab Results Component Value Date  04/20/2020 CO2 25 04/20/2020 BUN 60 (H) 04/20/2020 Medications Reviewed Assessment/Plan Active Problems: 
  Hip fracture requiring operative repair ([address] (4/11/2020) Procedure(s): RIGHT FEMORAL INTERTROCHANTERIC NAIL INSERTION :  
 
1. PT and/or OT Consults: YES continue PT/OT: oob to chair, gentle rom                         PWB 2. Incision Care:  Routine Incision Care and Dressing Changes as necessary: dressing changes POD#2 and as needed 3. Pain control 4. SCD only for DVT prophylaxis 5. Acute blood loss anemia - stable today Appreciate medicine input on patients medical issues 4.  DISCHARGE PLANNING 
 
  Intervention : Dimas Mccracken ([facility]) Makenna Moss for d/c per ortho standpoint Davion Demarco, [facility] 
[phone]

## 2020-04-21 PROBLEM — I48.91 ATRIAL FIBRILLATION (HCC): Status: ACTIVE | Noted: 2020-01-01

## 2020-04-21 NOTE — PROGRESS NOTES
[facility]. Progress Note Patient: Gomez Kaur               Sex: female          DOA: 4/11/2020 YOB: 1935      Age:  81 y.o.        LOS:  LOS: 10 days S/P  Procedure(s): RIGHT FEMORAL INTERTROCHANTERIC NAIL INSERTION Subjective:  
 
Patient confused this am. Was put in restraints last night. Denies cp, sob, abd pain Objective:  
  
Blood pressure 98/64, pulse 99, temperature 98.6 °F (37 °C), resp. rate 19, height 5' (1.524 m), weight 72 lb (32.7 kg), SpO2 94 %. Well developed, Well Nourished alert, cooperative, no distress Incision slight serous drainage, dressing in place Mild swelling and tenderness noted in right lower extremity. No significant evidence of active bleeding Sensory is intact Motor is intact 
nv intact 2+distal pulses Neg calf tenderness Labs: 
CBC 
@ 
CBC:  
Lab Results Component Value Date/Time WBC 13.6 (H) 04/21/2020 03:21 AM  
 RBC 2.80 (L) 04/21/2020 03:21 AM  
 HGB 8.6 (L) 04/21/2020 03:21 AM  
 HCT 26.9 (L) 04/21/2020 03:21 AM  
 PLATELET 251 23/79/3490 03:21 AM  
@ Coagulation Lab Results Component Value Date INR 1.3 (H) 04/11/2020 Basic Metabolic Profile Lab Results Component Value Date  04/21/2020 CO2 23 04/21/2020 BUN 65 (H) 04/21/2020 Medications Reviewed Assessment/Plan Active Problems: 
  Essential hypertension, benign () Overview: CONTROLLED Hip fracture requiring operative repair ([facility] (4/11/2020) Chronic renal disease, stage III ([facility] (4/20/2020) Anemia (4/20/2020) Secondary hyperparathyroidism of renal origin ([facility] (4/20/2020) Procedure(s): RIGHT FEMORAL INTERTROCHANTERIC NAIL INSERTION :  
 
1. PT and/or OT Consults: YES continue PT/OT: oob to chair, gentle rom                         PWB 2. Incision Care:  Routine Incision Care and Dressing Changes as necessary: dressing changes POD#2 and as needed 3. Pain control 4. SCD only for DVT prophylaxis 5. Acute blood loss anemia - stable today Appreciate medicine input on patients medical issues 4. DISCHARGE PLANNING  Intervention : Dimas Nawaf ([facility]) Will sign off at this time. Patients stay elongated secondary to medical issues Ortho Discharge instructions: 
PT/OT: PWB x 3 weeks then advance to WBAT with walker Staples removed on 4/23 F/u with ortho in 8 weeks Anali Chiu PA-C [phone] 
[phone]

## 2020-04-21 NOTE — PROGRESS NOTES
Problem: Mobility Impaired (Adult and Pediatric) Goal: *Acute Goals and Plan of Care (Insert Text) Description: Physical Therapy Goals Initiated 4/13/2020, reevaluated 4/21/2020 and to be accomplished within 7 day(s) 1. Patient will move from supine to sit and sit to supine , scoot up and down and roll side to side in bed with moderate assistance. 2.  Patient will transfer from bed to chair and chair to bed with moderate assistance using the least restrictive device. 3.  Patient will perform sit to stand with moderate assistance. 4.  Patient will ambulate with moderate assistance for 10-25 feet with the least restrictive device. 5.  Assess stairs when appropriate. PLOF: Patient reports she is independent with ADLs and functional mobility. Outcome: Progressing Towards Goal 
 PHYSICAL THERAPY RE-EVALUATION and TREATMENT Patient: Roxy Phillip (37 y.o. female) Date: 4/21/2020 Primary Diagnosis: Hip fracture requiring operative repair ([facility] [place] Procedure(s) (LRB): 
RIGHT FEMORAL INTERTROCHANTERIC NAIL INSERTION (Right) 9 Days Post-Op Precautions:   Fall, Skin, PWB(right LE) ASSESSMENT : 
Patient is cleared by nursing for PT reevaluation, and patient consents to therapy. Performed co-treatment with occupational therapy to increase participation, safety, and functional mobility. Pt just returned with transport. PT and OT assisted off the stretcher maximum assistance x2. Scooting to EOB maximum assistance. Sit to stands from stretcher, chair, and BSC maximum assistance x2. Stand pivot transfers from stretcher to chair, chair to [facility], and BSC to chair maximum assistance x2. Pt has difficulty maintaining PWB. Educated throughout session for [address] [place] [address]. Pt having difficulty performing correct sequencing with stepping and weight shifting with transfers and attempts with gait. Pt at times will not use UE in standing causing too much weight on LE especially R LE.  Pt needs increased time with activities and increased cues. Pt has poor safety awareness and continues to have confusion with session. Pt ended therapy sitting in recliner with all needs met. Patient will benefit from skilled intervention to address the above impairments. Patient's rehabilitation potential is considered to be Fair Factors which may influence rehabilitation potential include:   
[]         None noted 
[x]         Mental ability/status []         Medical condition 
[]         Home/family situation and support systems 
[]         Safety awareness 
[]         Pain tolerance/management 
[]         Other: PLAN : 
Recommendations and Planned Interventions:   
[x]           Bed Mobility Training             [x]    Neuromuscular Re-Education 
[x]           Transfer Training                   []    Orthotic/Prosthetic Training 
[x]           Gait Training                          [x]    Modalities [x]           Therapeutic Exercises           [x]    Edema Management/Control 
[x]           Therapeutic Activities            [x]    Family Training/Education 
[x]           Patient Education 
[]           Other (comment): Frequency/Duration: Patient will be followed by physical therapy 1-2 times per day/4-7 days per week to address goals. Discharge Recommendations: Dimas Mccracken Further Equipment Recommendations for Discharge: TBD next level of care SUBJECTIVE:  
Patient stated I don't think I can (pt does think she can perform any mobility).  OBJECTIVE DATA SUMMARY:  
Hospital course since last seen and reason for re-evaluation: Pt has been seen a few times in the past week and is making progress toward goals. Pt has most difficulty with standing and gait at this time. Updated goals due to slow progression. Past Medical History:  
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   
 Nonspecific abnormal electrocardiogram (ECG) (EKG) Obesity, unspecified Other and unspecified hyperlipidemia Renal insufficiency Past Surgical History:  
Procedure Laterality Date ABDOMEN SURGERY PROC UNLISTED Explor lap COLONOSCOPY N/A 5/31/2018 COLONOSCOPY w polypectomy performed by James Hester MD at [address] Left HX CATARACT REMOVAL INSERT PROSTHETIC LENS: LEFT,RIGHT  
 HX GYN    
 HX ORTHOPAEDIC Left Rotator cuff repair HX PARTIAL THYROIDECTOMY    
 MT BIOPSY OF BREAST, INCISIONAL    
 rt & lt 12 yrs ago Barriers to Learning/Limitations: yes;  cognitive and altered mental status (i.e.Sedation, Confusion) Compensate with: Visual Cues, Verbal Cues, and Tactile Cues Home Situation:  
Home Situation Home Environment: Private residence One/Two Story Residence: Two story Living Alone: No 
Support Systems: Child(do) Patient Expects to be Discharged to[de-identified] Rehabilitation facility Current DME Used/Available at Home: None Tub or Shower Type: Tub/Shower combination Critical Behavior: 
Neurologic State: Alert Orientation Level: Oriented to person;Disoriented to place; Disoriented to time Cognition: Decreased attention/concentration Safety/Judgement: Fall prevention Psychosocial 
Patient Behaviors: Calm; Cooperative B LE Strength:   
Strength: Generally decreased, functional             
B LE Tone & Sensation:  
Tone: Normal         
Sensation: Intact B LE Range Of Motion: 
AROM: Generally decreased, functional       
        
Posture: 
Posture (WDL): Exceptions to [facility] Posture Assessment: Forward head Functional Mobility: 
Bed Mobility: 
  
Supine to Sit: Maximum assistance;Assist x2 Scooting: Maximum assistance(to EOB) Transfers: 
Sit to Stand: Maximum assistance;Assist x2 Stand to Sit: Maximum assistance;Assist x2 Bed to Chair: Maximum assistance;Assist x2 Balance:  
Sitting: Intact; With support Sitting - Static: Good (unsupported) Sitting - Dynamic: Fair (occasional) Standing: Impaired; With support Standing - Static: Poor Standing - Dynamic : Poor Therapeutic Exercises:  
Reviewed and performed ankle pumps to increase blood flow and circulation. Pain: 
Mild pain noted with R hip during mobility but none reported prior or end of session. Pain Intervention(s) : Medication (see MAR); Rest, Repositioning Response to intervention: Nurse notified, See doc flow Activity Tolerance:  
Fair- 
Please refer to the flowsheet for vital signs taken during this treatment. After treatment:  
[x]         Patient left in no apparent distress sitting up in chair 
[]         Patient left in no apparent distress in bed 
[x]         Call bell left within reach [x]   Personal items in reach [x]         Nursing notified [facility] 
[]         Caregiver present 
[]         Bed/chair alarm activated 
[]         SCDs applied COMMUNICATION/EDUCATION:  
[x]         Role of Physical Therapy in the acute care setting. [x]         Fall prevention education was provided and the patient/caregiver indicated understanding. [x]         Patient/family have participated as able in goal setting and plan of care. [x]         Patient/family agree to work toward stated goals and plan of care. []         Patient understands intent and goals of therapy, but is neutral about his/her participation. []         Patient is unable to participate in goal setting/plan of care: ongoing with therapy staff. [x]         Out of bed at least 1-3 times a day with nursing assistance. []         Other: Thank you for this referral. 
Ting Ledezma, PT, DPT Time Calculation: 29 mins

## 2020-04-21 NOTE — PROGRESS NOTES
[facility] Hospitalist Group Progress Note Patient: Lisa Meehan Age: 81 y.o. : 1935 MR#: [id number] SSN: [id number] Date: 2020 Subjective:  
 
Pt is awaiting SNF placement. FT4 plateauing, with last FT4 at 2.6 Assessment/Plan: This is an 82 yo AAF with a medical history significant for MNG and Graves' disease, on MMI for treatment. She states that she only take MMI 10 mg po daily instead of her prescribed 10mg po bid, but does seem to get confused about the names of her medications and fills her own pill boxes with her, multiple, medications. She is followed by Dr. Durwood Lombard for her MNG and has been recommended for completion thyroidectomy. Given that she has some compressive symptoms and may be having some difficulty with medication compliance, she may benefit from surgery. This can be done as an outpt. 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 She m/l would  benefit from definitive therapy. Afib with RVR may be due to her hyperthyroidism, and once thyroid function is in normal range, if related to hyperthyroidism, symptoms should resolve. 
___________________________________________________ PLAN:   
 
 -will  continue MMI at 10 mg po bid while inpatient , then decrease to MMI 10 mg po daily on discharge. 
 
-will check FT4 daily to monitor trend.  
 
Thank you for the consult, will continue to follow pt with you while admitted to the hospital. 
 
Disposition: will need follow up with Dr. Home Gore  In ~ 4-6 weeks to have TSH/FT4 recheck to see if any further dose adjustments are needed. Address is [address] [place] [address]. Contact phone number is [phone]. 
  
 Discussed plan with Hospitalist, Dr. Carmen Zapata. Objective:  
VS:  
Visit Vitals BP 98/64 Pulse 99 Temp 98.6 °F (37 °C) Resp 19 Ht 5' (1.524 m) Wt 32.7 kg (72 lb) SpO2 94% BMI 14.06 kg/m² Tmax/24hrs: Temp (24hrs), Av.2 °F (36.8 °C), Min:97.4 °F (36.3 °C), Max:98.9 °F (37.2 °C) Intake/Output Summary (Last 24 hours) at 2020 1113 Last data filed at 2020 5779 Gross per 24 hour Intake  Output 500 ml Net -500 ml IMAGING 
 
2020 Thyroid scan and uptake COMPARISON: 2011. 
  
CORRELATION: None. 
  
DESCRIPTION: After ingestion of 257 microcuries 123Iodine, uptake of radioiodine 
by the thyroid gland was measured at 1, 6 and 24 hours. Images of the thyroid 
gland were obtained in anterior and bilateral anterior oblique projections using 
pinhole collimator, and additional anterior image was obtained with size markers 
in place. 
  
FINDINGS:   
  
Thyroid uptake is calculated as approximately 20% in one hour, 67% at 6 hour and 
40% in 24 hours.   
  
There is absence of the left thyroid gland. The right thyroid gland is large and 
heterogeneous. No dominant area of focal increased or decreased activity seen. Overall pattern appears similar to that of . .   
  
IMPRESSION: 
  
1. Elevated uptake values in keeping with hyperthyroidism. Similar heterogeneous 
scan appearance of the remnant enlarged right thyroid gland. Overall findings 
suggestive of toxic multinodular goiter. Labs:   
 
Results for Maye March (MRN [id number]) as of 2020 11:21 Ref. Range 2020 01:13 2020 06:25 2020 05:50 2020 01:52 2020 03:21  
T4, Free Latest Ref Range: 0.7 - 1.5 NG/DL 3.4 (H) 3.7 (H) 2.7 (H) 2.6 (H) 2.6 (H)  
 
 
  
Component Value Flag Ref Range Units Status Thyroid Stim Immunoglobulin 20.00  High   0.00 - 0.55 IU/L Final  
 
Component Value Flag Ref Range Units Status Thyroid peroxidase Ab 8   0 - 34 IU/mL Final  
Thyroglobulin Ab PENDING    IU/mL Incomplete Recent Results (from the past 24 hour(s)) EKG, 12 LEAD, SUBSEQUENT Collection Time: 04/20/20  2:45 PM  
Result Value Ref Range Ventricular Rate 79 BPM  
 Atrial Rate 79 BPM  
 P-R Interval 134 ms QRS Duration 88 ms Q-T Interval 368 ms QTC Calculation (Bezet) 421 ms Calculated P Axis 56 degrees Calculated R Axis 57 degrees Calculated T Axis 29 degrees Diagnosis Normal sinus rhythm T wave abnormality, consider anterior ischemia Abnormal ECG When compared with ECG of 17-APR-2020 15:29, 
premature atrial complexes are no longer present T wave inversion now evident in Anterior leads Confirmed by Lore Hinson MD, --- ([id number]) on 4/20/2020 4:44:23 PM 
  
T4, FREE Collection Time: 04/21/20  3:21 AM  
Result Value Ref Range T4, Free 2.6 (H) 0.7 - 1.5 NG/DL  
CBC WITH AUTOMATED DIFF Collection Time: 04/21/20  3:21 AM  
Result Value Ref Range WBC 13.6 (H) 4.6 - 13.2 K/uL  
 RBC 2.80 (L) 4.20 - 5.30 M/uL HGB 8.6 (L) 12.0 - 16.0 g/dL HCT 26.9 (L) 35.0 - 45.0 % MCV 96.1 74.0 - 97.0 FL  
 MCH 30.7 24.0 - 34.0 PG  
 MCHC 32.0 31.0 - 37.0 g/dL  
 RDW 16.4 (H) 11.6 - 14.5 % PLATELET 642 577 - 135 K/uL MPV 11.0 9.2 - 11.8 FL  
 NEUTROPHILS 74 42 - 75 % BAND NEUTROPHILS 4 0 - 5 % LYMPHOCYTES 15 (L) 20 - 51 % MONOCYTES 7 2 - 9 % EOSINOPHILS 0 0 - 5 % BASOPHILS 0 0 - 3 % NRBC 1.0 (H) 0  WBC  
 ABS. NEUTROPHILS 10.6 (H) 1.8 - 8.0 K/UL  
 ABS. LYMPHOCYTES 2.0 0.8 - 3.5 K/UL  
 ABS. MONOCYTES 1.0 0 - 1.0 K/UL  
 ABS. EOSINOPHILS 0.0 0.0 - 0.4 K/UL  
 ABS. BASOPHILS 0.0 0.0 - 0.06 K/UL  
 DF MANUAL PLATELET COMMENTS ADEQUATE PLATELETS    
 RBC COMMENTS ANISOCYTOSIS 1+ 
    
 RBC COMMENTS BASOPHILIC STIPPLING 1+ 
    
 RBC COMMENTS POLYCHROMASIA 1+ 
    
 RBC COMMENTS OVALOCYTES 1+ METABOLIC PANEL, BASIC Collection Time: 04/21/20  3:21 AM  
Result Value Ref Range Sodium 144 136 - 145 mmol/L Potassium 5.0 3.5 - 5.5 mmol/L  Chloride 112 (H) 100 - 111 mmol/L  
 CO2 23 21 - 32 mmol/L Anion gap 9 3.0 - 18 mmol/L Glucose 119 (H) 74 - 99 mg/dL BUN 65 (H) 7.0 - 18 MG/DL Creatinine 1.85 (H) 0.6 - 1.3 MG/DL  
 BUN/Creatinine ratio 35 (H) 12 - 20 GFR est AA 32 (L) >60 ml/min/1.73m2 GFR est non-AA 26 (L) >60 ml/min/1.73m2 Calcium 9.9 8.5 - 10.1 MG/DL  
EKG, 12 LEAD, SUBSEQUENT Collection Time: 04/21/20  5:11 AM  
Result Value Ref Range Ventricular Rate 196 BPM  
 Atrial Rate 170 BPM  
 QRS Duration 78 ms Q-T Interval 196 ms QTC Calculation (Bezet) 354 ms Calculated R Axis 85 degrees Calculated T Axis -32 degrees Diagnosis Atrial fibrillation with rapid ventricular response Nonspecific ST and T wave abnormality , probably digitalis effect Abnormal ECG When compared with ECG of 20-APR-2020 14:45, Atrial fibrillation has replaced Sinus rhythm Vent. rate has increased  BPM 
ST now depressed in Inferior leads T wave inversion less evident in Anterior leads Signed By: Jossy Barrett MD   
 April 21, 2020 8:21 PM

## 2020-04-21 NOTE — CDMP QUERY
Pt admitted withHip fracture  s/p ORIF /Pt noted to have AMS . If possible, please document in the progress notes and d/c summary if you are evaluating and / or treating any of the following: ? Metabolic Encephalopathy ? Toxic/Met  Encephalopathy ? Delirium due to medication ? Delirium due to other (please specify) ? AMS without encephalopathy or delirium ? Other Encephalopathy 
? Other, please specify ? Clinically unable to determine The medical record reflects the following: 
   Risk Factors:Hip fracture  s/p ORIF with ckd 3,and ow T4 Clinical Indicators: AMS,Patient is on multiple narcotic medication and sedating medication we will discontinue morphine discontinue Ativan discontinue Phenergan discontinue any p.o. narcotic medications-monitor patient's mental function,Electrolyte's on lab reports abnl Treatment: Bed alarms,labs monitored CT ordered Thank you [employer] RN    [phone]

## 2020-04-21 NOTE — PROGRESS NOTES
Interdisciplinary Round Note Patient Information: Patient Information: Marquise Oneil [address] [facility] Reason for Admission: Hip fracture requiring operative repair ([facility] [address] Phyllis Bhagats Attending Provider:   Abena Vigil MD 
 Past Medical History:  
Past Medical History:  
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   
 Nonspecific abnormal electrocardiogram (ECG) (EKG)  Obesity, unspecified  Other and unspecified hyperlipidemia  Renal insufficiency Hospital day: 10 
Estimated discharge date: 4/22/20 RRAT Score: Medium Risk 15 Total Score 3 Patient Length of Stay (>5 days = 3)  
 5 Pt. Coverage (Medicare=5 , Medicaid, or Self-Pay=4) 6 Charlson Comorbidity Score (Age + Comorbid Conditions) Criteria that do not apply:  
 Has Seen PCP in Last 6 Months (Yes=3, No=0) . Living with Significant Other. Assisted Living. LTAC. SNF. or  
Rehab  
 IP Visits Last 12 Months (1-3=4, 4=9, >4=11) Goals for Today: CAT scan VITAL SIGNS Vitals:  
 04/21/20 9729 04/21/20 7423 04/21/20 0355 04/21/20 4591 BP: 138/63 116/66 98/63 98/64 Pulse: 100 (!) 150 (!) 130 99 Resp: 19 19 19 Temp: 98.6 °F (37 °C)  98.6 °F (37 °C) SpO2: 97% 98% 94% Weight: 32.7 kg (72 lb) Height:      
 
 
 
 Lines, Drains, & Airways [REMOVED] Peripheral IV 04/11/20 Right Antecubital-Site Assessment: Clean, dry, & intact Peripheral IV 04/20/20 Right;Upper Arm-Site Assessment: Clean, dry, & intact [REMOVED] Midline Catheter 04/13/20 Right;Brachial-Site Assessment: Clean, dry, & intact VTE Prophylaxis Sequential/Intermittent Compression Device: Bilateral 
     Graduated Compression Stockings: Bilateral 
     Patient Refused VTE Prophylaxis: Yes Intake and Output:  
04/19 1901 - 04/21 0700 In: 240 [P.O.:240] Out: 500 [Urine:500] No intake/output data recorded. Current Diet: DIET NUTRITIONAL SUPPLEMENTS Lunch, Dinner; [employer] CUPS 
DIET NUTRITIONAL SUPPLEMENTS Breakfast, Lunch; ENSURE ENLIVE 
DIET REGULAR Abdominal  
Last Bowel Movement Date: 04/19/20(PER REPORT) Stool Appearance: Soft Abdominal Assessment: Obese Appetite: Poor Bowel Sounds: Active Recent Glucose Results:  
Lab Results Component Value Date/Time  (H) 04/21/2020 03:21 AM  
 
 
  
IV Antibiotics? No  
      
Activity Level: Activity Level: Bed Rest 
Needs assistance with ADLs: yes PT Consult Status: YES Current Immunizations: There is no immunization history on file for this patient. Recommendations:  
Discharge Disposition: SNF Medication Reconciliation Completed: YES Cardiac/Pulmonary Rehab Ordered:  NO 
 
Needs for Discharge: no  Recommendations from IDR team:  
  
 
Natalia Alpers, BSN RN Care Management Pager: [phone]

## 2020-04-21 NOTE — PROGRESS NOTES
Problem: Self Care Deficits Care Plan (Adult) Goal: *Acute Goals and Plan of Care (Insert Text) Description: Occupational Therapy Goals Initiated 4/13/2020. Pt re-evaluated 4/21/2020. Goals modified for the pt to achieve within 7 day(s). 1.  Patient will perform bed mobility in preparation for selfcare with moderate assist.  
2.  Patient will perform functional activity seated EOB for 4-7 minutes with supervision/setup and F+ balance. 3.  Patient will perform lower body dressing with moderate assist using AE prn. 
4.  Patient will perform toilet transfers with moderate assist. 
5.  Patient will perform all aspects of toileting with moderate assist. 
6.  Patient will participate in upper extremity therapeutic exercise/activities with minimal assistance for 8 minutes to increase ROM/strength for selfcare tasks. 7.  Patient will utilize energy conservation techniques during functional activities with verbal cues. Prior Level of Function: Patient lives with daughter in 2 story condo, 8 steps to bedroom and was independent with self-care and functional mobility PTA. Patient reports having shower chair at home Outcome: Progressing Towards Goal 
 OCCUPATIONAL THERAPY RE-EVALUATION Patient: Aurelia Angulo (73 y.o. female) Date: 4/21/2020 Primary Diagnosis: Hip fracture requiring operative repair ([facility] [address] Cheri Lacy Procedure(s) (LRB): 
RIGHT FEMORAL INTERTROCHANTERIC NAIL INSERTION (Right) 9 Days Post-Op Precautions:   Fall, Skin, PWB(right LE) ASSESSMENT : 
Pt cleared to participate in OT re-evaluation by RN. Upon entering room, pt alert and supine in stretcher with transport team present. Pt seen in conjunction with PT to increase pt participation and to maximize safety of patient and staff members.  Based on the objective data described below, the patient presents with  decreased strength, decreased activity tolerance, decreased functional sitting balance, decreased standing balance, decreased ability to safely perform functional transfers/mobility, increased confusion, and decreased independence in ADLs, increasing the need for assistance from others. Pt requires max assist x2 for sup>sit to participate in self-care. Pt stood with max assist x 2 with cueing provided for hand placement on RW. Noted BM in standing, therefore provided max assist for pericare before performing stand pivot transfer to locked recliner. Pt was then transferred to [facility] to continue toileting tasks, [facility]. Pt presents with increased confusion, only oriented to person and president; re-oriented pt to correct time and place. Educated pt on the role of OT, re-evaluation process, and to not get up without assistance of nursing with pt demonstrating fair understanding. The pt will benefit from further OT services, in order to maximize her ADL performance and decrease the risk for complications associated with decreased functional activity. Goals have been modified for the pt to achieve. At the end of the session, pt left sitting up in recliner, call-bell in reach, with all needs met. Patient will benefit from skilled intervention to address the above impairments. Patient's rehabilitation potential is considered to be Good Factors which may influence rehabilitation potential include:  
[]             None noted [x]             Mental ability/status [x]             Medical condition [x]             Home/family situation and support systems [x]             Safety awareness []             Pain tolerance/management 
[]             Other: PLAN : 
Recommendations and Planned Interventions:  
[x]               Self Care Training                  [x]      Therapeutic Activities [x]               Functional Mobility Training   []      Cognitive Retraining 
[x]               Therapeutic Exercises           [x]      Endurance Activities [x]               Balance Training                    []      Neuromuscular Re-Education []               Visual/Perceptual Training     [x]      Home Safety Training 
[x]               Patient Education                   [x]      Family Training/Education []               Other (comment): Frequency/Duration: Patient will be followed by occupational therapy 1-2 times per day/4-7 days per week to address goals. Discharge Recommendations: Dimas Mccracken Further Equipment Recommendations for Discharge: BSC; Rolling walker SUBJECTIVE:  
Patient stated I don't know\" OBJECTIVE DATA SUMMARY:  
Hospital course since last seen and reason for reevaluation: Pt is due for re-evaluation. Pt presents with increased confusion, impacting her performance with ADLs. Goals have been modified for the pt to achieve. OT anticipates pt will make functional gains in her ADL performance as a result of continued OT services. Past Medical History:  
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   
 Nonspecific abnormal electrocardiogram (ECG) (EKG) Obesity, unspecified Other and unspecified hyperlipidemia Renal insufficiency Past Surgical History:  
Procedure Laterality Date ABDOMEN SURGERY PROC UNLISTED Explor lap COLONOSCOPY N/A 5/31/2018 COLONOSCOPY w polypectomy performed by Yohannes Hansen MD at [address] [place] [address] Left HX CATARACT REMOVAL INSERT PROSTHETIC LENS: LEFT,RIGHT  
 HX GYN    
 HX ORTHOPAEDIC Left Rotator cuff repair HX PARTIAL THYROIDECTOMY    
 MA BIOPSY OF BREAST, INCISIONAL    
 rt & lt 12 yrs ago Barriers to Learning/Limitations: yes;  cognitive and altered mental status (i.e.Sedation, Confusion) Compensate with: visual, verbal, tactile, kinesthetic cues/model Home Situation:  
Home Situation Home Environment: Private residence One/Two Story Residence: Two story Living Alone: No 
Support Systems: Child(do) Patient Expects to be Discharged to[de-identified] Rehabilitation facility Current DME Used/Available at Home: None Tub or Shower Type: Tub/Shower combination []  Right hand dominant   []  Left hand dominant Cognitive/Behavioral Status: 
Neurologic State: Alert Orientation Level: Oriented to person;Disoriented to place; Disoriented to time Cognition: Decreased attention/concentration Safety/Judgement: Fall prevention Skin: Visible skin appeared intact Edema: None noted Coordination: BUE Coordination: Generally decreased, functional 
Fine Motor Skills-Upper: Left Intact; Right Intact Gross Motor Skills-Upper: Left Impaired;Right Impaired Balance: 
Sitting: Intact; With support Sitting - Static: Good (unsupported) Sitting - Dynamic: Fair (occasional) Standing: Impaired; With support Standing - Static: Poor Standing - Dynamic : Poor Strength: BUE Strength: Generally decreased, functional 
 
 
Tone & Sensation: BUE Tone: Normal 
Sensation: Intact Range of Motion: BUE 
AROM: Generally decreased, functional 
 
Functional Mobility and Transfers for ADLs: 
Bed Mobility: 
Supine to Sit: Maximum assistance;Assist x2 Transfers: 
Sit to Stand: Maximum assistance;Assist x2 Stand to Sit: Maximum assistance;Assist x2 Stretcher to Chair: Maximum assistance;Assist x2 Toilet Transfer from [facility] to [facility] : Maximum assistance;Assist x2(SPT) ADL Assessment:  
Feeding: Modified independent Oral Facial Hygiene/Grooming: Stand-by assistance Bathing: Moderate assistance Upper Body Dressing: Stand-by assistance Lower Body Dressing: Maximum assistance Toileting: Maximum assistance ADL Intervention: LB Dressing: Max assist to don socks Toileting Toileting Assistance: Maximum assistance Bowel Hygiene: Maximum assistance Cognitive Retraining Safety/Judgement: Fall prevention Pain: Pain level pre-treatment: 0/10 Pain level post-treatment: 0/10 Pain Intervention(s): Medication (see MAR); Rest, Ice, Repositioning Response to intervention: Nurse notified, See doc flow Activity Tolerance:  
Good Please refer to the flowsheet for vital signs taken during this treatment. After treatment:  
[x] Patient left in no apparent distress sitting up in chair 
[] Patient left in no apparent distress in bed 
[x] Call bell left within reach [x] Nursing notified 
[] Caregiver present 
[] Bed alarm activated COMMUNICATION/EDUCATION:  
[x] Role of Occupational Therapy in the acute care setting 
[] Home safety education was provided and the patient/caregiver indicated understanding. [x] Patient/family have participated as able in goal setting and plan of care. [x] Patient/family agree to work toward stated goals and plan of care. [] Patient understands intent and goals of therapy, but is neutral about his/her participation. [] Patient is unable to participate in goal setting and plan of care. Thank you for this referral. 
Earlene Vital, MS, OTR/L Time Calculation: 28 mins

## 2020-04-21 NOTE — PROGRESS NOTES
Progress Note Esteban Bambi 
81 y.o. Admit Date: 4/11/2020 Active Problems: 
  Essential hypertension, benign () POA: Yes Overview: CONTROLLED Hip fracture requiring operative repair [facility]) (4/11/2020) POA: Unknown Chronic renal disease, stage III ([facility] (4/20/2020) POA: Unknown Anemia (4/20/2020) POA: Unknown Secondary hyperparathyroidism of renal origin ([facility] (4/20/2020) POA: Unknown Subjective:  
 
Patient is tired looking, developed RVR Atrial fibrillation again,,earlier was on Cardizem, cardiology changed to Beta Blocker, not a candidate for Anticoagulation, on Amiodarone. A comprehensive review of systems was negative except for that written in the History of Present Illness. Objective:  
 
Visit Vitals /64 Pulse 100 Temp 98.6 °F (37 °C) Resp 19 Ht 5' (1.524 m) Wt 32.7 kg (72 lb) SpO2 94% BMI 14.06 kg/m² Intake/Output Summary (Last 24 hours) at 4/21/2020 1642 Last data filed at 4/21/2020 9332 Gross per 24 hour Intake  Output 500 ml Net -500 ml Current Facility-Administered Medications Medication Dose Route Frequency Provider Last Rate Last Dose  dilTIAZem (CARDIZEM) 100 mg in 0.9% sodium chloride (MBP/ADV) 100 mL infusion  5 mg/hr IntraVENous TITRATE Angela Lott MD 5 mL/hr at 04/21/20 1137 5 mg/hr at 04/21/20 1137  
 dextrose 5% and 0.9% NaCl infusion  100 mL/hr IntraVENous CONTINUOUS Medardo Calderon  mL/hr at 04/21/20 1235 100 mL/hr at 04/21/20 1235  
 amiodarone (CORDARONE) tablet 200 mg  200 mg Oral TID Angela Lott MD   200 mg at 04/21/20 1136  
 [START ON 5/1/2020] amiodarone (CORDARONE) tablet 200 mg  200 mg Oral DAILY Angela Lott MD      
 metoprolol (LOPRESSOR) injection 5 mg  5 mg IntraVENous Q6H Angela Lott MD   5 mg at 04/21/20 1238  
 0.9% sodium chloride infusion  50 mL/hr IntraVENous CONTINUOUS Harper Anaya MD   Stopped at 04/21/20 3435  pantoprazole (PROTONIX) 40 mg in 0.9% sodium chloride 10 mL injection  40 mg IntraVENous Q12H Gerardo Stover MD   40 mg at 04/21/20 1135  calcitRIOL (ROCALTROL) capsule 0.25 mcg  0.25 mcg Oral Q MON, WED & Patrick Feliciano MD   0.25 mcg at 04/20/20 2202  B complex-vitaminC-folic acid (NEPHROCAP) cap  1 Cap Oral DAILY Doni Alvarez MD   1 Cap at 04/21/20 1136  
 atorvastatin (LIPITOR) tablet 40 mg  40 mg Oral QHS Gerardo Stover MD   40 mg at 04/20/20 2206  methIMAzole (TAPAZOLE) tablet 10 mg  10 mg Oral Q12H Lindsay Lagos MD   10 mg at 04/21/20 1137  acetaminophen (TYLENOL) tablet 650 mg  650 mg Oral Q8H Demian Troy MD   650 mg at 04/21/20 1433  hydrocortisone (ANUSOL-HC) suppository 25 mg  25 mg Rectal Q12H Red Herring NP   25 mg at 04/21/20 1136  aluminum-magnesium hydroxide (MAALOX) oral suspension 15 mL  15 mL Oral QID PRN Marito Long MD   15 mL at 04/21/20 1236  polyethylene glycol (MIRALAX) packet 17 g  17 g Oral DAILY Sandra Gray   17 g at 04/21/20 1137  
 senna-docusate (PERICOLACE) 8.6-50 mg per tablet 1 Tab  1 Tab Oral DAILY Sandra Gray   1 Tab at 04/21/20 1137  
 ondansetron (ZOFRAN) injection 8 mg  8 mg IntraVENous Q6H PRN HIEN Gray   8 mg at 04/21/20 1136  allopurinoL (ZYLOPRIM) tablet 100 mg  100 mg Oral DAILY Sandra Gray   100 mg at 04/21/20 1136 Physical Exam:  
 
Physical Exam:  
General:  Alert, cooperative, no distress, appears stated age. Neck: Supple, symmetrical, trachea midline, no adenopathy, thyroid: no enlargement/tenderness/nodules, no carotid bruit and no JVD. Lungs:   IRR Heart:  Regular rate and rhythm, S1, S2 normal, no murmur, click, rub or gallop. Abdomen:   Soft, non-tender. Bowel sounds normal. No masses,  No organomegaly. Extremities: Extremities normal, atraumatic, no cyanosis or edema. Skin: Skin color, texture, turgor normal. No rashes or lesions Data Review: CBC w/Diff Recent Labs  
  04/21/20 
0321 04/20/20 
0152 04/19/20 
0550 WBC 13.6* 11.9 9.8  
RBC 2.80* 2.82* 2.19* HGB 8.6* 8.6* 6.8* HCT 26.9* 27.4* 21.5* MCV 96.1 97.2* 98.2*  
MCH 30.7 30.5 31.1 MCHC 32.0 31.4 31.6 RDW 16.4* 16.7* 16.4* Recent Labs  
  04/21/20 
0321 04/20/20 
0152 04/19/20 
0550 BANDS 4 4  -- MONOS 7 8 12* EOS 0 1 1  
BASOS 0 0 0  
RDW 16.4* 16.7* 16.4* Comprehensive Metabolic Profile Recent Labs  
  04/21/20 
0321 04/20/20 
0152  141  
K 5.0 4.5  
* 108 CO2 23 25 BUN 65* 60* CREA 1.85* 1.91* Recent Labs  
  04/21/20 
0321 04/20/20 
0152 CA 9.9 9.5 Impression: Active Hospital Problems Diagnosis Date Noted  Chronic renal disease, stage III ([facility] [address] 04/20/2020  Anemia 04/20/2020  Secondary hyperparathyroidism of renal origin ([facility] [address] 04/20/2020  Hip fracture requiring operative repair ([facility] 04/11/2020  Essential hypertension, benign CONTROLLED Atrial Fibrillation. Renal function slightly improved with careful hydration. Plan:  
 
Continue current IVF. Control Atrial Fibrillation , follow renal function.  
 
 
Thom Masters MD

## 2020-04-21 NOTE — PROGRESS NOTES
[facility] Hospitalist Group Progress Note Patient: Farheen Rausch Age: 81 y.o. : 1935 MR#: [id number] SSN: [id number] Date/Time: 2020 Subjective:   
Patient is still confused however confusion appears little better than yesterday No distress noted Still trying to pull on lines last night she was restrained Continue physical therapy Propofol Ros cannot be obtained due to patient's medical condition Assessment/Plan: 1. Hip fracture  s/p ORIF.- on alternate DVT prophylaxis - Acute blood loss anemia due to # 1: H&H improved after PRBC transfusion 4/15. Also had guiac +ve brown stool, s/p GI eval w/ recs made for no endoscopy. No clear overt GI bleeding at this time. Follow H+H and transfuse PRN. - Monitor H/H - continue PPI  
- Again drop in H/H noted - 1 unit PRBC ordered , Renal consulted , monitor h/H - if stable dc in AM  
- Again patient is not on anticoagulation , just SCD 2. Mechanical fall 3. CKD III, at baseline creat, Dr. Ankit Espinoza is nephrologist 
4. Goiter w/ partial thyroidectomy, pt states she is on thyroid medication but does not know name/dose, TSH high, free T4 low, concern for hypothyroid versus overtreated hyper. Discussed with endocrine Dr. Connor Estes, patient on methimazole at home but recommend to hold for 2 weeks and then resume at lower dose. Endocrine recommends iodine uptake test, first part to be done today . 5. HTN: BP stable, monitor off medications. 6. GERD: Continue PPI and add Maalox as needed. 7. DVT prophylaxis - Per ortho . 8. New onset of Afib with RVR -off Cardizem on amiodarone by Dr. Leonel Delgado 9.  
10. Hyperkalemia - Improved 11. Full code PLAN  
- thyroid uptake scan : 
IMPRESSION IMPRESSION: 
  
1. Elevated uptake values in keeping with hyperthyroidism. Similar heterogeneous 
scan appearance of the remnant enlarged right thyroid gland. Overall findings 
suggestive of toxic multinodular goiter. - Follow ECHO and cardiology recommendations If DVT prophylaxis can be changed to SCD , avoid high dose of ASA / heparin as drop in h/h again noted . D/W ortho . - will change DVT prophylaxis to SCD as patient's H/h dropped again , ortho will place order  
 
-Patient received 1 unit of PRBC and Venofer dose 
 
-Patient is on multiple narcotic medication and sedating medication we will discontinue morphine discontinue Ativan discontinue Phenergan discontinue any p.o. narcotic medications-monitor patient's mental function. Though stroke could be a possibility very unlikely because the progression is very gradual 
At this point she will not allow us to do any imaging. -CT head 
 
-If H&H remains stable we will restart-DVT prevention with heparin -IV hydration monitor electrolytes and follow-up with renal functions Case discussed with:  [x]Patient  [x]Family  []Nursing  []Case Management DVT Prophylaxis:  []Lovenox  []Hep SQ  [x]SCDs  []Coumadin   []Eliquis/Xarelto Objective:  
VS:  
Visit Vitals BP 98/64 Pulse 99 Temp 98.6 °F (37 °C) Resp 19 Ht 5' (1.524 m) Wt 32.7 kg (72 lb) SpO2 94% BMI 14.06 kg/m² Tmax/24hrs: Temp (24hrs), Av.2 °F (36.8 °C), Min:97.4 °F (36.3 °C), Max:98.9 °F (37.2 °C) IOBRIEF Intake/Output Summary (Last 24 hours) at 2020 9122 Last data filed at 2020 3248 Gross per 24 hour Intake  Output 500 ml Net -500 ml General:  Alert, cooperative, no acute distress Pulmonary:  CTA Bilaterally. No Wheezing/Rales. Cardiovascular: Regular rate and Rhythm. GI:  Soft, Non distended, Non tender. + Bowel sounds. Extremities:  No edema. Neurologic: Alert and oriented X 3. Medications:  
Current Facility-Administered Medications Medication Dose Route Frequency  dilTIAZem (CARDIZEM) 100 mg in 0.9% sodium chloride (MBP/ADV) 100 mL infusion  10 mg/hr IntraVENous TITRATE  dextrose 5% and 0.9% NaCl infusion  100 mL/hr IntraVENous CONTINUOUS  
 0.9% sodium chloride infusion  50 mL/hr IntraVENous CONTINUOUS  
 pantoprazole (PROTONIX) 40 mg in 0.9% sodium chloride 10 mL injection  40 mg IntraVENous Q12H  
 0.9% sodium chloride infusion 250 mL  250 mL IntraVENous PRN  
 calcitRIOL (ROCALTROL) capsule 0.25 mcg  0.25 mcg Oral Q MON, WED & FRI  B complex-vitaminC-folic acid (NEPHROCAP) cap  1 Cap Oral DAILY  metoprolol tartrate (LOPRESSOR) tablet 25 mg  25 mg Oral BID  atorvastatin (LIPITOR) tablet 40 mg  40 mg Oral QHS  methIMAzole (TAPAZOLE) tablet 10 mg  10 mg Oral Q12H  
 acetaminophen (TYLENOL) tablet 650 mg  650 mg Oral Q8H  
 hydrocortisone (ANUSOL-HC) suppository 25 mg  25 mg Rectal Q12H  
 aluminum-magnesium hydroxide (MAALOX) oral suspension 15 mL  15 mL Oral QID PRN  polyethylene glycol (MIRALAX) packet 17 g  17 g Oral DAILY  senna-docusate (PERICOLACE) 8.6-50 mg per tablet 1 Tab  1 Tab Oral DAILY  ondansetron (ZOFRAN) injection 8 mg  8 mg IntraVENous Q6H PRN  
 allopurinoL (ZYLOPRIM) tablet 100 mg  100 mg Oral DAILY Labs:   
Recent Results (from the past 24 hour(s)) EKG, 12 LEAD, SUBSEQUENT Collection Time: 04/20/20  2:45 PM  
Result Value Ref Range Ventricular Rate 79 BPM  
 Atrial Rate 79 BPM  
 P-R Interval 134 ms QRS Duration 88 ms Q-T Interval 368 ms QTC Calculation (Bezet) 421 ms Calculated P Axis 56 degrees Calculated R Axis 57 degrees Calculated T Axis 29 degrees Diagnosis Normal sinus rhythm T wave abnormality, consider anterior ischemia Abnormal ECG When compared with ECG of 17-APR-2020 15:29, 
premature atrial complexes are no longer present T wave inversion now evident in Anterior leads Confirmed by Mary Ann Pendleton MD, --- ([id number]) on 4/20/2020 4:44:23 PM 
  
T4, FREE Collection Time: 04/21/20  3:21 AM  
Result Value Ref Range  T4, Free 2.6 (H) 0.7 - 1.5 NG/DL  
CBC WITH AUTOMATED DIFF  
 Collection Time: 04/21/20  3:21 AM  
Result Value Ref Range WBC 13.6 (H) 4.6 - 13.2 K/uL  
 RBC 2.80 (L) 4.20 - 5.30 M/uL HGB 8.6 (L) 12.0 - 16.0 g/dL HCT 26.9 (L) 35.0 - 45.0 % MCV 96.1 74.0 - 97.0 FL  
 MCH 30.7 24.0 - 34.0 PG  
 MCHC 32.0 31.0 - 37.0 g/dL  
 RDW 16.4 (H) 11.6 - 14.5 % PLATELET 241 944 - 979 K/uL MPV 11.0 9.2 - 11.8 FL  
 NEUTROPHILS 74 42 - 75 % BAND NEUTROPHILS 4 0 - 5 % LYMPHOCYTES 15 (L) 20 - 51 % MONOCYTES 7 2 - 9 % EOSINOPHILS 0 0 - 5 % BASOPHILS 0 0 - 3 % NRBC 1.0 (H) 0  WBC  
 ABS. NEUTROPHILS 10.6 (H) 1.8 - 8.0 K/UL  
 ABS. LYMPHOCYTES 2.0 0.8 - 3.5 K/UL  
 ABS. MONOCYTES 1.0 0 - 1.0 K/UL  
 ABS. EOSINOPHILS 0.0 0.0 - 0.4 K/UL  
 ABS. BASOPHILS 0.0 0.0 - 0.06 K/UL  
 DF MANUAL PLATELET COMMENTS ADEQUATE PLATELETS    
 RBC COMMENTS ANISOCYTOSIS 1+ 
    
 RBC COMMENTS BASOPHILIC STIPPLING 1+ 
    
 RBC COMMENTS POLYCHROMASIA 1+ 
    
 RBC COMMENTS OVALOCYTES 1+ METABOLIC PANEL, BASIC Collection Time: 04/21/20  3:21 AM  
Result Value Ref Range Sodium 144 136 - 145 mmol/L Potassium 5.0 3.5 - 5.5 mmol/L Chloride 112 (H) 100 - 111 mmol/L  
 CO2 23 21 - 32 mmol/L Anion gap 9 3.0 - 18 mmol/L Glucose 119 (H) 74 - 99 mg/dL BUN 65 (H) 7.0 - 18 MG/DL Creatinine 1.85 (H) 0.6 - 1.3 MG/DL  
 BUN/Creatinine ratio 35 (H) 12 - 20 GFR est AA 32 (L) >60 ml/min/1.73m2 GFR est non-AA 26 (L) >60 ml/min/1.73m2 Calcium 9.9 8.5 - 10.1 MG/DL  
EKG, 12 LEAD, SUBSEQUENT Collection Time: 04/21/20  5:11 AM  
Result Value Ref Range Ventricular Rate 196 BPM  
 Atrial Rate 170 BPM  
 QRS Duration 78 ms Q-T Interval 196 ms QTC Calculation (Bezet) 354 ms Calculated R Axis 85 degrees Calculated T Axis -32 degrees Diagnosis Atrial fibrillation with rapid ventricular response Nonspecific ST and T wave abnormality , probably digitalis effect Abnormal ECG When compared with ECG of 20-APR-2020 14:45, 
 Atrial fibrillation has replaced Sinus rhythm Vent. rate has increased  BPM 
ST now depressed in Inferior leads T wave inversion less evident in Anterior leads Signed By: Marimar Alex MD   
 April 21, 2020 Disclaimer: Sections of this note are dictated using utilizing voice recognition software. Minor typographical errors may be present. If questions arise, please do not hesitate to contact me or call our department.

## 2020-04-21 NOTE — PROGRESS NOTES
2121. ..paged hospitalist for restraint order for pt. She has \"popped\" oxygen tubing (SpO2 82% on room air) and telemetry leads and pulled out midline. Pt is also a high fall risk with Rt. Hip fx. Can we order bilateral soft wrist restraints and 4 siderails? 2136. Edgardo Lagos per Dr. Dana Duran, ok for 4 siderails and bilateral wrist restaints.

## 2020-04-21 NOTE — PROGRESS NOTES
Per Dr. Renea Vail, pt is not medically ready for discharge today. Notified [facility] [address] [facility]. Yamel Young, BSN RN Care Management Pager: [phone]

## 2020-04-21 NOTE — PROGRESS NOTES
conducted an initial consultation and Spiritual Assessment for Gomez Kaur, who is a 81 y.o.,female. Patient's Primary Language is: [place]. According to the patient's EMR Scientologist Affiliation is: Orthodox. The reason the Patient came to the hospital is:  
Patient Active Problem List  
 Diagnosis Date Noted  Chronic renal disease, stage III ([facility] 04/20/2020  Anemia 04/20/2020  Secondary hyperparathyroidism of renal origin ([facility] 04/20/2020  Hip fracture requiring operative repair ([facility] 04/11/2020  Mitral valve disorders(424.0)  Other and unspecified hyperlipidemia  Essential hypertension, benign  Obesity, unspecified  Nonspecific abnormal electrocardiogram (ECG) (EKG)  Renal insufficiency The  provided the following Interventions: 
Initiated a relationship of care and support. Explored issues of geno, belief, spirituality and Sabianist/ritual needs while hospitalized. Listened empathically. Offered prayer and assurance of continued prayers on patient's behalf. The following outcomes where achieved: 
Patient shared limited information about both their medical narrative and spiritual journey/beliefs.  confirmed Patient's Scientologist Affiliation. Patient processed feeling about current hospitalization. Patient expressed gratitude for 's visit. Assessment: 
Patient does not have any Sabianist/cultural needs that will affect patient's preferences in health care. There are no spiritual or Sabianist issues which require intervention at this time. Plan: 
Chaplains will continue to follow and will provide pastoral care on an as needed/requested basis.  recommends bedside caregivers page  on duty if patient shows signs of acute spiritual or emotional distress. Chaplain Taylor. [facility]  
[phone]

## 2020-04-21 NOTE — PROGRESS NOTES
[facility] 
 
 
CARDIOLOGY PROGRESS NOTE 
RECS: 
 
 
1. Paroxymal atrial fibrillation- converted to NSR and developed A. fib again. Not an ideal candidate for anticoagulation due to recent  Rectal bleeding and significant drop in H&H requiring multiple transfusion. Would  Continue with rate control with bb. Will use IV Lopressor and try to discontinue diltiazem if possible. Start amiodarone and see if it will help keep her in sinus rhythm. Continue asa. Recent Echo showed preserved EF% 2. Hyperthyroidism: Patient on methimazole. Treatment per medicine. Would prefer beta-blockers rather than diltiazem for rate control. 3. indeterminate troponin- likely demand ischemia in the setting severe anemia, afib with RVR. We do not suspect an ACS 4. Hypertension-with intermittent hypotension. Continue ivf 5. Severe anemia- noted hgb drop pos op-s/p 4 units PRBC's - H&H low but stable 6. Rectal bleeding- possible due to hemorrhoids - resolved GI following 7. AMS- confused and agitated at times 8. s/p hemithyroidectomy ~ 12-13 years ago for nodules- Endocrino following patient on tapazole 9. Right hip fracture s/p ORIF 10. CKD-improving renal function on gentle hydration per Renal.  
 
 
 
ASSESSMENT: 
Hospital Problems  Date Reviewed: 4/20/2020 Codes Class Noted POA Chronic renal disease, stage III (HCC) ICD-10-CM: N18.3 ICD-9-CM: 585.3  4/20/2020 Unknown Anemia ICD-10-CM: D64.9 ICD-9-CM: 285.9  4/20/2020 Unknown Secondary hyperparathyroidism of renal origin [facility]) ICD-10-CM: N25.81 ICD-9-CM: 588.81  4/20/2020 Unknown Hip fracture requiring operative repair [facility]) ICD-10-CM: T41.397Z ICD-9-CM: 820.8  4/11/2020 Unknown Essential hypertension, benign ICD-10-CM: I10 
ICD-9-CM: 401.1  Unknown Yes Overview Signed 7/10/2013  6:40 PM by Annie Pagan SUBJECTIVE: 
Confused Can not remember why she is here in the hospital.  
 
OBJECTIVE: 
 
VS:  
Visit Vitals BP 98/64 Pulse 99 Temp 98.6 °F (37 °C) Resp 19 Ht 5' (1.524 m) Wt 32.7 kg (72 lb) SpO2 94% BMI 14.06 kg/m² Intake/Output Summary (Last 24 hours) at 4/21/2020 1106 Last data filed at 4/21/2020 7356 Gross per 24 hour Intake  Output 500 ml Net -500 ml TELE: normal sinus rhythm General: alert, well developed, anxious, confused and in no acute distress HENT: Normocephalic, atraumatic. Normal external eye. Neck :  no bruit, no JVD Cardiac:  irregular rate and rhythm, S1, S2 normal, no murmur, click, rub or gallop Lungs: clear to auscultation bilaterally Abdomen: Soft, nontender, no masses Extremities:  No c/c/e, peripheral pulses present Labs: Results:  
   
Chemistry Recent Labs  
  04/21/20 
0321 04/20/20 
1470 * 130*  141  
K 5.0 4.5  
* 108 CO2 23 25 BUN 65* 60* CREA 1.85* 1.91* CA 9.9 9.5 AGAP 9 8 BUCR 35* 31* CBC w/Diff Recent Labs  
  04/21/20 
0321 04/20/20 
0152 04/19/20 
0550 WBC 13.6* 11.9 9.8  
RBC 2.80* 2.82* 2.19* HGB 8.6* 8.6* 6.8* HCT 26.9* 27.4* 21.5*  
 198 186 GRANS 74 77* 72 LYMPH 15* 10* 15* EOS 0 1 1 Cardiac Enzymes No results for input(s): CPK, CKND1, MICHELLE in the last 72 hours. No lab exists for component: Avelino Munozman Coagulation No results for input(s): PTP, INR, APTT, INREXT, INREXT in the last 72 hours. Lipid Panel Lab Results Component Value Date/Time Cholesterol, total 182 01/16/2020 01:39 PM  
 HDL Cholesterol 55 01/16/2020 01:39 PM  
 LDL, calculated 99 01/16/2020 01:39 PM  
 Triglyceride 140 01/16/2020 01:39 PM  
 CHOL/HDL Ratio 3.3 01/16/2020 01:39 PM  
  
BNP No results for input(s): BNPP in the last 72 hours. Liver Enzymes No results for input(s): TP, ALB, TBIL, AP, SGOT, GPT in the last 72 hours. No lab exists for component: DBIL Thyroid Studies Lab Results Component Value Date/Time  TSH 48.20 (H) 04/13/2020 05:18 AM  
    
 
 
 
Jack Zaragoza MD

## 2020-04-22 NOTE — PROGRESS NOTES
[facility] 
 
 
CARDIOLOGY PROGRESS NOTE 
RECS: 
 
 
1. Paroxymal atrial fibrillation-converted back to sinus rhythm and is currently NSR . Not a candidate for anticoagulation due to recent  Rectal bleeding and significant drop in H&H requiring multiple transfusion. Continue amiodarone and see if it will help keep her in sinus rhythm. Continue asa. Recent Echo showed preserved EF%. Continue bb iv for rate control. 2. Chest pain- atypical ekg today shows NSR w/o acute ischemic changes. Follow troponin 3. SOB- multifactorial with severe anemia,  possible aspiration pneumonia vs PE  
4. Hyperthyroidism: Patient on methimazole. Treatment per medicine. Would prefer beta-blockers rather than diltiazem for rate control. 5. Hypertension-stable BP this am. Continue bb iv 6. Severe anemia- noted hgb drop pos op-s/p 4 units PRBC's - H&H low but stable 7. Rectal bleeding- possible due to hemorrhoids - resolved GI following 8. AMS- confused and agitated at times 9. 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 Right hip fracture s/p ORIF 11. CKD-elevated renal indices. Renal following Patient has increased shortness of breath and x-ray chest reported with bilateral interstitial infiltrates. White count is elevated but no fever. Discussed with hospitalist, Dr. Mick Bright, regarding possible need for antibiotics and COVID-19 rule out. He will consider it. Change IV to p.o. beta-blocker since patient is back in sinus rhythm and continue amiodarone. Check serial enzymes due to shortness of breath and very atypical chest discomfort. Elevated troponin I could be due to renal insufficiency as previous troponin had much better renal function but patient should be treated like a non-STEMI for now. Will try a short course of IV heparin and add aspirin. ASSESSMENT: 
Hospital Problems  Date Reviewed: 4/20/2020 Codes Class Noted POA Atrial fibrillation [facility]) ICD-10-CM: I48.91 
ICD-9-CM: 427.31  4/21/2020 Unknown Chronic renal disease, stage III (HCC) ICD-10-CM: N18.3 ICD-9-CM: 585.3  4/20/2020 Unknown Anemia ICD-10-CM: D64.9 ICD-9-CM: 285.9  4/20/2020 Unknown Secondary hyperparathyroidism of renal origin [facility]) ICD-10-CM: N25.81 ICD-9-CM: 588.81  4/20/2020 Unknown Hip fracture requiring operative repair [facility]) ICD-10-CM: T58.323Q ICD-9-CM: 820.8  4/11/2020 Unknown Essential hypertension, benign ICD-10-CM: I10 
ICD-9-CM: 401.1  Unknown Yes Overview Signed 7/10/2013  6:40 PM by Moises Brown SUBJECTIVE: 
Confused and disoriented Can not remember why she is here in the hospital.  
C/o constant reproducible chest pain and SOB OBJECTIVE: 
 
VS:  
Visit Vitals /50 (BP 1 Location: Left arm, BP Patient Position: At rest) Pulse 80 Temp 97.7 °F (36.5 °C) Resp (!) 41 Ht 5' (1.524 m) Wt 32.7 kg (72 lb) SpO2 95% BMI 14.06 kg/m² Intake/Output Summary (Last 24 hours) at 4/22/2020 1014 Last data filed at 4/22/2020 6921 Gross per 24 hour Intake 1860 ml Output 500 ml Net 1360 ml  
 
TELE: normal sinus rhythm General: alert, well developed, anxious, confused and in mild distress HENT: Normocephalic, atraumatic. Normal external eye. Neck :  no bruit, no JVD Cardiac:  irregular rate and rhythm, S1, S2 normal, no murmur, click, rub or gallop Lungs: few expiratory rales bilaterally Abdomen: Soft, nontender, no masses Extremities:  No c/c/e, peripheral pulses present Labs: Results:  
   
Chemistry Recent Labs  
  04/22/20 
0104 04/21/20 
0321 04/20/20 
8920 * 119* 130*  144 141  
K 5.2 5.0 4.5  
* 112* 108 CO2 24 23 25 BUN 81* 65* 60* CREA 2.16* 1.85* 1.91* CA 9.6 9.9 9.5 AGAP 6 9 8 BUCR 38* 35* 31* CBC w/Diff Recent Labs  
  04/22/20 
0104 04/21/20 0432 04/20/20 
3830 WBC 14.8* 13.6* 11.9 RBC 2.63* 2.80* 2.82* HGB 8.0* 8.6* 8.6* HCT 25.5* 26.9* 27.4*  
 237 198 GRANS 82* 74 77* LYMPH 10* 15* 10* EOS 0 0 1 Cardiac Enzymes No results for input(s): CPK, CKND1, MICHELLE in the last 72 hours. No lab exists for component: Hessie Salines Coagulation No results for input(s): PTP, INR, APTT, INREXT, INREXT in the last 72 hours. Lipid Panel Lab Results Component Value Date/Time Cholesterol, total 182 01/16/2020 01:39 PM  
 HDL Cholesterol 55 01/16/2020 01:39 PM  
 LDL, calculated 99 01/16/2020 01:39 PM  
 Triglyceride 140 01/16/2020 01:39 PM  
 CHOL/HDL Ratio 3.3 01/16/2020 01:39 PM  
  
BNP No results for input(s): BNPP in the last 72 hours. Liver Enzymes No results for input(s): TP, ALB, TBIL, AP, SGOT, GPT in the last 72 hours. No lab exists for component: DBIL Thyroid Studies Lab Results Component Value Date/Time  TSH 48.20 (H) 04/13/2020 05:18 AM  
    
 
 
 
TERESA Merchant

## 2020-04-22 NOTE — PROGRESS NOTES
Progress Note Raad Mcmanus 
81 y.o. Admit Date: 4/11/2020 Active Problems: 
  Essential hypertension, benign () POA: Yes Overview: CONTROLLED Hip fracture requiring operative repair [facility]) (4/11/2020) POA: Unknown Chronic renal disease, stage III ([facility] (4/20/2020) POA: Unknown Anemia (4/20/2020) POA: Unknown Secondary hyperparathyroidism of renal origin ([facility] (4/20/2020) POA: Unknown Atrial fibrillation ([facility] (4/21/2020) POA: Unknown Subjective:  
 
Patient is back after Vascular study, No DVT, tired looking,sleepy, not communicating , yesterday . Was quite Feisty,was trying to pullout Line, off IVF. Now converted to NSR.,  
 
 
A comprehensive review of systems was negative except for that written in the History of Present Illness. Objective:  
 
Visit Vitals /50 (BP 1 Location: Left arm, BP Patient Position: At rest) Pulse 80 Temp 97.7 °F (36.5 °C) Resp (!) 41 Ht 5' (1.524 m) Wt 32.7 kg (72 lb) SpO2 95% BMI 14.06 kg/m² Intake/Output Summary (Last 24 hours) at 4/22/2020 1317 Last data filed at 4/22/2020 1353 Gross per 24 hour Intake 1800 ml Output 500 ml Net 1300 ml Current Facility-Administered Medications Medication Dose Route Frequency Provider Last Rate Last Dose  LORazepam (ATIVAN) tablet 0.5 mg  0.5 mg Oral BID Simón Baumann MD   0.5 mg at 04/22/20 1034  
 metoprolol tartrate (LOPRESSOR) tablet 25 mg  25 mg Oral BID Amber Patrick MD      
 aspirin delayed-release tablet 81 mg  81 mg Oral DAILY Amber Patrick MD      
 heparin 25,000 units in D5W 250 ml infusion  12-25 Units/kg/hr IntraVENous TITRATE Amber Patrick MD      
 amiodarone (CORDARONE) tablet 200 mg  200 mg Oral TID Amber Patrick MD   200 mg at 04/22/20 1027  
 [START ON 5/1/2020] amiodarone (CORDARONE) tablet 200 mg  200 mg Oral DAILY Amber Patrick MD      
  pantoprazole (PROTONIX) 40 mg in 0.9% sodium chloride 10 mL injection  40 mg IntraVENous Q12H Gerardo Stover MD   40 mg at 04/22/20 1028  calcitRIOL (ROCALTROL) capsule 0.25 mcg  0.25 mcg Oral Q MON, WED & Patrick Feliciano MD   0.25 mcg at 04/20/20 2202  B complex-vitaminC-folic acid (NEPHROCAP) cap  1 Cap Oral DAILY Doni Alvarez MD   1 Cap at 04/22/20 1034  
 atorvastatin (LIPITOR) tablet 40 mg  40 mg Oral QHS Gerardo Stover MD   40 mg at 04/21/20 2117  
 methIMAzole (TAPAZOLE) tablet 10 mg  10 mg Oral Q12H Lindsay Lagos MD   10 mg at 04/22/20 1028  acetaminophen (TYLENOL) tablet 650 mg  650 mg Oral Q8H Marito Long MD   650 mg at 04/22/20 7529  hydrocortisone (ANUSOL-HC) suppository 25 mg  25 mg Rectal Q12H Red Herring NP   25 mg at 04/22/20 1028  aluminum-magnesium hydroxide (MAALOX) oral suspension 15 mL  15 mL Oral QID PRN Marito Long MD   15 mL at 04/21/20 1236  polyethylene glycol (MIRALAX) packet 17 g  17 g Oral DAILY [place]   17 g at 04/22/20 1035  senna-docusate (PERICOLACE) 8.6-50 mg per tablet 1 Tab  1 Tab Oral DAILY [facility]   1 Tab at 04/22/20 1028  ondansetron (ZOFRAN) injection 8 mg  8 mg IntraVENous Q6H PRN HIEN Gray   8 mg at 04/21/20 1136  allopurinoL (ZYLOPRIM) tablet 100 mg  100 mg Oral DAILY [place]ah [place], [place]   100 mg at 04/22/20 1027 Physical Exam:  
 
Physical Exam:  
General:  no distress, appears stated age. Neck: Supple, symmetrical, trachea midline, no adenopathy, thyroid: no enlargement/tenderness/nodules, no carotid bruit and no JVD. Lungs:   Clear to auscultation bilaterally. Heart:  Regular rate and rhythm, S1, S2 normal, no murmur, click, rub or gallop. Abdomen:   Soft, non-tender. Bowel sounds normal. No masses,  No organomegaly.   
Extremities: Extremities normal, atraumatic, no cyanosis or edema,  
 Skin: Skin color, texture, turgor normal. No rashes or lesions Data Review: CBC w/Diff Recent Labs  
  04/22/20 
0104 04/21/20 
0321 04/20/20 
9946 WBC 14.8* 13.6* 11.9 RBC 2.63* 2.80* 2.82* HGB 8.0* 8.6* 8.6* HCT 25.5* 26.9* 27.4* MCV 97.0 96.1 97.2*  
MCH 30.4 30.7 30.5 MCHC 31.4 32.0 31.4  
RDW 16.3* 16.4* 16.7* Recent Labs  
  04/22/20 
0104 04/21/20 
0321 04/20/20 
7980 BANDS  --  4 4 MONOS 8 7 8 EOS 0 0 1 BASOS 0 0 0  
RDW 16.3* 16.4* 16.7* Comprehensive Metabolic Profile Recent Labs  
  04/22/20 
0950 04/22/20 
0104 04/21/20 
0321 * 144 144  
K 5.4 5.2 5.0  
* 114* 112* CO2 22 24 23 BUN 83* 81* 65* CREA 2.07* 2.16* 1.85* Recent Labs  
  04/22/20 
0950 04/22/20 
0104 04/21/20 
0321 CA 9.9 9.6 9.9 Result Information Status: Final result (Exam End: 4/22/2020 09:30) Provider Status: Open Study Result EXAM:  XR CHEST PORT 
  
INDICATION:   respiratory distress 
  
COMPARISON: 4/11/2020. 
  
FINDINGS: 
Moderately enlarged cardiac silhouette. Aortic atherosclerosis. Vascular 
congestion. Patchy bilateral basilar lung airspace opacities right greater than 
left. Probably small right pleural effusion. No pneumothorax. Thoracolumbar 
scoliosis. 
  
IMPRESSION IMPRESSION: 
  
Moderately enlarged cardiac silhouette with vascular congestion, possibly 
exaggerated by technique. Bilateral basilar airspace disease concerning for 
pneumonia or aspiration in the appropriate clinical setting. Small right pleural 
effusion Impression: Active Hospital Problems Diagnosis Date Noted  Atrial fibrillation ([facility] 04/21/2020  Chronic renal disease, stage III ([facility] 04/20/2020  Anemia 04/20/2020  Secondary hyperparathyroidism of renal origin ([facility] 04/20/2020  Hip fracture requiring operative repair ([address] 04/11/2020  Essential hypertension, benign   CONTROLLED 
  
  
 Worsening Renal  Function , multifactorial due to Change In Hemodynamics,? Sepsis, Infiltrate. Can't R/O ATN Mild Leucocytosis Plan:  
 
Agree with Stopping IVF, check urine with Microscopic exam, I/O record, startAntibiotics.   
 
 
Moses Hugo MD

## 2020-04-22 NOTE — ROUTINE PROCESS
Bedside and Verbal shift change report given to OLGA Castellano RN (oncoming nurse) by WAI Yoder RN (offgoing nurse). Report included the following information SBAR.

## 2020-04-22 NOTE — PROGRESS NOTES
[facility] Hospitalist Group Progress Note Patient: Desiree Griffith Age: 81 y.o. : 1935 MR#: [id number] SSN: [id number] Date/Time: 2020 Subjective: This a.m. patient developed chest pain and shortness of breath Mentally she is still little bit confused but much better than 2 days prior and yesterday She is answering questions like where she is her name and her daughter's name correctly I called her daughter and updated her condition in details all her questions were answered to her satisfaction MRT:  
MRT was called because of patient developing chest pain and shortness of breath Assessment/Plan: 1. Hip fracture  s/p ORIF.- on alternate DVT prophylaxis - Acute blood loss anemia due to # 1: H&H improved after PRBC transfusion 4/15. Also had guiac +ve brown stool, s/p GI eval w/ recs made for no endoscopy. No clear overt GI bleeding at this time. Follow H+H and transfuse PRN. - Monitor H/H - continue PPI  
- Again drop in H/H noted - 1 unit PRBC ordered , Renal consulted , monitor h/H - if stable dc in AM  
- Again patient is not on anticoagulation , just SCD 2. Mechanical fall 3. CKD III, at baseline creat, Dr. Alexander Call is nephrologist 
4. Goiter w/ partial thyroidectomy, pt states she is on thyroid medication but does not know name/dose, TSH high, free T4 low, concern for hypothyroid versus overtreated hyper. Discussed with endocrine Dr. Fide Garza, patient on methimazole at home but recommend to hold for 2 weeks and then resume at lower dose. Endocrine recommends iodine uptake test, first part to be done today . 5. HTN: BP stable, monitor off medications. 6. GERD: Continue PPI and add Maalox as needed. 7. DVT prophylaxis - Per ortho . 8. New onset of Afib with RVR -off Cardizem on amiodarone by Dr. Maura Capps 9.  
10. Hyperkalemia - Improved 11. Full code Plan 
-Acutely hypoxic respiratory failure-patient's was tachypneic respiratory rate of 41, O2 saturation and PO2 on ABG which was low, moderate distress noted on the patient on respiratory exertion requiring increased amount of oxygen to 4 L Possible etiology at this point: 
-Aspiration pneumonia-Per picture on chest x-ray-pulmonary consulted and following discussion with pulmonary done, patient will be started on Cefepime( PCN allergy )  aspiration precaution speech study, 
 
-Acute ACS/NSTEMI: Troponin is high. Cardiology consulted . Case discussed with them Patient will be restarted on heparin drip hemoglobin every 6 hours will be observed close eye on any blood thinner bleeding will be noted, patient is also on aspirin, GI prophylaxis with PPI 
-Hopefully patient will not drop H&H suggesting any bleeding. 
 
-Anxiety disorder 
-Patient was already on Ativan at home but due to confusion and decreased mentation for last 2 days it was stopped along with morphine I will restart at a very low dose to avoid any diazepam withdrawal- 
 
-Atelectasis-incentive spirometry, case discussed with the PT OT advised them to have patient sitting up position as tolerated. Case discussed with:  [x]Patient  [x]Family  []Nursing  []Case Management DVT Prophylaxis:  []Lovenox  []Hep SQ  [x]SCDs  []Coumadin   []Eliquis/Xarelto Objective:  
VS:  
Visit Vitals /55 (BP 1 Location: Right arm, BP Patient Position: At rest) Pulse 73 Temp 97.5 °F (36.4 °C) Resp 16 Ht 5' (1.524 m) Wt 32.7 kg (72 lb) SpO2 95% BMI 14.06 kg/m² Tmax/24hrs: Temp (24hrs), Av.9 °F (36.6 °C), Min:97.4 °F (36.3 °C), Max:98.6 °F (37 °C) IOBRIEF Intake/Output Summary (Last 24 hours) at 2020 3732 Last data filed at 2020 5206 Gross per 24 hour Intake 1860 ml Output 500 ml Net 1360 ml General: Awake but in severe distress and tachypneic Pulmonary:  CTA Bilaterally. No Wheezing/Rales. Cardiovascular: Regular rate and Rhythm. GI:  Soft, Non distended, Non tender. + Bowel sounds. Extremities:  No edema. Neurologic: Cannot be examined Medications:  
Current Facility-Administered Medications Medication Dose Route Frequency  dilTIAZem (CARDIZEM) 100 mg in 0.9% sodium chloride (MBP/ADV) 100 mL infusion  5 mg/hr IntraVENous TITRATE  dextrose 5% and 0.9% NaCl infusion  100 mL/hr IntraVENous CONTINUOUS  
 amiodarone (CORDARONE) tablet 200 mg  200 mg Oral TID  [START ON 5/1/2020] amiodarone (CORDARONE) tablet 200 mg  200 mg Oral DAILY  metoprolol (LOPRESSOR) injection 5 mg  5 mg IntraVENous Q6H  
 0.9% sodium chloride infusion  50 mL/hr IntraVENous CONTINUOUS  
 pantoprazole (PROTONIX) 40 mg in 0.9% sodium chloride 10 mL injection  40 mg IntraVENous Q12H  calcitRIOL (ROCALTROL) capsule 0.25 mcg  0.25 mcg Oral Q MON, WED & FRI  B complex-vitaminC-folic acid (NEPHROCAP) cap  1 Cap Oral DAILY  atorvastatin (LIPITOR) tablet 40 mg  40 mg Oral QHS  methIMAzole (TAPAZOLE) tablet 10 mg  10 mg Oral Q12H  
 acetaminophen (TYLENOL) tablet 650 mg  650 mg Oral Q8H  
 hydrocortisone (ANUSOL-HC) suppository 25 mg  25 mg Rectal Q12H  
 aluminum-magnesium hydroxide (MAALOX) oral suspension 15 mL  15 mL Oral QID PRN  polyethylene glycol (MIRALAX) packet 17 g  17 g Oral DAILY  senna-docusate (PERICOLACE) 8.6-50 mg per tablet 1 Tab  1 Tab Oral DAILY  ondansetron (ZOFRAN) injection 8 mg  8 mg IntraVENous Q6H PRN  
 allopurinoL (ZYLOPRIM) tablet 100 mg  100 mg Oral DAILY Labs:   
Recent Results (from the past 24 hour(s)) T4, FREE Collection Time: 04/22/20  1:04 AM  
Result Value Ref Range T4, Free 2.0 (H) 0.7 - 1.5 NG/DL  
CBC WITH AUTOMATED DIFF Collection Time: 04/22/20  1:04 AM  
Result Value Ref Range WBC 14.8 (H) 4.6 - 13.2 K/uL  
 RBC 2.63 (L) 4.20 - 5.30 M/uL HGB 8.0 (L) 12.0 - 16.0 g/dL HCT 25.5 (L) 35.0 - 45.0 %  MCV 97.0 74.0 - 97.0 FL  
 MCH 30.4 24.0 - 34.0 PG  
 MCHC 31.4 31.0 - 37.0 g/dL  
 RDW 16.3 (H) 11.6 - 14.5 % PLATELET 059 948 - 208 K/uL MPV 11.1 9.2 - 11.8 FL  
 NEUTROPHILS 82 (H) 40 - 73 % LYMPHOCYTES 10 (L) 21 - 52 % MONOCYTES 8 3 - 10 % EOSINOPHILS 0 0 - 5 % BASOPHILS 0 0 - 2 %  
 ABS. NEUTROPHILS 12.0 (H) 1.8 - 8.0 K/UL  
 ABS. LYMPHOCYTES 1.5 0.9 - 3.6 K/UL  
 ABS. MONOCYTES 1.2 0.05 - 1.2 K/UL  
 ABS. EOSINOPHILS 0.0 0.0 - 0.4 K/UL  
 ABS. BASOPHILS 0.0 0.0 - 0.1 K/UL  
 DF AUTOMATED METABOLIC PANEL, BASIC Collection Time: 04/22/20  1:04 AM  
Result Value Ref Range Sodium 144 136 - 145 mmol/L Potassium 5.2 3.5 - 5.5 mmol/L Chloride 114 (H) 100 - 111 mmol/L  
 CO2 24 21 - 32 mmol/L Anion gap 6 3.0 - 18 mmol/L Glucose 133 (H) 74 - 99 mg/dL BUN 81 (H) 7.0 - 18 MG/DL Creatinine 2.16 (H) 0.6 - 1.3 MG/DL  
 BUN/Creatinine ratio 38 (H) 12 - 20 GFR est AA 26 (L) >60 ml/min/1.73m2 GFR est non-AA 22 (L) >60 ml/min/1.73m2 Calcium 9.6 8.5 - 10.1 MG/DL Signed By: Alberto Olivia MD   
 April 22, 2020 Disclaimer: Sections of this note are dictated using utilizing voice recognition software. Minor typographical errors may be present. If questions arise, please do not hesitate to contact me or call our department.

## 2020-04-22 NOTE — PROGRESS NOTES
[facility] Hospitalist Group Progress Note Patient: Yahaira Martinez Age: 81 y.o. : 1935 MR#: [id number] SSN: [id number] Date: 2020 Subjective:  
 
Pt seen at bedside today. Sh states that she recognizes me. She is a bit restless. She answers questions, but everything is \"no\". Pt is awaiting SNF placement. FT4 decreasing, with last FT4 at 2.0. Overnight, pt had CP and this morning was back in afib with RVR. Assessment/Plan: This is an 82 yo AAF with a medical history significant for MNG and Graves' disease, on MMI for treatment. She states that she only take MMI 10 mg po daily instead of her prescribed 10mg po bid, but does seem to get confused about the names of her medications and fills her own pill boxes with her, multiple, medications. She is followed by Dr. Sherin Sosa for her MNG and has been recommended for completion thyroidectomy. Given that she has some compressive symptoms and may be having some difficulty with medication compliance, she may benefit from surgery. This can be done as an outpt. 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 She m/l would  benefit from definitive therapy. Afib with RVR may be due to her hyperthyroidism, and once thyroid function is in normal range, if related to hyperthyroidism, symptoms should resolve. 
___________________________________________________ PLAN:   
 
 -will  continue MMI at 10 mg po bid while inpatient , then decrease to MMI 10 mg po daily on discharge. 
 
-will check FT4 daily to monitor trend.  
 
Thank you for the consult, will continue to follow pt with you while admitted to the hospital. 
 
Disposition: will need follow up with Dr. Juanpablo Wong  In ~ 4-6 weeks to have TSH/FT4 recheck to see if any further dose adjustments are needed. Address is [address] [place] [address]. Contact phone number is [phone]. 
  
Total visit time: 30  minutes. Of this time, greater than 50% was spent counseling and/or coordinating care. Counseling elements included diagnostic results and/or recommended diagnostic studies, prognosis, education about the natural history and management of their condition, risks and benefits of management (treatment) options, and instructions for management (treatment) and/or follow-up. Objective:  
VS:  
Visit Vitals /50 (BP 1 Location: Left arm, BP Patient Position: At rest) Pulse 80 Temp 97.7 °F (36.5 °C) Resp (!) 41 Ht 5' (1.524 m) Wt 32.7 kg (72 lb) SpO2 95% BMI 14.06 kg/m² Tmax/24hrs: Temp (24hrs), Av.9 °F (36.6 °C), Min:97.4 °F (36.3 °C), Max:98.6 °F (37 °C) ROS: as per HPI, otherwise negative or unable to obtain. Intake/Output Summary (Last 24 hours) at 2020 1058 Last data filed at 2020 6910 Gross per 24 hour Intake 1860 ml Output 500 ml Net 1360 ml PE: 
 
General: Awake and alert, oriented to name, mild distress HEENT: NC/AT EOMI Skin: no rashes IMAGING 
 
2020 Thyroid scan and uptake COMPARISON: 2011. 
  
CORRELATION: None. 
  
DESCRIPTION: After ingestion of 257 microcuries 123Iodine, uptake of radioiodine 
by the thyroid gland was measured at 1, 6 and 24 hours. Images of the thyroid 
gland were obtained in anterior and bilateral anterior oblique projections using 
pinhole collimator, and additional anterior image was obtained with size markers 
in place. 
  
FINDINGS:   
  
Thyroid uptake is calculated as approximately 20% in one hour, 67% at 6 hour and 
40% in 24 hours.   
  
There is absence of the left thyroid gland. The right thyroid gland is large and 
heterogeneous. No dominant area of focal increased or decreased activity seen. Overall pattern appears similar to that of 2011. .   
  
IMPRESSION: 
  
1. Elevated uptake values in keeping with hyperthyroidism. Similar heterogeneous 
scan appearance of the remnant enlarged right thyroid gland. Overall findings 
suggestive of toxic multinodular goiter. Labs:   
 
Results for Lige Cogan (MRN [id number]) as of 4/22/2020 10:55 Ref. Range 4/18/2020 06:25 4/19/2020 05:50 4/20/2020 01:52 4/21/2020 03:21 4/22/2020 01:04  
T4, Free Latest Ref Range: 0.7 - 1.5 NG/DL 3.7 (H) 2.7 (H) 2.6 (H) 2.6 (H) 2.0 (H)  
 
 
 
  
Component Value Flag Ref Range Units Status Thyroid Stim Immunoglobulin 20.00  High   0.00 - 0.55 IU/L Final  
 
Component Value Flag Ref Range Units Status Thyroid peroxidase Ab 8   0 - 34 IU/mL Final  
Thyroglobulin Ab PENDING    IU/mL Incomplete Recent Results (from the past 24 hour(s)) T4, FREE Collection Time: 04/22/20  1:04 AM  
Result Value Ref Range T4, Free 2.0 (H) 0.7 - 1.5 NG/DL  
CBC WITH AUTOMATED DIFF Collection Time: 04/22/20  1:04 AM  
Result Value Ref Range WBC 14.8 (H) 4.6 - 13.2 K/uL  
 RBC 2.63 (L) 4.20 - 5.30 M/uL HGB 8.0 (L) 12.0 - 16.0 g/dL HCT 25.5 (L) 35.0 - 45.0 % MCV 97.0 74.0 - 97.0 FL  
 MCH 30.4 24.0 - 34.0 PG  
 MCHC 31.4 31.0 - 37.0 g/dL  
 RDW 16.3 (H) 11.6 - 14.5 % PLATELET 474 332 - 229 K/uL MPV 11.1 9.2 - 11.8 FL  
 NEUTROPHILS 82 (H) 40 - 73 % LYMPHOCYTES 10 (L) 21 - 52 % MONOCYTES 8 3 - 10 % EOSINOPHILS 0 0 - 5 % BASOPHILS 0 0 - 2 %  
 ABS. NEUTROPHILS 12.0 (H) 1.8 - 8.0 K/UL  
 ABS. LYMPHOCYTES 1.5 0.9 - 3.6 K/UL  
 ABS. MONOCYTES 1.2 0.05 - 1.2 K/UL  
 ABS. EOSINOPHILS 0.0 0.0 - 0.4 K/UL  
 ABS. BASOPHILS 0.0 0.0 - 0.1 K/UL  
 DF AUTOMATED METABOLIC PANEL, BASIC Collection Time: 04/22/20  1:04 AM  
Result Value Ref Range Sodium 144 136 - 145 mmol/L Potassium 5.2 3.5 - 5.5 mmol/L Chloride 114 (H) 100 - 111 mmol/L  
 CO2 24 21 - 32 mmol/L  Anion gap 6 3.0 - 18 mmol/L  
 Glucose 133 (H) 74 - 99 mg/dL BUN 81 (H) 7.0 - 18 MG/DL Creatinine 2.16 (H) 0.6 - 1.3 MG/DL  
 BUN/Creatinine ratio 38 (H) 12 - 20 GFR est AA 26 (L) >60 ml/min/1.73m2 GFR est non-AA 22 (L) >60 ml/min/1.73m2 Calcium 9.6 8.5 - 10.1 MG/DL  
POC G3 Collection Time: 04/22/20  9:38 AM  
Result Value Ref Range Device: NASAL CANNULA Flow rate (POC) 4 L/M  
 pH (POC) 7.345 (L) 7.35 - 7.45    
 pCO2 (POC) 42.3 35.0 - 45.0 MMHG  
 pO2 (POC) 56 (L) 80 - 100 MMHG  
 HCO3 (POC) 23.1 22 - 26 MMOL/L  
 sO2 (POC) 87 (L) 92 - 97 % Base deficit (POC) 3 mmol/L Allens test (POC) YES Site RIGHT BRACHIAL Specimen type (POC) ARTERIAL Performed by Adriana Signed By: Adolfo Cedillo MD   
 April 22, 2020 8:21 PM

## 2020-04-22 NOTE — CONSULTS
[facility]. Pulmonary, Critical Care, and Sleep Medicine Initial Patient Consult Name: Ian Torres MRN: [id number] : 1935 Hospital: [address] [place] [address] Dr Date: 2020 This patient has been seen and evaluated at the request of Dr. Can Mary for hypoxia. IMPRESSION:  
· Acute dyspnea/SOB with acute hypoxic resp failure: Suspect etiology due to aspiration given CXR showing new RLL infiltrate and pt was lethargic with pain medications · Aspiration pneumonia · Hip fracture s/p ORIF · Elevated D-dimer: In the setting of aspiration, likely acute phase reactant, although with dyspnea and hypoxia and fracture with surgery with immobilizaiton, pt is at risk for VTE · Acute encephalopathy/delirium:  Likely toxic/metabolic in nature. Improving · PAF currently controlled ventricular response · Anemia:  Pt had some episodes of LGIB requiring transfusion during hospitalization · Hyperthyroidism/Grave's disease · CKD Patient Active Problem List  
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  
 Obesity, unspecified E66.9  Nonspecific abnormal electrocardiogram (ECG) (EKG) R94.31  
 Renal insufficiency N28.9  Hip fracture requiring operative repair ([facility] S72.009A  Chronic renal disease, stage III (HCC) N18.3  Anemia D64.9  Secondary hyperparathyroidism of renal origin ([facility] N25.81  
 Atrial fibrillation ([facility] I48.91  
  
RECOMMENDATIONS:  
Advise starting pt on Zosyn to cover for aspiration pneumonia. Obtian sputum and blood cx and serum procalcitonin With regards to elevated d-dimer, pt restarted on anticoagulation by cardiology for a-fib, recommend low intensity heparin drip and monitor for signs of worsening anemia. Since pt remains at an elevated risk for VTE, when feasible, obtain CT chest w/o contrast and V/Q scan.   However, if pt's hypoxia improves with aspiration pneumonia treatment prior to obtaining studies, then this is likely not due to VTE and studies will not be needed. Supplemental oxygen to maintain SpO2 >88%. Titrate as needed, ok to use HFNC Please assess for home oxygen need prior to discharge Aggressive pulmonary toileting/bronchial hygiene Frequent incentive spirometry Aspiration precautions including elevating HOB >30deg. Advise SLP evaluation PT/OT, OOB, ambulate with assistance as tolerated DVT ppx per primary service Will follow Subjective:  
N.B. patient is a poor historian, history obtained from medical providers and medical record Patient is a 81 y.o. female with a past medical history of A. fib, Graves' disease, multinodular goiter, CKD, hypertension, presented to [facility] after falling at home, developed right leg pain. In the ER, patient found to have right hip fracture, patient admitted for evaluation and treatment. During the course of the hospitalization, patient was treated with ORIF. Patient also developed anemia requiring transfusion of PRBC on 4/15 and again today. Patient being followed by cardiology, on amiodarone by cardiology. This morning, patient found to be more tachypneic and requiring significant amount of oxygen, 6 L by nasal cannula but still tachypneic. Per discussion with primary service, patient was sleepy this weekend, given doses of morphine and Ativan. Patient more alert today, but notes some dyspnea. Patient does not note increased cough, also does not note dysphagia, but patient not oriented, appears delirious. Patient denies any abdominal pain. Further history unable to be elicited Past Medical History:  
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   
 Nonspecific abnormal electrocardiogram (ECG) (EKG)  Obesity, unspecified  Other and unspecified hyperlipidemia  Renal insufficiency Past Surgical History:  
Procedure Laterality Date [address] UNLISTED Explor lap  COLONOSCOPY N/A 5/31/2018 COLONOSCOPY w polypectomy performed by Chip Bains MD at [address] Left  HX CATARACT REMOVAL INSERT PROSTHETIC LENS: LEFT,RIGHT  HX GYN    
 HX ORTHOPAEDIC Left Rotator cuff repair  HX PARTIAL THYROIDECTOMY  NE BIOPSY OF BREAST, INCISIONAL    
 rt & lt 12 yrs ago Prior to Admission medications Medication Sig Start Date End Date Taking? Authorizing Provider  
oxyCODONE IR (ROXICODONE) 5 mg immediate release tablet Take 1 Tab by mouth every six (6) hours as needed for Pain for up to 7 days. Max Daily Amount: 20 mg. 4/17/20 4/24/20 Yes Trudy Ortiz PA  
amLODIPine (NORVASC) 5 mg tablet Take 5 mg by mouth daily. Yes Other, MD Max  
allopurinoL (ZYLOPRIM) 100 mg tablet Take 100 mg by mouth daily. Yes Other, MD Max  
simvastatin (ZOCOR) 20 mg tablet Take 20 mg by mouth nightly. Yes Other, MD Max  
furosemide (LASIX) 20 mg tablet Take 20 mg by mouth every other day. Yes Other, MD Max  
losartan (COZAAR) 50 mg tablet Take 50 mg by mouth daily. Yes Provider, Historical  
FOLIC ACID/VITAMIN B COMP W-C (VIKTOR-LUCERO PO) Take 1 Tab by mouth daily. Yes Provider, Historical  
omeprazole (PRILOSEC) 20 mg capsule Take 20 mg by mouth daily. Yes Provider, Historical  
aspirin 81 mg tablet Take 81 mg by mouth. Yes Provider, Historical  
LORazepam (ATIVAN) 1 mg tablet Take  by mouth two (2) times a day. Yes Provider, Historical  
 
Allergies Allergen Reactions  Ciprofibrate Unable to Consolidated Jose  Darvon [Propoxyphene] Unable to Obtain  Penicillins Unable to Obtain Social History Tobacco Use  Smoking status: Former Smoker  Smokeless tobacco: Never Used  Tobacco comment: REMOTELY QUIT 1980'S Substance Use Topics  Alcohol use: No  
  
History reviewed. No pertinent family history. Current Facility-Administered Medications Medication Dose Route Frequency  LORazepam (ATIVAN) tablet 0.5 mg  0.5 mg Oral BID  metoprolol tartrate (LOPRESSOR) tablet 25 mg  25 mg Oral BID  aspirin delayed-release tablet 81 mg  81 mg Oral DAILY  heparin 25,000 units in D5W 250 ml infusion  12-25 Units/kg/hr IntraVENous TITRATE  amiodarone (CORDARONE) tablet 200 mg  200 mg Oral TID  [START ON 2020] amiodarone (CORDARONE) tablet 200 mg  200 mg Oral DAILY  pantoprazole (PROTONIX) 40 mg in 0.9% sodium chloride 10 mL injection  40 mg IntraVENous Q12H  calcitRIOL (ROCALTROL) capsule 0.25 mcg  0.25 mcg Oral Q MON, WED & FRI  B complex-vitaminC-folic acid (NEPHROCAP) cap  1 Cap Oral DAILY  atorvastatin (LIPITOR) tablet 40 mg  40 mg Oral QHS  methIMAzole (TAPAZOLE) tablet 10 mg  10 mg Oral Q12H  
 acetaminophen (TYLENOL) tablet 650 mg  650 mg Oral Q8H  
 hydrocortisone (ANUSOL-HC) suppository 25 mg  25 mg Rectal Q12H  polyethylene glycol (MIRALAX) packet 17 g  17 g Oral DAILY  senna-docusate (PERICOLACE) 8.6-50 mg per tablet 1 Tab  1 Tab Oral DAILY  allopurinoL (ZYLOPRIM) tablet 100 mg  100 mg Oral DAILY Review of Systems: ROS unable to be obtained due to patient factors, secondary to delirium Objective:  
Vital Signs:   
Visit Vitals /50 (BP 1 Location: Left arm, BP Patient Position: At rest) Pulse 80 Temp 97.7 °F (36.5 °C) Resp (!) 41 Ht 5' (1.524 m) Wt 32.7 kg (72 lb) SpO2 95% BMI 14.06 kg/m² O2 Device: Nasal cannula O2 Flow Rate (L/min): 3 l/min Temp (24hrs), Av.9 °F (36.6 °C), Min:97.4 °F (36.3 °C), Max:98.6 °F (37 °C) Intake/Output:  
Last shift:       07 -  1900 In: 240 [P.O.:240] Out: - Last 3 shifts: 1901 -  0700 In: 7007 [P.O.:420; I.V.:1200] Out: 9549 [NDKUN:5653] Intake/Output Summary (Last 24 hours) at 4/22/2020 1407 Last data filed at 4/22/2020 7978 Gross per 24 hour Intake 1800 ml Output 500 ml Net 1300 ml Physical Exam:  
General:  Alert, cooperative, mild distress with tachypnea, appears stated age, laying in bed, upright, wearing nasal cannula. Head:  Normocephalic, without obvious abnormality, atraumatic. Eyes:  Conjunctivae/corneas clear. ANicteric, PERRLA, EOMI Nose: Nares normal. Mucosa normal. No drainage or sinus tenderness. Throat: Lips, mucosa dry. NO thrush; poor dentition, no oral lesions Neck: Supple, symmetrical, trachea midline, no adenopathy, thyroid: no enlargment/tenderness/nodules, no carotid bruit and no JVD. No crepitus Back:   Symmetric, no curvature, no spine tenderness or flank pain  
Lungs:    Poor air entry bilaterally, decreased breath sounds in bilateral bases, faint rhonchi heard over right lower lobe, no wheezes or rales throughout all lung fields Chest wall:  No tenderness or deformity. NO CREPITUS Heart:   Irregularly irregular rhythm, controlled rate, S1, S2 normal, no murmur, click, rub or gallop. Abdomen:   Soft, non-tender. Bowel sounds normal. No masses,  No organomegaly. No paradoxical motion Extremities: normal, atraumatic, no cyanosis or edema. No clubbing. Right hip has dressing over it Pulses: 1-2+ and symmetric all extremities. Skin: Skin color, texture, turgor normal. No rashes or lesions Lymph nodes: Cervical, supraclavicular, and axillary nodes normal.  
Neurologic: Grossly nonfocal, gross movement intact and strength intact in all extremities, sensation also grossly intact. Patient alert to self, disoriented to place and date consistent with acute delirium Data review:  
Labs: 
Recent Results (from the past 24 hour(s)) T4, FREE Collection Time: 04/22/20  1:04 AM  
Result Value Ref Range  T4, Free 2.0 (H) 0.7 - 1.5 NG/DL  
CBC WITH AUTOMATED DIFF  
 Collection Time: 04/22/20  1:04 AM  
Result Value Ref Range WBC 14.8 (H) 4.6 - 13.2 K/uL  
 RBC 2.63 (L) 4.20 - 5.30 M/uL HGB 8.0 (L) 12.0 - 16.0 g/dL HCT 25.5 (L) 35.0 - 45.0 % MCV 97.0 74.0 - 97.0 FL  
 MCH 30.4 24.0 - 34.0 PG  
 MCHC 31.4 31.0 - 37.0 g/dL  
 RDW 16.3 (H) 11.6 - 14.5 % PLATELET 875 452 - 214 K/uL MPV 11.1 9.2 - 11.8 FL  
 NEUTROPHILS 82 (H) 40 - 73 % LYMPHOCYTES 10 (L) 21 - 52 % MONOCYTES 8 3 - 10 % EOSINOPHILS 0 0 - 5 % BASOPHILS 0 0 - 2 %  
 ABS. NEUTROPHILS 12.0 (H) 1.8 - 8.0 K/UL  
 ABS. LYMPHOCYTES 1.5 0.9 - 3.6 K/UL  
 ABS. MONOCYTES 1.2 0.05 - 1.2 K/UL  
 ABS. EOSINOPHILS 0.0 0.0 - 0.4 K/UL  
 ABS. BASOPHILS 0.0 0.0 - 0.1 K/UL  
 DF AUTOMATED METABOLIC PANEL, BASIC Collection Time: 04/22/20  1:04 AM  
Result Value Ref Range Sodium 144 136 - 145 mmol/L Potassium 5.2 3.5 - 5.5 mmol/L Chloride 114 (H) 100 - 111 mmol/L  
 CO2 24 21 - 32 mmol/L Anion gap 6 3.0 - 18 mmol/L Glucose 133 (H) 74 - 99 mg/dL BUN 81 (H) 7.0 - 18 MG/DL Creatinine 2.16 (H) 0.6 - 1.3 MG/DL  
 BUN/Creatinine ratio 38 (H) 12 - 20 GFR est AA 26 (L) >60 ml/min/1.73m2 GFR est non-AA 22 (L) >60 ml/min/1.73m2 Calcium 9.6 8.5 - 10.1 MG/DL  
POC G3 Collection Time: 04/22/20  9:38 AM  
Result Value Ref Range Device: NASAL CANNULA Flow rate (POC) 4 L/M  
 pH (POC) 7.345 (L) 7.35 - 7.45    
 pCO2 (POC) 42.3 35.0 - 45.0 MMHG  
 pO2 (POC) 56 (L) 80 - 100 MMHG  
 HCO3 (POC) 23.1 22 - 26 MMOL/L  
 sO2 (POC) 87 (L) 92 - 97 % Base deficit (POC) 3 mmol/L Allens test (POC) YES Site RIGHT BRACHIAL Specimen type (POC) ARTERIAL Performed by Adriana D DIMER Collection Time: 04/22/20  9:50 AM  
Result Value Ref Range D DIMER 16.81 (H) <0.46 ug/ml(FEU) TROPONIN I Collection Time: 04/22/20  9:50 AM  
Result Value Ref Range  Troponin-I, QT 1.87 (HH) 0.0 - 0.284 NG/ML  
METABOLIC PANEL, BASIC  
 Collection Time: 04/22/20  9:50 AM  
Result Value Ref Range Sodium 146 (H) 136 - 145 mmol/L Potassium 5.4 3.5 - 5.5 mmol/L Chloride 116 (H) 100 - 111 mmol/L  
 CO2 22 21 - 32 mmol/L Anion gap 8 3.0 - 18 mmol/L Glucose 181 (H) 74 - 99 mg/dL BUN 83 (H) 7.0 - 18 MG/DL Creatinine 2.07 (H) 0.6 - 1.3 MG/DL  
 BUN/Creatinine ratio 40 (H) 12 - 20 GFR est AA 28 (L) >60 ml/min/1.73m2 GFR est non-AA 23 (L) >60 ml/min/1.73m2 Calcium 9.9 8.5 - 10.1 MG/DL ABG:   
Lab Results Component Value Date/Time PHI 7.345 (L) 04/22/2020 09:38 AM  
 PCO2I 42.3 04/22/2020 09:38 AM  
 PO2I 56 (L) 04/22/2020 09:38 AM  
 HCO3I 23.1 04/22/2020 09:38 AM  
 
 
 
PFT Results  (Last 48 hours) None Echo Results  (Last 48 hours) None Imaging: 
I have personally reviewed the patients radiographs and have reviewed the reports: 
Chest x-ray from 4/22 compared to chest x-ray from 4/11/2020, shows poor inspiration, but shows new right lower lobe infiltrate, prior was clear. This is suspicious for aspiration pneumonia. No masses or effusions seen. CXR Results  (Last 48 hours) 04/22/20 0930  XR CHEST PORT Final result Impression:  IMPRESSION:  
   
Moderately enlarged cardiac silhouette with vascular congestion, possibly  
exaggerated by technique. Bilateral basilar airspace disease concerning for  
pneumonia or aspiration in the appropriate clinical setting. Small right pleural  
effusion. Narrative:  EXAM:  XR CHEST PORT INDICATION:   respiratory distress COMPARISON: 4/11/2020. FINDINGS:  
Moderately enlarged cardiac silhouette. Aortic atherosclerosis. Vascular  
congestion. Patchy bilateral basilar lung airspace opacities right greater than  
left. Probably small right pleural effusion. No pneumothorax. Thoracolumbar  
scoliosis. CT Results  (Last 48 hours) 04/21/20 1345  CT HEAD WO CONT Final result Impression:  IMPRESSION:  
1. No acute intracranial hemorrhage, mass effect or midline structural shift. Follow-up with MRI is recommended if acute ischemia is suspected given  
limitations of CT. Narrative:  HISTORY: Altered mental status. EXAM: CT head. TECHNIQUE: Unenhanced spiral images of the head were performed from skullbase to  
vertex with coronal and sagittal reconstruction. No prior studies were performed  
for comparison. All CT scans at this facility are performed using dose optimization technique as  
appropriate to a performed exam, to include automated exposure control,  
adjustment of the mA and/or kV according to patient size (including appropriate  
matching for site-specific examinations), or use of iterative reconstruction  
technique. FINDINGS: No acute intracranial hemorrhage, mass effect or midline structural  
shift is demonstrated. Ventricles and basal cisterns are unremarkable. No  
extra-axial fluid collection is seen. Visualized skull base and calvarium  
demonstrate no abnormality. High complexity decision making was performed during the evaluation of this patient at high risk for decompensation with multiple organ involvement 
  
Above mentioned total time spent on reviewing the case/medical record/data/notes/EMR/patient examination/documentation/coordinating care with nurse/consultants, exclusive of procedures with complex decision making performed and > 50% time spent in face to face evaluation. Yolanda Nguyễn MD/MPH Pulmonary, Critical Care Medicine [facility]

## 2020-04-22 NOTE — PROGRESS NOTES
conducted a Follow up consultation and Spiritual Assessment for Mary Anne Mills, who is a 81 y.o.,female. The  provided the following Interventions: 
Patient was not in her best spirit today. She seemed upset, refused prayer and holy communion. When asked what is going on, she said, \"I'm not feeling good today. \" 
Patient looked pale with furrowed eyebrows and admitted she has pains all over her body. Continued the compassionate care and support. Listened empathically. Offered prayer and assurance of continued prayer on patient's behalf. Chart reviewed. The following outcomes were achieved: 
Patient refused 's prayer and offer to give holy communion. Assessment: 
There are no further spiritual or Anabaptist issues which require Spiritual Care Services interventions at this time. Plan: 
Chaplains will continue to follow and will provide pastoral care on an as needed/requested basis.  recommends bedside caregivers page  on duty if patient shows signs of acute spiritual or emotional distress. [facility] [id number] [facility]  
[phone]

## 2020-04-22 NOTE — PROGRESS NOTES
Problem: Pressure Injury - Risk of 
Goal: *Prevention of pressure injury Description: Document Thierry Scale and appropriate interventions in the flowsheet. Outcome: Progressing Towards Goal 
Note: Pressure Injury Interventions: 
Sensory Interventions: Assess changes in LOC, Keep linens dry and wrinkle-free, Maintain/enhance activity level, Minimize linen layers, Pressure redistribution bed/mattress (bed type), Turn and reposition approx. every two hours (pillows and wedges if needed) Moisture Interventions: Absorbent underpads, Apply protective barrier, creams and emollients, Internal/External urinary devices, Maintain skin hydration (lotion/cream), Minimize layers, Moisture barrier Activity Interventions: Increase time out of bed, Pressure redistribution bed/mattress(bed type) Mobility Interventions: HOB 30 degrees or less, Pressure redistribution bed/mattress (bed type), Turn and reposition approx. every two hours(pillow and wedges) Nutrition Interventions: Document food/fluid/supplement intake, Offer support with meals,snacks and hydration Friction and Shear Interventions: Apply protective barrier, creams and emollients, HOB 30 degrees or less, Minimize layers Problem: Patient Education: Go to Patient Education Activity Goal: Patient/Family Education Outcome: Progressing Towards Goal 
  
Problem: Falls - Risk of 
Goal: *Absence of Falls Description: Document Rushie Duff Fall Risk and appropriate interventions in the flowsheet. Outcome: Progressing Towards Goal 
Note: Fall Risk Interventions: 
Mobility Interventions: Bed/chair exit alarm, Patient to call before getting OOB, PT Consult for mobility concerns, Utilize walker, cane, or other assistive device Mentation Interventions: Adequate sleep, hydration, pain control, Bed/chair exit alarm, Increase mobility, More frequent rounding, Reorient patient, Room close to nurse's station, Toileting rounds, Update white board Medication Interventions: Bed/chair exit alarm, Patient to call before getting OOB, Teach patient to arise slowly Elimination Interventions: Bed/chair exit alarm, Call light in reach, Toileting schedule/hourly rounds History of Falls Interventions: Bed/chair exit alarm, Door open when patient unattended, Room close to nurse's station Problem: Patient Education: Go to Patient Education Activity Goal: Patient/Family Education Outcome: Progressing Towards Goal 
  
Problem: Patient Education: Go to Patient Education Activity Goal: Patient/Family Education Outcome: Progressing Towards Goal 
  
Problem: Hip Fracture: Post-Op Day 2 Goal: Off Pathway (Use only if patient is Off Pathway) Outcome: Progressing Towards Goal 
Goal: Activity/Safety Outcome: Progressing Towards Goal 
Goal: Diagnostic Test/Procedures Outcome: Progressing Towards Goal 
Goal: Nutrition/Diet Outcome: Progressing Towards Goal 
Goal: Medications Outcome: Progressing Towards Goal 
Goal: Respiratory Outcome: Progressing Towards Goal 
Goal: Treatments/Interventions/Procedures Outcome: Progressing Towards Goal 
Goal: Psychosocial 
Outcome: Progressing Towards Goal 
Goal: Discharge Planning Outcome: Progressing Towards Goal 
Goal: *Optimal pain control at patient's stated goal 
Outcome: Progressing Towards Goal 
Goal: *Hemodynamically stable Outcome: Progressing Towards Goal 
Goal: *Adequate oxygenation Outcome: Progressing Towards Goal 
Goal: *Tolerates increased activity Outcome: Progressing Towards Goal 
Goal: *Tolerates nutrition therapy Outcome: Progressing Towards Goal 
Goal: *Absence of skin breakdown Outcome: Progressing Towards Goal 
  
Problem: Hip Fracture: Post-Op Day 3 Goal: Off Pathway (Use only if patient is Off Pathway) Outcome: Progressing Towards Goal 
Goal: Activity/Safety Outcome: Progressing Towards Goal 
Goal: Diagnostic Test/Procedures Outcome: Progressing Towards Goal 
Goal: Nutrition/Diet Outcome: Progressing Towards Goal 
Goal: Medications Outcome: Progressing Towards Goal 
Goal: Respiratory Outcome: Progressing Towards Goal 
Goal: Treatments/Interventions/Procedures Outcome: Progressing Towards Goal 
Goal: Psychosocial 
Outcome: Progressing Towards Goal 
Goal: Discharge Planning Outcome: Progressing Towards Goal 
Goal: *Met physical therapy criteria for discharge to next level of care Outcome: Progressing Towards Goal 
Goal: *Optimal pain control at patient's stated goal 
Outcome: Progressing Towards Goal 
Goal: *Hemodynamically stable Outcome: Progressing Towards Goal 
Goal: *Tolerating diet Outcome: Progressing Towards Goal 
Goal: *Active bowel function Outcome: Progressing Towards Goal 
Goal: *Adequate urinary output Outcome: Progressing Towards Goal 
Goal: *Absence of skin breakdown Outcome: Progressing Towards Goal 
Goal: *Patient verbalizes understanding of discharge instructions Outcome: Progressing Towards Goal 
  
Problem: Hip Fracture: Post-Op Day 4 Goal: Off Pathway (Use only if patient is Off Pathway) Outcome: Progressing Towards Goal 
Goal: Activity/Safety Outcome: Progressing Towards Goal 
Goal: Diagnostic Test/Procedures Outcome: Progressing Towards Goal 
Goal: Nutrition/Diet Outcome: Progressing Towards Goal 
Goal: Medications Outcome: Progressing Towards Goal 
Goal: Respiratory Outcome: Progressing Towards Goal 
Goal: Treatments/Interventions/Procedures Outcome: Progressing Towards Goal 
Goal: Psychosocial 
Outcome: Progressing Towards Goal 
Goal: Discharge Planning Outcome: Progressing Towards Goal 
Goal: *Met physical therapy criteria for discharge to next level of care Outcome: Progressing Towards Goal 
Goal: *Optimal pain control at patient's stated goal 
Outcome: Progressing Towards Goal 
Goal: *Hemodynamically stable Outcome: Progressing Towards Goal 
Goal: *Tolerating diet Outcome: Progressing Towards Goal 
Goal: *Active bowel function Outcome: Progressing Towards Goal 
Goal: *Adequate urinary output Outcome: Progressing Towards Goal 
Goal: *Absence of skin breakdown Outcome: Progressing Towards Goal 
Goal: *Patient verbalizes understanding of discharge instructions Outcome: Progressing Towards Goal 
  
Problem: Pain Goal: *Control of Pain Outcome: Progressing Towards Goal 
Goal: *PALLIATIVE CARE:  Alleviation of Pain Outcome: Progressing Towards Goal 
  
Problem: Patient Education: Go to Patient Education Activity Goal: Patient/Family Education Outcome: Progressing Towards Goal 
  
Problem: Anxiety Goal: *Alleviation of anxiety Outcome: Progressing Towards Goal 
Goal: *Alleviation of anxiety (Palliative Care) Outcome: Progressing Towards Goal 
  
Problem: Patient Education: Go to Patient Education Activity Goal: Patient/Family Education Outcome: Progressing Towards Goal

## 2020-04-22 NOTE — PROGRESS NOTES
X2 PT txs attempted at [phone]. 
 
1st attempt, transport in room w/ pt as pt exiting floor for testing. 2nd attempt, pt pending duplex results for DVT r/o. Will hold at this time pending results of duplex. Nursing notified. Will follow up at later date when appropriate. CHERELLE Glass

## 2020-04-22 NOTE — PROGRESS NOTES
Patient complained of 10/10 chest pain. Vitals were taken and EKG performed. MD called and orders received.

## 2020-04-22 NOTE — DIABETES MGMT
GLYCEMIC CONTROL SCREENING INITIATED: 
 
No results found for: HBA1C, HGBE8, UOZ1XXTG Lab Results Component Value Date/Time Glucose 181 (H) 04/22/2020 09:50 AM  
 
 
  [x]  Glucose values exceeding inpatient target range: -180mg/dl   
        non-ICU 70-180mg/dl [x]  Recommend corrective insulin per IP Insulin order set. [x]  Standard insulin scale  []  Very insuln resistant scale Hal Luong RD, CDE

## 2020-04-23 PROBLEM — J69.0 ASPIRATION PNEUMONIA (HCC): Status: ACTIVE | Noted: 2020-01-01

## 2020-04-23 PROBLEM — D72.829 LEUCOCYTOSIS: Status: ACTIVE | Noted: 2020-01-01

## 2020-04-23 NOTE — PROGRESS NOTES
[facility] 
 
 
CARDIOLOGY PROGRESS NOTE 
RECS: 
 
 
1. Paroxymal atrial fibrillation-converted back to sinus rhythm. on afib with RVR  this am. Added Cardizem drip for rate control. amiodarone reduced to 200 mg daily. Recent Echo showed preserved EF%. 2. Acute hypoxic respiratory failure- on high flow O2  - [address] following 3. Elevated troponin- currently chest pain free. Continue to trend troponin- on heparin drip per ACS protocol. continue  asa,statin and bb.   
4. Elevated D-dimer-  High risk for PE/DVT- consider V/Q scan 5. SOB- multifactorial with severe anemia,  possible aspiration pneumonia vs PE -  
6. Hyperthyroidism: Patient on methimazole. Treatment per medicine. Would prefer beta-blockers rather than diltiazem for rate control. 7. Severe anemia- noted hgb drop pos op-s/p 4 units PRBC's - H&H dropping monitor closely 8. AMS- confused and agitated at times 9. 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 Right hip fracture s/p ORIF 11. CKD-elevated renal indices. Renal following [place] Founds afib with rvr- one dose of digoxin and will consider discontinuing amio once HR improves. ASSESSMENT: 
Hospital Problems  Date Reviewed: 4/20/2020 Codes Class Noted POA Atrial fibrillation [facility]) ICD-10-CM: I48.91 
ICD-9-CM: 427.31  4/21/2020 Unknown Chronic renal disease, stage III (HCC) ICD-10-CM: N18.3 ICD-9-CM: 585.3  4/20/2020 Unknown Anemia ICD-10-CM: D64.9 ICD-9-CM: 285.9  4/20/2020 Unknown Secondary hyperparathyroidism of renal origin [facility]) ICD-10-CM: N25.81 ICD-9-CM: 588.81  4/20/2020 Unknown Hip fracture requiring operative repair [facility]) ICD-10-CM: F81.709K ICD-9-CM: 820.8  4/11/2020 Unknown Essential hypertension, benign ICD-10-CM: I10 
ICD-9-CM: 401.1  Unknown Yes  Overview Signed 7/10/2013  6:40 PM by Dang Torres 
  
  
   
  
 
 
 
 SUBJECTIVE: 
Confused and disoriented Can not remember why she is here in the hospital.  
C/o SOB OBJECTIVE: 
 
VS:  
Visit Vitals /69 Pulse (!) 104 Temp 97.8 °F (36.6 °C) Resp 25 Ht 5' (1.524 m) Wt 32.7 kg (72 lb) SpO2 99% BMI 14.06 kg/m² Intake/Output Summary (Last 24 hours) at 4/23/2020 9543 Last data filed at 4/23/2020 1056 Gross per 24 hour Intake 240 ml Output 575 ml Net -335 ml TELE:afib with RVR General: alert, well developed, anxious, confused and in mild distress HENT: Normocephalic, atraumatic. Normal external eye. Neck :  no bruit, no JVD Cardiac:  irregular rate and rhythm, S1, S2 normal, no murmur, click, rub or gallop Lungs: diminished breath sounds  bilaterally Abdomen: Soft, nontender, no masses Extremities:  No c/c/e, peripheral pulses present Labs: Results:  
   
Chemistry Recent Labs  
  04/23/20 0211 04/22/20 
0950 04/22/20 
0104 * 181* 133* * 146* 144  
K 5.5 5.4 5.2 * 116* 114* CO2 25 22 24 BUN 95* 83* 81* CREA 2.38* 2.07* 2.16* CA 9.7 9.9 9.6 AGAP 7 8 6 BUCR 40* 40* 38* CBC w/Diff Recent Labs  
  04/23/20 0211 04/22/20 2000 04/22/20 0104 WBC 15.4* 20.0* 14.8*  
RBC 2.36* 2.69* 2.63* HGB 7.4* 8.3* 8.0*  
HCT 23.3* 26.3* 25.5*  
 201 203 GRANS 77* 77* 82* LYMPH 11* 6* 10* EOS 0 0 0 Cardiac Enzymes No results for input(s): CPK, CKND1, MICHELLE in the last 72 hours. No lab exists for component: [place] Coagulation Recent Labs  
  04/23/20 0211 04/22/20 2000 APTT 63.8* 32.6 Lipid Panel Lab Results Component Value Date/Time Cholesterol, total 182 01/16/2020 01:39 PM  
 HDL Cholesterol 55 01/16/2020 01:39 PM  
 LDL, calculated 99 01/16/2020 01:39 PM  
 Triglyceride 140 01/16/2020 01:39 PM  
 CHOL/HDL Ratio 3.3 01/16/2020 01:39 PM  
  
BNP No results for input(s): BNPP in the last 72 hours. Liver Enzymes No results for input(s): TP, ALB, TBIL, AP, SGOT, GPT in the last 72 hours. No lab exists for component: DBIL Thyroid Studies Lab Results Component Value Date/Time TSH 48.20 (H) 04/13/2020 05:18 AM  
    
 
 
 
TERESA Merchant supervised I have independently evaluated and examined the patient. All relevant labs and testing data's are reviewed. Care plan discussed and updated after review.  
 
Belen Enriquez MD

## 2020-04-23 NOTE — PROGRESS NOTES
[facility]. Pulmonary, Critical Care, and Sleep Medicine F/U Patient Consult Name: Elinor Box MRN: [id number] : 1935 Hospital: [address] [place] [address] Dr Date: 2020 This patient has been seen and evaluated at the request of Dr. Alhaji Pickens for hypoxia. IMPRESSION:  
· Acute dyspnea/SOB with acute hypoxic resp failure: Suspect etiology due to aspiration given CXR showing new RLL infiltrate and pt was lethargic with pain medications. Pt requiring HFNC · Aspiration pneumonia: Given new right lower lobe infiltrate, as well as elevated procalcitonin at 6.3 · Hip fracture s/p ORIF · Elevated D-dimer: In the setting of aspiration, likely acute phase reactant, although with dyspnea and hypoxia and fracture with surgery with immobilizaiton, pt is at risk for VTE. Pt placed on heparin gtt by cardiology but stopped due to anemia · Acute encephalopathy/delirium:  Likely toxic/metabolic in nature. Improving · PAF currently controlled ventricular response · Anemia:  Pt had some episodes of LGIB requiring transfusion during hospitalization · Hyperthyroidism/Grave's disease · CKD Patient Active Problem List  
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  
 Obesity, unspecified E66.9  Nonspecific abnormal electrocardiogram (ECG) (EKG) R94.31  
 Renal insufficiency N28.9  Hip fracture requiring operative repair ([facility] [address] S72.009A  Chronic renal disease, stage III (HCC) N18.3  Anemia D64.9  Secondary hyperparathyroidism of renal origin ([facility] [address] N25.81  
 Atrial fibrillation (HCC) I48.91  
 Aspiration pneumonia ([facility] [address] J69.0  Leucocytosis D72.829 RECOMMENDATIONS:  
Continue Zosyn to cover for aspiration pneumonia. F/U culture results and adjust accordingly With regards to elevated d-dimer, pt unable to tolerate anticoagulation due to anemia requiring transfusion. Hypoxia likely due to aspiration given that tachypnea has improved with ABx. Since pt remains at an elevated risk for VTE, could consider CT chest w/o contrast and V/Q scan ONLY IF hypoxia worsens. However, if pt's hypoxia improves with aspiration pneumonia treatment prior to obtaining studies, then this is likely not due to VTE and studies will not be needed. Can hold off at this time Supplemental oxygen to maintain SpO2 >88%. Titrate as needed, currently on HFNC Please assess for home oxygen need prior to discharge Aggressive pulmonary toileting/bronchial hygiene Frequent incentive spirometry Aspiration precautions including elevating HOB >30deg. Advise SLP evaluation PT/OT, OOB, ambulate with assistance as tolerated DVT ppx per primary service Will follow Subjective:  
04/23/20 Patient seen and examined at bedside. No acute events overnight. Patient less tachypneic, patient on high flow nasal cannula, tolerating well. Patient remains delirious, does not have any complaints, denies any hip pain, chest pain, cough. HPI: 
N.B. patient is a poor historian, history obtained from medical providers and medical record Patient is a 81 y.o. female with a past medical history of A. fib, Graves' disease, multinodular goiter, CKD, hypertension, presented to [facility] after falling at home, developed right leg pain. In the ER, patient found to have right hip fracture, patient admitted for evaluation and treatment. During the course of the hospitalization, patient was treated with ORIF. Patient also developed anemia requiring transfusion of PRBC on 4/15 and again today. Patient being followed by cardiology, on amiodarone by cardiology. This morning, patient found to be more tachypneic and requiring significant amount of oxygen, 6 L by nasal cannula but still tachypneic.   Per discussion with primary service, patient was sleepy this weekend, given doses of morphine and Ativan. Patient more alert today, but notes some dyspnea. Patient does not note increased cough, also does not note dysphagia, but patient not oriented, appears delirious. Patient denies any abdominal pain. Further history unable to be elicited Past Medical History:  
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   
 Nonspecific abnormal electrocardiogram (ECG) (EKG)  Obesity, unspecified  Other and unspecified hyperlipidemia  Renal insufficiency Past Surgical History:  
Procedure Laterality Date [address] UNLISTED Explor lap  COLONOSCOPY N/A 5/31/2018 COLONOSCOPY w polypectomy performed by Elisa Singh MD at [address] [facility] Left  HX CATARACT REMOVAL INSERT PROSTHETIC LENS: LEFT,RIGHT  HX GYN    
 HX ORTHOPAEDIC Left Rotator cuff repair  HX PARTIAL THYROIDECTOMY  CA BIOPSY OF BREAST, INCISIONAL    
 rt & lt 12 yrs ago Prior to Admission medications Medication Sig Start Date End Date Taking? Authorizing Provider  
oxyCODONE IR (ROXICODONE) 5 mg immediate release tablet Take 1 Tab by mouth every six (6) hours as needed for Pain for up to 7 days. Max Daily Amount: 20 mg. 4/17/20 4/24/20 Yes Isiah Ortiz PA  
amLODIPine (NORVASC) 5 mg tablet Take 5 mg by mouth daily. Yes Jabier, MD Max  
allopurinoL (ZYLOPRIM) 100 mg tablet Take 100 mg by mouth daily. Yes Jabier, MD Max  
simvastatin (ZOCOR) 20 mg tablet Take 20 mg by mouth nightly. Yes Jabier, MD Max  
furosemide (LASIX) 20 mg tablet Take 20 mg by mouth every other day. Yes Jabier, MD Max  
losartan (COZAAR) 50 mg tablet Take 50 mg by mouth daily. Yes Provider, Historical  
FOLIC ACID/VITAMIN B COMP W-C (VIKTOR-LUCERO PO) Take 1 Tab by mouth daily.    Yes Provider, Historical  
 omeprazole (PRILOSEC) 20 mg capsule Take 20 mg by mouth daily. Yes Provider, Historical  
aspirin 81 mg tablet Take 81 mg by mouth. Yes Provider, Historical  
LORazepam (ATIVAN) 1 mg tablet Take  by mouth two (2) times a day. Yes Provider, Historical  
 
Allergies Allergen Reactions  Ciprofibrate Unable to Consolidated Jose  Darvon [Propoxyphene] Unable to Obtain  Penicillins Unable to Obtain Social History Tobacco Use  Smoking status: Former Smoker  Smokeless tobacco: Never Used  Tobacco comment: REMOTELY QUIT 1980'S Substance Use Topics  Alcohol use: No  
  
History reviewed. No pertinent family history. Current Facility-Administered Medications Medication Dose Route Frequency  dilTIAZem (CARDIZEM) 100 mg in 0.9% sodium chloride (MBP/ADV) 100 mL infusion  5 mg/hr IntraVENous CONTINUOUS  
 metoprolol tartrate (LOPRESSOR) tablet 12.5 mg  12.5 mg Oral Q12H  
 dextrose 5% infusion  100 mL/hr IntraVENous CONTINUOUS  
 [START ON 4/24/2020] methIMAzole (TAPAZOLE) tablet 10 mg  10 mg Oral DAILY  LORazepam (ATIVAN) tablet 0.5 mg  0.5 mg Oral BID  aspirin delayed-release tablet 81 mg  81 mg Oral DAILY  cefepime (MAXIPIME) 2 g in sterile water (preservative free) 10 mL IV syringe  2 g IntraVENous Q24H  pantoprazole (PROTONIX) 40 mg in 0.9% sodium chloride 10 mL injection  40 mg IntraVENous Q12H  calcitRIOL (ROCALTROL) capsule 0.25 mcg  0.25 mcg Oral Q MON, WED & FRI  B complex-vitaminC-folic acid (NEPHROCAP) cap  1 Cap Oral DAILY  atorvastatin (LIPITOR) tablet 40 mg  40 mg Oral QHS  methIMAzole (TAPAZOLE) tablet 10 mg  10 mg Oral Q12H  
 acetaminophen (TYLENOL) tablet 650 mg  650 mg Oral Q8H  
 hydrocortisone (ANUSOL-HC) suppository 25 mg  25 mg Rectal Q12H  polyethylene glycol (MIRALAX) packet 17 g  17 g Oral DAILY  senna-docusate (PERICOLACE) 8.6-50 mg per tablet 1 Tab  1 Tab Oral DAILY  allopurinoL (ZYLOPRIM) tablet 100 mg  100 mg Oral DAILY Review of Systems: 
Remains unable to obtain ROS due to patient factors, secondary to delirium Objective:  
Vital Signs:   
Visit Vitals /76 Pulse 60 Temp 98 °F (36.7 °C) Resp 23 Ht 5' (1.524 m) Wt 73 kg (160 lb 15 oz) SpO2 99% BMI 31.43 kg/m² O2 Device: Hi flow nasal cannula O2 Flow Rate (L/min): 35 l/min Temp (24hrs), Av.4 °F (36.9 °C), Min:97.8 °F (36.6 °C), Max:99.6 °F (37.6 °C) Intake/Output:  
Last shift:      No intake/output data recorded. Last 3 shifts:  1901 -  0700 In: 1800 [P.O.:600; I.V.:1200] Out: 1275 [[id number]] Intake/Output Summary (Last 24 hours) at 2020 1831 Last data filed at 2020 5423 Gross per 24 hour Intake  Output 175 ml Net -175 ml Physical Exam:  
General:  Alert, cooperative, NAD, appears stated age, laying in bed, upright, wearing HFNC Head:  Normocephalic, without obvious abnormality, atraumatic. Eyes:  Conjunctivae/corneas clear. ANicteric, PERRLA, EOMI Nose: Nares normal. Mucosa normal. No drainage or sinus tenderness. Throat: Lips, mucosa dry. NO thrush; poor dentition, no oral lesions Neck: Supple, symmetrical, trachea midline, no adenopathy, thyroid: no enlargment/tenderness/nodules, no carotid bruit and no JVD. No crepitus Back:   Symmetric, no curvature, no spine tenderness or flank pain  
Lungs:   Unchanged, poor air entry bilaterally, decreased breath sounds in bilateral bases, faint rhonchi heard over right lower lobe, no wheezes or rales throughout all lung fields Chest wall:  No tenderness or deformity. NO CREPITUS Heart:   Irregularly irregular rhythm, controlled rate, S1, S2 normal, no murmur, click, rub or gallop. Abdomen:   Soft, non-tender. Bowel sounds normal. No masses,  No organomegaly. No paradoxical motion Extremities: normal, atraumatic, no cyanosis or edema. No clubbing. Right hip has dressing over it Pulses: 1-2+ and symmetric all extremities. Skin: Skin color, texture, turgor normal. No rashes or lesions Lymph nodes: Cervical, supraclavicular, and axillary nodes normal.  
Neurologic: Grossly nonfocal, gross movement intact and strength intact in all extremities, sensation also grossly intact. Patient alert to self, disoriented to place and date consistent with acute delirium Data review:  
Labs: 
Recent Results (from the past 24 hour(s)) CBC WITH AUTOMATED DIFF Collection Time: 04/22/20  8:00 PM  
Result Value Ref Range WBC 20.0 (H) 4.6 - 13.2 K/uL  
 RBC 2.69 (L) 4.20 - 5.30 M/uL HGB 8.3 (L) 12.0 - 16.0 g/dL HCT 26.3 (L) 35.0 - 45.0 % MCV 97.8 (H) 74.0 - 97.0 FL  
 MCH 30.9 24.0 - 34.0 PG  
 MCHC 31.6 31.0 - 37.0 g/dL  
 RDW 16.8 (H) 11.6 - 14.5 % PLATELET 302 650 - 752 K/uL MPV 11.7 9.2 - 11.8 FL  
 NEUTROPHILS 77 (H) 42 - 75 % BAND NEUTROPHILS 13 (H) 0 - 5 % LYMPHOCYTES 6 (L) 20 - 51 % MONOCYTES 4 2 - 9 % EOSINOPHILS 0 0 - 5 % BASOPHILS 0 0 - 3 %  
 ABS. NEUTROPHILS 18.0 (H) 1.8 - 8.0 K/UL  
 ABS. LYMPHOCYTES 1.2 0.8 - 3.5 K/UL  
 ABS. MONOCYTES 0.8 0 - 1.0 K/UL  
 ABS. EOSINOPHILS 0.0 0.0 - 0.4 K/UL  
 ABS. BASOPHILS 0.0 0.0 - 0.06 K/UL  
 DF AUTOMATED PLATELET COMMENTS ADEQUATE PLATELETS    
 RBC COMMENTS ANISOCYTOSIS 
1+ PTT Collection Time: 04/22/20  8:00 PM  
Result Value Ref Range aPTT 32.6 23.0 - 36.4 SEC T4, FREE Collection Time: 04/23/20  2:11 AM  
Result Value Ref Range T4, Free 1.7 (H) 0.7 - 1.5 NG/DL  
CBC WITH AUTOMATED DIFF Collection Time: 04/23/20  2:11 AM  
Result Value Ref Range WBC 15.4 (H) 4.6 - 13.2 K/uL  
 RBC 2.36 (L) 4.20 - 5.30 M/uL HGB 7.4 (L) 12.0 - 16.0 g/dL HCT 23.3 (L) 35.0 - 45.0 % MCV 98.7 (H) 74.0 - 97.0 FL  
 MCH 31.4 24.0 - 34.0 PG  
 MCHC 31.8 31.0 - 37.0 g/dL  
 RDW 16.6 (H) 11.6 - 14.5 % PLATELET 014 077 - 084 K/uL MPV 11.4 9.2 - 11.8 FL  
 NEUTROPHILS 77 (H) 42 - 75 % BAND NEUTROPHILS 9 (H) 0 - 5 % LYMPHOCYTES 11 (L) 20 - 51 % MONOCYTES 3 2 - 9 % EOSINOPHILS 0 0 - 5 % BASOPHILS 0 0 - 3 %  
 ABS. NEUTROPHILS 13.2 (H) 1.8 - 8.0 K/UL  
 ABS. LYMPHOCYTES 1.7 0.8 - 3.5 K/UL  
 ABS. MONOCYTES 0.5 0 - 1.0 K/UL  
 ABS. EOSINOPHILS 0.0 0.0 - 0.4 K/UL  
 ABS. BASOPHILS 0.0 0.0 - 0.06 K/UL  
 DF AUTOMATED PLATELET COMMENTS ADEQUATE PLATELETS    
 RBC COMMENTS ANISOCYTOSIS 1+ 
    
 RBC COMMENTS MACROCYTOSIS 
1+ METABOLIC PANEL, BASIC Collection Time: 04/23/20  2:11 AM  
Result Value Ref Range Sodium 146 (H) 136 - 145 mmol/L Potassium 5.5 3.5 - 5.5 mmol/L Chloride 114 (H) 100 - 111 mmol/L  
 CO2 25 21 - 32 mmol/L Anion gap 7 3.0 - 18 mmol/L Glucose 106 (H) 74 - 99 mg/dL BUN 95 (H) 7.0 - 18 MG/DL Creatinine 2.38 (H) 0.6 - 1.3 MG/DL  
 BUN/Creatinine ratio 40 (H) 12 - 20 GFR est AA 24 (L) >60 ml/min/1.73m2 GFR est non-AA 19 (L) >60 ml/min/1.73m2 Calcium 9.7 8.5 - 10.1 MG/DL  
PTT Collection Time: 04/23/20  2:11 AM  
Result Value Ref Range aPTT 63.8 (H) 23.0 - 36.4 SEC PROCALCITONIN Collection Time: 04/23/20  2:11 AM  
Result Value Ref Range Procalcitonin 6.30 ng/mL HGB & HCT Collection Time: 04/23/20 12:55 PM  
Result Value Ref Range HGB 7.9 (L) 12.0 - 16.0 g/dL HCT 25.3 (L) 35.0 - 45.0 % TROPONIN I Collection Time: 04/23/20 12:55 PM  
Result Value Ref Range Troponin-I, QT 0.63 (H) 0.0 - 0.045 NG/ML  
IRON PROFILE Collection Time: 04/23/20 12:55 PM  
Result Value Ref Range Iron 23 (L) 50 - 175 ug/dL TIBC 140 (L) 250 - 450 ug/dL Iron % saturation 16 (L) 20 - 50 % FERRITIN Collection Time: 04/23/20 12:55 PM  
Result Value Ref Range Ferritin 1,457 (H) 8 - 388 NG/ML  
PTT Collection Time: 04/23/20 12:55 PM  
Result Value Ref Range aPTT 40.9 (H) 23.0 - 36.4 SEC  
HGB & HCT Collection Time: 04/23/20  5:10 PM  
Result Value Ref Range HGB 7.6 (L) 12.0 - 16.0 g/dL HCT 24.5 (L) 35.0 - 45.0 % ABG:   
 No results found for: PH, PHI, PCO2, PCO2I, PO2, PO2I, HCO3, HCO3I, FIO2, FIO2I 
 
 
PFT Results  (Last 48 hours) None Echo Results  (Last 48 hours) None Imaging: 
I have personally reviewed the patients radiographs and have reviewed the reports:  No new studies Chest x-ray from 4/22 compared to chest x-ray from 4/11/2020, shows poor inspiration, but shows new right lower lobe infiltrate, prior was clear. This is suspicious for aspiration pneumonia. No masses or effusions seen. CXR Results  (Last 48 hours) 04/22/20 0930  XR CHEST PORT Final result Impression:  IMPRESSION:  
   
Moderately enlarged cardiac silhouette with vascular congestion, possibly  
exaggerated by technique. Bilateral basilar airspace disease concerning for  
pneumonia or aspiration in the appropriate clinical setting. Small right pleural  
effusion. Narrative:  EXAM:  XR CHEST PORT INDICATION:   respiratory distress COMPARISON: 4/11/2020. FINDINGS:  
Moderately enlarged cardiac silhouette. Aortic atherosclerosis. Vascular  
congestion. Patchy bilateral basilar lung airspace opacities right greater than  
left. Probably small right pleural effusion. No pneumothorax. Thoracolumbar  
scoliosis. CT Results  (Last 48 hours) None High complexity decision making was performed during the evaluation of this patient at high risk for decompensation with multiple organ involvement Total of 30 min critical care time spent at bedside during the course of care providing evaluation,management and care decisions and ordering appropriate treatment related to critical care problems exclusive of procedures.  
The reason for providing this level of medical care for this critically ill patient was due a critical illness that impaired one or more vital organ systems such that there was a high probability of imminent or life threatening deterioration in the patients condition. This care involved high complexity decision making to assess, manipulate, and support vital system functions, to treat this degree vital organ system failure and to prevent further life threatening deterioration of the patients condition. Above mentioned total time spent on reviewing the case/medical record/data/notes/EMR/patient examination/documentation/coordinating care with nurse/consultants, exclusive of procedures with complex decision making performed and > 50% time spent in face to face evaluation. Soila Bond MD/MPH Pulmonary, Critical Care Medicine [facility]

## 2020-04-23 NOTE — PROGRESS NOTES
Progress Note Yahaira Martinez 
81 y.o. Admit Date: 4/11/2020 Active Problems: 
  Essential hypertension, benign () POA: Yes Overview: CONTROLLED Hip fracture requiring operative repair [facility]) (4/11/2020) POA: Unknown Chronic renal disease, stage III ([facility] [address] (4/20/2020) POA: Unknown Anemia (4/20/2020) POA: Unknown Secondary hyperparathyroidism of renal origin ([facility] (4/20/2020) POA: Unknown Atrial fibrillation ([facility] (4/21/2020) POA: Unknown Aspiration pneumonia ([facility] (4/23/2020) POA: Unknown Leucocytosis (4/23/2020) POA: Unknown Subjective:  
 
Patient feels good compared to last 2 days, on IVF & Antibiotics, has Aspiration  Pneumonia, on High Flow oxygen. Earlier pulled out Mathur & then re-inserted, intermittent RVR,on  Cardizem drip. A comprehensive review of systems was negative except for that written in the History of Present Illness. Objective:  
 
Visit Vitals /69 Pulse (!) 104 Temp 97.8 °F (36.6 °C) Resp 25 Ht 5' (1.524 m) Wt 73 kg (160 lb 15 oz) SpO2 99% BMI 31.43 kg/m² Intake/Output Summary (Last 24 hours) at 4/23/2020 1445 Last data filed at 4/23/2020 0373 Gross per 24 hour Intake 240 ml Output 175 ml Net 65 ml  
 
 
Current Facility-Administered Medications Medication Dose Route Frequency Provider Last Rate Last Dose  dilTIAZem (CARDIZEM) 100 mg in 0.9% sodium chloride (MBP/ADV) 100 mL infusion  5 mg/hr IntraVENous CONTINUOUS Mariah Ewing NP 5 mL/hr at 04/23/20 1143 5 mg/hr at 04/23/20 1143  
 metoprolol tartrate (LOPRESSOR) tablet 12.5 mg  12.5 mg Oral Q12H Mariah Ewing, NP      
 dextrose 5% infusion  100 mL/hr IntraVENous CONTINUOUS Elijah Ordaz  mL/hr at 04/23/20 1131 100 mL/hr at 04/23/20 1131  
 [START ON 4/24/2020] methIMAzole (TAPAZOLE) tablet 10 mg  10 mg Oral DAILY Clarence Cardoza MD      
 iron sucrose (VENOFER) 200 mg in 0.9% sodium chloride 100 mL IVPB  200 mg IntraVENous Adolfo Harrison  mL/hr at 04/23/20 1439 200 mg at 04/23/20 1439  LORazepam (ATIVAN) tablet 0.5 mg  0.5 mg Oral BID Nicole Sorto MD   0.5 mg at 04/23/20 9672  aspirin delayed-release tablet 81 mg  81 mg Oral DAILY Yady Boss MD   81 mg at 04/23/20 5955  cefepime (MAXIPIME) 2 g in sterile water (preservative free) 10 mL IV syringe  2 g IntraVENous Q24H Nicole Sorto MD   2 g at 04/22/20 1750  pantoprazole (PROTONIX) 40 mg in 0.9% sodium chloride 10 mL injection  40 mg IntraVENous Q12H Nicole Sorto MD   40 mg at 04/23/20 3207  calcitRIOL (ROCALTROL) capsule 0.25 mcg  0.25 mcg Oral Q MON, WED & Kate Voss MD   0.25 mcg at 04/22/20 2138  B complex-vitaminC-folic acid (NEPHROCAP) cap  1 Cap Oral DAILY Inocencia Son MD   1 Cap at 04/23/20 9681  atorvastatin (LIPITOR) tablet 40 mg  40 mg Oral QHS Nicole Sorto MD   40 mg at 04/22/20 2137  
 methIMAzole (TAPAZOLE) tablet 10 mg  10 mg Oral Q12H Lenka Webster MD   10 mg at 04/23/20 6799  acetaminophen (TYLENOL) tablet 650 mg  650 mg Oral Q8H Denisse Troy MD   650 mg at 04/23/20 1443  hydrocortisone (ANUSOL-HC) suppository 25 mg  25 mg Rectal Q12H Sunny Nati, NP   25 mg at 04/23/20 8842  aluminum-magnesium hydroxide (MAALOX) oral suspension 15 mL  15 mL Oral QID PRN Gaby Bennett MD   15 mL at 04/21/20 1236  polyethylene glycol (MIRALAX) packet 17 g  17 g Oral DAILY Julieth Short [address]   Stopped at 04/23/20 1570  senna-docusate (PERICOLACE) 8.6-50 mg per tablet 1 Tab  1 Tab Oral DAILY Julieth Short [address]   Stopped at 04/23/20 4825  ondansetron (ZOFRAN) injection 8 mg  8 mg IntraVENous Q6H PRN HIEN Hopkins   8 mg at 04/21/20 1136  allopurinoL (ZYLOPRIM) tablet 100 mg  100 mg Oral DAILY Rylee Hopkins   100 mg at 04/23/20 7900 Physical Exam:  
 
Physical Exam: General:  Alert, cooperative, no distress, appears stated age. Neck: Supple, symmetrical, trachea midline, no adenopathy, thyroid: no enlargement/tenderness/nodules, no carotid bruit and no JVD. Lungs:   Clear to auscultation bilaterally. Heart:  Regular rate and rhythm, S1, S2 normal, no murmur, click, rub or gallop. Abdomen:   Soft, non-tender. Bowel sounds normal. No masses,  No organomegaly. Extremities: Extremities normal, atraumatic, no cyanosis or edema. Skin: Skin color, texture, turgor normal. No rashes or lesions Data Review: CBC w/Diff Recent Labs  
  04/23/20 
1255 04/23/20 
0211 04/22/20 2000 04/22/20 
0104 WBC  --  15.4* 20.0* 14.8*  
RBC  --  2.36* 2.69* 2.63* HGB 7.9* 7.4* 8.3* 8.0*  
HCT 25.3* 23.3* 26.3* 25.5* MCV  --  98.7* 97.8* 97.0 MCH  --  31.4 30.9 30.4 MCHC  --  31.8 31.6 31.4  
RDW  --  16.6* 16.8* 16.3* Recent Labs  
  04/23/20 
0211 04/22/20 2000 04/22/20 
0104 04/21/20 
0321 BANDS 9* 13*  --  4 MONOS 3 4 8 7 EOS 0 0 0 0  
BASOS 0 0 0 0  
RDW 16.6* 16.8* 16.3* 16.4* Comprehensive Metabolic Profile Recent Labs  
  04/23/20 
0211 04/22/20 
0950 04/22/20 
0104 * 146* 144  
K 5.5 5.4 5.2 * 116* 114* CO2 25 22 24 BUN 95* 83* 81* CREA 2.38* 2.07* 2.16* Recent Labs  
  04/23/20 0211 04/22/20 
0950 04/22/20 
0104 CA 9.7 9.9 9.6 Result Information Status: Final result (Exam End: 4/22/2020 09:30) Provider Status: Open Study Result EXAM:  XR CHEST PORT 
  
INDICATION:   respiratory distress 
  
COMPARISON: 4/11/2020. 
  
FINDINGS: 
Moderately enlarged cardiac silhouette. Aortic atherosclerosis. Vascular 
congestion. Patchy bilateral basilar lung airspace opacities right greater than 
left. Probably small right pleural effusion. No pneumothorax. Thoracolumbar 
scoliosis. 
  
IMPRESSION IMPRESSION: 
  
Moderately enlarged cardiac silhouette with vascular congestion, possibly exaggerated by technique. Bilateral basilar airspace disease concerning for 
pneumonia or aspiration in the appropriate clinical setting. Small right pleural 
effusion Impression: Active Hospital Problems Diagnosis Date Noted  Aspiration pneumonia ([facility] [address] 04/23/2020  Leucocytosis 04/23/2020  Atrial fibrillation ([facility] [address] 04/21/2020  Chronic renal disease, stage III ([facility] [address] 04/20/2020  Anemia 04/20/2020  Secondary hyperparathyroidism of renal origin ([facility] [address] 04/20/2020  Hip fracture requiring operative repair ([facility] 04/11/2020  Essential hypertension, benign CONTROLLED Bun & creatinine is going high, has high Na, noUA sent as requested. Plan:  
 
Continue IVF, continue antibiotics, anticipate Renal function will improve ,adjust medicine dose as per eGFR.  
 
 
Oleg Frazier MD

## 2020-04-23 NOTE — PROGRESS NOTES
Interdisciplinary Round Note Patient Information: Patient Information: Milton Li [address] [facility] Reason for Admission: Hip fracture requiring operative repair ([facility] [address] Ileana Eisenmenger Attending Provider:   Papito Beltran MD 
 Past Medical History:  
Past Medical History:  
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   
 Nonspecific abnormal electrocardiogram (ECG) (EKG)  Obesity, unspecified  Other and unspecified hyperlipidemia  Renal insufficiency Hospital day: 12 
Estimated discharge date: 4/27/20 RRAT Score: Medium Risk 15 Total Score 3 Patient Length of Stay (>5 days = 3)  
 5 Pt. Coverage (Medicare=5 , Medicaid, or Self-Pay=4) 6 Charlson Comorbidity Score (Age + Comorbid Conditions) Criteria that do not apply:  
 Has Seen PCP in Last 6 Months (Yes=3, No=0) . Living with Significant Other. Assisted Living. LTAC. SNF. or  
Rehab  
 IP Visits Last 12 Months (1-3=4, 4=9, >4=11) Goals for Today: HI flow oxygen, cardiology, hydration, nutrition, GI 
 
 
 
  
VITAL SIGNS Vitals:  
 04/23/20 0000 04/23/20 0002 04/23/20 0341 04/23/20 3842 BP: 90/50 111/42 103/60 120/69 Pulse: 61  (!) 57 (!) 104 Resp: 24  20 25 Temp: 99.6 °F (37.6 °C) 97.9 °F (36.6 °C) 99.2 °F (37.3 °C) 97.8 °F (36.6 °C) SpO2: 98%  100% 99% Weight:      
Height:      
 
 
 
 Lines, Drains, & Airways [REMOVED] Peripheral IV 04/11/20 Right Antecubital-Site Assessment: Clean, dry, & intact [REMOVED] Peripheral IV 04/20/20 Right;Upper Arm-Site Assessment: Clean, dry, & intact [REMOVED] Peripheral IV 04/21/20 Left;Posterior Forearm-Site Assessment: Clean, dry, & intact Peripheral IV 04/22/20 Anterior;Left;Proximal Forearm-Site Assessment: Clean, dry, & intact [REMOVED] Midline Catheter 04/13/20 Right;Brachial-Site Assessment: Clean, dry, & intact VTE Prophylaxis Sequential/Intermittent Compression Device: Bilateral 
     Graduated Compression Stockings: Bilateral 
     Patient Refused VTE Prophylaxis: Yes Intake and Output:  
04/21 1901 - 04/23 0700 In: 1800 [P.O.:600; I.V.:1200] Out: 1275 [[id number]] No intake/output data recorded. Current Diet: DIET NUTRITIONAL SUPPLEMENTS Lunch, Dinner; [employer] CUPS 
DIET NUTRITIONAL SUPPLEMENTS Breakfast, Lunch; ENSURE ENLIVE 
DIET REGULAR Abdominal  
Last Bowel Movement Date: 04/23/20 Stool Appearance: Soft Abdominal Assessment: Obese Appetite: Poor Bowel Sounds: Active Recent Glucose Results:  
Lab Results Component Value Date/Time  (H) 04/23/2020 02:11 AM  
 
 
  
IV Antibiotics? yes Activity Level: Activity Level: Bed Rest 
Needs assistance with ADLs: yes PT Consult Status: YES Current Immunizations: There is no immunization history on file for this patient. Recommendations:  
Discharge Disposition: SNF Medication Reconciliation Completed: YES Cardiac/Pulmonary Rehab Ordered:  NO 
 
Needs for Discharge: no  Recommendations from IDR team:  
  
 
JEREMY Gastelum RN Care Management Pager: [phone]

## 2020-04-23 NOTE — PROGRESS NOTES
NUTRITION Nutrition Consult: General Nutrition Management & Supplements, Diet Education RECOMMENDATIONS / PLAN:  
 
- Continue current nutrition interventions - Encourage po intake of meals/supplements - Assistance with meals as needed - Continue RD inpatient monitoring and evaluation. NUTRITION INTERVENTIONS & DIAGNOSIS:  
 
- Meals/snacks: general/healthful diet - Medical food supplement therapy: continue - Collaboration and referral of nutrition care: interdisciplinary rounds - Nutrition counseling: general healthy diet on 4/20 Nutrition Diagnosis: Inadequate oral intake related to decreased appetite as evidenced by poor meal intake since admission. Food and nutrition related knowledge deficit related to general healthy diet as evidenced by pt requiring additional information/ reinforcement. ASSESSMENT:  
 
4/23: Pt starting to be more alert per chart review. Had chest pain and SOB yesterday; now on high flow nasal cannula. Continues to have poor po intake per nursing. No plan for appetite stimulant; nor supplemental EN support, pt not medically appropriate per MD. Code status changed to DNR today. Encouraged pt to increase po intake. GI following due to persistent anemia. Noted weight discrepancy during recent days; wt rechecked today 4/20: Per RN, pt with poor/no meal intake during the weekend; was able to get pt to drink 2 Ensure drinks yesterday. Pt did not eat breakfast today; lethargic during lunch. Pt had good meal intake last Thursday per RN and pt report. This writer had to wake up pt several times during visit. Food preferences discussed. Agreeable to trying Magic [employer] supplement; noted 3-4 unopened bottles of Ensure Enlive at bed side. Pt reported having poor appetite. Discussed with Dr Alhaji Pickens. Provided education on general healthy diet; explained to pt the importance of adequate nutrition and consuming a variety of healthy foods. 4/17: Pt off unit at time of visit. Only drank the Ensure this morning at breakfast per CNA. Poor meal intake per chart documentation. Noted wt loss in past several years PTA per chart hx. Admitted due to hip fracture; s/p open reduction internal fixation on 4/15. Nutritional intake adequate to meet patients estimated nutritional needs:  No 
 
Diet: DIET NUTRITIONAL SUPPLEMENTS Lunch, Dinner; [employer] CUPS 
DIET NUTRITIONAL SUPPLEMENTS Breakfast, Lunch; ENSURE ENLIVE 
DIET REGULAR Food Allergies:  None known Current Appetite: Poor Appetite/meal intake prior to admission: Unable to determine at this time Feeding Limitations:  [] Swallowing difficulty    [] Chewing difficulty    [] Other: 
Current Meal Intake:  
Patient Vitals for the past 100 hrs: 
 % Diet Eaten 04/22/20 1740 25 % 04/19/20 1925 0 % 04/19/20 1808 0 % 04/19/20 1330 25 % BM: 4/23 Skin Integrity: right hip surgical incision; stage I right heel pressure injury Edema:   [x] No     [] Yes Pertinent Medications: Reviewed: maalox, atorvastatin, nephrocap, calcitriol, D5 at 100 mL/hr (120 gm dextrose, 408 kcal per day), iron sucrose, ondansetron, pantoprazole, miralax, pericolace Recent Labs  
  04/23/20 
0211 04/22/20 
0950 04/22/20 
0104 * 146* 144  
K 5.5 5.4 5.2 * 116* 114* CO2 25 22 24 * 181* 133* BUN 95* 83* 81* CREA 2.38* 2.07* 2.16* CA 9.7 9.9 9.6 Intake/Output Summary (Last 24 hours) at 4/23/2020 1432 Last data filed at 4/23/2020 4757 Gross per 24 hour Intake 240 ml Output 575 ml Net -335 ml Anthropometrics: 
Ht Readings from Last 1 Encounters:  
04/19/20 5' (1.524 m) Last 3 Recorded Weights in this Encounter 04/19/20 1925 04/21/20 0413 04/23/20 1432 Weight: 71.7 kg (158 lb) 32.7 kg (72 lb) 73 kg (160 lb 15 oz) Body mass index is 31.43 kg/m². Obese, Class I Weight History:   -13 lb, 7.6% x 7 years PTA Weight Metrics 4/23/2020 5/31/2018 7/10/2013 Weight 160 lb 15 oz 155 lb 9.6 oz 171 lb BMI 31.43 kg/m2 30.39 kg/m2 32.33 kg/m2 Admitting Diagnosis: Hip fracture requiring operative repair ([facility] [address] Cecille Buckner Pertinent PMHx: bell's palsy, HTN, renal insufficiency, partial thyroidectomy, goiter Education Needs:        [] None identified  [] Identified - Not appropriate at this time  [x]  Identified and addressed - refer to education log Learning Limitations:   [x] None identified  [] Identified Cultural, Presybeterian & ethnic food preferences:  [x] None identified    [] Identified and addressed ESTIMATED NUTRITION NEEDS:  
 
Calories: 7714-0537 kcal (MSJx1.2-1.4) based on  [x] Actual BW: 72 kg      [] IBW Protein: 58-86 gm (0.8-1.2 gm/kg) based on  [x] Actual BW      [] IBW Fluid: 1 mL/kcal 
  
MONITORING & EVALUATION:  
 
Nutrition Goal(s):  
- PO nutrition intake will meet >75% of patient estimated nutritional needs within the next 7 days. Outcome: Progressing towards goal   
- Patient will increase knowledge of appropriate food choices on a general healthy diet prior to discharge. Outcome: Progressing towards goal   
 
Monitoring:   [x] Food and nutrient intake   [x] Food and nutrient administration  [x] Comparative standards   [x] Nutrition-focused physical findings   [x] Anthropometric Measurements   [x] Treatment/therapy   [x] Biochemical data, medical tests, and procedures Previous Recommendations (for follow-up assessments only): Implemented Discharge Planning: No nutritional discharge needs at this time. Participated in care planning, discharge planning, & interdisciplinary rounds as appropriate. Suzy Das RD Pager: [phone]

## 2020-04-23 NOTE — PROGRESS NOTES
[facility] Hospitalist Group Progress Note Patient: Elisa Partida Age: 81 y.o. : 1935 MR#: [id number] SSN: [id number] Date: 2020 Subjective: Og Sears Pt is awaiting SNF placement. FT4 decreasing, with last FT4 at 1.7 Assessment/Plan: This is an 82 yo AAF with a medical history significant for MNG and Graves' disease, on MMI for treatment. She states that she only take MMI 10 mg po daily instead of her prescribed 10mg po bid, but does seem to get confused about the names of her medications and fills her own pill boxes with her, multiple, medications. She is followed by Dr. El Olivares for her MNG and has been recommended for completion thyroidectomy. Given that she has some compressive symptoms and may be having some difficulty with medication compliance, she may benefit from surgery. This can be done as an outpt. 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 She m/l would  benefit from definitive therapy. Afib with RVR may be due to her hyperthyroidism, and once thyroid function is in normal range, if related to hyperthyroidism, symptoms should resolve. 
___________________________________________________ PLAN:   
 
 -will  continue MMI at 10 mg po bid while inpatient for today and then decrease to MMI 10 mg po daily tomorrow. She will need to discharge on this dose. 
 
-will check FT4 daily to monitor trend.  
 
Thank you for the consult, will continue to follow pt with you while admitted to the hospital. 
 
Disposition: will need follow up with Dr. Judge Painter  In ~ 4-6 weeks to have TSH/FT4 recheck to see if any further dose adjustments are needed. Address is [address] [place] [address]. Contact phone number is [phone]. Objective:  
VS:  
Visit Vitals /69 Pulse (!) 104 Temp 97.8 °F (36.6 °C) Resp 25 Ht 5' (1.524 m) Wt 32.7 kg (72 lb) SpO2 99% BMI 14.06 kg/m² Intake/Output Summary (Last 24 hours) at 4/23/2020 1037 Last data filed at 4/23/2020 4358 Gross per 24 hour Intake 240 ml Output 575 ml Net -335 ml IMAGING 
 
4/17/2020 Thyroid scan and uptake COMPARISON: 7/18/2011. 
  
CORRELATION: None. 
  
DESCRIPTION: After ingestion of 257 microcuries 123Iodine, uptake of radioiodine 
by the thyroid gland was measured at 1, 6 and 24 hours. Images of the thyroid 
gland were obtained in anterior and bilateral anterior oblique projections using 
pinhole collimator, and additional anterior image was obtained with size markers 
in place. 
  
FINDINGS:   
  
Thyroid uptake is calculated as approximately 20% in one hour, 67% at 6 hour and 
40% in 24 hours.   
  
There is absence of the left thyroid gland. The right thyroid gland is large and 
heterogeneous. No dominant area of focal increased or decreased activity seen. Overall pattern appears similar to that of 2011. .   
  
IMPRESSION: 
  
1. Elevated uptake values in keeping with hyperthyroidism. Similar heterogeneous 
scan appearance of the remnant enlarged right thyroid gland. Overall findings 
suggestive of toxic multinodular goiter. Labs:   
 
Results for Maye March (MRN [id number]) as of 4/23/2020 10:31 Ref. Range 4/20/2020 01:52 4/21/2020 03:21 4/22/2020 01:04 4/23/2020 02:11  
T4, Free Latest Ref Range: 0.7 - 1.5 NG/DL 2.6 (H) 2.6 (H) 2.0 (H) 1.7 (H)  
 
 
 
 
  
Component Value Flag Ref Range Units Status Thyroid Stim Immunoglobulin 20.00  High   0.00 - 0.55 IU/L Final  
 
Component Value Flag Ref Range Units Status Thyroid peroxidase Ab 8   0 - 34 IU/mL Final  
Thyroglobulin Ab PENDING    IU/mL Incomplete Recent Results (from the past 24 hour(s)) URINALYSIS W/ RFLX MICROSCOPIC Collection Time: 04/22/20  6:10 PM  
Result Value Ref Range Color DARK YELLOW Appearance CLOUDY Specific gravity 1.015 1.005 - 1.030    
 pH (UA) 5.0 5.0 - 8.0 Protein TRACE (A) NEG mg/dL Glucose Negative NEG mg/dL Ketone Negative NEG mg/dL Bilirubin Negative NEG Blood TRACE (A) NEG Urobilinogen 1.0 0.2 - 1.0 EU/dL Nitrites Negative NEG Leukocyte Esterase MODERATE (A) NEG URINE MICROSCOPIC ONLY Collection Time: 04/22/20  6:10 PM  
Result Value Ref Range WBC 50 to 75 0 - 4 /hpf  
 RBC 1 to 3 0 - 5 /hpf Epithelial cells Negative 0 - 5 /lpf Bacteria FEW (A) NEG /hpf Mucus 1+ (A) NEG /lpf Hyaline cast 1 to 3 0 - 2 /lpf  
CBC WITH AUTOMATED DIFF Collection Time: 04/22/20  8:00 PM  
Result Value Ref Range WBC 20.0 (H) 4.6 - 13.2 K/uL  
 RBC 2.69 (L) 4.20 - 5.30 M/uL HGB 8.3 (L) 12.0 - 16.0 g/dL HCT 26.3 (L) 35.0 - 45.0 % MCV 97.8 (H) 74.0 - 97.0 FL  
 MCH 30.9 24.0 - 34.0 PG  
 MCHC 31.6 31.0 - 37.0 g/dL  
 RDW 16.8 (H) 11.6 - 14.5 % PLATELET 840 938 - 064 K/uL MPV 11.7 9.2 - 11.8 FL  
 NEUTROPHILS 77 (H) 42 - 75 % BAND NEUTROPHILS 13 (H) 0 - 5 % LYMPHOCYTES 6 (L) 20 - 51 % MONOCYTES 4 2 - 9 % EOSINOPHILS 0 0 - 5 % BASOPHILS 0 0 - 3 %  
 ABS. NEUTROPHILS 18.0 (H) 1.8 - 8.0 K/UL  
 ABS. LYMPHOCYTES 1.2 0.8 - 3.5 K/UL  
 ABS. MONOCYTES 0.8 0 - 1.0 K/UL  
 ABS. EOSINOPHILS 0.0 0.0 - 0.4 K/UL  
 ABS. BASOPHILS 0.0 0.0 - 0.06 K/UL  
 DF AUTOMATED PLATELET COMMENTS ADEQUATE PLATELETS    
 RBC COMMENTS ANISOCYTOSIS 
1+ PTT Collection Time: 04/22/20  8:00 PM  
Result Value Ref Range aPTT 32.6 23.0 - 36.4 SEC T4, FREE Collection Time: 04/23/20  2:11 AM  
Result Value Ref Range T4, Free 1.7 (H) 0.7 - 1.5 NG/DL  
CBC WITH AUTOMATED DIFF Collection Time: 04/23/20  2:11 AM  
Result Value Ref Range WBC 15.4 (H) 4.6 - 13.2 K/uL  
 RBC 2.36 (L) 4.20 - 5.30 M/uL HGB 7.4 (L) 12.0 - 16.0 g/dL HCT 23.3 (L) 35.0 - 45.0 %  MCV 98.7 (H) 74.0 - 97.0 FL  
 MCH 31.4 24.0 - 34.0 PG  
 MCHC 31.8 31.0 - 37.0 g/dL  
 RDW 16.6 (H) 11.6 - 14.5 % PLATELET 831 206 - 675 K/uL MPV 11.4 9.2 - 11.8 FL  
 NEUTROPHILS 77 (H) 42 - 75 % BAND NEUTROPHILS 9 (H) 0 - 5 % LYMPHOCYTES 11 (L) 20 - 51 % MONOCYTES 3 2 - 9 % EOSINOPHILS 0 0 - 5 % BASOPHILS 0 0 - 3 %  
 ABS. NEUTROPHILS 13.2 (H) 1.8 - 8.0 K/UL  
 ABS. LYMPHOCYTES 1.7 0.8 - 3.5 K/UL  
 ABS. MONOCYTES 0.5 0 - 1.0 K/UL  
 ABS. EOSINOPHILS 0.0 0.0 - 0.4 K/UL  
 ABS. BASOPHILS 0.0 0.0 - 0.06 K/UL  
 DF AUTOMATED PLATELET COMMENTS ADEQUATE PLATELETS    
 RBC COMMENTS ANISOCYTOSIS 1+ 
    
 RBC COMMENTS MACROCYTOSIS 
1+ METABOLIC PANEL, BASIC Collection Time: 04/23/20  2:11 AM  
Result Value Ref Range Sodium 146 (H) 136 - 145 mmol/L Potassium 5.5 3.5 - 5.5 mmol/L Chloride 114 (H) 100 - 111 mmol/L  
 CO2 25 21 - 32 mmol/L Anion gap 7 3.0 - 18 mmol/L Glucose 106 (H) 74 - 99 mg/dL BUN 95 (H) 7.0 - 18 MG/DL Creatinine 2.38 (H) 0.6 - 1.3 MG/DL  
 BUN/Creatinine ratio 40 (H) 12 - 20 GFR est AA 24 (L) >60 ml/min/1.73m2 GFR est non-AA 19 (L) >60 ml/min/1.73m2 Calcium 9.7 8.5 - 10.1 MG/DL  
PTT Collection Time: 04/23/20  2:11 AM  
Result Value Ref Range aPTT 63.8 (H) 23.0 - 36.4 SEC PROCALCITONIN Collection Time: 04/23/20  2:11 AM  
Result Value Ref Range Procalcitonin 6.30 ng/mL Signed By: Clyde Hoff MD   
 April 23, 2020 8:21 PM

## 2020-04-23 NOTE — PROGRESS NOTES
Problem: Mobility Impaired (Adult and Pediatric) Goal: *Acute Goals and Plan of Care (Insert Text) Description: Physical Therapy Goals Initiated 4/13/2020, reevaluated 4/21/2020 and to be accomplished within 7 day(s) 1. Patient will move from supine to sit and sit to supine , scoot up and down and roll side to side in bed with moderate assistance. 2.  Patient will transfer from bed to chair and chair to bed with moderate assistance using the least restrictive device. 3.  Patient will perform sit to stand with moderate assistance. 4.  Patient will ambulate with moderate assistance for 10-25 feet with the least restrictive device. 5.  Assess stairs when appropriate. PLOF: Patient reports she is independent with ADLs and functional mobility. Outcome: Progressing Towards Goal 
 PHYSICAL THERAPY TREATMENT Patient: Anita Swan (12 y.o. female) Date: 4/23/2020 Diagnosis: Hip fracture requiring operative repair ([facility] [address] [facility] <principal problem not specified> Procedure(s) (LRB): 
RIGHT FEMORAL INTERTROCHANTERIC NAIL INSERTION (Right) 11 Days Post-Op Precautions: Fall, Skin, PWB(right LE) ASSESSMENT: 
Pt found supine HOB elevated willing to participate w/ therapy. Tx performed w/ OT to maximize safety of pt and staff as well as to facilitate balance and stability while performing mobility and self care tasks. Pt continues to make slow progression w/ therapy, req mod to max A for all mobility tasks. Once assisted to EOB, pt was able to maintain good balance all while maintaining HR in mid 50s to low 60s and w/ WFL SPO2 (pt on HFNC). Pt maintained safe and therapeutic vital measurements t/o tx. Pt stood to RW, req mod A from an elevated bed height, and amb laterally for 3'. Pt req frequent cues for gait and RW sequencing as well to increase step height/length. Prior to sitting, pericare provided and pt returned to a sitting position.  Pt able to maintain an upright sitting position via prop sitting for approx 5' post gait training prior to increase in fatigue as evidenced by increase in instability left UE that was supporting pt. Pt assisted back to supine to a safe position, positioned in chair position, and left w/ all needs in reach. Instructed pt on how to return bed to supine if needed, however displayed some difficulty w/ trunk rotation to reach for bed controls, thus notified nursing will need to assist when needed. Progression toward goals: good [x]      Improving appropriately and progressing toward goals 
[]      Improving slowly and progressing toward goals 
[]      Not making progress toward goals and plan of care will be adjusted PLAN: 
Patient continues to benefit from skilled intervention to address the above impairments. Continue treatment per established plan of care. Discharge Recommendations:  Dimas Mccracken Further Equipment Recommendations for Discharge:  bedside commode and rolling walker SUBJECTIVE:  
Patient stated I'm from [facility]. OBJECTIVE DATA SUMMARY:  
Critical Behavior: 
Neurologic State: Alert, Confused Orientation Level: Oriented to person Cognition: Impaired decision making Safety/Judgement: Fall prevention Functional Mobility Training: 
Bed Mobility: 
Supine to Sit: Maximum assistance Sit to Supine: Maximum assistance;Assist x2 Scooting: Maximum assistance Transfers: 
Sit to Stand: Moderate assistance Stand to Sit: Minimum assistance Balance: 
Sitting: Intact; With support Standing: Impaired; With support Standing - Static: Fair(-) Standing - Dynamic : Poor Ambulation/Gait Training: 
Distance (ft): 3 Feet (ft) Assistive Device: Walker, rolling Ambulation - Level of Assistance: Moderate assistance Gait Abnormalities: Decreased step clearance Base of Support: Center of gravity altered Speed/Shirlene: Pace decreased (<100 feet/min) Step Length: Left shortened;Right shortened Therapeutic Exercises:  
 
 
EXERCISE  
 Sets  
Reps Active Active Assist  
Passive Self ROM Comments Ankle Pumps 1 10  [x] [] [] []   
         
         
         
         
         
         
         
         
         
         
 
 
Pain: 
Pain level pre-treatment: 0/10 Pain level post-treatment: 0/10 Activity Tolerance:  
Good Please refer to the flowsheet for vital signs taken during this treatment. After treatment:  
[x] Patient left in no apparent distress sitting up in chair 
[] Patient left in no apparent distress in bed 
[x] Call bell left within reach [x] Nursing notified 
[] Caregiver present 
[] Bed alarm activated 
[x] SCDs applied COMMUNICATION/EDUCATION:  
[x]         Role of Physical Therapy in the acute care setting. [x]         Fall prevention education was provided and the patient/caregiver indicated understanding. [x]         Patient/family have participated as able in working toward goals and plan of care. [x]         Patient/family agree to work toward stated goals and plan of care. []         Patient understands intent and goals of therapy, but is neutral about his/her participation. []         Patient is unable to participate in stated goals/plan of care: ongoing with therapy staff. 
[]         Other: 
 
   
Elizabeth Zaragoza PTA Time Calculation: 16 mins

## 2020-04-23 NOTE — CDMP QUERY
Patient developed SOB with chest pain. If possible, please document in the progress notes and discharge summary if you are evaluating and/or treating any of the following: 
 
? NSTEMI ? Type 2 MI ? Demand Ischemia New onset of Afib with RVR     
? Other, please specify ? Undetermined The medical record reflects the following: 
Risk Factors: Hip fracture  s/p ORIF.- on alternate DVT prophylaxis - 
-on PT OT/DVT prophylaxis SCD, could not tolerate heparin based on aspirin as after starting dose patient noted to have drop in H&H requiring transfusion Clinical Indicators:Pt developed chest pain and sob , Documentation of nstemi/afib w/rvr tropoins elevated  And txment started Treatment: IV heparin gtt started cardiology consulted Thank you Elissa Favre  RN  [phone]

## 2020-04-23 NOTE — ROUTINE PROCESS
Bedside and Verbal shift change report given to Héctor Bishop RN (oncoming nurse) by Heber Carrel, RN (offgoing nurse). Report included the following information SBAR, Kardex and Recent Results.

## 2020-04-23 NOTE — PROGRESS NOTES
Informed Ceola  of [address] [place] [address] that pt is not medically ready for discharge. OSWALD MuseN RN Care Management Pager: [phone]

## 2020-04-23 NOTE — PROGRESS NOTES
conducted a Follow up consultation and Spiritual Assessment for Elinor Box, who is a 81 y.o.,female. The  provided the following Interventions: 
Continued the relationship of care and support. Listened empathically. Offered prayer and assurance of continued prayer on patients behalf. The following outcomes were achieved: 
Patient expressed gratitude for 's visit. Assessment: 
There are no further spiritual or L[employer] issues which require Spiritual Care Services interventions at this time. Plan: 
Chaplains will continue to follow and will provide pastoral care on an as needed/requested basis.  recommends bedside caregivers page  on duty if patient shows signs of acute spiritual or emotional distress. Chaplain Fr Tr Peterson [facility]  
[phone]

## 2020-04-23 NOTE — PROGRESS NOTES
Problem: Self Care Deficits Care Plan (Adult) Goal: *Acute Goals and Plan of Care (Insert Text) Description: Occupational Therapy Goals Initiated 4/13/2020. Pt re-evaluated 4/21/2020. Goals modified for the pt to achieve within 7 day(s). 1.  Patient will perform bed mobility in preparation for selfcare with moderate assist.  
2.  Patient will perform functional activity seated EOB for 4-7 minutes with supervision/setup and F+ balance. 3.  Patient will perform lower body dressing with moderate assist using AE prn. 
4.  Patient will perform toilet transfers with moderate assist. 
5.  Patient will perform all aspects of toileting with moderate assist. 
6.  Patient will participate in upper extremity therapeutic exercise/activities with minimal assistance for 8 minutes to increase ROM/strength for selfcare tasks. 7.  Patient will utilize energy conservation techniques during functional activities with verbal cues. Prior Level of Function: Patient lives with daughter in 2 story condo, 8 steps to bedroom and was independent with self-care and functional mobility PTA. Patient reports having shower chair at home Outcome: Progressing Towards Goal 
 OCCUPATIONAL THERAPY TREATMENT Patient: Rosalie Martinez (47 y.o. female) Date: 4/23/2020 Diagnosis: Hip fracture requiring operative repair ([facility] [address] [facility] <principal problem not specified> Procedure(s) (LRB): 
RIGHT FEMORAL INTERTROCHANTERIC NAIL INSERTION (Right) 11 Days Post-Op Precautions: Fall, Skin, PWB(right LE) Chart, occupational therapy assessment, plan of care, and goals were reviewed. ASSESSMENT: 
Pt appears brighter today. Now on HFNC 46L Pt agreeable to EOB activity. Generalized weakness and c/o R hip pain w/movement require Max Assist w/bed mobility to maneuver to EOB.  Pt tolerates ~ 15 minutes sitting EOB performing simple ADL grooming tasks and UB dressing task (see functional levels below) PT joined session for increase safety w/functional transfer training. Pt requires 2 person assist w/functional transfer to standing w/RW. Pt requires max vc's for sequencing w/side stepping to HOB. Pt fatigues easily. Returned to supine w/2 person assist. HR 60s throughout session, SpO2% 98 Progression toward goals: 
[]          Improving appropriately and progressing toward goals [x]          Improving slowly and progressing toward goals 
[]          Not making progress toward goals and plan of care will be adjusted PLAN: 
Patient continues to benefit from skilled intervention to address the above impairments. Continue treatment per established plan of care. Discharge Recommendations:  Dimas Mccracken Further Equipment Recommendations for Discharge:  TBD by next level of care SUBJECTIVE:  
Patient stated That feels nice.  reference warm cloth for facial hygiene OBJECTIVE DATA SUMMARY:  
Cognitive/Behavioral Status: 
Neurologic State: Alert, Confused Orientation Level: Oriented to person, Oriented to place Cognition: Follows commands Safety/Judgement: Fall prevention Functional Mobility and Transfers for ADLs: 
 Bed Mobility: 
Supine to Sit: Maximum assistance Sit to Supine: Maximum assistance;Assist x2 Scooting: Maximum assistance Transfers: 
Sit to Stand: Moderate assistance(Min A x 2 w/RW from elevated surface) Balance: 
Sitting: Intact; With support Standing: Impaired; With support Standing - Static: Fair(-) Standing - Dynamic : Poor ADL Intervention: 
Grooming Position Performed: Seated edge of bed Washing Face: Stand-by assistance Washing Hands: Stand-by assistance Upper Body Dressing Assistance Hospital Gown: Moderate assistance(seated EOB) Neuro Re-Education: 
Sitting EOB ~ 15 minutes Pain: 
Pain level pre-treatment: 0/10 Pain level post-treatment: 0/10 Pt c/o minimal R hip pain w/movement Activity Tolerance:   
Fair Please refer to the flowsheet for vital signs taken during this treatment. After treatment:  
[]  Patient left in no apparent distress sitting up in chair 
[x]  Patient left in no apparent distress in bed 
[x]  Call bell left within reach [x]  Nursing notified 
[]  Caregiver present 
[]  Bed alarm activated COMMUNICATION/EDUCATION:  
[] Role of Occupational Therapy in the acute care setting 
[] Home safety education was provided and the patient/caregiver indicated understanding. [] Patient/family have participated as able in working towards goals and plan of care. [x] Patient/family agree to work toward stated goals and plan of care. [] Patient understands intent and goals of therapy, but is neutral about his/her participation. [] Patient is unable to participate in goal setting and plan of care. Thank you for this referral. 
JANEL Martinez Time Calculation: 24 mins

## 2020-04-23 NOTE — PROGRESS NOTES
[facility] 
[phone] Gastroenterology follow up-Progress note Impression: 1. Acute blood loss anemia- noted to be hgb 9.8 on admission with drop noted 2 days later to hgb 6.6. S/p 4 units PRBC over course of admission, currently Hgb 7.4 
- Iron deficient, Ion sat 13%, Ferritin 371 on 4/13 2. Positive FOB- brown stool on rectal exam- has ? Internal hemorrhoids. Of note had positive FOB in 2018 and colonoscopy performed. 3. Hemorrhoids- will treat locally with topical treatment 4. R hip fracture s/p ORIF- per ortho 5. CKD III 6. Goiter with partial thyroidectomy 7. HTN 
8. GERD- controlled on PPI; no warning symptoms 9. H/o colon polyps- TA noted on colonoscopy in 2018- due to age no further surveillance recommended Plan: 1. Continue PPI BID, transition to oral as tolerated 2. Monitor H/H and transfuse per protocol 3. Repeat iron and ferritin 4. Consider iron infusion 5. Monitor for signs of overt GI bleeding, consider EGD only for signs of overt GI bleeding. Chief Complaint: Anemia, heme + stool Subjective:  GI reconsulted due to persistent anemia. Patient was admitted 4/11 for mechanical fall with hip fracture requiring ORIF following day. She had mild anemia on admission, decreased to 6.6 POD #1. Stools heme +. GI was consulted on 4/15/2020 and at that time recommended conservative therapy. She was placed on heparin, then asprin for DVT prophylaxis post-operatively, continues to have slowly downtrending Hgb without overt GI bleeding, thus GI was consulted. Anticoagulation has been held, only on SCDs due to anemia. Discussed w/ RN [employer]. No overt GI bleeding, did have some bleeding from urethra secondary to pulling out poon cath. She has been confused for several days and adds no meaningful history. On HFNC. ROS: Denies any fevers, chills, rash. General: well developed, well nourished, no acute distress Eyes: conjunctiva normal, EOM normal 
 Cardiovascular: heart normal, intact distal pulses, normal rate and regular rhythm Pulmonary: breath sounds normal and effort normal 
Abdominal: appearance normal, bowel sounds normal and soft, non-acute, non-tender Patient Active Problem List  
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  
 Obesity, unspecified E66.9  Nonspecific abnormal electrocardiogram (ECG) (EKG) R94.31  
 Renal insufficiency N28.9  Hip fracture requiring operative repair ([facility] S72.009A  Chronic renal disease, stage III (HCC) N18.3  Anemia D64.9  Secondary hyperparathyroidism of renal origin ([facility] [address] N25.81  
 Atrial fibrillation ([facility] I48.91 Visit Vitals /69 Pulse (!) 104 Temp 97.8 °F (36.6 °C) Resp 25 Ht 5' (1.524 m) Wt 32.7 kg (72 lb) SpO2 99% BMI 14.06 kg/m² Intake/Output Summary (Last 24 hours) at 4/23/2020 1131 Last data filed at 4/23/2020 6977 Gross per 24 hour Intake 240 ml Output 575 ml Net -335 ml CBC w/Diff Lab Results Component Value Date/Time WBC 15.4 (H) 04/23/2020 02:11 AM  
 RBC 2.36 (L) 04/23/2020 02:11 AM  
 HGB 7.4 (L) 04/23/2020 02:11 AM  
 HCT 23.3 (L) 04/23/2020 02:11 AM  
 MCV 98.7 (H) 04/23/2020 02:11 AM  
 MCH 31.4 04/23/2020 02:11 AM  
 MCHC 31.8 04/23/2020 02:11 AM  
 RDW 16.6 (H) 04/23/2020 02:11 AM  
  04/23/2020 02:11 AM  
 Lab Results Component Value Date/Time GRANS 77 (H) 04/23/2020 02:11 AM  
 LYMPH 11 (L) 04/23/2020 02:11 AM  
 EOS 0 04/23/2020 02:11 AM  
 BANDS 9 (H) 04/23/2020 02:11 AM  
 BASOS 0 04/23/2020 02:11 AM  
  
Basic Metabolic Profile Recent Labs  
  04/23/20 
0211 *  
K 5.5 * CO2 25 BUN 95*  
CA 9.7 Hepatic Function Lab Results Component Value Date/Time ALB 2.1 (L) 04/14/2020 05:14 AM  
 TP 5.0 (L) 04/14/2020 05:14 AM  
 AP 76 04/14/2020 05:14 AM  
 Lab Results Component Value Date/Time SGOT 18 04/14/2020 05:14 AM  
  
 
 
[facility] Recent Labs  
  04/23/20 
0211 04/22/20 2000 APTT 63.8* 32.6 HIEN Daigle 
04/23/20, 12:14 PM  
[facility]. [facility] [employer] Phone: [phone] Pager: [phone]

## 2020-04-23 NOTE — PROGRESS NOTES
[facility] Hospitalist Group Progress Note Patient: Carol Lowe Age: 81 y.o. : 1935 MR#: [id number] SSN: [id number] Date/Time: 2020 Subjective:   
 
Patient is still on high flow Very alert awake oriented compared to yesterday no distress to mild distress Denies any chest pain no palpitation no external bleeding was noted or reported Still has some poor appetite Assessment/Plan: 1. Hip fracture  s/p ORIF.- on alternate DVT prophylaxis - 
-on PT OT/DVT prophylaxis SCD, could not tolerate heparin based on aspirin as after starting dose patient noted to have drop in H&H requiring transfusion 2. Mechanical fall 3. CKD III, at baseline creat, Dr. Mervat Marquez is nephrologist 
 
 
4. Goiter w/ partial thyroidectomy,-endocrine consulted Dr. thyroid monitoring patient 5. HTN: BP stable, monitor off medications. 6. GERD: Continue PPI and add Maalox as needed. 7. DVT prophylaxis -SCD ( Current DVT study negative ) 8. New onset of Afib with RVR -off amiodarone, discussed with cardiology, they will give trial of dig beta-blocker and if needed Cardizem. No heparin no anticoagulation 9. RUPALI-prerenal likely from poor p.o. fluid intake and dehydration, follow with renal, IV hydration 10. Aspiration pneumonia-patient is on cefepime tolerating p.o. patient has reported penicillin allergy, follow pulmonary recommendation. Plan 
-Since patient's respiratory failure chest pain patient evaluated by pulmonary cardiology, currently patient is being treated actively for aspiration pneumonia, A. fib RVR, possible ACS with high troponin, drop in H&H is significant since patient was started on heparin and heparin is stopped. Continue aspirin, reconsult GI(done). Monitor cultures.   Monitor renal functions 
 
-I discussed at length with patient's daughter Ramona Aragon over telephone, she selected DNR/DNI status for her mother discussed with niece over the phone by patient's nurse Norberto. ZAHRA DNR form signed 2020: 
-Acutely hypoxic respiratory failure-patient's was tachypneic respiratory rate of 41, O2 saturation and PO2 on ABG which was low, moderate distress noted on the patient on respiratory exertion requiring increased amount of oxygen to 4 L Possible etiology at this point: 
-Aspiration pneumonia-Per picture on chest x-ray-pulmonary consulted and following discussion with pulmonary done, patient will be started on Cefepime( PCN allergy )  aspiration precaution speech study, 
 
-Acute ACS/NSTEMI: Troponin is high. Cardiology consulted . Case discussed with them Patient will be restarted on heparin drip hemoglobin every 6 hours will be observed close eye on any blood thinner bleeding will be noted, patient is also on aspirin, GI prophylaxis with PPI 
-Hopefully patient will not drop H&H suggesting any bleeding. 
 
-Anxiety disorder 
-Patient was already on Ativan at home but due to confusion and decreased mentation for last 2 days it was stopped along with morphine I will restart at a very low dose to avoid any diazepam withdrawal- 
 
-Atelectasis-incentive spirometry, case discussed with the PT OT advised them to have patient sitting up position as tolerated. Case discussed with:  [x]Patient  [x]Family  []Nursing  []Case Management DVT Prophylaxis:  []Lovenox  []Hep SQ  [x]SCDs  []Coumadin   []Eliquis/Xarelto Objective:  
VS:  
Visit Vitals /69 Pulse (!) 104 Temp 97.8 °F (36.6 °C) Resp 25 Ht 5' (1.524 m) Wt 32.7 kg (72 lb) SpO2 99% BMI 14.06 kg/m² Tmax/24hrs: Temp (24hrs), Av.3 °F (36.8 °C), Min:97.5 °F (36.4 °C), Max:99.6 °F (37.6 °C) IOBRIEF Intake/Output Summary (Last 24 hours) at 2020 1009 Last data filed at 2020 3779 Gross per 24 hour Intake 240 ml Output 575 ml Net -335 ml General:  Alert, cooperative, mild distress noted Pulmonary:  CTA Bilaterally. No Wheezing/Rales. Cardiovascular: Regular rate and Rhythm. GI:  Soft, Non distended, Non tender. + Bowel sounds. Extremities:  No edema. Neurologic: Alert and oriented X 2 Medications:  
Current Facility-Administered Medications Medication Dose Route Frequency  dilTIAZem (CARDIZEM) 100 mg in 0.9% sodium chloride (MBP/ADV) 100 mL infusion  5 mg/hr IntraVENous CONTINUOUS  
 metoprolol tartrate (LOPRESSOR) tablet 12.5 mg  12.5 mg Oral Q12H  digoxin (LANOXIN) injection 500 mcg  500 mcg IntraVENous NOW  dextrose 5% infusion  100 mL/hr IntraVENous CONTINUOUS  
 LORazepam (ATIVAN) tablet 0.5 mg  0.5 mg Oral BID  aspirin delayed-release tablet 81 mg  81 mg Oral DAILY  cefepime (MAXIPIME) 2 g in sterile water (preservative free) 10 mL IV syringe  2 g IntraVENous Q24H  pantoprazole (PROTONIX) 40 mg in 0.9% sodium chloride 10 mL injection  40 mg IntraVENous Q12H  calcitRIOL (ROCALTROL) capsule 0.25 mcg  0.25 mcg Oral Q MON, WED & FRI  B complex-vitaminC-folic acid (NEPHROCAP) cap  1 Cap Oral DAILY  atorvastatin (LIPITOR) tablet 40 mg  40 mg Oral QHS  methIMAzole (TAPAZOLE) tablet 10 mg  10 mg Oral Q12H  
 acetaminophen (TYLENOL) tablet 650 mg  650 mg Oral Q8H  
 hydrocortisone (ANUSOL-HC) suppository 25 mg  25 mg Rectal Q12H  
 aluminum-magnesium hydroxide (MAALOX) oral suspension 15 mL  15 mL Oral QID PRN  polyethylene glycol (MIRALAX) packet 17 g  17 g Oral DAILY  senna-docusate (PERICOLACE) 8.6-50 mg per tablet 1 Tab  1 Tab Oral DAILY  ondansetron (ZOFRAN) injection 8 mg  8 mg IntraVENous Q6H PRN  
 allopurinoL (ZYLOPRIM) tablet 100 mg  100 mg Oral DAILY Labs:   
Recent Results (from the past 24 hour(s)) URINALYSIS W/ RFLX MICROSCOPIC Collection Time: 04/22/20  6:10 PM  
Result Value Ref Range Color DARK YELLOW Appearance CLOUDY Specific gravity 1.015 1.005 - 1.030    
 pH (UA) 5.0 5.0 - 8.0 Protein TRACE (A) NEG mg/dL Glucose Negative NEG mg/dL Ketone Negative NEG mg/dL Bilirubin Negative NEG Blood TRACE (A) NEG Urobilinogen 1.0 0.2 - 1.0 EU/dL Nitrites Negative NEG Leukocyte Esterase MODERATE (A) NEG URINE MICROSCOPIC ONLY Collection Time: 04/22/20  6:10 PM  
Result Value Ref Range WBC 50 to 75 0 - 4 /hpf  
 RBC 1 to 3 0 - 5 /hpf Epithelial cells Negative 0 - 5 /lpf Bacteria FEW (A) NEG /hpf Mucus 1+ (A) NEG /lpf Hyaline cast 1 to 3 0 - 2 /lpf  
CBC WITH AUTOMATED DIFF Collection Time: 04/22/20  8:00 PM  
Result Value Ref Range WBC 20.0 (H) 4.6 - 13.2 K/uL  
 RBC 2.69 (L) 4.20 - 5.30 M/uL HGB 8.3 (L) 12.0 - 16.0 g/dL HCT 26.3 (L) 35.0 - 45.0 % MCV 97.8 (H) 74.0 - 97.0 FL  
 MCH 30.9 24.0 - 34.0 PG  
 MCHC 31.6 31.0 - 37.0 g/dL  
 RDW 16.8 (H) 11.6 - 14.5 % PLATELET 173 379 - 097 K/uL MPV 11.7 9.2 - 11.8 FL  
 NEUTROPHILS 77 (H) 42 - 75 % BAND NEUTROPHILS 13 (H) 0 - 5 % LYMPHOCYTES 6 (L) 20 - 51 % MONOCYTES 4 2 - 9 % EOSINOPHILS 0 0 - 5 % BASOPHILS 0 0 - 3 %  
 ABS. NEUTROPHILS 18.0 (H) 1.8 - 8.0 K/UL  
 ABS. LYMPHOCYTES 1.2 0.8 - 3.5 K/UL  
 ABS. MONOCYTES 0.8 0 - 1.0 K/UL  
 ABS. EOSINOPHILS 0.0 0.0 - 0.4 K/UL  
 ABS. BASOPHILS 0.0 0.0 - 0.06 K/UL  
 DF AUTOMATED PLATELET COMMENTS ADEQUATE PLATELETS    
 RBC COMMENTS ANISOCYTOSIS 
1+ PTT Collection Time: 04/22/20  8:00 PM  
Result Value Ref Range aPTT 32.6 23.0 - 36.4 SEC T4, FREE Collection Time: 04/23/20  2:11 AM  
Result Value Ref Range T4, Free 1.7 (H) 0.7 - 1.5 NG/DL  
CBC WITH AUTOMATED DIFF Collection Time: 04/23/20  2:11 AM  
Result Value Ref Range WBC 15.4 (H) 4.6 - 13.2 K/uL  
 RBC 2.36 (L) 4.20 - 5.30 M/uL HGB 7.4 (L) 12.0 - 16.0 g/dL HCT 23.3 (L) 35.0 - 45.0 % MCV 98.7 (H) 74.0 - 97.0 FL  
 MCH 31.4 24.0 - 34.0 PG  
 MCHC 31.8 31.0 - 37.0 g/dL  
 RDW 16.6 (H) 11.6 - 14.5 % PLATELET 085 908 - 438 K/uL MPV 11.4 9.2 - 11.8 FL  
 NEUTROPHILS 77 (H) 42 - 75 % BAND NEUTROPHILS 9 (H) 0 - 5 % LYMPHOCYTES 11 (L) 20 - 51 % MONOCYTES 3 2 - 9 % EOSINOPHILS 0 0 - 5 % BASOPHILS 0 0 - 3 %  
 ABS. NEUTROPHILS 13.2 (H) 1.8 - 8.0 K/UL  
 ABS. LYMPHOCYTES 1.7 0.8 - 3.5 K/UL  
 ABS. MONOCYTES 0.5 0 - 1.0 K/UL  
 ABS. EOSINOPHILS 0.0 0.0 - 0.4 K/UL  
 ABS. BASOPHILS 0.0 0.0 - 0.06 K/UL  
 DF AUTOMATED PLATELET COMMENTS ADEQUATE PLATELETS    
 RBC COMMENTS ANISOCYTOSIS 1+ 
    
 RBC COMMENTS MACROCYTOSIS 
1+ METABOLIC PANEL, BASIC Collection Time: 04/23/20  2:11 AM  
Result Value Ref Range Sodium 146 (H) 136 - 145 mmol/L Potassium 5.5 3.5 - 5.5 mmol/L Chloride 114 (H) 100 - 111 mmol/L  
 CO2 25 21 - 32 mmol/L Anion gap 7 3.0 - 18 mmol/L Glucose 106 (H) 74 - 99 mg/dL BUN 95 (H) 7.0 - 18 MG/DL Creatinine 2.38 (H) 0.6 - 1.3 MG/DL  
 BUN/Creatinine ratio 40 (H) 12 - 20 GFR est AA 24 (L) >60 ml/min/1.73m2 GFR est non-AA 19 (L) >60 ml/min/1.73m2 Calcium 9.7 8.5 - 10.1 MG/DL  
PTT Collection Time: 04/23/20  2:11 AM  
Result Value Ref Range aPTT 63.8 (H) 23.0 - 36.4 SEC PROCALCITONIN Collection Time: 04/23/20  2:11 AM  
Result Value Ref Range Procalcitonin 6.30 ng/mL Signed By: Laura Aguilar MD   
 April 23, 2020 Disclaimer: Sections of this note are dictated using utilizing voice recognition software. Minor typographical errors may be present. If questions arise, please do not hesitate to contact me or call our department.

## 2020-04-23 NOTE — ROUTINE PROCESS
8510.. Bedside and Verbal shift change report given to Keith Burroughs RN (oncoming nurse) by Paula Tena RN (offgoing nurse). Report included the following information SBAR, Kardex, Intake/Output, Recent Results and Cardiac Rhythm sinus bradycardia/sinus rhythm.

## 2020-04-23 NOTE — PROGRESS NOTES
Pt BP decreased, pt received multiple cardiac meds. Will discuss dose changes and continue to monitor.

## 2020-04-24 NOTE — PROGRESS NOTES
[facility] Hospitalist Group Progress Note Patient: Keyonna Rico Age: 81 y.o. : 1935 MR#: [id number] SSN: [id number] Date: 2020 Subjective: Flores Pelaez Pt is awaiting SNF placement. FT4 decreasing, with last FT4 at 1.4 . TT3 is now in normal range of 133. Assessment/Plan: This is an 80 yo AAF with a medical history significant for MNG and Graves' disease, on MMI for treatment. She states that she only take MMI 10 mg po daily instead of her prescribed 10mg po bid, but does seem to get confused about the names of her medications and fills her own pill boxes with her, multiple, medications. She is followed by Dr. Irene Salmon for her MNG and has been recommended for completion thyroidectomy. Given that she has some compressive symptoms and may be having some difficulty with medication compliance, she may benefit from surgery. This can be done as an outpt. 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 She m/l would  benefit from definitive therapy. Afib with RVR may be due to her hyperthyroidism, and once thyroid function is in normal range, if related to hyperthyroidism, symptoms should resolve. 
___________________________________________________ PLAN:   
 
 -will  decrease  MMI to 10 mg po daily today. This is  Her outpt dose. 
 
-will check FT4 daily to monitor trend.  
 
Thank you for the consult, will continue to follow pt with you while admitted to the hospital. 
 
Disposition: will need follow up with Dr. Irwin Tan  In ~ 4-6 weeks to have TSH/FT4 recheck to see if any further dose adjustments are needed. Address is [address] [place] [address]. Contact phone number is [phone]. Objective:  
VS:  
Visit Vitals /57 (BP 1 Location: Right arm, BP Patient Position: At rest) Pulse 60 Temp 97.6 °F (36.4 °C) Resp 18 Ht 5' (1.524 m) Wt 73 kg (160 lb 15 oz) SpO2 99% BMI 31.43 kg/m² Intake/Output Summary (Last 24 hours) at 4/24/2020 1109 Last data filed at 4/24/2020 2517 Gross per 24 hour Intake 910 ml Output 600 ml Net 310 ml  
 
IMAGING 
 
4/17/2020 Thyroid scan and uptake COMPARISON: 7/18/2011. 
  
CORRELATION: None. 
  
DESCRIPTION: After ingestion of 257 microcuries 123Iodine, uptake of radioiodine 
by the thyroid gland was measured at 1, 6 and 24 hours. Images of the thyroid 
gland were obtained in anterior and bilateral anterior oblique projections using 
pinhole collimator, and additional anterior image was obtained with size markers 
in place. 
  
FINDINGS:   
  
Thyroid uptake is calculated as approximately 20% in one hour, 67% at 6 hour and 
40% in 24 hours.   
  
There is absence of the left thyroid gland. The right thyroid gland is large and 
heterogeneous. No dominant area of focal increased or decreased activity seen. Overall pattern appears similar to that of 2011. .   
  
IMPRESSION: 
  
1. Elevated uptake values in keeping with hyperthyroidism. Similar heterogeneous 
scan appearance of the remnant enlarged right thyroid gland. Overall findings 
suggestive of toxic multinodular goiter. Labs:   
 
Results for Romayne Bunk (MRN [id number]) as of 4/24/2020 11:11 Ref. Range 4/18/2020 06:25 4/19/2020 05:50 4/20/2020 01:52 4/21/2020 03:21 4/22/2020 01:04 4/23/2020 02:11 4/24/2020 02:13  
T4, Free Latest Ref Range: 0.7 - 1.5 NG/DL 3.7 (H) 2.7 (H) 2.6 (H) 2.6 (H) 2.0 (H) 1.7 (H) 1.4  
T3, total Latest Ref Range: 71 - 180 ng/dL 280 (H)     133   
 
 
 
 
 
  Component Value Flag Ref Range Units Status Thyroid Stim Immunoglobulin 20.00  High   0.00 - 0.55 IU/L Final  
 
Component Value Flag Ref Range Units Status Thyroid peroxidase Ab 8   0 - 34 IU/mL Final  
 Thyroglobulin Ab PENDING    IU/mL Incomplete Recent Results (from the past 24 hour(s)) HGB & HCT Collection Time: 04/23/20 12:55 PM  
Result Value Ref Range HGB 7.9 (L) 12.0 - 16.0 g/dL HCT 25.3 (L) 35.0 - 45.0 % TROPONIN I Collection Time: 04/23/20 12:55 PM  
Result Value Ref Range Troponin-I, QT 0.63 (H) 0.0 - 0.045 NG/ML  
CULTURE, BLOOD Collection Time: 04/23/20 12:55 PM  
Result Value Ref Range Special Requests: NO SPECIAL REQUESTS Culture result: NO GROWTH AFTER 19 HOURS    
IRON PROFILE Collection Time: 04/23/20 12:55 PM  
Result Value Ref Range Iron 23 (L) 50 - 175 ug/dL TIBC 140 (L) 250 - 450 ug/dL Iron % saturation 16 (L) 20 - 50 % FERRITIN Collection Time: 04/23/20 12:55 PM  
Result Value Ref Range Ferritin 1,457 (H) 8 - 388 NG/ML  
PTT Collection Time: 04/23/20 12:55 PM  
Result Value Ref Range aPTT 40.9 (H) 23.0 - 36.4 SEC  
HGB & HCT Collection Time: 04/23/20  5:10 PM  
Result Value Ref Range HGB 7.6 (L) 12.0 - 16.0 g/dL HCT 24.5 (L) 35.0 - 45.0 % HGB & HCT Collection Time: 04/23/20  9:00 PM  
Result Value Ref Range HGB 7.5 (L) 12.0 - 16.0 g/dL HCT 24.3 (L) 35.0 - 45.0 %  
T4, FREE Collection Time: 04/24/20  2:13 AM  
Result Value Ref Range T4, Free 1.4 0.7 - 1.5 NG/DL  
CBC WITH AUTOMATED DIFF Collection Time: 04/24/20  2:13 AM  
Result Value Ref Range WBC 14.7 (H) 4.6 - 13.2 K/uL  
 RBC 2.35 (L) 4.20 - 5.30 M/uL HGB 7.1 (L) 12.0 - 16.0 g/dL HCT 23.3 (L) 35.0 - 45.0 % MCV 99.1 (H) 74.0 - 97.0 FL  
 MCH 30.2 24.0 - 34.0 PG  
 MCHC 30.5 (L) 31.0 - 37.0 g/dL  
 RDW 16.6 (H) 11.6 - 14.5 % PLATELET 048 656 - 179 K/uL MPV 11.6 9.2 - 11.8 FL  
 NEUTROPHILS 89 (H) 42 - 75 % LYMPHOCYTES 8 (L) 20 - 51 % MONOCYTES 3 2 - 9 % EOSINOPHILS 0 0 - 5 % BASOPHILS 0 0 - 3 %  
 ABS. NEUTROPHILS 13.1 (H) 1.8 - 8.0 K/UL  
 ABS. LYMPHOCYTES 1.2 0.8 - 3.5 K/UL  
 ABS. MONOCYTES 0.4 0 - 1.0 K/UL ABS. EOSINOPHILS 0.0 0.0 - 0.4 K/UL  
 ABS. BASOPHILS 0.0 0.0 - 0.06 K/UL  
 DF MANUAL PLATELET COMMENTS ADEQUATE PLATELETS    
 RBC COMMENTS ANISOCYTOSIS 1+ 
    
 RBC COMMENTS POLYCHROMASIA 1+ 
    
 RBC COMMENTS HYPOCHROMIA 1+ METABOLIC PANEL, BASIC Collection Time: 04/24/20  2:13 AM  
Result Value Ref Range Sodium 139 136 - 145 mmol/L Potassium 5.1 3.5 - 5.5 mmol/L Chloride 109 100 - 111 mmol/L  
 CO2 22 21 - 32 mmol/L Anion gap 8 3.0 - 18 mmol/L Glucose 135 (H) 74 - 99 mg/dL  (H) 7.0 - 18 MG/DL Creatinine 2.34 (H) 0.6 - 1.3 MG/DL  
 BUN/Creatinine ratio 44 (H) 12 - 20 GFR est AA 24 (L) >60 ml/min/1.73m2 GFR est non-AA 20 (L) >60 ml/min/1.73m2 Calcium 9.5 8.5 - 10.1 MG/DL  
HGB & HCT Collection Time: 04/24/20  8:53 AM  
Result Value Ref Range HGB 7.3 (L) 12.0 - 16.0 g/dL HCT 23.0 (L) 35.0 - 45.0 % Signed By: Norma Qureshi MD   
 April 24, 2020 8:21 PM

## 2020-04-24 NOTE — PROGRESS NOTES
[facility] 
 
 
CARDIOLOGY PROGRESS NOTE 
RECS: 
 
 
1. Paroxymal atrial fibrillation-converted back to sinus rhythm. Now with intermittent sinus bradycardia. Continue metoprolol bid per parameters. amiodarone d/c. Recent Echo showed preserved EF%. No an ideal  candidate for anticoagulation due to severe anemia requiring multiple transfusions. 2. Acute hypoxic respiratory failure- on high flow O2- [facility] following 3. Elevated troponin- currently chest pain free. Troponin peak 1.87. now down trending. continue  asa,statin and bb.   
4. Elevated D-dimer-  High risk for PE/DVT- consider V/Q scan 5. SOB- multifactorial with severe anemia,  possible aspiration pneumonia vs PE -  
6. Hyperthyroidism: Patient on methimazole. Treatment per medicine. Would prefer beta-blockers rather than diltiazem for rate control. 7. Severe anemia- noted hgb drop pos op-s/p 5 units PRBC's. GI was consulted 8. AMS- confused and agitated at times 9. 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 Right hip fracture s/p ORIF 11. CKD-elevated renal indices. Renal following ASSESSMENT: 
Hospital Problems  Date Reviewed: 4/23/2020 Codes Class Noted POA Aspiration pneumonia ([facility] [address] ICD-10-CM: J69.0 ICD-9-CM: 507.0  4/23/2020 Unknown Leucocytosis ICD-10-CM: G12.665 ICD-9-CM: 288.60  4/23/2020 Unknown Atrial fibrillation [facility]) ICD-10-CM: I48.91 
ICD-9-CM: 427.31  4/21/2020 Unknown Chronic renal disease, stage III (HCC) ICD-10-CM: N18.3 ICD-9-CM: 585.3  4/20/2020 Unknown Anemia ICD-10-CM: D64.9 ICD-9-CM: 285.9  4/20/2020 Unknown Secondary hyperparathyroidism of renal origin [facility]) ICD-10-CM: N25.81 ICD-9-CM: 588.81  4/20/2020 Unknown Hip fracture requiring operative repair [facility]) ICD-10-CM: O30.798L ICD-9-CM: 820.8  4/11/2020 Unknown  Essential hypertension, benign ICD-10-CM: I10 
 ICD-9-CM: 401.1  Unknown Yes Overview Signed 7/10/2013  6:40 PM by [facility] SUBJECTIVE: 
 SOB on high flow O2 No chest pain OBJECTIVE: 
 
VS:  
Visit Vitals /57 (BP 1 Location: Right arm, BP Patient Position: At rest) Pulse 60 Temp 97.6 °F (36.4 °C) Resp 18 Ht 5' (1.524 m) Wt 73 kg (160 lb 15 oz) SpO2 99% BMI 31.43 kg/m² Intake/Output Summary (Last 24 hours) at 4/24/2020 1123 Last data filed at 4/24/2020 9105 Gross per 24 hour Intake 910 ml Output 600 ml Net 310 ml  
 
TELE: NSR General: alert, well developed, anxious, confused and in mild distress HENT: Normocephalic, atraumatic. Normal external eye. Neck :  no bruit, no JVD Cardiac:  irregular rate and rhythm, S1, S2 normal, no murmur, click, rub or gallop Lungs: diminished breath sounds  bilaterally Abdomen: Soft, nontender, no masses Extremities:  No c/c/e, peripheral pulses present Labs: Results:  
   
Chemistry Recent Labs  
  04/24/20 
0034 04/23/20 
0211 04/22/20 
9824 * 106* 181*  146* 146*  
K 5.1 5.5 5.4  114* 116* CO2 22 25 22 * 95* 83* CREA 2.34* 2.38* 2.07* CA 9.5 9.7 9.9 AGAP 8 7 8 BUCR 44* 40* 40* CBC w/Diff Recent Labs  
  04/24/20 
0853 04/24/20 
0213 04/23/20 
2100  04/23/20 
0211 04/22/20 2000 WBC  --  14.7*  --   --  15.4* 20.0*  
RBC  --  2.35*  --   --  2.36* 2.69* HGB 7.3* 7.1* 7.5*   < > 7.4* 8.3* HCT 23.0* 23.3* 24.3*   < > 23.3* 26.3*  
PLT  --  159  --   --  182 201 GRANS  --  89*  --   --  77* 77* LYMPH  --  8*  --   --  11* 6*  
EOS  --  0  --   --  0 0  
 < > = values in this interval not displayed. Cardiac Enzymes No results for input(s): CPK, CKND1, MICHELLE in the last 72 hours. No lab exists for component: [place] Coagulation Recent Labs  
  04/23/20 
1255 04/23/20 
0211 APTT 40.9* 63.8* Lipid Panel Lab Results Component Value Date/Time Cholesterol, total 182 01/16/2020 01:39 PM  
 HDL Cholesterol 55 01/16/2020 01:39 PM  
 LDL, calculated 99 01/16/2020 01:39 PM  
 Triglyceride 140 01/16/2020 01:39 PM  
 CHOL/HDL Ratio 3.3 01/16/2020 01:39 PM  
  
BNP No results for input(s): BNPP in the last 72 hours. Liver Enzymes No results for input(s): TP, ALB, TBIL, AP, SGOT, GPT in the last 72 hours. No lab exists for component: DBIL Thyroid Studies Lab Results Component Value Date/Time TSH 48.20 (H) 04/13/2020 05:18 AM  
    
 
 
 
TERESA Merchant supervised I have independently evaluated and examined the patient. All relevant labs and testing data's are reviewed. Care plan discussed and updated after review.  
 
Familia Yanez MD

## 2020-04-24 NOTE — ROUTINE PROCESS
Bedside and Verbal shift change report given to Adelina Novoa RN (oncoming nurse) by Caresse Sicard (offgoing nurse). Report included the following information SBAR, Kardex and Recent Results.

## 2020-04-24 NOTE — PROGRESS NOTES
[email or web address] 
[phone] Gastroenterology follow up-Progress note Impression: 1. Anemia- likely multifactorial. noted to be hgb 9.8 on admission with drop noted 2 days later to hgb 6.6. S/p 4 units PRBC over course of admission, currently Hgb 7.1 
- Iron sat 16%, Ferritin 1457 2. Positive FOB- brown stool on rectal exam- has ? Internal hemorrhoids. Of note had positive FOB in 2018 and colonoscopy performed. 3. Hemorrhoids- will treat locally with topical treatment 4. R hip fracture s/p ORIF- per ortho 5. CKD III 6. Goiter with partial thyroidectomy 7. HTN 
8. GERD- controlled on PPI; no warning symptoms 9. H/o colon polyps- TA noted on colonoscopy in 2018- due to age no further surveillance recommended 10. Aspiration pneumonia Plan: 1. Continue PPI BID, transition to oral as tolerated 2. Monitor H/H and transfuse per protocol 3. Monitor for signs of overt GI bleeding, consider EGD only for signs of overt GI bleeding. Chief Complaint: Anemia, heme + stool Subjective:  Patient A&Ox2, denies complaints. On HFNC. Per RN, no evidence of GI bleeding or melena. ROS: Denies any fevers, chills, rash. General: well developed, well nourished, no acute distress Eyes: conjunctiva normal, EOM normal 
Cardiovascular: heart normal, intact distal pulses, normal rate and regular rhythm Pulmonary: breath sounds normal and effort normal 
Abdominal: appearance normal, bowel sounds normal and soft, non-acute, non-tender Patient Active Problem List  
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  
 Obesity, unspecified E66.9  Nonspecific abnormal electrocardiogram (ECG) (EKG) R94.31  
 Renal insufficiency N28.9  Hip fracture requiring operative repair ([facility] [address] S72.009A  Chronic renal disease, stage III (HCC) N18.3  Anemia D64.9  Secondary hyperparathyroidism of renal origin ([facility] N25.81  
 Atrial fibrillation (HCC) I48.91  
 Aspiration pneumonia ([facility] J69.0  Leucocytosis D72.829 Visit Vitals /57 (BP 1 Location: Right arm, BP Patient Position: At rest) Pulse 60 Temp 97.6 °F (36.4 °C) Resp 18 Ht 5' (1.524 m) Wt 73 kg (160 lb 15 oz) SpO2 99% BMI 31.43 kg/m² Intake/Output Summary (Last 24 hours) at 4/24/2020 1047 Last data filed at 4/24/2020 9377 Gross per 24 hour Intake 910 ml Output 600 ml Net 310 ml CBC w/Diff Lab Results Component Value Date/Time WBC 14.7 (H) 04/24/2020 02:13 AM  
 RBC 2.35 (L) 04/24/2020 02:13 AM  
 HGB 7.3 (L) 04/24/2020 08:53 AM  
 HCT 23.0 (L) 04/24/2020 08:53 AM  
 MCV 99.1 (H) 04/24/2020 02:13 AM  
 MCH 30.2 04/24/2020 02:13 AM  
 MCHC 30.5 (L) 04/24/2020 02:13 AM  
 RDW 16.6 (H) 04/24/2020 02:13 AM  
  04/24/2020 02:13 AM  
 Lab Results Component Value Date/Time GRANS 89 (H) 04/24/2020 02:13 AM  
 LYMPH 8 (L) 04/24/2020 02:13 AM  
 EOS 0 04/24/2020 02:13 AM  
 BANDS 9 (H) 04/23/2020 02:11 AM  
 BASOS 0 04/24/2020 02:13 AM  
  
Basic Metabolic Profile Recent Labs  
  04/24/20 
2601   
K 5.1  CO2 22 * CA 9.5 Hepatic Function Lab Results Component Value Date/Time ALB 2.1 (L) 04/14/2020 05:14 AM  
 TP 5.0 (L) 04/14/2020 05:14 AM  
 AP 76 04/14/2020 05:14 AM  
 Lab Results Component Value Date/Time SGOT 18 04/14/2020 05:14 AM  
  
 
 
Coags Recent Labs  
  04/23/20 
1255 04/23/20 
0211 APTT 40.9* 63.8* HIEN Espino 
04/24/20, 10:53 AM   
[facility]. [email or web address] Phone: [phone] Pager: [phone]

## 2020-04-24 NOTE — PROGRESS NOTES
[facility] Hospitalist Group Progress Note Patient: Aurelia Angulo Age: 81 y.o. : 1935 MR#: [id number] SSN: [id number] Date: 2020 Subjective/24-hour events: No new shortness of breath or chest pain daily. Remains on high flow nasal cannula. Assessment:  
Paroxysmal atrial fibrillation, currently NSR 
RLL pneumonia suspect secondary to aspiration Acute hypoxic respiratory failure Displaced intertrochanteric right femur fracture status post ORIF Multinodular goiter and Graves' disease Hypertension CKD 3 with secondary hyperparathyroidism Anemia, multifactorial   
Obesity Acute encephalopathy, metabolic/toxic, improved Plan: 
Wean high flow nasal cannula as tolerated. Continue antibiotics per pulmonology recommendations, pulmonary hygiene is ordered. 
H&H. No need for transfusion today. PT/OT as able/tolerated. Current recommendations for skilled nursing placement and she already has an accepting facility. Not medically stable for discharge today. Case discussed with:  [x]Patient  []Family  [x]Nursing  [x]Case Management DVT Prophylaxis:  []Lovenox  []Hep SQ  [x]SCDs  []Coumadin   []On Heparin gtt Objective:  
VS:  
Visit Vitals /57 (BP 1 Location: Right arm, BP Patient Position: At rest) Pulse 60 Temp 97.6 °F (36.4 °C) Resp 18 Ht 5' (1.524 m) Wt 73 kg (160 lb 15 oz) SpO2 99% BMI 31.43 kg/m² Tmax/24hrs: Temp (24hrs), Av °F (36.7 °C), Min:97.6 °F (36.4 °C), Max:98.5 °F (36.9 °C) Intake/Output Summary (Last 24 hours) at 2020 1403 Last data filed at 2020 4956 Gross per 24 hour Intake 910 ml Output 600 ml Net 310 ml General: In NAD. Nontoxic-appearing. High flow nasal cannula in place. Cardiovascular: RRR. Pulmonary: No active wheezing. No accessory muscle use noted. GI: Abdomen soft, nontender. Extremities: Warm no edema or ischemia. Neuro: Awake, moves extremities spontaneously Labs:   
Recent Results (from the past 24 hour(s)) HGB & HCT Collection Time: 04/23/20  5:10 PM  
Result Value Ref Range HGB 7.6 (L) 12.0 - 16.0 g/dL HCT 24.5 (L) 35.0 - 45.0 % HGB & HCT Collection Time: 04/23/20  9:00 PM  
Result Value Ref Range HGB 7.5 (L) 12.0 - 16.0 g/dL HCT 24.3 (L) 35.0 - 45.0 %  
T4, FREE Collection Time: 04/24/20  2:13 AM  
Result Value Ref Range T4, Free 1.4 0.7 - 1.5 NG/DL  
CBC WITH AUTOMATED DIFF Collection Time: 04/24/20  2:13 AM  
Result Value Ref Range WBC 14.7 (H) 4.6 - 13.2 K/uL  
 RBC 2.35 (L) 4.20 - 5.30 M/uL HGB 7.1 (L) 12.0 - 16.0 g/dL HCT 23.3 (L) 35.0 - 45.0 % MCV 99.1 (H) 74.0 - 97.0 FL  
 MCH 30.2 24.0 - 34.0 PG  
 MCHC 30.5 (L) 31.0 - 37.0 g/dL  
 RDW 16.6 (H) 11.6 - 14.5 % PLATELET 071 791 - 383 K/uL MPV 11.6 9.2 - 11.8 FL  
 NEUTROPHILS 89 (H) 42 - 75 % LYMPHOCYTES 8 (L) 20 - 51 % MONOCYTES 3 2 - 9 % EOSINOPHILS 0 0 - 5 % BASOPHILS 0 0 - 3 %  
 ABS. NEUTROPHILS 13.1 (H) 1.8 - 8.0 K/UL  
 ABS. LYMPHOCYTES 1.2 0.8 - 3.5 K/UL  
 ABS. MONOCYTES 0.4 0 - 1.0 K/UL  
 ABS. EOSINOPHILS 0.0 0.0 - 0.4 K/UL  
 ABS. BASOPHILS 0.0 0.0 - 0.06 K/UL  
 DF MANUAL PLATELET COMMENTS ADEQUATE PLATELETS    
 RBC COMMENTS ANISOCYTOSIS 1+ 
    
 RBC COMMENTS POLYCHROMASIA 1+ 
    
 RBC COMMENTS HYPOCHROMIA 1+ METABOLIC PANEL, BASIC Collection Time: 04/24/20  2:13 AM  
Result Value Ref Range Sodium 139 136 - 145 mmol/L Potassium 5.1 3.5 - 5.5 mmol/L Chloride 109 100 - 111 mmol/L  
 CO2 22 21 - 32 mmol/L Anion gap 8 3.0 - 18 mmol/L Glucose 135 (H) 74 - 99 mg/dL  (H) 7.0 - 18 MG/DL Creatinine 2.34 (H) 0.6 - 1.3 MG/DL  
 BUN/Creatinine ratio 44 (H) 12 - 20 GFR est AA 24 (L) >60 ml/min/1.73m2 GFR est non-AA 20 (L) >60 ml/min/1.73m2 Calcium 9.5 8.5 - 10.1 MG/DL  
HGB & HCT Collection Time: 04/24/20  8:53 AM  
Result Value Ref Range HGB 7.3 (L) 12.0 - 16.0 g/dL HCT 23.0 (L) 35.0 - 45.0 % GLUCOSE, POC Collection Time: 04/24/20 11:43 AM  
Result Value Ref Range Glucose (POC) 202 (H) 70 - 110 mg/dL Signed By: Bertha Adamson MD   
 April 24, 2020

## 2020-04-24 NOTE — PROGRESS NOTES
Interdisciplinary Round Note Patient Information: Patient Information: Martha Hummel [address] [facility] Reason for Admission: Hip fracture requiring operative repair ([address] Burgess Esquivel Attending Provider:   Beryl Busch MD 
 Past Medical History:  
Past Medical History:  
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   
 Nonspecific abnormal electrocardiogram (ECG) (EKG)  Obesity, unspecified  Other and unspecified hyperlipidemia  Renal insufficiency Hospital day: 13 
Estimated discharge date: 4/27/20 RRAT Score: Medium Risk [facility] Total Score 3 Patient Length of Stay (>5 days = 3)  
 5 Pt. Coverage (Medicare=5 , Medicaid, or Self-Pay=4) 6 Charlson Comorbidity Score (Age + Comorbid Conditions) Criteria that do not apply:  
 Has Seen PCP in Last 6 Months (Yes=3, No=0) . Living with Significant Other. Assisted Living. LTAC. SNF. or  
Rehab  
 IP Visits Last 12 Months (1-3=4, 4=9, >4=11) Goals for Today: monitor respiratory status VITAL SIGNS Vitals:  
 04/24/20 1344 04/24/20 0310 04/24/20 1480 04/24/20 6394 BP: 102/55  124/54 106/57 Pulse: (!) 55  60 Resp: 18  18 18 Temp: 98.5 °F (36.9 °C)  98 °F (36.7 °C) 97.6 °F (36.4 °C) SpO2: 98% 98% 100% 99% Weight:      
Height:      
 
 
 
 Lines, Drains, & Airways [REMOVED] Peripheral IV 04/11/20 Right Antecubital-Site Assessment: Clean, dry, & intact [REMOVED] Peripheral IV 04/20/20 Right;Upper Arm-Site Assessment: Clean, dry, & intact [REMOVED] Peripheral IV 04/21/20 Left;Posterior Forearm-Site Assessment: Clean, dry, & intact Peripheral IV 04/22/20 Anterior;Left;Proximal Forearm-Site Assessment: Clean, dry, & intact [REMOVED] Midline Catheter 04/13/20 Right;Brachial-Site Assessment: Clean, dry, & intact VTE Prophylaxis Sequential/Intermittent Compression Device: Bilateral 
     Graduated Compression Stockings: Bilateral 
     Patient Refused VTE Prophylaxis: Yes Intake and Output:  
04/22 1901 - 04/24 0700 In: 910 [I.V.:910] Out: 375 [Urine:375] 04/24 0701 - 04/24 1900 In: -  
Out: 400 [Urine:400] Current Diet: DIET NUTRITIONAL SUPPLEMENTS Lunch, Dinner; MAGIC CUPS 
DIET NUTRITIONAL SUPPLEMENTS Breakfast, Lunch; ENSURE ENLIVE 
DIET REGULAR Abdominal  
Last Bowel Movement Date: 04/23/20 Stool Appearance: Soft Abdominal Assessment: Obese Appetite: Poor Bowel Sounds: Active Recent Glucose Results:  
Lab Results Component Value Date/Time  (H) 04/24/2020 02:13 AM  
 GLUCPOC 202 (H) 04/24/2020 11:43 AM  
 
 
  
IV Antibiotics? yes Activity Level: Activity Level: Bed Rest 
Needs assistance with ADLs: yes PT Consult Status: YES Current Immunizations: There is no immunization history on file for this patient. Recommendations:  
Discharge Disposition: SNF Medication Reconciliation Completed: YES Cardiac/Pulmonary Rehab Ordered:  NO 
 
Needs for Discharge: no Recommendations from IDR team:  
  
 
JEREMY Olvera RN Care Management Pager: [phone]

## 2020-04-24 NOTE — PROGRESS NOTES
Progress Note Milton Party 
81 y.o. Admit Date: 4/11/2020 Active Problems: 
  Essential hypertension, benign () POA: Yes Overview: CONTROLLED Hip fracture requiring operative repair [facility]) (4/11/2020) POA: Unknown Chronic renal disease, stage III [facility] (4/20/2020) POA: Unknown Anemia (4/20/2020) POA: Unknown Secondary hyperparathyroidism of renal origin ([facility] (4/20/2020) POA: Unknown Atrial fibrillation ([facility] (4/21/2020) POA: Unknown Aspiration pneumonia ([facility] (4/23/2020) POA: Unknown Leucocytosis (4/23/2020) POA: Unknown Subjective:  
 
Patient feels good compared to last few days , on IVF & Antibiotics, has Aspiration  Pneumonia, on High Flow oxygen. Mentally clear A comprehensive review of systems was negative except for that written in the History of Present Illness. Objective:  
 
Visit Vitals /57 (BP 1 Location: Right arm, BP Patient Position: At rest) Pulse 60 Temp 97.6 °F (36.4 °C) Resp 18 Ht 5' (1.524 m) Wt 73 kg (160 lb 15 oz) SpO2 99% BMI 31.43 kg/m² Intake/Output Summary (Last 24 hours) at 4/24/2020 1230 Last data filed at 4/24/2020 6943 Gross per 24 hour Intake 910 ml Output 600 ml Net 310 ml  
 
 
Current Facility-Administered Medications Medication Dose Route Frequency Provider Last Rate Last Dose  metoprolol tartrate (LOPRESSOR) tablet 12.5 mg  12.5 mg Oral Q12H Mariah Ewing NP   Stopped at 04/23/20 2131  
 dextrose 5% infusion  100 mL/hr IntraVENous CONTINUOUS Papito Beltran  mL/hr at 04/24/20 0740 100 mL/hr at 04/24/20 0740  
 methIMAzole (TAPAZOLE) tablet 10 mg  10 mg Oral DAILY Nighat Brantley MD   10 mg at 04/24/20 2246  LORazepam (ATIVAN) tablet 0.5 mg  0.5 mg Oral BID Papito Beltran MD   0.5 mg at 04/24/20 0080  aspirin delayed-release tablet 81 mg  81 mg Oral DAILY Frank Treadwell MD   81 mg at 04/24/20 2135  cefepime (MAXIPIME) 2 g in sterile water (preservative free) 10 mL IV syringe  2 g IntraVENous Q24H Sabiha Perla MD   2 g at 04/23/20 1756  pantoprazole (PROTONIX) 40 mg in 0.9% sodium chloride 10 mL injection  40 mg IntraVENous Q12H Sabiha Perla MD   40 mg at 04/24/20 7955  calcitRIOL (ROCALTROL) capsule 0.25 mcg  0.25 mcg Oral Q MON, WED & Ioana Gibson MD   0.25 mcg at 04/22/20 2138  B complex-vitaminC-folic acid (NEPHROCAP) cap  1 Cap Oral DAILY Addi Khan MD   1 Cap at 04/24/20 0910  
 atorvastatin (LIPITOR) tablet 40 mg  40 mg Oral QHS Sabiha Perla MD   40 mg at 04/23/20 2132  acetaminophen (TYLENOL) tablet 650 mg  650 mg Oral Q8H Ranta Hernandez MD   650 mg at 04/24/20 9413  hydrocortisone (ANUSOL-HC) suppository 25 mg  25 mg Rectal Q12H Clary Coronado NP   25 mg at 04/24/20 0911  
 aluminum-magnesium hydroxide (MAALOX) oral suspension 15 mL  15 mL Oral QID PRN Ratna Hernandez MD   15 mL at 04/21/20 1236  polyethylene glycol (MIRALAX) packet 17 g  17 g Oral DAILY Lilia Holiday, [place]   Stopped at 04/23/20 6757  senna-docusate (PERICOLACE) 8.6-50 mg per tablet 1 Tab  1 Tab Oral DAILY [place], [place]   1 Tab at 04/24/20 0910  
 ondansetron (ZOFRAN) injection 8 mg  8 mg IntraVENous Q6H PRN Lilia Holiday, PA   8 mg at 04/21/20 1136  allopurinoL (ZYLOPRIM) tablet 100 mg  100 mg Oral DAILY Lilia Holiday, [place]   100 mg at 04/24/20 1355 Physical Exam:  
 
Physical Exam:  
General:  Alert, cooperative, no distress, appears stated age. Neck: Supple, symmetrical, trachea midline, no adenopathy, thyroid: no enlargement/tenderness/nodules, no carotid bruit and no JVD. Lungs:   Clear to auscultation bilaterally. Heart:  Regular rate and rhythm, S1, S2 normal, no murmur, click, rub or gallop. Abdomen:   Soft, non-tender. Bowel sounds normal. No masses,  No organomegaly. Extremities: Extremities normal, atraumatic, no cyanosis or edema. Skin: Skin color, texture, turgor normal. No rashes or lesions Data Review: CBC w/Diff Recent Labs  
  04/24/20 
0853 04/24/20 0213 04/23/20 
2100  04/23/20 0211 04/22/20 2000 WBC  --  14.7*  --   --  15.4* 20.0*  
RBC  --  2.35*  --   --  2.36* 2.69* HGB 7.3* 7.1* 7.5*   < > 7.4* 8.3* HCT 23.0* 23.3* 24.3*   < > 23.3* 26.3*  
MCV  --  99.1*  --   --  98.7* 97.8*  
MCH  --  30.2  --   --  31.4 30.9 MCHC  --  30.5*  --   --  31.8 31.6 RDW  --  16.6*  --   --  16.6* 16.8*  
 < > = values in this interval not displayed. Recent Labs  
  04/24/20 0213 04/23/20 0211 04/22/20 2000 BANDS  --  9* 13* MONOS 3 3 4 EOS 0 0 0  
BASOS 0 0 0  
RDW 16.6* 16.6* 16.8* Comprehensive Metabolic Profile Recent Labs  
  04/24/20 0213 04/23/20 0211 04/22/20 
0950  146* 146*  
K 5.1 5.5 5.4  114* 116* CO2 22 25 22 * 95* 83* CREA 2.34* 2.38* 2.07* Recent Labs  
  04/24/20 0213 04/23/20 0211 04/22/20 
0950 CA 9.5 9.7 9.9 Result Information Status: Final result (Exam End: 4/22/2020 09:30) Provider Status: Open Study Result EXAM:  XR CHEST PORT 
  
INDICATION:   respiratory distress 
  
COMPARISON: 4/11/2020. 
  
FINDINGS: 
Moderately enlarged cardiac silhouette. Aortic atherosclerosis. Vascular 
congestion. Patchy bilateral basilar lung airspace opacities right greater than 
left. Probably small right pleural effusion. No pneumothorax. Thoracolumbar 
scoliosis. 
  
IMPRESSION IMPRESSION: 
  
Moderately enlarged cardiac silhouette with vascular congestion, possibly 
exaggerated by technique. Bilateral basilar airspace disease concerning for 
pneumonia or aspiration in the appropriate clinical setting. Small right pleural 
effusion Impression: Active Hospital Problems Diagnosis Date Noted  Aspiration pneumonia ([facility] [address] 04/23/2020  Leucocytosis 04/23/2020  Atrial fibrillation ([facility] [facility] 04/21/2020  Chronic renal disease, stage III ([facility] [address] 04/20/2020  Anemia 04/20/2020  Secondary hyperparathyroidism of renal origin [facility] 04/20/2020  Hip fracture requiring operative repair [facility] 04/11/2020  Essential hypertension, benign CONTROLLED Na has normalized , Creatinine is Plateau ed, Pre-renal Bun/Creatinine ratio Plan:  
 
Continue IVF, continue antibiotics, anticipate Renal function will improve ,adjust medicine dose as per eGFR.  
 
 
Thom Masters MD

## 2020-04-25 PROBLEM — E87.20 ACIDOSIS, METABOLIC: Status: ACTIVE | Noted: 2020-01-01

## 2020-04-25 PROBLEM — E87.5 HYPERKALEMIA: Status: ACTIVE | Noted: 2020-01-01

## 2020-04-25 NOTE — PROGRESS NOTES
[email or web address] 
[phone] Gastroenterology follow up-Progress note Impression: 1. Anemia- likely multifactorial. noted to be hgb 9.8 on admission with drop noted 2 days later to hgb 6.6. S/p 4 units PRBC over course of admission, currently Hgb 7.1 
- Iron sat 16%, Ferritin 1457 2. Positive FOB- brown stool on rectal exam- has ? Internal hemorrhoids. Of note had positive FOB in 2018 and colonoscopy performed. 3. Hemorrhoids- will treat locally with topical treatment 4. R hip fracture s/p ORIF- per ortho 5. CKD III 6. Goiter with partial thyroidectomy 7. HTN 
8. GERD- controlled on PPI; no warning symptoms 9. H/o colon polyps- TA noted on colonoscopy in 2018- due to age no further surveillance recommended 10. Aspiration pneumonia Plan: 1. Continue PPI BID, transitioned to oral dosing 2. Monitor H/H and transfuse per protocol 3. Monitor for signs of overt GI bleeding, consider EGD only for signs of overt GI bleeding. Will plan to see patient in follow up in 6-8 weeks. Please call for any overt signs of hemorrhage. Thank you for this consultation and the opportunity to participate in the care of this patient. Please do not hesitate to call with any questions or concerns, or should event occur that may necessitate additional GI evaluation. Elda Barba [facility] 1946, [facility] Office/pager - [phone] Cell - [phone] 
[email or web address] Chief Complaint: Anemia, heme + stool Subjective:  Patient A&Ox2, denies complaints. On HFNC. Patient states she has not had a bowel movement today, denies bleeding. ROS: Denies any fevers, chills, rash. General: well developed, well nourished, no acute distress Eyes: conjunctiva normal, EOM normal 
Cardiovascular: heart normal, intact distal pulses, normal rate and regular rhythm Pulmonary: breath sounds normal and effort normal 
 Abdominal: appearance normal, bowel sounds normal and soft, non-acute, non-tender Patient Active Problem List  
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  
 Obesity, unspecified E66.9  Nonspecific abnormal electrocardiogram (ECG) (EKG) R94.31  
 Renal insufficiency N28.9  Hip fracture requiring operative repair ([facility] S72.009A  Chronic renal disease, stage III ([facility]) N18.3  Anemia D64.9  Secondary hyperparathyroidism of renal origin ([facility] N25.81  
 Atrial fibrillation ([facility]) I48.91  
 Aspiration pneumonia ([facility] J69.0  Leucocytosis D72.829 Visit Vitals /69 (BP 1 Location: Right arm, BP Patient Position: At rest) Pulse (!) 59 Temp 99.3 °F (37.4 °C) Resp 18 Ht 5' (1.524 m) Wt 73 kg (160 lb 15 oz) SpO2 96% BMI 31.43 kg/m² Intake/Output Summary (Last 24 hours) at 4/25/2020 1334 Last data filed at 4/25/2020 1233 Gross per 24 hour Intake 910 ml Output 1550 ml Net -640 ml CBC w/Diff Lab Results Component Value Date/Time WBC 14.7 (H) 04/24/2020 02:13 AM  
 RBC 2.35 (L) 04/24/2020 02:13 AM  
 HGB 7.4 (L) 04/25/2020 07:06 AM  
 HCT 23.0 (L) 04/25/2020 07:06 AM  
 MCV 99.1 (H) 04/24/2020 02:13 AM  
 MCH 30.2 04/24/2020 02:13 AM  
 MCHC 30.5 (L) 04/24/2020 02:13 AM  
 RDW 16.6 (H) 04/24/2020 02:13 AM  
  04/24/2020 02:13 AM  
 Lab Results Component Value Date/Time GRANS 89 (H) 04/24/2020 02:13 AM  
 LYMPH 8 (L) 04/24/2020 02:13 AM  
 EOS 0 04/24/2020 02:13 AM  
 BANDS 9 (H) 04/23/2020 02:11 AM  
 BASOS 0 04/24/2020 02:13 AM  
  
Basic Metabolic Profile Recent Labs  
  04/25/20 
0533 *  
K 5.4  CO2 20* BUN 94*  
CA 9.3 Hepatic Function Lab Results Component Value Date/Time ALB 2.1 (L) 04/14/2020 05:14 AM  
 TP 5.0 (L) 04/14/2020 05:14 AM  
 AP 76 04/14/2020 05:14 AM  
 Lab Results Component Value Date/Time SGOT 18 04/14/2020 05:14 AM  
  
 
 
Coags Recent Labs  
  04/23/20 
1255 04/23/20 
0211 APTT 40.9* 63.8* Richmond Chatterjee MD 
04/25/20, 10:53 AM   
[email or web address] Phone: [phone] Pager: [phone]

## 2020-04-25 NOTE — PROGRESS NOTES
Problem: Self Care Deficits Care Plan (Adult) Goal: *Acute Goals and Plan of Care (Insert Text) Description: Occupational Therapy Goals Initiated 4/13/2020. Pt re-evaluated 4/21/2020. Goals modified for the pt to achieve within 7 day(s). 1.  Patient will perform bed mobility in preparation for selfcare with moderate assist.  
2.  Patient will perform functional activity seated EOB for 4-7 minutes with supervision/setup and F+ balance. 3.  Patient will perform lower body dressing with moderate assist using AE prn. 
4.  Patient will perform toilet transfers with moderate assist. 
5.  Patient will perform all aspects of toileting with moderate assist. 
6.  Patient will participate in upper extremity therapeutic exercise/activities with minimal assistance for 8 minutes to increase ROM/strength for selfcare tasks. 7.  Patient will utilize energy conservation techniques during functional activities with verbal cues. Prior Level of Function: Patient lives with daughter in 2 story condo, 8 steps to bedroom and was independent with self-care and functional mobility PTA. Patient reports having shower chair at home Outcome: Progressing Towards Goal 
OCCUPATIONAL THERAPY TREATMENT Patient: Gomez Kaur (79 y.o. female) Date: 4/25/2020 Diagnosis: Hip fracture requiring operative repair ([facility] Gisselle Briggs <principal problem not specified> Procedure(s) (LRB): 
RIGHT FEMORAL INTERTROCHANTERIC NAIL INSERTION (Right) 13 Days Post-Op Precautions: Fall, Skin, PWB(right LE) Chart, occupational therapy assessment, plan of care, and goals were reviewed. ASSESSMENT: 
Pt was cleared to participate in OT by pt's nurse. Pt is fidgeting with her HFNC upon entry, reporting she was trying to adjust is for comfort. Pt's BUE wrist restraints were doffed at the beginning of the session and replaced at the end of the session.  Pt performed simple grooming task at bed level with Supervision utilizing LUE. Pt reports increased pain in RLE, requesting to remain in bed. Pt required Max A for partial long sit in bed for linen adjustment to prevent skin breakdown. Pt educated on BUE TherEx to perform to improve endurance and activity tolerance for ADLs carryover, pt initially agreeable to TherEx, however, reports increased pain in RUE shoulder and was not able to tolerate AROM/PROM TherEx. Pt's nurse notified of pt's pain, RUE was placed on the pillow for pt's comfort. Progression toward goals: 
[]          Improving appropriately and progressing toward goals [x]          Improving slowly and progressing toward goals 
[]          Not making progress toward goals and plan of care will be adjusted PLAN: 
Patient continues to benefit from skilled intervention to address the above impairments. Continue treatment per established plan of care. Discharge Recommendations:  Dimas Mccracken Further Equipment Recommendations for Discharge:  hospital bed SUBJECTIVE:  
Patient stated Limon Relic you talk to my daughter? I need some rest and they keep calling me.  OBJECTIVE DATA SUMMARY:  
Cognitive/Behavioral Status: 
Neurologic State: Alert, Confused Orientation Level: Oriented to person, Oriented to place Cognition: Follows commands Safety/Judgement: Fall prevention Functional Mobility and Transfers for ADLs: 
 Bed Mobility: 
Rolling: Maximum assistance Supine to Sit: Maximum assistance(to long sit) Scooting: Maximum assistance ADL Intervention: 
 Grooming Grooming Assistance: Supervision Washing Face: Supervision(w/LUE) Washing Hands: Supervision UE Therapeutic Exercises: BUE elbow flex/ext, LUE shoulder flex x10, RUE x3 limited by pain, upon further assessment pain present in R shoulder with AROM and PROM Pain: 
Pain level pre-treatment: not rated Pain level post-treatment: not rated Activity Tolerance:   
Fair- 
 Please refer to the flowsheet for vital signs taken during this treatment. After treatment:  
[]  Patient left in no apparent distress sitting up in chair 
[x]  Patient left in no apparent distress in bed 
[x]  Call bell left within reach [x]  Nursing notified 
[]  Caregiver present [x]  Bed alarm activated, wrist restraints donned COMMUNICATION/EDUCATION:  
[x] Role of Occupational Therapy in the acute care setting 
[x] Home safety education was provided and the patient/caregiver indicated understanding. [] Patient/family have participated as able in working towards goals and plan of care. [] Patient/family agree to work toward stated goals and plan of care. [] Patient understands intent and goals of therapy, but is neutral about his/her participation. [x] Patient is unable to participate in goal setting and plan of care. Thank you for this referral. 
MONSE Coates Time Calculation: 12 mins

## 2020-04-25 NOTE — PROGRESS NOTES
[facility] 
 
 
CARDIOLOGY PROGRESS NOTE 
RECS: 
 
 
1. Paroxymal atrial fibrillation-converted back to sinus rhythm. Now with intermittent sinus bradycardia. metoprolol discontinue. Amiodarone resume 200 mg daily. diRecent Echo showed preserved EF%. No an ideal  candidate for anticoagulation due to severe anemia requiring multiple transfusions. 2. Acute hypoxic respiratory failure- on high flow O2- [facility] following 3. Elevated troponin- currently chest pain free. Troponin peak 1.87. now down trending. continue  asa,statin and bb.   
4. Elevated D-dimer-  High risk for PE/DVT- consider V/Q scan 5. SOB- multifactorial with severe anemia,  possible aspiration pneumonia vs PE. SOB worsening hold ivf. follow Chest xray. Follow NT pro BNP 6. Hyperthyroidism: Patient on methimazole. Treatment per medicine. 7. Severe anemia- noted hgb drop pos op-s/p 5 units PRBC's. GI was consulted 8. AMS- better 9. 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 Right hip fracture s/p ORIF 11. CKD-elevated renal indices. Renal following ASSESSMENT: 
Hospital Problems  Date Reviewed: 4/23/2020 Codes Class Noted POA Aspiration pneumonia ([facility] [address] ICD-10-CM: J69.0 ICD-9-CM: 507.0  4/23/2020 Unknown Leucocytosis ICD-10-CM: N21.698 ICD-9-CM: 288.60  4/23/2020 Unknown Atrial fibrillation [facility]) ICD-10-CM: I48.91 
ICD-9-CM: 427.31  4/21/2020 Unknown Chronic renal disease, stage III (HCC) ICD-10-CM: N18.3 ICD-9-CM: 585.3  4/20/2020 Unknown Anemia ICD-10-CM: D64.9 ICD-9-CM: 285.9  4/20/2020 Unknown Secondary hyperparathyroidism of renal origin [facility]) ICD-10-CM: N25.81 ICD-9-CM: 588.81  4/20/2020 Unknown Hip fracture requiring operative repair [facility]) ICD-10-CM: W54.179M ICD-9-CM: 820.8  4/11/2020 Unknown Essential hypertension, benign ICD-10-CM: I10 
ICD-9-CM: 401.1  Unknown Yes Overview Signed 7/10/2013  6:40 PM by Deysi Snyder SUBJECTIVE: 
SOB on high flow O2 No chest pain OBJECTIVE: 
 
VS:  
Visit Vitals /50 (BP 1 Location: Right arm, BP Patient Position: At rest) Pulse (!) 58 Temp 98.8 °F (37.1 °C) Resp 16 Ht 5' (1.524 m) Wt 73 kg (160 lb 15 oz) SpO2 100% BMI 31.43 kg/m² Intake/Output Summary (Last 24 hours) at 4/25/2020 2409 Last data filed at 4/25/2020 9850 Gross per 24 hour Intake 910 ml Output 850 ml Net 60 ml  
 
TELE: NSR General: alert, well developed, anxious, confused and in mild distress HENT: Normocephalic, atraumatic. Normal external eye. Neck :  no bruit, no JVD Cardiac:  irregular rate and rhythm, S1, S2 normal, no murmur, click, rub or gallop Lungs: diminished breath sounds  bilaterally Abdomen: Soft, nontender, no masses Extremities:  No c/c/e, peripheral pulses present Labs: Results:  
   
Chemistry Recent Labs  
  04/25/20 
0533 04/24/20 
6429 04/23/20 
7176 * 135* 106* * 139 146*  
K 5.4 5.1 5.5  109 114* CO2 20* 22 25 BUN 94* 103* 95* CREA 2.08* 2.34* 2.38* CA 9.3 9.5 9.7 AGAP 8 8 7 BUCR 45* 44* 40* CBC w/Diff Recent Labs  
  04/25/20 
0533 04/24/20 
2115 04/24/20 
1535  04/24/20 
0213  04/23/20 
0211 04/22/20 2000 WBC  --   --   --   --  14.7*  --  15.4* 20.0*  
RBC  --   --   --   --  2.35*  --  2.36* 2.69* HGB 8.1* 7.9* 8.3*   < > 7.1*   < > 7.4* 8.3* HCT 25.7* 26.2* 27.2*   < > 23.3*   < > 23.3* 26.3*  
PLT  --   --   --   --  159  --  182 201 GRANS  --   --   --   --  89*  --  77* 77* LYMPH  --   --   --   --  8*  --  11* 6*  
EOS  --   --   --   --  0  --  0 0  
 < > = values in this interval not displayed. Cardiac Enzymes No results for input(s): CPK, CKND1, MICHELLE in the last 72 hours. No lab exists for component: Jaiden Luna Coagulation Recent Labs  
  04/23/20 
1255 04/23/20 
4928 APTT 40.9* 63.8* Lipid Panel Lab Results Component Value Date/Time Cholesterol, total 182 01/16/2020 01:39 PM  
 HDL Cholesterol 55 01/16/2020 01:39 PM  
 LDL, calculated 99 01/16/2020 01:39 PM  
 Triglyceride 140 01/16/2020 01:39 PM  
 CHOL/HDL Ratio 3.3 01/16/2020 01:39 PM  
  
BNP No results for input(s): BNPP in the last 72 hours. Liver Enzymes No results for input(s): TP, ALB, TBIL, AP, SGOT, GPT in the last 72 hours. No lab exists for component: DBIL Thyroid Studies Lab Results Component Value Date/Time TSH 48.20 (H) 04/13/2020 05:18 AM  
    
 
 
 
TERESA Merchant I have independently evaluated taken history and examined the patient. All relevant labs and testing data's are reviewed. Care plan discussed and updated after review.  
Robinson Alvarez MD

## 2020-04-25 NOTE — PROGRESS NOTES
Problem: Mobility Impaired (Adult and Pediatric) Goal: *Acute Goals and Plan of Care (Insert Text) Description: Physical Therapy Goals Initiated 4/13/2020, reevaluated 4/21/2020 and to be accomplished within 7 day(s) 1. Patient will move from supine to sit and sit to supine , scoot up and down and roll side to side in bed with moderate assistance. 2.  Patient will transfer from bed to chair and chair to bed with moderate assistance using the least restrictive device. 3.  Patient will perform sit to stand with moderate assistance. 4.  Patient will ambulate with moderate assistance for 10-25 feet with the least restrictive device. 5.  Assess stairs when appropriate. PLOF: Patient reports she is independent with ADLs and functional mobility. Outcome: Progressing Towards Goal 
  
PHYSICAL THERAPY TREATMENT Patient: Elinor Box (89 y.o. female) Date: 4/25/2020 Diagnosis: Hip fracture requiring operative repair ([facility] [address] Steffen Whitehead <principal problem not specified> Procedure(s) (LRB): 
RIGHT FEMORAL INTERTROCHANTERIC NAIL INSERTION (Right) 13 Days Post-Op Precautions: Fall, Skin, PWB(right LE) ASSESSMENT: 
Pt seen and agreeable to PT tx, pt continues to be on HFNC, received with nursing at bedside and assisted with changing pad following pericare. Pt requires max A for rolling to both sides with assist of hand placement of handrails to help pull herself over but once there pt is able to stay on her side on her own. Pt continues to need max A to scoot up in bed. Following repositioning and changing of linens/pad, pt was instructed in BLE therex to maintain joint integrity. Pt is able to perform AROM on the L side and requires AAROM on the R side due to pain in her R hip. Pt took intermittent rest breaks throughout and at end of session she was repositioned for comfort with all needs within reach, soft restraints on. Pt will continue to be followed in the acute setting for progress towards goals, cont per Poc. Progression toward goals:  
[]      Improving appropriately and progressing toward goals [x]      Improving slowly and progressing toward goals 
[]      Not making progress toward goals and plan of care will be adjusted PLAN: 
Patient continues to benefit from skilled intervention to address the above impairments. Continue treatment per established plan of care. Discharge Recommendations:  Dimas Mccracken Further Equipment Recommendations for Discharge:  TBD at next level of care SUBJECTIVE:  
Patient stated I slept good last night .  OBJECTIVE DATA SUMMARY:  
Critical Behavior: 
Neurologic State: Alert, Confused Orientation Level: Oriented to person, Disoriented to place, Disoriented to situation, Disoriented to time Cognition: Decreased attention/concentration, Decreased command following, Impaired decision making, Impulsive, Memory loss, Poor safety awareness Safety/Judgement: Fall prevention Functional Mobility Training: 
Bed Mobility: 
Rolling: Maximum assistance Scooting: Maximum assistance Range Of Motion: 
 AROM: Generally decreased, functional 
  
 PROM: Generally decreased, functional 
  
 
Ambulation/Gait Training: 
 
Right Side Weight Bearing: Partial (%) Therapeutic Exercises:  
Supine in bed EXERCISE Sets Reps Active Active Assist  
Passive Self ROM Comments Ankle Pumps 2 10  [x] [] [] [] Quad Sets/Glut Sets 1 10  [x] [] [] [] Hold for 5 secs Hamstring Sets   [] [] [] [] Short Arc Quads   [] [] [] [] Heel Slides 1 10 [] [x] [] [] Straight Leg Raises   [] [] [] [] Hip Add 1 10 [x] [] [] [] Hold for 5 secs, w/ pillow squeeze Long Arc Quads   [] [] [] [] Seated Marching   [] [] [] []   
Standing Marching   [] [] [] [] Hip abd slides 1 10 [] [x] [] []   
 
 
Pain: 
Pain level pre-treatment: not rated numerically Pain level post-treatment: \"  \"  
Pain Intervention(s): Medication (see MAR); Rest, Repositioning Response to intervention: Nurse notified Activity Tolerance:  
Fair Please refer to the flowsheet for vital signs taken during this treatment. After treatment:  
[] Patient left in no apparent distress sitting up in chair 
[x] Patient left in no apparent distress in bed 
[x] Call bell left within reach [x] Nursing notified 
[] Caregiver present 
[] Bed alarm activated 
[] Soft restraints applied COMMUNICATION/EDUCATION:  
[x]         Role of Physical Therapy in the acute care setting. [x]         Fall prevention education was provided and the patient/caregiver indicated understanding. [x]         Patient/family have participated as able in working toward goals and plan of care. [x]         Patient/family agree to work toward stated goals and plan of care. []         Patient understands intent and goals of therapy, but is neutral about his/her participation. []         Patient is unable to participate in stated goals/plan of care: ongoing with therapy staff. 
[]         Other: 
 
   
Keyur Mendez Time Calculation: 27 mins

## 2020-04-25 NOTE — PROGRESS NOTES
[facility] Hospitalist Group Progress Note Patient: Izabella Vazquez Age: 81 y.o. : 1935 MR#: [id number] SSN: [id number] Date: 2020 Subjective: Noe Simpson PT seen at bedside today. She is on HFNC and states she is comfortable . She remembers me  Pt is awaiting SNF placement. FT4 decreasing, with last FT4 at 1.3 . Last TT3 is now in normal range of 104. Assessment/Plan: This is an 80 yo AAF with a medical history significant for MNG and Graves' disease, on MMI for treatment. She states that she only take MMI 10 mg po daily instead of her prescribed 10mg po bid, but does seem to get confused about the names of her medications and fills her own pill boxes with her, multiple, medications. She is followed by Dr. Lesly Rasheed for her MNG and has been recommended for completion thyroidectomy. Given that she has some compressive symptoms and may be having some difficulty with medication compliance, she may benefit from surgery. This can be done as an outpt. 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 She m/l would  benefit from definitive therapy. Issues Afib with RVR may have been due to her hyperthyroidism, and now that thyroid function is in normal range, if related to hyperthyroidism, symptoms should resolve. Otherwise may need to consider other etiology. ___________________________________________________ PLAN:   
 
 -will  continue MMI 10 mg po daily today. This is  Her outpt dose. 
 
-will check FT4 daily to monitor trend.  
 
Thank you for the consult, will continue to follow pt with you while admitted to the hospital. 
 
Total visit time: 30  minutes. Of this time, greater than 50% was spent counseling and/or coordinating care.  Counseling elements included diagnostic results and/or recommended diagnostic studies, prognosis, education about the natural history and management of their condition, risks and benefits of management (treatment) options, and instructions for management (treatment) and/or follow-up. Disposition: will need follow up with Dr. Kevan Maosn  In ~ 4-6 weeks to have TSH/FT4 recheck to see if any further dose adjustments are needed. Address is [address] [place] [address]. Contact phone number is [phone]. Objective:  
VS:  
Visit Vitals /50 (BP 1 Location: Right arm, BP Patient Position: At rest) Pulse (!) 58 Temp 98.8 °F (37.1 °C) Resp 16 Ht 5' (1.524 m) Wt 73 kg (160 lb 15 oz) SpO2 100% BMI 31.43 kg/m² Intake/Output Summary (Last 24 hours) at 4/25/2020 1038 Last data filed at 4/25/2020 0155 Gross per 24 hour Intake 910 ml Output 850 ml Net 60 ml PE:  
General : A&Ox 3 NAD HEENT: NC/AT EOMI Skin: no rashes Psych: good , though quiet mood, slightly flat affect. ROS: as per HPI, otherwise negative IMAGING 
 
4/17/2020 Thyroid scan and uptake COMPARISON: 7/18/2011. 
  
CORRELATION: None. 
  
DESCRIPTION: After ingestion of 257 microcuries 123Iodine, uptake of radioiodine 
by the thyroid gland was measured at 1, 6 and 24 hours. Images of the thyroid 
gland were obtained in anterior and bilateral anterior oblique projections using 
pinhole collimator, and additional anterior image was obtained with size markers 
in place. 
  
FINDINGS:   
  
Thyroid uptake is calculated as approximately 20% in one hour, 67% at 6 hour and 
40% in 24 hours.   
  
There is absence of the left thyroid gland. The right thyroid gland is large and 
heterogeneous. No dominant area of focal increased or decreased activity seen. Overall pattern appears similar to that of 2011. .   
  
IMPRESSION: 
  
1. Elevated uptake values in keeping with hyperthyroidism. Similar heterogeneous scan appearance of the remnant enlarged right thyroid gland. Overall findings 
suggestive of toxic multinodular goiter. Labs:   
 
Results for Nimisha Ovalles (MRN [id number]) as of 4/25/2020 10:41 Ref. Range 4/23/2020 02:11 4/24/2020 02:13 4/25/2020 05:33  
T4, Free Latest Ref Range: 0.7 - 1.5 NG/DL 1.7 (H) 1.4 1.3  
T3, total Latest Ref Range: 71 - 180 ng/dL 133 107   
 
  
Component Value Flag Ref Range Units Status Thyroid Stim Immunoglobulin 20.00  High   0.00 - 0.55 IU/L Final  
 
Component Value Flag Ref Range Units Status Thyroid peroxidase Ab 8   0 - 34 IU/mL Final  
Thyroglobulin Ab PENDING    IU/mL Incomplete Recent Results (from the past 24 hour(s)) GLUCOSE, POC Collection Time: 04/24/20 11:43 AM  
Result Value Ref Range Glucose (POC) 202 (H) 70 - 110 mg/dL HGB & HCT Collection Time: 04/24/20  3:35 PM  
Result Value Ref Range HGB 8.3 (L) 12.0 - 16.0 g/dL HCT 27.2 (L) 35.0 - 45.0 % GLUCOSE, POC Collection Time: 04/24/20  4:05 PM  
Result Value Ref Range Glucose (POC) 174 (H) 70 - 110 mg/dL HGB & HCT Collection Time: 04/24/20  9:15 PM  
Result Value Ref Range HGB 7.9 (L) 12.0 - 16.0 g/dL HCT 26.2 (L) 35.0 - 45.0 %  
T4, FREE Collection Time: 04/25/20  5:33 AM  
Result Value Ref Range T4, Free 1.3 0.7 - 1.5 NG/DL  
HGB & HCT Collection Time: 04/25/20  5:33 AM  
Result Value Ref Range HGB 8.1 (L) 12.0 - 16.0 g/dL HCT 25.7 (L) 35.0 - 45.0 % METABOLIC PANEL, BASIC Collection Time: 04/25/20  5:33 AM  
Result Value Ref Range Sodium 129 (L) 136 - 145 mmol/L Potassium 5.4 3.5 - 5.5 mmol/L Chloride 101 100 - 111 mmol/L  
 CO2 20 (L) 21 - 32 mmol/L Anion gap 8 3.0 - 18 mmol/L Glucose 119 (H) 74 - 99 mg/dL BUN 94 (H) 7.0 - 18 MG/DL Creatinine 2.08 (H) 0.6 - 1.3 MG/DL  
 BUN/Creatinine ratio 45 (H) 12 - 20 GFR est AA 28 (L) >60 ml/min/1.73m2 GFR est non-AA 23 (L) >60 ml/min/1.73m2  Calcium 9.3 8.5 - 10.1 MG/DL  
 PROCALCITONIN Collection Time: 04/25/20  5:33 AM  
Result Value Ref Range Procalcitonin 4.54 ng/mL NT-PRO BNP Collection Time: 04/25/20  5:33 AM  
Result Value Ref Range NT pro-BNP 45,948 (H) 0 - 1,800 PG/ML  
HEMOGLOBIN A1C WITH EAG Collection Time: 04/25/20  7:06 AM  
Result Value Ref Range Hemoglobin A1c 5.5 4.2 - 5.6 % Est. average glucose 111 mg/dL HGB & HCT Collection Time: 04/25/20  7:06 AM  
Result Value Ref Range HGB 7.4 (L) 12.0 - 16.0 g/dL HCT 23.0 (L) 35.0 - 45.0 % Signed By: Stefano Ruelas MD   
 April 25, 2020 8:21 PM

## 2020-04-25 NOTE — PROGRESS NOTES
Progress Note Maria Luisa Long 
81 y.o. Admit Date: 4/11/2020 Active Problems: 
  Essential hypertension, benign () POA: Yes Overview: CONTROLLED Hip fracture requiring operative repair [facility]) (4/11/2020) POA: Unknown Chronic renal disease, stage III ([facility] (4/20/2020) POA: Unknown Anemia (4/20/2020) POA: Unknown Secondary hyperparathyroidism of renal origin ([facility] (4/20/2020) POA: Unknown Atrial fibrillation ([facility] (4/21/2020) POA: Unknown Aspiration pneumonia ([facility] (4/23/2020) POA: Unknown Leucocytosis (4/23/2020) POA: Unknown Hyperkalemia (4/25/2020) POA: Unknown Acidosis, metabolic (5/85/3307) POA: Unknown Subjective:  
 
Patient feels good, still on high flow oxygen. . ,hving orange juice A comprehensive review of systems was negative except for that written in the History of Present Illness. Objective:  
 
Visit Vitals /69 (BP 1 Location: Right arm, BP Patient Position: At rest) Pulse (!) 59 Temp 99.3 °F (37.4 °C) Resp 18 Ht 5' (1.524 m) Wt 73 kg (160 lb 15 oz) SpO2 96% BMI 31.43 kg/m² Intake/Output Summary (Last 24 hours) at 4/25/2020 1337 Last data filed at 4/25/2020 1233 Gross per 24 hour Intake 910 ml Output 1550 ml Net -640 ml  
 
 
Current Facility-Administered Medications Medication Dose Route Frequency Provider Last Rate Last Dose  amiodarone (CORDARONE) tablet 200 mg  200 mg Oral DAILY Mariah Ewing, NP   200 mg at 04/25/20 1228  pantoprazole (PROTONIX) tablet 40 mg  40 mg Oral ACB&Latesha Nicholson MD      
 sodium polystyrene (KAYEXALATE) 15 gram/60 mL oral suspension 15 g  15 g Oral NOW Esther Whyte MD      
 sodium bicarbonate tablet 650 mg  650 mg Oral TID Esther Whyte MD      
 methIMAzole (TAPAZOLE) tablet 10 mg  10 mg Oral DAILY Arnold Mills MD   10 mg at 04/25/20 0901  LORazepam (ATIVAN) tablet 0.5 mg  0.5 mg Oral BID Baldemar Concepcion MD 0.5 mg at 04/25/20 7540  aspirin delayed-release tablet 81 mg  81 mg Oral DAILY Brandy Gimenez MD   81 mg at 04/25/20 0900  cefepime (MAXIPIME) 2 g in sterile water (preservative free) 10 mL IV syringe  2 g IntraVENous Q24H Armstead Meigs, MD   2 g at 04/24/20 1747  calcitRIOL (ROCALTROL) capsule 0.25 mcg  0.25 mcg Oral Q MON, WED & Beltran Villagomez MD   0.25 mcg at 04/24/20 2232  B complex-vitaminC-folic acid (NEPHROCAP) cap  1 Cap Oral DAILY Miesha Cuellar MD   1 Cap at 04/25/20 2747  atorvastatin (LIPITOR) tablet 40 mg  40 mg Oral QHS Armstead Meigs, MD   40 mg at 04/24/20 2233  acetaminophen (TYLENOL) tablet 650 mg  650 mg Oral Q8H Arely Troy MD   650 mg at 04/25/20 1300  hydrocortisone (ANUSOL-HC) suppository 25 mg  25 mg Rectal Q12H Fozia Slocumb, NP   25 mg at 04/25/20 2637  aluminum-magnesium hydroxide (MAALOX) oral suspension 15 mL  15 mL Oral QID PRN Jacqueline Zhao MD   15 mL at 04/21/20 1236  polyethylene glycol (MIRALAX) packet 17 g  17 g Oral DAILY Perfecto Gayle, [address]   17 g at 04/25/20 5360  senna-docusate (PERICOLACE) 8.6-50 mg per tablet 1 Tab  1 Tab Oral DAILY Perfecto Gayle, [address]   1 Tab at 04/25/20 4076  ondansetron (ZOFRAN) injection 8 mg  8 mg IntraVENous Q6H PRN HIEN Olmos   8 mg at 04/21/20 1136  allopurinoL (ZYLOPRIM) tablet 100 mg  100 mg Oral DAILY Perfecto Gayle, [address]   100 mg at 04/25/20 1633 Physical Exam:  
 
Physical Exam:  
General:  Alert, cooperative, no distress, appears stated age. Neck: Supple, symmetrical, trachea midline, no adenopathy, thyroid: no enlargement/tenderness/nodules, no carotid bruit and no JVD. Lungs:   Clear to auscultation bilaterally. Heart:  Regular rate and rhythm, S1, S2 normal, no murmur, click, rub or gallop. Abdomen:   Soft, non-tender. Bowel sounds normal. No masses,  No organomegaly. Extremities: Extremities normal, atraumatic, no cyanosis or edema, S/P Right Hip fracture with ORIF. Skin: Skin color, texture, turgor normal. No rashes or lesions Data Review: CBC w/Diff Recent Labs  
  04/25/20 
9804 04/25/20 
0533 04/24/20 
2115  04/24/20 
6798  04/23/20 0211 04/22/20 2000 WBC  --   --   --   --  14.7*  --  15.4* 20.0*  
RBC  --   --   --   --  2.35*  --  2.36* 2.69* HGB 7.4* 8.1* 7.9*   < > 7.1*   < > 7.4* 8.3* HCT 23.0* 25.7* 26.2*   < > 23.3*   < > 23.3* 26.3*  
MCV  --   --   --   --  99.1*  --  98.7* 97.8*  
MCH  --   --   --   --  30.2  --  31.4 30.9 MCHC  --   --   --   --  30.5*  --  31.8 31.6 RDW  --   --   --   --  16.6*  --  16.6* 16.8*  
 < > = values in this interval not displayed. Recent Labs  
  04/24/20 0213 04/23/20 0211 04/22/20 2000 BANDS  --  9* 13* MONOS 3 3 4 EOS 0 0 0  
BASOS 0 0 0  
RDW 16.6* 16.6* 16.8* Comprehensive Metabolic Profile Recent Labs  
  04/25/20 
0533 04/24/20 
5849 04/23/20 0211 * 139 146*  
K 5.4 5.1 5.5  109 114* CO2 20* 22 25 BUN 94* 103* 95* CREA 2.08* 2.34* 2.38* Recent Labs  
  04/25/20 
0533 04/24/20 
0785 04/23/20 0211 CA 9.3 9.5 9.7 Impression: Active Hospital Problems Diagnosis Date Noted  Hyperkalemia 04/25/2020  Acidosis, metabolic 18/65/8254  Aspiration pneumonia ([facility] [address] 04/23/2020  Leucocytosis 04/23/2020  Atrial fibrillation ([facility] [address] 04/21/2020  Chronic renal disease, stage III ([facility] [address] 04/20/2020  Anemia 04/20/2020  [facility] 04/20/2020  Hip fracture requiring operative repair ([facility] 04/11/2020  Essential hypertension, benign CONTROLLED Plan:  
 
Avoid High K containing food,will give low dose of Kayexalate, add Bicarb,follow renal panel.   
 
 
Radha Voss MD

## 2020-04-25 NOTE — PROGRESS NOTES
Problem: Pressure Injury - Risk of 
Goal: *Prevention of pressure injury Description: Document Thierry Scale and appropriate interventions in the flowsheet. Outcome: Progressing Towards Goal 
Note: Pressure Injury Interventions: 
Sensory Interventions: Assess changes in LOC, Assess need for specialty bed, Discuss PT/OT consult with provider, Keep linens dry and wrinkle-free, Pad between skin to skin, Pressure redistribution bed/mattress (bed type) Moisture Interventions: Absorbent underpads, Internal/External urinary devices Activity Interventions: Increase time out of bed, Pressure redistribution bed/mattress(bed type), PT/OT evaluation Mobility Interventions: HOB 30 degrees or less, Pressure redistribution bed/mattress (bed type), PT/OT evaluation Nutrition Interventions: Document food/fluid/supplement intake Friction and Shear Interventions: HOB 30 degrees or less Problem: Falls - Risk of 
Goal: *Absence of Falls Description: Document Laretta Maribel Fall Risk and appropriate interventions in the flowsheet. Outcome: Progressing Towards Goal 
Note: Fall Risk Interventions: 
Mobility Interventions: Bed/chair exit alarm, OT consult for ADLs, Patient to call before getting OOB, PT Consult for mobility concerns, PT Consult for assist device competence, Strengthening exercises (ROM-active/passive) Mentation Interventions: Adequate sleep, hydration, pain control, Bed/chair exit alarm, More frequent rounding, Reorient patient, Room close to nurse's station Medication Interventions: Bed/chair exit alarm, Patient to call before getting OOB, Teach patient to arise slowly Elimination Interventions: Bed/chair exit alarm, Call light in reach History of Falls Interventions: Bed/chair exit alarm, Door open when patient unattended, Investigate reason for fall, Room close to nurse's station Problem: Pain Goal: *Control of Pain Outcome: Progressing Towards Goal

## 2020-04-25 NOTE — PROGRESS NOTES
[facility] Progress Note Patient: Elisa Partida Age: 81 y.o. : 1935 MR#: [id number] SSN: [id number] Date: 2020 Subjective/24-hour events:  
 
Had some increased SOB this AM but reports some improvement in symptoms as of now. Denies chest pain and palpitations, remains afebrile. Assessment:  
Paroxysmal atrial fibrillation, currently NSR 
RLL pneumonia suspect secondary to aspiration Acute hypoxic respiratory failure Displaced intertrochanteric right femur fracture status post ORIF Multinodular goiter and Graves' disease Hypertension CKD 3 with secondary hyperparathyroidism Anemia, multifactorial   
Obesity Acute encephalopathy, metabolic/toxic, improved Plan: 
Continue HFNC as required, wean as able. Remains on IV ABX - de-escalate when appropriate. Will f/u with PCCM. IVF fluids already discontinued by cardiology - agree. F/U CXR ordered. Amiodarone/asa/statin as already prescribed. H/H acceptable, no need for transfusion. Continue PPI, monitor. MMI per endocrinology. Wean restraints as able. Have discontinued scheduled ativan - will monitor following this change. PT/OT as soon as able/respiratory status improves. Anticipate SNF disposition at discharge. Case discussed with:  [x]Patient  []Family  [x]Nursing  [x]Case Management DVT Prophylaxis:  []Lovenox  []Hep SQ  [x]SCDs  []Coumadin   []On Heparin gtt Objective:  
VS:  
Visit Vitals /50 (BP 1 Location: Right arm, BP Patient Position: At rest) Pulse (!) 58 Temp 98.8 °F (37.1 °C) Resp 16 Ht 5' (1.524 m) Wt 73 kg (160 lb 15 oz) SpO2 100% BMI 31.43 kg/m² Tmax/24hrs: Temp (24hrs), Av.1 °F (36.7 °C), Min:97.6 °F (36.4 °C), Max:98.8 °F (37.1 °C) Intake/Output Summary (Last 24 hours) at 2020 1010 Last data filed at 2020 9818 Gross per 24 hour Intake 910 ml Output 850 ml Net 60 ml  
 
 
 General: In NAD. Nontoxic-appearing. High flow nasal cannula in place. Cardiovascular: RRR. Pulmonary: No active wheezing. No accessory muscle use noted. GI: Abdomen soft, nontender. Extremities: Warm no edema or ischemia. Neuro: Awake, moves extremities spontaneously Labs:   
Recent Results (from the past 24 hour(s)) GLUCOSE, POC Collection Time: 04/24/20 11:43 AM  
Result Value Ref Range Glucose (POC) 202 (H) 70 - 110 mg/dL HGB & HCT Collection Time: 04/24/20  3:35 PM  
Result Value Ref Range HGB 8.3 (L) 12.0 - 16.0 g/dL HCT 27.2 (L) 35.0 - 45.0 % GLUCOSE, POC Collection Time: 04/24/20  4:05 PM  
Result Value Ref Range Glucose (POC) 174 (H) 70 - 110 mg/dL HGB & HCT Collection Time: 04/24/20  9:15 PM  
Result Value Ref Range HGB 7.9 (L) 12.0 - 16.0 g/dL HCT 26.2 (L) 35.0 - 45.0 %  
T4, FREE Collection Time: 04/25/20  5:33 AM  
Result Value Ref Range T4, Free 1.3 0.7 - 1.5 NG/DL  
HGB & HCT Collection Time: 04/25/20  5:33 AM  
Result Value Ref Range HGB 8.1 (L) 12.0 - 16.0 g/dL HCT 25.7 (L) 35.0 - 45.0 % METABOLIC PANEL, BASIC Collection Time: 04/25/20  5:33 AM  
Result Value Ref Range Sodium 129 (L) 136 - 145 mmol/L Potassium 5.4 3.5 - 5.5 mmol/L Chloride 101 100 - 111 mmol/L  
 CO2 20 (L) 21 - 32 mmol/L Anion gap 8 3.0 - 18 mmol/L Glucose 119 (H) 74 - 99 mg/dL BUN 94 (H) 7.0 - 18 MG/DL Creatinine 2.08 (H) 0.6 - 1.3 MG/DL  
 BUN/Creatinine ratio 45 (H) 12 - 20 GFR est AA 28 (L) >60 ml/min/1.73m2 GFR est non-AA 23 (L) >60 ml/min/1.73m2 Calcium 9.3 8.5 - 10.1 MG/DL PROCALCITONIN Collection Time: 04/25/20  5:33 AM  
Result Value Ref Range Procalcitonin 4.54 ng/mL HEMOGLOBIN A1C WITH EAG Collection Time: 04/25/20  7:06 AM  
Result Value Ref Range Hemoglobin A1c 5.5 4.2 - 5.6 % Est. average glucose 111 mg/dL HGB & HCT Collection Time: 04/25/20  7:06 AM  
Result Value Ref Range HGB 7.4 (L) 12.0 - 16.0 g/dL HCT 23.0 (L) 35.0 - 45.0 % Signed By: Vic Stewart MD   
 April 25, 2020

## 2020-04-25 NOTE — PROGRESS NOTES
Bedside and Verbal shift change report given to Rob (oncoming nurse) by Coretta Alamo RN 
 (offgoing nurse). Report given with SBAR, Kardex, MAR and Recent Results.

## 2020-04-25 NOTE — PROGRESS NOTES
[facility]. Pulmonary, Critical Care, and Sleep Medicine F/U Patient Consult Name: Dexter Mas MRN: [id number] : 1935 Hospital: [address] [place] [address] Dr Date: 2020 This patient has been seen and evaluated at the request of Dr. Daniel Doherty for hypoxia. IMPRESSION:  
· Acute dyspnea/SOB with acute hypoxic resp failure: Suspect etiology due to aspiration given CXR showing new RLL infiltrate and pt was lethargic with pain medications. Pt requiring HFNC · Aspiration pneumonia: Given new right lower lobe infiltrate, as well as elevated procalcitonin at 6.3 · Hip fracture s/p ORIF · Elevated D-dimer: In the setting of aspiration, likely acute phase reactant, although with dyspnea and hypoxia and fracture with surgery with immobilizaiton, pt is at risk for VTE. Pt placed on heparin gtt by cardiology but stopped due to anemia · Acute encephalopathy/delirium:  Likely toxic/metabolic in nature. Improving · PAF currently controlled ventricular response · Anemia:  Pt had some episodes of LGIB requiring transfusion during hospitalization · Hyperthyroidism/Grave's disease · CKD Patient Active Problem List  
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  
 Obesity, unspecified E66.9  Nonspecific abnormal electrocardiogram (ECG) (EKG) R94.31  
 Renal insufficiency N28.9  Hip fracture requiring operative repair ([facility] [address] S72.009A  Chronic renal disease, stage III (HCC) N18.3  Anemia D64.9  Secondary hyperparathyroidism of renal origin ([facility] [address] N25.81  
 Atrial fibrillation (HCC) I48.91  
 Aspiration pneumonia ([facility] [address] J69.0  Leucocytosis D72.829 RECOMMENDATIONS:  
Continue Zosyn to cover for aspiration pneumonia -- will hold off on deescalation since pt remains on HFNC. Repeat procalcitonin and F/U culture results and adjust accordingly With regards to elevated d-dimer, pt unable to tolerate anticoagulation due to anemia requiring transfusion. Hypoxia likely due to aspiration given that tachypnea has improved with ABx. Since pt remains at an elevated risk for VTE, could consider CT chest w/o contrast and V/Q scan ONLY IF hypoxia worsens. However, if pt's hypoxia improves with aspiration pneumonia treatment prior to obtaining studies, then this is likely not due to VTE and studies will not be needed. Can hold off at this time Supplemental oxygen to maintain SpO2 >88%. Titrate as needed, currently on HFNC Please assess for home oxygen need prior to discharge Aggressive pulmonary toileting/bronchial hygiene Frequent incentive spirometry Aspiration precautions including elevating HOB >30deg. Advise SLP evaluation Pt remains somewhat delirious, would advise making benzos PRN 
PT/OT, OOB, ambulate with assistance as tolerated DVT ppx per primary service Will follow Subjective:  
04/24/20 Patient seen and examined at bedside. No acute events overnight. Patient remains on HFNC. Pt currently remains delirious and has no complaints. Denies N/V/D, chest pain, hip pain, cough. HPI: 
N.B. patient is a poor historian, history obtained from medical providers and medical record Patient is a 81 y.o. female with a past medical history of A. fib, Graves' disease, multinodular goiter, CKD, hypertension, presented to [facility] after falling at home, developed right leg pain. In the ER, patient found to have right hip fracture, patient admitted for evaluation and treatment. During the course of the hospitalization, patient was treated with ORIF. Patient also developed anemia requiring transfusion of PRBC on 4/15 and again today. Patient being followed by cardiology, on amiodarone by cardiology.   This morning, patient found to be more tachypneic and requiring significant amount of oxygen, 6 L by nasal cannula but still tachypneic. Per discussion with primary service, patient was sleepy this weekend, given doses of morphine and Ativan. Patient more alert today, but notes some dyspnea. Patient does not note increased cough, also does not note dysphagia, but patient not oriented, appears delirious. Patient denies any abdominal pain. Further history unable to be elicited Past Medical History:  
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   
 Nonspecific abnormal electrocardiogram (ECG) (EKG)  Obesity, unspecified  Other and unspecified hyperlipidemia  Renal insufficiency Past Surgical History:  
Procedure Laterality Date [address] UNLISTED Explor lap  COLONOSCOPY N/A 5/31/2018 COLONOSCOPY w polypectomy performed by Deloris Ruelas MD at [address] Left  HX CATARACT REMOVAL INSERT PROSTHETIC LENS: LEFT,RIGHT  HX GYN    
 HX ORTHOPAEDIC Left Rotator cuff repair  HX PARTIAL THYROIDECTOMY  NE BIOPSY OF BREAST, INCISIONAL    
 rt & lt 12 yrs ago Prior to Admission medications Medication Sig Start Date End Date Taking? Authorizing Provider  
oxyCODONE IR (ROXICODONE) 5 mg immediate release tablet Take 1 Tab by mouth every six (6) hours as needed for Pain for up to 7 days. Max Daily Amount: 20 mg. 4/17/20 4/24/20 Yes Yanni Ortiz PA  
amLODIPine (NORVASC) 5 mg tablet Take 5 mg by mouth daily. Yes Jabier, MD Max  
allopurinoL (ZYLOPRIM) 100 mg tablet Take 100 mg by mouth daily. Yes aMx Eugene MD  
simvastatin (ZOCOR) 20 mg tablet Take 20 mg by mouth nightly. Yes Max Eugene MD  
furosemide (LASIX) 20 mg tablet Take 20 mg by mouth every other day. Yes Max Eugene MD  
losartan (COZAAR) 50 mg tablet Take 50 mg by mouth daily.    Yes Provider, Historical  
 FOLIC ACID/VITAMIN B COMP W-C (VIKTOR-LUCERO PO) Take 1 Tab by mouth daily. Yes Provider, Historical  
omeprazole (PRILOSEC) 20 mg capsule Take 20 mg by mouth daily. Yes Provider, Historical  
aspirin 81 mg tablet Take 81 mg by mouth. Yes Provider, Historical  
LORazepam (ATIVAN) 1 mg tablet Take  by mouth two (2) times a day. Yes Provider, Historical  
 
Allergies Allergen Reactions  Ciprofibrate Unable to Consolidated Jose  Darvon [Propoxyphene] Unable to Obtain  Penicillins Unable to Obtain Social History Tobacco Use  Smoking status: Former Smoker  Smokeless tobacco: Never Used  Tobacco comment: REMOTELY QUIT 1980'S Substance Use Topics  Alcohol use: No  
  
History reviewed. No pertinent family history. Current Facility-Administered Medications Medication Dose Route Frequency  metoprolol tartrate (LOPRESSOR) tablet 12.5 mg  12.5 mg Oral Q12H  
 dextrose 5% infusion  100 mL/hr IntraVENous CONTINUOUS  
 methIMAzole (TAPAZOLE) tablet 10 mg  10 mg Oral DAILY  LORazepam (ATIVAN) tablet 0.5 mg  0.5 mg Oral BID  aspirin delayed-release tablet 81 mg  81 mg Oral DAILY  cefepime (MAXIPIME) 2 g in sterile water (preservative free) 10 mL IV syringe  2 g IntraVENous Q24H  pantoprazole (PROTONIX) 40 mg in 0.9% sodium chloride 10 mL injection  40 mg IntraVENous Q12H  calcitRIOL (ROCALTROL) capsule 0.25 mcg  0.25 mcg Oral Q MON, WED & FRI  B complex-vitaminC-folic acid (NEPHROCAP) cap  1 Cap Oral DAILY  atorvastatin (LIPITOR) tablet 40 mg  40 mg Oral QHS  acetaminophen (TYLENOL) tablet 650 mg  650 mg Oral Q8H  
 hydrocortisone (ANUSOL-HC) suppository 25 mg  25 mg Rectal Q12H  polyethylene glycol (MIRALAX) packet 17 g  17 g Oral DAILY  senna-docusate (PERICOLACE) 8.6-50 mg per tablet 1 Tab  1 Tab Oral DAILY  allopurinoL (ZYLOPRIM) tablet 100 mg  100 mg Oral DAILY Review of Systems: Remains unable to obtain ROS due to patient factors, secondary to delirium Objective:  
Vital Signs:   
Visit Vitals /62 (BP 1 Location: Right arm, BP Patient Position: At rest) Pulse 62 Temp 98 °F (36.7 °C) Resp 19 Ht 5' (1.524 m) Wt 73 kg (160 lb 15 oz) SpO2 98% BMI 31.43 kg/m² O2 Device: Hi flow nasal cannula O2 Flow Rate (L/min): 40 l/min Temp (24hrs), Av °F (36.7 °C), Min:97.6 °F (36.4 °C), Max:98.5 °F (36.9 °C) Intake/Output:  
Last shift:      1901 -  0700 In: 910 [I.V.:910] Out: - Last 3 shifts:  0701 -  1900 In: 910 [I.V.:910] Out: 600 [Urine:600] Intake/Output Summary (Last 24 hours) at 2020 Last data filed at 2020 192 Gross per 24 hour Intake 910 ml Output 600 ml Net 310 ml Physical Exam:  
General:  Alert, cooperative, NAD, appears stated age, laying in bed, upright, wearing HFNC -- remains in delirium Head:  Normocephalic, without obvious abnormality, atraumatic. Eyes:  Conjunctivae/corneas clear. ANicteric, PERRLA, EOMI Nose: Nares normal. Mucosa normal. No drainage or sinus tenderness. Throat: Lips, mucosa dry. NO thrush; poor dentition, no oral lesions Neck: Supple, symmetrical, trachea midline, no adenopathy, no carotid bruit and no JVD. No crepitus Back:   Symmetric, no curvature, no spine tenderness or flank pain  
Lungs:   Unchanged, poor air entry bilaterally, decreased breath sounds in bilateral bases, faint rhonchi heard over right lower lobe, no wheezes or rales throughout all lung fields Chest wall:  No tenderness or deformity. NO CREPITUS Heart:   Irregularly irregular rhythm, controlled rate, S1, S2 normal, no murmur, click, rub or gallop. Abdomen:   Soft, non-tender. Bowel sounds normal. No masses,  No organomegaly. No paradoxical motion Extremities: normal, atraumatic, no cyanosis or edema. No clubbing. Right hip has dressing over it Pulses: 1-2+ and symmetric all extremities. Skin: Skin color, texture, turgor normal. No rashes or lesions Lymph nodes: Cervical, supraclavicular, and axillary nodes normal.  
Neurologic: Grossly nonfocal, gross movement intact and strength intact in all extremities, sensation also grossly intact. Patient alert to self, disoriented to place and date consistent with acute delirium Data review:  
Labs: 
Recent Results (from the past 24 hour(s)) T4, FREE Collection Time: 04/24/20  2:13 AM  
Result Value Ref Range T4, Free 1.4 0.7 - 1.5 NG/DL  
CBC WITH AUTOMATED DIFF Collection Time: 04/24/20  2:13 AM  
Result Value Ref Range WBC 14.7 (H) 4.6 - 13.2 K/uL  
 RBC 2.35 (L) 4.20 - 5.30 M/uL HGB 7.1 (L) 12.0 - 16.0 g/dL HCT 23.3 (L) 35.0 - 45.0 % MCV 99.1 (H) 74.0 - 97.0 FL  
 MCH 30.2 24.0 - 34.0 PG  
 MCHC 30.5 (L) 31.0 - 37.0 g/dL  
 RDW 16.6 (H) 11.6 - 14.5 % PLATELET 537 200 - 851 K/uL MPV 11.6 9.2 - 11.8 FL  
 NEUTROPHILS 89 (H) 42 - 75 % LYMPHOCYTES 8 (L) 20 - 51 % MONOCYTES 3 2 - 9 % EOSINOPHILS 0 0 - 5 % BASOPHILS 0 0 - 3 %  
 ABS. NEUTROPHILS 13.1 (H) 1.8 - 8.0 K/UL  
 ABS. LYMPHOCYTES 1.2 0.8 - 3.5 K/UL  
 ABS. MONOCYTES 0.4 0 - 1.0 K/UL  
 ABS. EOSINOPHILS 0.0 0.0 - 0.4 K/UL  
 ABS. BASOPHILS 0.0 0.0 - 0.06 K/UL  
 DF MANUAL PLATELET COMMENTS ADEQUATE PLATELETS    
 RBC COMMENTS ANISOCYTOSIS 1+ 
    
 RBC COMMENTS POLYCHROMASIA 1+ 
    
 RBC COMMENTS HYPOCHROMIA 1+ METABOLIC PANEL, BASIC Collection Time: 04/24/20  2:13 AM  
Result Value Ref Range Sodium 139 136 - 145 mmol/L Potassium 5.1 3.5 - 5.5 mmol/L Chloride 109 100 - 111 mmol/L  
 CO2 22 21 - 32 mmol/L Anion gap 8 3.0 - 18 mmol/L Glucose 135 (H) 74 - 99 mg/dL  (H) 7.0 - 18 MG/DL Creatinine 2.34 (H) 0.6 - 1.3 MG/DL  
 BUN/Creatinine ratio 44 (H) 12 - 20 GFR est AA 24 (L) >60 ml/min/1.73m2 GFR est non-AA 20 (L) >60 ml/min/1.73m2 Calcium 9.5 8.5 - 10.1 MG/DL  
HGB & HCT Collection Time: 04/24/20  8:53 AM  
Result Value Ref Range HGB 7.3 (L) 12.0 - 16.0 g/dL HCT 23.0 (L) 35.0 - 45.0 % GLUCOSE, POC Collection Time: 04/24/20 11:43 AM  
Result Value Ref Range Glucose (POC) 202 (H) 70 - 110 mg/dL HGB & HCT Collection Time: 04/24/20  3:35 PM  
Result Value Ref Range HGB 8.3 (L) 12.0 - 16.0 g/dL HCT 27.2 (L) 35.0 - 45.0 % GLUCOSE, POC Collection Time: 04/24/20  4:05 PM  
Result Value Ref Range Glucose (POC) 174 (H) 70 - 110 mg/dL HGB & HCT Collection Time: 04/24/20  9:15 PM  
Result Value Ref Range HGB 7.9 (L) 12.0 - 16.0 g/dL HCT 26.2 (L) 35.0 - 45.0 % ABG:   
No results found for: PH, PHI, PCO2, PCO2I, PO2, PO2I, HCO3, HCO3I, FIO2, FIO2I 
 
 
PFT Results  (Last 48 hours) None Echo Results  (Last 48 hours) None Imaging: 
I have personally reviewed the patients radiographs and have reviewed the reports:  No new studies Chest x-ray from 4/22 compared to chest x-ray from 4/11/2020, shows poor inspiration, but shows new right lower lobe infiltrate, prior was clear. This is suspicious for aspiration pneumonia. No masses or effusions seen. CXR Results  (Last 48 hours) None CT Results  (Last 48 hours) None High complexity decision making was performed during the evaluation of this patient at high risk for decompensation with multiple organ involvement Total of 34 min critical care time spent at bedside during the course of care providing evaluation,management and care decisions and ordering appropriate treatment related to critical care problems exclusive of procedures.  
The reason for providing this level of medical care for this critically ill patient was due a critical illness that impaired one or more vital organ systems such that there was a high probability of imminent or life threatening deterioration in the patients condition. This care involved high complexity decision making to assess, manipulate, and support vital system functions, to treat this degree vital organ system failure and to prevent further life threatening deterioration of the patients condition. Above mentioned total time spent on reviewing the case/medical record/data/notes/EMR/patient examination/documentation/coordinating care with nurse/consultants, exclusive of procedures with complex decision making performed and > 50% time spent in face to face evaluation. Job Alma D[facility]/MP[facility] Pulmonary, Critical Care Medicine [facility]

## 2020-04-25 NOTE — PROGRESS NOTES
1925: Assumed patient care from [place] [id number]. Patient is alert and oriented to person,but disoriented to place, time and situation. Respiratory status Is stable on high flow oxygen. Vital signs are stable. MEWS score is a one. Patient denies any pain, discomfort, nausea vomiting dizziness or anxiety. White board and fall card is updated. Bed is locked and in lowest position. Call bell, water and personal belongings are within reach. Patient has no questions, comments or concerns after bedside shift report. 0630: Patient repeatedly removed or attempted to remove her Mathur catheter, peripheral IVs, Telemetry box and high-flow oxygen. This nurse called the Hospitalist  and got an order for soft wrist restraints. 0700: Patient had an uneventful shift. Respiratory status, vital signs and MEWS score remained stable. Patient was resting quietly with no signs of distress noted. Bed locked and in lowest position. Call bell water and personal belongings were within reach. Patient had no questions, comments or concerns after bedside shift report.  [facility] CESAR [facility]USSO

## 2020-04-26 NOTE — PROGRESS NOTES
[facility]. Pulmonary, Critical Care, and Sleep Medicine F/U Patient Consult Name: Esteban Lacy MRN: [id number] : 1935 Hospital: [facility] Date: 2020 This patient has been seen and evaluated at the request of Dr. Mahogany Hugo for hypoxia. IMPRESSION:  
· Acute dyspnea/SOB with acute hypoxic resp failure: Suspect etiology due to aspiration given CXR showing new RLL infiltrate and pt was lethargic with pain medications. Pt STILL requiring HFNC -- likely due to atelectasis as well as aspiration PNA · Aspiration pneumonia: Given new right lower lobe infiltrate, as well as elevated procalcitonin at 6.3 · Hip fracture s/p ORIF · Elevated D-dimer: In the setting of aspiration, likely acute phase reactant, although with dyspnea and hypoxia and fracture with surgery with immobilizaiton, pt is at risk for VTE. Pt placed on heparin gtt by cardiology but stopped due to severe anemia · Acute encephalopathy/delirium:  Likely toxic/metabolic in nature. Patient remains in restraints, continues to be delirious · PAF currently controlled ventricular response · Anemia:  Pt had some episodes of LGIB requiring transfusion during hospitalization · Hyperthyroidism/Grave's disease · CKD Patient Active Problem List  
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  
 Obesity, unspecified E66.9  Nonspecific abnormal electrocardiogram (ECG) (EKG) R94.31  
 Renal insufficiency N28.9  Hip fracture requiring operative repair ([address] S72.009A  Chronic renal disease, stage III (HCC) N18.3  Anemia D64.9  Secondary hyperparathyroidism of renal origin ([address] N25.81  
 Atrial fibrillation (HCC) I48.91  
 Aspiration pneumonia ([address] J69.0  Leucocytosis D72.829  
 Hyperkalemia E87.5  Acidosis, metabolic V61.4 RECOMMENDATIONS:  
 Continue ABx to cover for aspiration pneumonia -- added flagyl and deescalated cefepime to ceftriaxone given that pt likely does not have pseudomonas and blood cx NGTD. Repeat procalcitonin and F/U culture results and adjust accordingly. Discussed with primary service With regards to elevated d-dimer, pt unable to tolerate anticoagulation due to anemia requiring transfusion. Hypoxia likely due to aspiration given that tachypnea has improved with ABx. Since pt remains at an elevated risk for VTE, could consider CT chest w/o contrast and V/Q scan ONLY IF hypoxia worsens. However, since pt's hypoxia is improving with aspiration pneumonia treatment prior to obtaining studies, then this is likely not due to VTE and studies will not be needed. Can hold off at this time Supplemental oxygen to maintain SpO2 >88%. Titrate as needed, currently on HFNC Please assess for home oxygen need prior to discharge Aggressive pulmonary toileting/bronchial hygiene Frequent incentive spirometry Agree with not scheduling benzo's as pt remains delirious. Judicious use of sedating meds given patient remains delirious and requiring restraints. Strict aspiration precautions including elevating HOB >30deg. Advise SLP evaluation. Patient will need monitored feedings given delirium Pt remains somewhat delirious, would advise making benzos PRN 
PT/OT, OOB, ambulate with assistance as tolerated DVT ppx per primary service Will follow Subjective:  
04/25/20 Patient seen and examined at bedside. No acute events overnight. Patient remains on HFNC. Pt currently remains delirious and has no complaints. Denies N/V/D, chest pain, hip pain, cough. Patient currently restrained HPI: 
N.B. patient is a poor historian, history obtained from medical providers and medical record Patient is a 81 y.o. female with a past medical history of A. fib, Graves' disease, multinodular goiter, CKD, hypertension, presented to [facility] [address] after falling at home, developed right leg pain. In the ER, patient found to have right hip fracture, patient admitted for evaluation and treatment. During the course of the hospitalization, patient was treated with ORIF. Patient also developed anemia requiring transfusion of PRBC on 4/15 and again today. Patient being followed by cardiology, on amiodarone by cardiology. This morning, patient found to be more tachypneic and requiring significant amount of oxygen, 6 L by nasal cannula but still tachypneic. Per discussion with primary service, patient was sleepy this weekend, given doses of morphine and Ativan. Patient more alert today, but notes some dyspnea. Patient does not note increased cough, also does not note dysphagia, but patient not oriented, appears delirious. Patient denies any abdominal pain. Further history unable to be elicited Past Medical History:  
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   
 Nonspecific abnormal electrocardiogram (ECG) (EKG)  Obesity, unspecified  Other and unspecified hyperlipidemia  Renal insufficiency Past Surgical History:  
Procedure Laterality Date [address] [place] [address] UNLISTED Explor lap  COLONOSCOPY N/A 5/31/2018 COLONOSCOPY w polypectomy performed by Payal Li MD at [facility] Left  HX CATARACT REMOVAL INSERT PROSTHETIC LENS: LEFT,RIGHT  HX GYN    
 HX ORTHOPAEDIC Left Rotator cuff repair  HX PARTIAL THYROIDECTOMY  [place] BIOPSY OF BREAST, INCISIONAL    
 rt & lt 12 yrs ago Prior to Admission medications Medication Sig Start Date End Date Taking? Authorizing Provider  
oxyCODONE IR (ROXICODONE) 5 mg immediate release tablet Take 1 Tab by mouth every six (6) hours as needed for Pain for up to 7 days.  Max Daily Amount: 20 mg. 4/17/20 4/24/20 Yes Bharat Ortiz PA  
amLODIPine (NORVASC) 5 mg tablet Take 5 mg by mouth daily. Yes Other, MD Max  
allopurinoL (ZYLOPRIM) 100 mg tablet Take 100 mg by mouth daily. Yes Other, MD Max  
simvastatin (ZOCOR) 20 mg tablet Take 20 mg by mouth nightly. Yes Other, MD Max  
furosemide (LASIX) 20 mg tablet Take 20 mg by mouth every other day. Yes Other, MD Max  
losartan (COZAAR) 50 mg tablet Take 50 mg by mouth daily. Yes Provider, Historical  
FOLIC ACID/VITAMIN B COMP W-C (VIKTOR-LUCERO PO) Take 1 Tab by mouth daily. Yes Provider, Historical  
omeprazole (PRILOSEC) 20 mg capsule Take 20 mg by mouth daily. Yes Provider, Historical  
aspirin 81 mg tablet Take 81 mg by mouth. Yes Provider, Historical  
LORazepam (ATIVAN) 1 mg tablet Take  by mouth two (2) times a day. Yes Provider, Historical  
 
Allergies Allergen Reactions  Ciprofibrate Unable to Consolidated Jose  Darvon [Propoxyphene] Unable to Obtain  Penicillins Unable to Obtain Social History Tobacco Use  Smoking status: Former Smoker  Smokeless tobacco: Never Used  Tobacco comment: REMOTELY QUIT 1980'S Substance Use Topics  Alcohol use: No  
  
History reviewed. No pertinent family history. Current Facility-Administered Medications Medication Dose Route Frequency  amiodarone (CORDARONE) tablet 200 mg  200 mg Oral DAILY  pantoprazole (PROTONIX) tablet 40 mg  40 mg Oral ACB&D  
 sodium bicarbonate tablet 650 mg  650 mg Oral TID  metroNIDAZOLE (FLAGYL) IVPB premix 500 mg  500 mg IntraVENous Q8H  
 cefTRIAXone (ROCEPHIN) 2 g in sterile water (preservative free) 20 mL IV syringe  2 g IntraVENous Q24H  
 methIMAzole (TAPAZOLE) tablet 10 mg  10 mg Oral DAILY  aspirin delayed-release tablet 81 mg  81 mg Oral DAILY  calcitRIOL (ROCALTROL) capsule 0.25 mcg  0.25 mcg Oral Q MON, WED & FRI  B complex-vitaminC-folic acid (NEPHROCAP) cap  1 Cap Oral DAILY  atorvastatin (LIPITOR) tablet 40 mg  40 mg Oral QHS  acetaminophen (TYLENOL) tablet 650 mg  650 mg Oral Q8H  
 hydrocortisone (ANUSOL-HC) suppository 25 mg  25 mg Rectal Q12H  polyethylene glycol (MIRALAX) packet 17 g  17 g Oral DAILY  senna-docusate (PERICOLACE) 8.6-50 mg per tablet 1 Tab  1 Tab Oral DAILY  allopurinoL (ZYLOPRIM) tablet 100 mg  100 mg Oral DAILY Review of Systems: 
Remains unable to obtain ROS due to patient factors, secondary to delirium Objective:  
Vital Signs:   
Visit Vitals /44 Pulse 68 Temp 97.7 °F (36.5 °C) Resp 14 Ht 5' (1.524 m) Wt 73 kg (160 lb 15 oz) SpO2 96% BMI 31.43 kg/m² O2 Device: Hi flow nasal cannula, Humidifier O2 Flow Rate (L/min): 30 l/min Temp (24hrs), Av.2 °F (36.8 °C), Min:97.5 °F (36.4 °C), Max:99.3 °F (37.4 °C) Intake/Output:  
Last shift:      No intake/output data recorded. Last 3 shifts:  0701 -  1900 In: 1270 [P.O.:360; I.V.:910] Out: 2700 [Urine:2700] Intake/Output Summary (Last 24 hours) at 2020 2249 Last data filed at 2020 1733 Gross per 24 hour Intake 360 ml Output 2300 ml Net -1940 ml Physical Exam:  
General:  Alert, cooperative, NAD, appears stated age, laying in bed, upright, wearing HFNC -- remains in delirium Head:  Normocephalic, without obvious abnormality, atraumatic. Eyes:  Conjunctivae/corneas clear. ANicteric, PERRLA, EOMI Nose: Nares normal. Mucosa normal. No drainage or sinus tenderness. Throat: Lips, mucosa dry. NO thrush; poor dentition, no oral lesions Neck: Supple, symmetrical, trachea midline, no adenopathy, no carotid bruit and no JVD. No crepitus Back:   Symmetric, no curvature, no spine tenderness or flank pain  
Lungs:   Unchanged, poor air entry bilaterally, decreased breath sounds in bilateral bases, faint rhonchi heard over right lower lobe, no wheezes or rales throughout all lung fields Chest wall:  No tenderness or deformity. NO CREPITUS Heart:   Irregularly irregular rhythm, controlled rate, S1, S2 normal, no murmur, click, rub or gallop. Abdomen:   Soft, non-tender. Bowel sounds normal. No masses,  No organomegaly. No paradoxical motion Extremities: normal, atraumatic, no cyanosis or edema. No clubbing. Right hip has dressing over it. Patient in restraints, bilateral upper extremities Pulses: 1-2+ and symmetric all extremities. Skin: Skin color, texture, turgor normal. No rashes or lesions Lymph nodes: Cervical, supraclavicular, and axillary nodes normal.  
Neurologic: Grossly nonfocal, gross movement intact and strength intact in all extremities, sensation also grossly intact. Patient alert to self, disoriented to place and date consistent with acute delirium Data review:  
Labs: 
Recent Results (from the past 24 hour(s)) T4, FREE Collection Time: 04/25/20  5:33 AM  
Result Value Ref Range T4, Free 1.3 0.7 - 1.5 NG/DL  
HGB & HCT Collection Time: 04/25/20  5:33 AM  
Result Value Ref Range HGB 8.1 (L) 12.0 - 16.0 g/dL HCT 25.7 (L) 35.0 - 45.0 % METABOLIC PANEL, BASIC Collection Time: 04/25/20  5:33 AM  
Result Value Ref Range Sodium 129 (L) 136 - 145 mmol/L Potassium 5.4 3.5 - 5.5 mmol/L Chloride 101 100 - 111 mmol/L  
 CO2 20 (L) 21 - 32 mmol/L Anion gap 8 3.0 - 18 mmol/L Glucose 119 (H) 74 - 99 mg/dL BUN 94 (H) 7.0 - 18 MG/DL Creatinine 2.08 (H) 0.6 - 1.3 MG/DL  
 BUN/Creatinine ratio 45 (H) 12 - 20 GFR est AA 28 (L) >60 ml/min/1.73m2 GFR est non-AA 23 (L) >60 ml/min/1.73m2 Calcium 9.3 8.5 - 10.1 MG/DL PROCALCITONIN Collection Time: 04/25/20  5:33 AM  
Result Value Ref Range Procalcitonin 4.54 ng/mL NT-PRO BNP Collection Time: 04/25/20  5:33 AM  
Result Value Ref Range NT pro-BNP 45,948 (H) 0 - 1,800 PG/ML  
HEMOGLOBIN A1C WITH EAG Collection Time: 04/25/20  7:06 AM  
Result Value Ref Range Hemoglobin A1c 5.5 4.2 - 5.6 % Est. average glucose 111 mg/dL HGB & HCT Collection Time: 04/25/20  7:06 AM  
Result Value Ref Range HGB 7.4 (L) 12.0 - 16.0 g/dL HCT 23.0 (L) 35.0 - 45.0 % HGB & HCT Collection Time: 04/25/20  3:16 PM  
Result Value Ref Range HGB 8.1 (L) 12.0 - 16.0 g/dL HCT 26.3 (L) 35.0 - 45.0 % METABOLIC PANEL, BASIC Collection Time: 04/25/20  3:16 PM  
Result Value Ref Range Sodium 135 (L) 136 - 145 mmol/L Potassium 5.2 3.5 - 5.5 mmol/L Chloride 103 100 - 111 mmol/L  
 CO2 23 21 - 32 mmol/L Anion gap 9 3.0 - 18 mmol/L Glucose 104 (H) 74 - 99 mg/dL BUN 89 (H) 7.0 - 18 MG/DL Creatinine 2.03 (H) 0.6 - 1.3 MG/DL  
 BUN/Creatinine ratio 44 (H) 12 - 20 GFR est AA 28 (L) >60 ml/min/1.73m2 GFR est non-AA 23 (L) >60 ml/min/1.73m2 Calcium 9.5 8.5 - 10.1 MG/DL ABG:   
No results found for: PH, PHI, PCO2, PCO2I, PO2, PO2I, HCO3, HCO3I, FIO2, FIO2I 
 
 
PFT Results  (Last 48 hours) None Echo Results  (Last 48 hours) None Imaging: 
I have personally reviewed the patients radiographs and have reviewed the reports: 
Chest x-ray from 4/25/2020 shows bilateral lower lobe opacities, right lower lobe now has dense consolidation consistent with aspiration pneumonia. No masses seen. CXR Results  (Last 48 hours) 04/25/20 1056  XR CHEST PORT Final result Impression:  IMPRESSION:  
   
Similar lung opacities left greater than right. Stable tracheal deviation to the left likely secondary to enlarged right thyroid  
gland Narrative:  Portable Chest  
   
CPT CODE: 04459 HISTORY: Shortness of breath. FINDINGS:   
   
Comparison 4/22/2020 Wires overlie the patient. Heart size and mediastinal contour stable. Stable  
deviated trachea to the left. Lung opacities left greater than right  
predominantly at the bases without significant change. No florid pulmonary edema  
or pneumothorax. CT Results  (Last 48 hours) None High complexity decision making was performed during the evaluation of this patient at high risk for decompensation with multiple organ involvement Total of 36 min critical care time spent at bedside during the course of care providing evaluation,management and care decisions and ordering appropriate treatment related to critical care problems exclusive of procedures. The reason for providing this level of medical care for this critically ill patient was due a critical illness that impaired one or more vital organ systems such that there was a high probability of imminent or life threatening deterioration in the patients condition. This care involved high complexity decision making to assess, manipulate, and support vital system functions, to treat this degree vital organ system failure and to prevent further life threatening deterioration of the patients condition. Above mentioned total time spent on reviewing the case/medical record/data/notes/EMR/patient examination/documentation/coordinating care with nurse/consultants, exclusive of procedures with complex decision making performed and > 50% time spent in face to face evaluation. Mihai Briseno MD/MPH Pulmonary, Critical Care Medicine [facility]

## 2020-04-26 NOTE — PROGRESS NOTES
[facility] Hospitalist Group Progress Note Patient: Esteban Lacy Age: 81 y.o. : 1935 MR#: [id number] SSN: [id number] Date: 2020 Subjective:  
 
Pt is awaiting SNF placement. FT4 decreasing, with last FT4 at 1.1. Assessment/Plan: This is an 82 yo AAF with a medical history significant for MNG and Graves' disease, on MMI for treatment. She states that she only take MMI 10 mg po daily instead of her prescribed 10mg po bid, but does seem to get confused about the names of her medications and fills her own pill boxes with her, multiple, medications. She is followed by Dr. Eliana Santana for her MNG and has been recommended for completion thyroidectomy. Given that she has some compressive symptoms and may be having some difficulty with medication compliance, she may benefit from surgery. This can be done as an outpt. 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 She m/l would  benefit from definitive therapy. Issues Afib with RVR may have been due to her hyperthyroidism, and now that thyroid function is in normal range, if related to hyperthyroidism, symptoms should resolve. Otherwise may need to consider other etiology. ___________________________________________________ PLAN:   
 
 -will  continue MMI 10 mg po daily today. This is  Her outpt dose. 
 
-will check FT4 daily to monitor trend. If FT4 levels are <1.0, then may need to hold MMI dose for a day or 2 and then restart once FT4 levels is >1.0. She was receiving MMI 10mg po bid for a number of days and decrease could be reflective of the higher dose. Thank you for the consult, will continue to follow pt with you while admitted to the hospital. 
 
Dr. Eliana Santana will be taking over service tomorrow, Monday, . Disposition: will need follow up with Dr. Rodrigo Walker  In ~ 4-6 weeks to have TSH/FT4 recheck to see if any further dose adjustments are needed. Address is [address] [place] [address]. Contact phone number is [phone]. Objective:  
VS:  
Visit Vitals /76 Pulse (!) 58 Temp 98.3 °F (36.8 °C) Resp 16 Ht 5' (1.524 m) Wt 73 kg (160 lb 15 oz) SpO2 98% BMI 31.43 kg/m² Intake/Output Summary (Last 24 hours) at 4/26/2020 3988 Last data filed at 4/25/2020 1925 Gross per 24 hour Intake 240 ml Output 1450 ml Net -1210 ml  
 
IMAGING 
 
4/17/2020 Thyroid scan and uptake COMPARISON: 7/18/2011. 
  
CORRELATION: None. 
  
DESCRIPTION: After ingestion of 257 microcuries 123Iodine, uptake of radioiodine 
by the thyroid gland was measured at 1, 6 and 24 hours. Images of the thyroid 
gland were obtained in anterior and bilateral anterior oblique projections using 
pinhole collimator, and additional anterior image was obtained with size markers 
in place. 
  
FINDINGS:   
  
Thyroid uptake is calculated as approximately 20% in one hour, 67% at 6 hour and 
40% in 24 hours.   
  
There is absence of the left thyroid gland. The right thyroid gland is large and 
heterogeneous. No dominant area of focal increased or decreased activity seen. Overall pattern appears similar to that of 2011. .   
  
IMPRESSION: 
  
1. Elevated uptake values in keeping with hyperthyroidism. Similar heterogeneous 
scan appearance of the remnant enlarged right thyroid gland. Overall findings 
suggestive of toxic multinodular goiter. Labs:   
 
Results for Demetrio Bush (MRN [id number]) as of 4/26/2020 09:50 Ref. Range 4/23/2020 02:11 4/24/2020 02:13 4/25/2020 05:33 4/26/2020 05:27  
T4, Free Latest Ref Range: 0.7 - 1.5 NG/DL 1.7 (H) 1.4 1.3 1.1 T3, total Latest Ref Range: 71 - 180 ng/dL 133 107    
 
 
  
Component Value Flag Ref Range Units Status Thyroid Stim Immunoglobulin 20.00  High   0.00 - 0.55 IU/L Final  
 
Component Value Flag Ref Range Units Status Thyroid peroxidase Ab 8   0 - 34 IU/mL Final  
Thyroglobulin Ab PENDING    IU/mL Incomplete Recent Results (from the past 24 hour(s)) HGB & HCT Collection Time: 04/25/20  3:16 PM  
Result Value Ref Range HGB 8.1 (L) 12.0 - 16.0 g/dL HCT 26.3 (L) 35.0 - 45.0 % METABOLIC PANEL, BASIC Collection Time: 04/25/20  3:16 PM  
Result Value Ref Range Sodium 135 (L) 136 - 145 mmol/L Potassium 5.2 3.5 - 5.5 mmol/L Chloride 103 100 - 111 mmol/L  
 CO2 23 21 - 32 mmol/L Anion gap 9 3.0 - 18 mmol/L Glucose 104 (H) 74 - 99 mg/dL BUN 89 (H) 7.0 - 18 MG/DL Creatinine 2.03 (H) 0.6 - 1.3 MG/DL  
 BUN/Creatinine ratio 44 (H) 12 - 20 GFR est AA 28 (L) >60 ml/min/1.73m2 GFR est non-AA 23 (L) >60 ml/min/1.73m2 Calcium 9.5 8.5 - 10.1 MG/DL  
T4, FREE Collection Time: 04/26/20  5:27 AM  
Result Value Ref Range T4, Free 1.1 0.7 - 1.5 NG/DL  
HGB & HCT Collection Time: 04/26/20  5:27 AM  
Result Value Ref Range HGB 8.7 (L) 12.0 - 16.0 g/dL HCT 28.0 (L) 35.0 - 45.0 % Signed By: Bin Rowley MD   
 April 26, 2020 8:21 PM

## 2020-04-26 NOTE — PROGRESS NOTES
[facility] 
 
 
CARDIOLOGY PROGRESS NOTE 
RECS: 
 
 
1. Paroxymal atrial fibrillation-with RVR today- resume low dose metoprolol per parameters. Continue Amiodarone  200 mg daily. Recent Echo showed preserved EF%. No an ideal  candidate for anticoagulation due to severe anemia requiring multiple transfusions. 2. Acute hypoxic respiratory failure- on high flow O2- [facility] following 3. Elevated troponin- currently chest pain free. Troponin peak 1.87. now down trending. continue  asa,statin and bb.   
4. Elevated D-dimer-  High risk for PE/DVT- consider V/Q scan 5. SOB- multifactorial with severe anemia,  possible aspiration pneumonia vs PE. Elevated NT pro BNP- no significant peripheral edema- will give lasix iv 20 mg once 6. Hyperthyroidism: Patient on methimazole. Treatment per medicine. 7. Severe anemia- noted hgb drop pos op-s/p 5 units PRBC's. GI was consulted 8. AMS- better 9. 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 Right hip fracture s/p ORIF 11. CKD-elevated renal indices. Renal following ASSESSMENT: 
Hospital Problems  Date Reviewed: 4/25/2020 Codes Class Noted POA Hyperkalemia ICD-10-CM: E87.5 ICD-9-CM: 276.7  4/25/2020 Unknown Acidosis, metabolic HFZ-70-OK: I41.8 ICD-9-CM: 276.2  4/25/2020 Unknown Aspiration pneumonia ([facility] [address] ICD-10-CM: J69.0 ICD-9-CM: 507.0  4/23/2020 Unknown Leucocytosis ICD-10-CM: P72.191 ICD-9-CM: 288.60  4/23/2020 Unknown Atrial fibrillation [facility]) ICD-10-CM: I48.91 
ICD-9-CM: 427.31  4/21/2020 Unknown Chronic renal disease, stage III (HCC) ICD-10-CM: N18.3 ICD-9-CM: 585.3  4/20/2020 Unknown Anemia ICD-10-CM: D64.9 ICD-9-CM: 285.9  4/20/2020 Unknown Secondary hyperparathyroidism of renal origin [facility]) ICD-10-CM: N25.81 ICD-9-CM: 588.81  4/20/2020 Unknown Hip fracture requiring operative repair [facility]) ICD-10-CM: C44.416T ICD-9-CM: 820.8  4/11/2020 Unknown Essential hypertension, benign ICD-10-CM: I10 
ICD-9-CM: 401.1  Unknown Yes Overview Signed 7/10/2013  6:40 PM by Isabel Alvarado SUBJECTIVE: 
SOB on high flow O2 No chest pain OBJECTIVE: 
 
VS:  
Visit Vitals /76 Pulse (!) 58 Temp 98.3 °F (36.8 °C) Resp 16 Ht 5' (1.524 m) Wt 73 kg (160 lb 15 oz) SpO2 98% BMI 31.43 kg/m² Intake/Output Summary (Last 24 hours) at 4/26/2020 1020 Last data filed at 4/25/2020 1925 Gross per 24 hour Intake 240 ml Output 1450 ml Net -1210 ml  
 
TELE: afib with RVR General: alert, well developed, anxious, confused and in mild distress HENT: Normocephalic, atraumatic. Normal external eye. Neck :  no bruit, no JVD Cardiac:  irregular rate and rhythm, S1, S2 normal, no murmur, click, rub or gallop Lungs: diminished breath sounds  bilaterally Abdomen: Soft, nontender, no masses Extremities:  No c/c/e, peripheral pulses present Labs: Results:  
   
Chemistry Recent Labs  
  04/25/20 
1516 04/25/20 
0533 04/24/20 1996 * 119* 135* * 129* 139  
K 5.2 5.4 5.1  101 109 CO2 23 20* 22 BUN 89* 94* 103* CREA 2.03* 2.08* 2.34* CA 9.5 9.3 9.5 AGAP 9 8 8 BUCR 44* 45* 44* CBC w/Diff Recent Labs  
  04/26/20 
0527 04/25/20 
1516 04/25/20 
0706  04/24/20 
6004 WBC  --   --   --   --  14.7*  
RBC  --   --   --   --  2.35* HGB 8.7* 8.1* 7.4*   < > 7.1*  
HCT 28.0* 26.3* 23.0*   < > 23.3*  
PLT  --   --   --   --  159 GRANS  --   --   --   --  89* LYMPH  --   --   --   --  8*  
EOS  --   --   --   --  0  
 < > = values in this interval not displayed. Cardiac Enzymes No results for input(s): CPK, CKND1, MICHELLE in the last 72 hours. No lab exists for component: Saud Abrams Coagulation Recent Labs  
  04/23/20 
1255 APTT 40.9*  
   
 Lipid Panel Lab Results Component Value Date/Time Cholesterol, total 182 01/16/2020 01:39 PM  
 HDL Cholesterol 55 01/16/2020 01:39 PM  
 LDL, calculated 99 01/16/2020 01:39 PM  
 Triglyceride 140 01/16/2020 01:39 PM  
 CHOL/HDL Ratio 3.3 01/16/2020 01:39 PM  
  
BNP No results for input(s): BNPP in the last 72 hours. Liver Enzymes No results for input(s): TP, ALB, TBIL, AP, SGOT, GPT in the last 72 hours. No lab exists for component: DBIL Thyroid Studies Lab Results Component Value Date/Time TSH 48.20 (H) 04/13/2020 05:18 AM  
    
 
 
 
TERESA Merchant I have independently evaluated taken history and examined the patient. All relevant labs and testing data's are reviewed. Care plan discussed and updated after review.  
Clarence Manley MD

## 2020-04-26 NOTE — PROGRESS NOTES
Progress Note Aurelia Angulo 
81 y.o. Admit Date: 4/11/2020 Active Problems: 
  Essential hypertension, benign () POA: Yes Overview: CONTROLLED Hip fracture requiring operative repair [facility]) (4/11/2020) POA: Unknown Chronic renal disease, stage III ([facility] (4/20/2020) POA: Unknown Anemia (4/20/2020) POA: Unknown Secondary hyperparathyroidism of renal origin ([facility] (4/20/2020) POA: Unknown Atrial fibrillation ([facility] (4/21/2020) POA: Unknown Aspiration pneumonia ([facility] (4/23/2020) POA: Unknown Leucocytosis (4/23/2020) POA: Unknown Hyperkalemia (4/25/2020) POA: Unknown Acidosis, metabolic (8/93/4686) POA: Unknown Subjective:  
 
Patient feels ok, no significant change in status. A comprehensive review of systems was negative except for that written in the History of Present Illness. Objective:  
 
Visit Vitals /76 Pulse (!) 58 Temp 98.3 °F (36.8 °C) Resp 16 Ht 5' (1.524 m) Wt 73 kg (160 lb 15 oz) SpO2 98% BMI 31.43 kg/m² Intake/Output Summary (Last 24 hours) at 4/26/2020 1255 Last data filed at 4/25/2020 1925 Gross per 24 hour Intake 240 ml Output 750 ml Net -510 ml  
 
 
Current Facility-Administered Medications Medication Dose Route Frequency Provider Last Rate Last Dose  furosemide (LASIX) injection 20 mg  20 mg IntraVENous ONCE Mariah Ewing NP      
 metoprolol tartrate (LOPRESSOR) tablet 12.5 mg  12.5 mg Oral Q12H Mariah Ewing NP      
 amiodarone (CORDARONE) tablet 200 mg  200 mg Oral DAILY Mariah Ewing NP   200 mg at 04/26/20 0759  pantoprazole (PROTONIX) tablet 40 mg  40 mg Oral ACB&Lottie Nicholson MD   40 mg at 04/26/20 0701  
 sodium bicarbonate tablet 650 mg  650 mg Oral TID Meryl Baxter MD   650 mg at 04/26/20 1879  LORazepam (ATIVAN) tablet 0.5 mg  0.5 mg Oral BID PRN Deysi Quezada MD      
  metroNIDAZOLE (FLAGYL) IVPB premix 500 mg  500 mg IntraVENous Q8H Flossie Hamman,  mL/hr at 04/26/20 0950 500 mg at 04/26/20 0950  cefTRIAXone (ROCEPHIN) 2 g in sterile water (preservative free) 20 mL IV syringe  2 g IntraVENous Q24H Flossie Hamman, MD   2 g at 04/25/20 1547  
 methIMAzole (TAPAZOLE) tablet 10 mg  10 mg Oral DAILY Sowmya Pan MD   10 mg at 04/26/20 4362  aspirin delayed-release tablet 81 mg  81 mg Oral DAILY Erick Fox MD   81 mg at 04/26/20 6532  calcitRIOL (ROCALTROL) capsule 0.25 mcg  0.25 mcg Oral Q MON, WED & Maxim Short MD   0.25 mcg at 04/24/20 2232  B complex-vitaminC-folic acid (NEPHROCAP) cap  1 Cap Oral DAILY Riley Ann MD   1 Cap at 04/26/20 4351  atorvastatin (LIPITOR) tablet 40 mg  40 mg Oral QHS [place] Lam MD   40 mg at 04/25/20 2244  acetaminophen (TYLENOL) tablet 650 mg  650 mg Oral Q8H Hannah Florentino MD   650 mg at 04/26/20 9149  hydrocortisone (ANUSOL-HC) suppository 25 mg  25 mg Rectal Q12H Dewey Espinosakell, NP   25 mg at 04/26/20 1001  aluminum-magnesium hydroxide (MAALOX) oral suspension 15 mL  15 mL Oral QID PRN Hannah Florentino MD   15 mL at 04/21/20 1236  polyethylene glycol (MIRALAX) packet 17 g  17 g Oral DAILY Sandra Boucher   Stopped at 04/26/20 0900  
 senna-docusate (PERICOLACE) 8.6-50 mg per tablet 1 Tab  1 Tab Oral DAILY Sandra Boucher   Stopped at 04/26/20 0900  
 ondansetron (ZOFRAN) injection 8 mg  8 mg IntraVENous Q6H PRN HIEN Boucher   8 mg at 04/21/20 1136  allopurinoL (ZYLOPRIM) tablet 100 mg  100 mg Oral DAILY Sandra Boucher   100 mg at 04/26/20 1001 Physical Exam:  
 
Physical Exam:  
General:  Alert, cooperative, no distress, appears stated age. Neck: Supple, symmetrical, trachea midline, no adenopathy, thyroid: no enlargement/tenderness/nodules, no carotid bruit and no JVD. Lungs:   Clear to auscultation bilaterally. Heart:  Regular rate and rhythm, S1, S2 normal, no murmur, click, rub or gallop. Abdomen:   Soft, non-tender. Bowel sounds normal. No masses,  No organomegaly. ,has poon. Extremities: Extremities normal, atraumatic, no cyanosis or edema. Skin: Skin color, texture, turgor normal. No rashes or lesions Data Review: CBC w/Diff Recent Labs  
  04/26/20 
9420 04/26/20 
0527 04/25/20 
1516  04/24/20 
4061 WBC  --   --   --   --  14.7*  
RBC  --   --   --   --  2.35* HGB 8.1* 8.7* 8.1*   < > 7.1*  
HCT 25.7* 28.0* 26.3*   < > 23.3*  
MCV  --   --   --   --  99.1*  
MCH  --   --   --   --  30.2 MCHC  --   --   --   --  30.5* RDW  --   --   --   --  16.6*  
 < > = values in this interval not displayed. Recent Labs  
  04/24/20 
3955 MONOS 3  
EOS 0  
BASOS 0  
RDW 16.6* Comprehensive Metabolic Profile Recent Labs  
  04/25/20 
1516 04/25/20 
0533 04/24/20 
6772 * 129* 139  
K 5.2 5.4 5.1  101 109 CO2 23 20* 22 BUN 89* 94* 103* CREA 2.03* 2.08* 2.34* Recent Labs  
  04/25/20 
1516 04/25/20 
0533 04/24/20 
9062 CA 9.5 9.3 9.5 Impression: Active Hospital Problems Diagnosis Date Noted  Hyperkalemia 04/25/2020  Acidosis, metabolic 47/91/5350  Aspiration pneumonia ([facility] 04/23/2020  Leucocytosis 04/23/2020  Atrial fibrillation ([facility] 04/21/2020  Chronic renal disease, stage III ([facility] 04/20/2020  Anemia 04/20/2020  Secondary hyperparathyroidism of renal origin ([facility] 04/20/2020  Hip fracture requiring operative repair ([facility] 04/11/2020  Essential hypertension, benign CONTROLLED Na , K, & Bicarb have improved, Remained in OPre-Renal Bun & creatinine ratio. Off IVF, received 1 dose of Lasix  Today. Plan:  
 
Continue to follow renal function, avoid Nephrotoxins,Antibiotics as per egfr.  
 
 
Moses Hugo MD

## 2020-04-26 NOTE — PROGRESS NOTES
1925: Assumed patient care from [address] Patient is alert and oriented to person, but disoriented to place, time and situation. Respiratory status Is stable on high flow oxygen. Vital signs are stable. MEWS score is a one. Patient denies any pain, discomfort, nausea vomiting dizziness or anxiety. White board and fall card is updated. Bed is locked and in lowest position. Call bell, water and personal belongings are within reach. Patient has no questions, comments or concerns after bedside shift report. 0700: Patient had an uneventful shift. Respiratory status, vital signs and MEWS score remained stable. Patient was resting quietly with no signs of distress noted. Bed locked and in lowest position. Call bell water and personal belongings were within reach. Patient was unable to verbalize any questions, comments or concerns after bedside shift report.  [facility]AR RUSSO

## 2020-04-26 NOTE — PROGRESS NOTES
[facility] Hospitalist Group Progress Note Patient: Tiana Sorensen Age: 81 y.o. : 1935 MR#: [id number] SSN: [id number] Date: 2020 Subjective/24-hour events: No new issues overnight. Seemed to be a bit more confused than usual earlier this AM per nursing but oriented currently. Assessment:  
Paroxysmal atrial fibrillation, currently NSR 
RLL pneumonia suspect secondary to aspiration Acute hypoxic respiratory failure Displaced intertrochanteric right femur fracture status post ORIF Multinodular goiter and Graves' disease Hypertension CKD 3 with secondary hyperparathyroidism Anemia, multifactorial   
Obesity Acute encephalopathy, metabolic/toxic, improved Plan: 
HFNC as required, continue to wean as able. Antibiotic therapy adjusted yesterday. Maxipime discontinued, changed to rocephin with addition of flagyl for better anaerobic coverage. Repeat BMP to trend potassium and monitor renal indices. Ese Cazares Amiodarone/asa/statin as prescribed. Ese Cazares Continue to follow H/H. Acceptable currently and trending up - no need for transufusion today. Restraints off currently. Maintain out of these if able. D/W nursing. Mobilize when able. SNF at discharge. Follow. Case discussed with:  [x]Patient  []Family  [x]Nursing  [x]Case Management DVT Prophylaxis:  []Lovenox  []Hep SQ  [x]SCDs  []Coumadin   []On Heparin gtt Objective:  
VS:  
Visit Vitals /76 Pulse (!) 58 Temp 98.3 °F (36.8 °C) Resp 16 Ht 5' (1.524 m) Wt 73 kg (160 lb 15 oz) SpO2 98% BMI 31.43 kg/m² Tmax/24hrs: Temp (24hrs), Av.2 °F (36.8 °C), Min:97.5 °F (36.4 °C), Max:99.3 °F (37.4 °C) Intake/Output Summary (Last 24 hours) at 2020 8001 Last data filed at 2020 1925 Gross per 24 hour Intake 240 ml Output 1450 ml Net -1210 ml General: In NAD. Nontoxic-appearing. High flow nasal cannula in place. Cardiovascular: RRR. Pulmonary: No active wheezing. No accessory muscle use noted. GI: Abdomen soft, nontender. Extremities: Warm no edema or ischemia. Neuro: Awake, moves extremities spontaneously Labs:   
Recent Results (from the past 24 hour(s)) HGB & HCT Collection Time: 04/25/20  3:16 PM  
Result Value Ref Range HGB 8.1 (L) 12.0 - 16.0 g/dL HCT 26.3 (L) 35.0 - 45.0 % METABOLIC PANEL, BASIC Collection Time: 04/25/20  3:16 PM  
Result Value Ref Range Sodium 135 (L) 136 - 145 mmol/L Potassium 5.2 3.5 - 5.5 mmol/L Chloride 103 100 - 111 mmol/L  
 CO2 23 21 - 32 mmol/L Anion gap 9 3.0 - 18 mmol/L Glucose 104 (H) 74 - 99 mg/dL BUN 89 (H) 7.0 - 18 MG/DL Creatinine 2.03 (H) 0.6 - 1.3 MG/DL  
 BUN/Creatinine ratio 44 (H) 12 - 20 GFR est AA 28 (L) >60 ml/min/1.73m2 GFR est non-AA 23 (L) >60 ml/min/1.73m2 Calcium 9.5 8.5 - 10.1 MG/DL  
T4, FREE Collection Time: 04/26/20  5:27 AM  
Result Value Ref Range T4, Free 1.1 0.7 - 1.5 NG/DL  
HGB & HCT Collection Time: 04/26/20  5:27 AM  
Result Value Ref Range HGB 8.7 (L) 12.0 - 16.0 g/dL HCT 28.0 (L) 35.0 - 45.0 % Signed By: Meenu Roberts MD   
 April 26, 2020

## 2020-04-27 NOTE — PROGRESS NOTES
[facility]. Pulmonary, Critical Care, and Sleep Medicine F/U Patient Consult Name: Anita Swan MRN: [id number] : 1935 Hospital: [facility] Date: 2020 This patient has been seen and evaluated at the request of Dr. Kobe Fabian for hypoxia. IMPRESSION:  
· Acute dyspnea/SOB with acute hypoxic resp failure: Suspect etiology due to aspiration given CXR showing new RLL infiltrate and pt was lethargic with pain medications. Pt transitioned off of HFNC to salter cannulat this evening -- likely due to atelectasis as well as aspiration PNA · Aspiration pneumonia: Given new right lower lobe infiltrate, as well as elevated procalcitonin at 6.3, trending down with ABx to 2.74 
· Hip fracture s/p ORIF · Elevated D-dimer: In the setting of aspiration, likely acute phase reactant, although with dyspnea and hypoxia and fracture with surgery with immobilizaiton, pt is at risk for VTE. Pt placed on heparin gtt by cardiology but stopped due to severe anemia · Acute encephalopathy/delirium:  Likely toxic/metabolic in nature. Patient remains in restraints, continues to be delirious · PAF currently controlled ventricular response · Anemia:  Pt had some episodes of LGIB requiring transfusion during hospitalization · Hyperthyroidism/Grave's disease · CKD Patient Active Problem List  
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  
 Obesity, unspecified E66.9  Nonspecific abnormal electrocardiogram (ECG) (EKG) R94.31  
 Renal insufficiency N28.9  Hip fracture requiring operative repair ([address] S72.009A  Chronic renal disease, stage III (HCC) N18.3  Anemia D64.9  Secondary hyperparathyroidism of renal origin ([address] N25.81  
 Atrial fibrillation (HCC) I48.91  
 Aspiration pneumonia ([address] J69.0  Leucocytosis D72.829  
 Hyperkalemia E87.5  Acidosis, metabolic I61.4 RECOMMENDATIONS:  
Continue ABx to cover for aspiration pneumonia -- ceftriaxone and flagyl -- total duration 7-10 days of therapy (on discharge can switch to Augmentin 875/125mg BID OR vantin and flagyl BID) With regards to elevated d-dimer, pt unable to tolerate anticoagulation due to anemia requiring transfusion. Hypoxia likely due to aspiration given that tachypnea has improved with ABx. Can hold off on further workup as previously noted Supplemental oxygen to maintain SpO2 >88%. Titrate as needed, weaning down off of HFNC this afternoon Please assess for home oxygen need prior to discharge Aggressive pulmonary toileting/bronchial hygiene Frequent incentive spirometry Agree with not scheduling benzo's as pt remains delirious. Judicious use of sedating meds given patient remains delirious and requiring restraints. Continue delirium and fall precautions Strict aspiration precautions including elevating HOB >30deg. Advise SLP evaluation. Patient will need monitored feedings given delirium Pt remains somewhat delirious, would advise judicious use of sedating medications PT/OT, OOB, ambulate with assistance as tolerated DVT ppx per primary service Will follow Subjective:  
04/26/20 Patient seen and examined at bedside. No acute events overnight. Patient remains on HFNC. Pt currently remains delirious and has no complaints. Denies N/V/D, chest pain, hip pain, cough. Patient unrestrained. Nursing gave patient a bath. HPI: 
N.B. patient is a poor historian, history obtained from medical providers and medical record Patient is a 81 y.o. female with a past medical history of A. fib, Graves' disease, multinodular goiter, CKD, hypertension, presented to [facility] after falling at home, developed right leg pain.   In the ER, patient found to have right hip fracture, patient admitted for evaluation and treatment. During the course of the hospitalization, patient was treated with ORIF. Patient also developed anemia requiring transfusion of PRBC on 4/15 and again today. Patient being followed by cardiology, on amiodarone by cardiology. This morning, patient found to be more tachypneic and requiring significant amount of oxygen, 6 L by nasal cannula but still tachypneic. Per discussion with primary service, patient was sleepy this weekend, given doses of morphine and Ativan. Patient more alert today, but notes some dyspnea. Patient does not note increased cough, also does not note dysphagia, but patient not oriented, appears delirious. Patient denies any abdominal pain. Further history unable to be elicited Past Medical History:  
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   
 Nonspecific abnormal electrocardiogram (ECG) (EKG)  Obesity, unspecified  Other and unspecified hyperlipidemia  Renal insufficiency Past Surgical History:  
Procedure Laterality Date [address] UNLISTED Explor lap  COLONOSCOPY N/A 5/31/2018 [facility]  HX CATARACT REMOVAL INSERT PROSTHETIC LENS: LEFT,RIGHT  HX GYN    
 HX ORTHOPAEDIC Left Rotator cuff repair  HX PARTIAL THYROIDECTOMY  MD BIOPSY OF BREAST, INCISIONAL    
 rt & lt 12 yrs ago Prior to Admission medications Medication Sig Start Date End Date Taking? Authorizing Provider  
amLODIPine (NORVASC) 5 mg tablet Take 5 mg by mouth daily. Yes Jabier, MD Max  
allopurinoL (ZYLOPRIM) 100 mg tablet Take 100 mg by mouth daily. Yes Jabier, MD Max  
simvastatin (ZOCOR) 20 mg tablet Take 20 mg by mouth nightly. Yes Max Eugene MD  
furosemide (LASIX) 20 mg tablet Take 20 mg by mouth every other day.    Yes Other, MD Max  
losartan (COZAAR) 50 mg tablet Take 50 mg by mouth daily. Yes Provider, Historical  
FOLIC ACID/VITAMIN B COMP W-C (VIKTOR-LUCERO PO) Take 1 Tab by mouth daily. Yes Provider, Historical  
omeprazole (PRILOSEC) 20 mg capsule Take 20 mg by mouth daily. Yes Provider, Historical  
aspirin 81 mg tablet Take 81 mg by mouth. Yes Provider, Historical  
LORazepam (ATIVAN) 1 mg tablet Take  by mouth two (2) times a day. Yes Provider, Historical  
 
Allergies Allergen Reactions  Ciprofibrate Unable to Consolidated Jose  Darvon [Propoxyphene] Unable to Obtain  Penicillins Unable to Obtain Social History Tobacco Use  Smoking status: Former Smoker  Smokeless tobacco: Never Used  Tobacco comment: REMOTELY QUIT 1980'S Substance Use Topics  Alcohol use: No  
  
History reviewed. No pertinent family history. Current Facility-Administered Medications Medication Dose Route Frequency  metoprolol tartrate (LOPRESSOR) tablet 12.5 mg  12.5 mg Oral Q12H  
 amiodarone (CORDARONE) tablet 200 mg  200 mg Oral DAILY  pantoprazole (PROTONIX) tablet 40 mg  40 mg Oral ACB&D  
 sodium bicarbonate tablet 650 mg  650 mg Oral TID  metroNIDAZOLE (FLAGYL) IVPB premix 500 mg  500 mg IntraVENous Q8H  
 cefTRIAXone (ROCEPHIN) 2 g in sterile water (preservative free) 20 mL IV syringe  2 g IntraVENous Q24H  
 methIMAzole (TAPAZOLE) tablet 10 mg  10 mg Oral DAILY  aspirin delayed-release tablet 81 mg  81 mg Oral DAILY  calcitRIOL (ROCALTROL) capsule 0.25 mcg  0.25 mcg Oral Q MON, WED & FRI  B complex-vitaminC-folic acid (NEPHROCAP) cap  1 Cap Oral DAILY  atorvastatin (LIPITOR) tablet 40 mg  40 mg Oral QHS  acetaminophen (TYLENOL) tablet 650 mg  650 mg Oral Q8H  
 hydrocortisone (ANUSOL-HC) suppository 25 mg  25 mg Rectal Q12H  polyethylene glycol (MIRALAX) packet 17 g  17 g Oral DAILY  senna-docusate (PERICOLACE) 8.6-50 mg per tablet 1 Tab  1 Tab Oral DAILY  allopurinoL (ZYLOPRIM) tablet 100 mg  100 mg Oral DAILY Review of Systems: 
Remains unable to obtain ROS due to patient factors, secondary to delirium Objective:  
Vital Signs:   
Visit Vitals /58 Pulse 61 Temp 98 °F (36.7 °C) Resp 18 Ht 5' (1.524 m) Wt 73 kg (160 lb 15 oz) SpO2 100% BMI 31.43 kg/m² O2 Device: Hi flow nasal cannula O2 Flow Rate (L/min): 8 l/min Temp (24hrs), Av.2 °F (36.8 °C), Min:98 °F (36.7 °C), Max:98.4 °F (36.9 °C) Intake/Output:  
Last shift:      1901 -  0700 In: -  
Out: 631 [UGNKA:259] Last 3 shifts:  07 -  190 In: 480 [P.O.:480] Out: 4250 [YKKPI:6771] Intake/Output Summary (Last 24 hours) at 20208 Last data filed at 2020 2209 Gross per 24 hour Intake 120 ml Output 2650 ml Net -2530 ml Physical Exam:  
General:  Alert, cooperative, NAD, appears stated age, laying in bed, upright, wearing HFNC -- remains in delirium Head:  Normocephalic, without obvious abnormality, atraumatic. Eyes:  Conjunctivae/corneas clear. ANicteric, PERRLA, EOMI Nose: Nares normal. Mucosa normal. No drainage or sinus tenderness. Throat: Lips, mucosa dry. NO thrush; poor dentition, no oral lesions Neck: Supple, symmetrical, trachea midline, no adenopathy, no carotid bruit and no JVD. No crepitus Back:   Symmetric, no curvature, no spine tenderness or flank pain  
Lungs:    Poor air entry bilaterally, some fine rhonchi over right lower lobe, otherwise clear to auscultation, no wheezes throughout all lung fields Chest wall:  No tenderness or deformity. NO CREPITUS Heart:   Irregularly irregular rhythm, controlled rate, S1, S2 normal, no murmur, click, rub or gallop. Abdomen:   Soft, non-tender. Bowel sounds normal. No masses,  No organomegaly. No paradoxical motion Extremities: normal, atraumatic, no cyanosis or edema. No clubbing. Right hip has dressing over it. Patient in restraints, bilateral upper extremities Pulses: 1-2+ and symmetric all extremities. Skin: Skin color, texture, turgor normal. No rashes or lesions Lymph nodes: Cervical, supraclavicular, and axillary nodes normal.  
Neurologic: Grossly nonfocal, gross movement intact and strength intact in all extremities, sensation also grossly intact. Patient alert to self, disoriented to place and date consistent with acute delirium Data review:  
Labs: 
Recent Results (from the past 24 hour(s)) T4, FREE Collection Time: 04/26/20  5:27 AM  
Result Value Ref Range T4, Free 1.1 0.7 - 1.5 NG/DL  
HGB & HCT Collection Time: 04/26/20  5:27 AM  
Result Value Ref Range HGB 8.7 (L) 12.0 - 16.0 g/dL HCT 28.0 (L) 35.0 - 45.0 % PROCALCITONIN Collection Time: 04/26/20  5:27 AM  
Result Value Ref Range Procalcitonin 2.74 ng/mL HGB & HCT Collection Time: 04/26/20  9:53 AM  
Result Value Ref Range HGB 8.1 (L) 12.0 - 16.0 g/dL HCT 25.7 (L) 35.0 - 45.0 % MAGNESIUM Collection Time: 04/26/20 10:50 AM  
Result Value Ref Range Magnesium 2.3 1.6 - 2.6 mg/dL URINALYSIS W/ RFLX MICROSCOPIC Collection Time: 04/26/20  1:54 PM  
Result Value Ref Range Color YELLOW Appearance CLEAR Specific gravity 1.009 1.005 - 1.030    
 pH (UA) 5.0 5.0 - 8.0 Protein Negative NEG mg/dL Glucose Negative NEG mg/dL Ketone Negative NEG mg/dL Bilirubin Negative NEG Blood Negative NEG Urobilinogen 0.2 0.2 - 1.0 EU/dL Nitrites Negative NEG Leukocyte Esterase SMALL (A) NEG URINE MICROSCOPIC ONLY Collection Time: 04/26/20  1:54 PM  
Result Value Ref Range WBC 4 to 10 0 - 4 /hpf  
 RBC NONE 0 - 5 /hpf Epithelial cells FEW 0 - 5 /lpf Bacteria FEW (A) NEG /hpf HGB & HCT Collection Time: 04/26/20  4:37 PM  
Result Value Ref Range HGB 7.8 (L) 12.0 - 16.0 g/dL HCT 24.7 (L) 35.0 - 45.0 % ABG:   
 No results found for: PH, PHI, PCO2, PCO2I, PO2, PO2I, HCO3, HCO3I, FIO2, FIO2I 
 
 
PFT Results  (Last 48 hours) None Echo Results  (Last 48 hours) None Imaging: 
I have personally reviewed the patients radiographs and have reviewed the reports: No new studies in the interval 
Chest x-ray from 4/25/2020 shows bilateral lower lobe opacities, right lower lobe now has dense consolidation consistent with aspiration pneumonia. No masses seen. CXR Results  (Last 48 hours) 04/25/20 1056  XR CHEST PORT Final result Impression:  IMPRESSION:  
   
Similar lung opacities left greater than right. Stable tracheal deviation to the left likely secondary to enlarged right thyroid  
gland Narrative:  Portable Chest  
   
CPT CODE: 33862 HISTORY: Shortness of breath. FINDINGS:   
   
Comparison 4/22/2020 Wires overlie the patient. Heart size and mediastinal contour stable. Stable  
deviated trachea to the left. Lung opacities left greater than right  
predominantly at the bases without significant change. No florid pulmonary edema  
or pneumothorax. CT Results  (Last 48 hours) None High complexity decision making was performed during the evaluation of this patient at high risk for decompensation with multiple organ involvement Above mentioned total time spent on reviewing the case/medical record/data/notes/EMR/patient examination/documentation/coordinating care with nurse/consultants, exclusive of procedures with complex decision making performed and > 50% time spent in face to face evaluation. Owen Mosquera MD/MPH Pulmonary, Critical Care Medicine [facility]

## 2020-04-27 NOTE — PROGRESS NOTES
[facility]. Pulmonary, Critical Care, and Sleep Medicine Progress Note Name: Desiree Griffith MRN: [id number] : 1935 Hospital: [address] [place] [address] Dr Date: 2020 This patient has been seen and evaluated at the request of Dr. Antonio Cruz for hypoxia. IMPRESSION:  
· Acute dyspnea/SOB with acute hypoxic resp failure: Suspect etiology due to aspiration given CXR showing new LL infiltrate in the setting of AMS. Pt maintained on salter cannula -- likely due to atelectasis as well as aspiration PNA · Aspiration pneumonia: Given new right lower lobe infiltrate, as well as elevated procalcitonin at 6.3, trending down with ABx to 2.74 
· Hip fracture s/p ORIF · Elevated D-dimer: In the setting of aspiration, likely acute phase reactant, although with dyspnea and hypoxia and fracture with surgery with immobilizaiton, pt is at risk for VTE. Pt placed on heparin gtt by cardiology but stopped due to severe anemia · Acute encephalopathy/delirium:  Likely toxic/metabolic in nature, resolved · PAF with controlled ventricular response · Anemia:  Pt had some episodes of LGIB requiring transfusion during hospitalization. No signs of active bleeding · Hyperthyroidism/Grave's disease · CKD Patient Active Problem List  
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  
 Obesity, unspecified E66.9  Nonspecific abnormal electrocardiogram (ECG) (EKG) R94.31  
 Renal insufficiency N28.9  Hip fracture requiring operative repair ([facility] [address] S72.009A  Chronic renal disease, stage III (HCC) N18.3  Anemia D64.9  Secondary hyperparathyroidism of renal origin ([address] N25.81  
 Atrial fibrillation (HCC) I48.91  
 Aspiration pneumonia ([facility] [address] J69.0  Leucocytosis D72.829  
 Hyperkalemia E87.5  Acidosis, metabolic S46.1 RECOMMENDATIONS:  
 Continue empiric ABx for aspiration pneumonia -- ceftriaxone and flagyl -- total duration 7-10 days of therapy (on discharge can switch to Augmentin 875/125mg BID OR vantin and flagyl BID) Pt unable to tolerate anticoagulation due to anemia requiring transfusion. Hypoxia likely due to aspiration rather than PE given that tachypnea has improved with ABx. Supplemental oxygen to maintain SpO2 >90%. Titrate as tolerated Assess home oxygen needs prior to discharge Aggressive pulmonary toilette/bronchial hygiene Frequent incentive spirometry Judicious use of sedating meds given patient history of delirium. Strict aspiration precautions including elevating HOB >30deg. Advise SLP evaluation. Patient will need monitored feedings given delirium PT/OT, OOB, ambulate with assistance as tolerated DVT ppx per primary service Will follow Subjective:  
04/27/20 Patient seen and examined at bedside. No acute events overnight. Patient now on O2 by Salter cannula. OOB in chair without problems Denies N/V/D, chest pain, hip pain, cough. Patient unrestrained. HPI: 
  
Patient is a 81 y.o. female with a past medical history of A. fib, Graves' disease, multinodular goiter, CKD, hypertension, presented to [facility] after falling at home, developed right leg pain. In the ER, patient found to have right hip fracture, patient admitted for evaluation and treatment. During the course of the hospitalization, patient was treated with ORIF. Patient also developed anemia requiring transfusion of PRBC on 4/15 and on admission. Patient being followed by cardiology, on amiodarone. Past Medical History:  
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   
 Nonspecific abnormal electrocardiogram (ECG) (EKG)  Obesity, unspecified  Other and unspecified hyperlipidemia  Renal insufficiency Past Surgical History:  
Procedure Laterality Date [address] [place] [address] UNLISTED Explor lap  COLONOSCOPY N/A 5/31/2018 COLONOSCOPY w polypectomy performed by Anup Thompson MD at [address] Left  HX CATARACT REMOVAL INSERT PROSTHETIC LENS: LEFT,RIGHT  HX GYN    
 HX ORTHOPAEDIC Left Rotator cuff repair  HX PARTIAL THYROIDECTOMY  MT BIOPSY OF BREAST, INCISIONAL    
 rt & lt 12 yrs ago Prior to Admission medications Medication Sig Start Date End Date Taking? Authorizing Provider  
amLODIPine (NORVASC) 5 mg tablet Take 5 mg by mouth daily. Yes Other, MD Max  
allopurinoL (ZYLOPRIM) 100 mg tablet Take 100 mg by mouth daily. Yes Other, MD Max  
simvastatin (ZOCOR) 20 mg tablet Take 20 mg by mouth nightly. Yes Other, MD Max  
furosemide (LASIX) 20 mg tablet Take 20 mg by mouth every other day. Yes Other, MD Max  
losartan (COZAAR) 50 mg tablet Take 50 mg by mouth daily. Yes Provider, Historical  
FOLIC ACID/VITAMIN B COMP W-C (VIKTOR-LUCERO PO) Take 1 Tab by mouth daily. Yes Provider, Historical  
omeprazole (PRILOSEC) 20 mg capsule Take 20 mg by mouth daily. Yes Provider, Historical  
aspirin 81 mg tablet Take 81 mg by mouth. Yes Provider, Historical  
LORazepam (ATIVAN) 1 mg tablet Take  by mouth two (2) times a day. Yes Provider, Historical  
 
Allergies Allergen Reactions  Ciprofibrate Unable to Consolidated Jose  Darvon [Propoxyphene] Unable to Obtain  Penicillins Unable to Obtain Social History Tobacco Use  Smoking status: Former Smoker  Smokeless tobacco: Never Used  Tobacco comment: REMOTELY QUIT 1980'S Substance Use Topics  Alcohol use: No  
  
History reviewed. No pertinent family history. Current Facility-Administered Medications Medication Dose Route Frequency  metoprolol tartrate (LOPRESSOR) tablet 12.5 mg  12.5 mg Oral Q12H  
 amiodarone (CORDARONE) tablet 200 mg  200 mg Oral DAILY  pantoprazole (PROTONIX) tablet 40 mg  40 mg Oral ACB&D  
 sodium bicarbonate tablet 650 mg  650 mg Oral TID  metroNIDAZOLE (FLAGYL) IVPB premix 500 mg  500 mg IntraVENous Q8H  
 cefTRIAXone (ROCEPHIN) 2 g in sterile water (preservative free) 20 mL IV syringe  2 g IntraVENous Q24H  
 methIMAzole (TAPAZOLE) tablet 10 mg  10 mg Oral DAILY  aspirin delayed-release tablet 81 mg  81 mg Oral DAILY  calcitRIOL (ROCALTROL) capsule 0.25 mcg  0.25 mcg Oral Q MON, WED & FRI  B complex-vitaminC-folic acid (NEPHROCAP) cap  1 Cap Oral DAILY  atorvastatin (LIPITOR) tablet 40 mg  40 mg Oral QHS  acetaminophen (TYLENOL) tablet 650 mg  650 mg Oral Q8H  
 hydrocortisone (ANUSOL-HC) suppository 25 mg  25 mg Rectal Q12H  polyethylene glycol (MIRALAX) packet 17 g  17 g Oral DAILY  senna-docusate (PERICOLACE) 8.6-50 mg per tablet 1 Tab  1 Tab Oral DAILY  allopurinoL (ZYLOPRIM) tablet 100 mg  100 mg Oral DAILY Review of Systems: 
Remains unable to obtain ROS due to patient factors, secondary to delirium Objective:  
Vital Signs:   
Visit Vitals /56 Pulse (!) 56 Temp 98.5 °F (36.9 °C) Resp 19 Ht 5' (1.524 m) Wt 73 kg (160 lb 15 oz) SpO2 100% BMI 31.43 kg/m² O2 Device: Hi flow nasal cannula O2 Flow Rate (L/min): 8 l/min Temp (24hrs), Av.1 °F (36.7 °C), Min:97.5 °F (36.4 °C), Max:98.5 °F (36.9 °C) Intake/Output:  
Last shift:      No intake/output data recorded. Last 3 shifts:  1901 -  0700 In: 120 [P.O.:120] Out: 1013 Alejandro Brumfield [[id number]] Intake/Output Summary (Last 24 hours) at 2020 1119 Last data filed at 2020 1606 Gross per 24 hour Intake 120 ml Output 1350 ml Net -1230 ml Physical Exam:  
General:  Alert, cooperative, NAD, appears stated age, OOB in chair Head:  Normocephalic, without obvious abnormality, atraumatic. Eyes:  Conjunctivae/corneas clear. Anicteric, PERRLA, EOMI Nose: Nares normal. Mucosa normal. No drainage or sinus tenderness. Throat: Lips, mucosa dry. NO thrush; poor dentition, no oral lesions Neck: Supple, symmetrical, trachea midline, no adenopathy, no carotid bruit and no JVD. No crepitus Back:   Symmetric, no curvature, no spine tenderness or flank pain  
Lungs:    Poor air entry bilaterally, some fine rhonchi over right lower lobe, rales and egophony LLL otherwise clear to auscultation, no wheezes throughout all lung fields Chest wall:  No tenderness or deformity. NO CREPITUS Heart:   Irregularly irregular rhythm, controlled rate, S1, S2 normal, no murmur, click, rub or gallop. Abdomen:   Soft, non-tender. Bowel sounds normal. No masses,  No organomegaly. No paradoxical motion Extremities: normal, atraumatic, no cyanosis or edema. No clubbing. Right hip has dressing over it. Patient in restraints, bilateral upper extremities Pulses: 1-2+ and symmetric all extremities. Skin: Skin color, texture, turgor normal. No rashes or lesions Lymph nodes: Cervical, supraclavicular, and axillary nodes normal.  
Neurologic: Grossly nonfocal, gross movement intact and strength intact in all extremities, sensation also grossly intact. Patient A Ox3 Data review:  
Labs: 
Recent Results (from the past 24 hour(s)) URINALYSIS W/ RFLX MICROSCOPIC Collection Time: 04/26/20  1:54 PM  
Result Value Ref Range Color YELLOW Appearance CLEAR Specific gravity 1.009 1.005 - 1.030    
 pH (UA) 5.0 5.0 - 8.0 Protein Negative NEG mg/dL Glucose Negative NEG mg/dL Ketone Negative NEG mg/dL Bilirubin Negative NEG Blood Negative NEG Urobilinogen 0.2 0.2 - 1.0 EU/dL Nitrites Negative NEG Leukocyte Esterase SMALL (A) NEG URINE MICROSCOPIC ONLY Collection Time: 04/26/20  1:54 PM  
Result Value Ref Range WBC 4 to 10 0 - 4 /hpf  
 RBC NONE 0 - 5 /hpf Epithelial cells FEW 0 - 5 /lpf  Bacteria FEW (A) NEG /hpf  
 HGB & HCT Collection Time: 04/26/20  4:37 PM  
Result Value Ref Range HGB 7.8 (L) 12.0 - 16.0 g/dL HCT 24.7 (L) 35.0 - 45.0 % HGB & HCT Collection Time: 04/26/20 11:17 PM  
Result Value Ref Range HGB 7.9 (L) 12.0 - 16.0 g/dL HCT 25.2 (L) 35.0 - 45.0 % HGB & HCT Collection Time: 04/27/20 10:30 AM  
Result Value Ref Range HGB 8.2 (L) 12.0 - 16.0 g/dL HCT 26.7 (L) 35.0 - 45.0 % METABOLIC PANEL, BASIC Collection Time: 04/27/20 10:30 AM  
Result Value Ref Range Sodium 145 136 - 145 mmol/L Potassium 4.8 3.5 - 5.5 mmol/L Chloride 110 100 - 111 mmol/L  
 CO2 29 21 - 32 mmol/L Anion gap 6 3.0 - 18 mmol/L Glucose 149 (H) 74 - 99 mg/dL BUN 79 (H) 7.0 - 18 MG/DL Creatinine 1.64 (H) 0.6 - 1.3 MG/DL  
 BUN/Creatinine ratio 48 (H) 12 - 20 GFR est AA 36 (L) >60 ml/min/1.73m2 GFR est non-AA 30 (L) >60 ml/min/1.73m2 Calcium 9.0 8.5 - 10.1 MG/DL ABG:   
No results found for: PH, PHI, PCO2, PCO2I, PO2, PO2I, HCO3, HCO3I, FIO2, FIO2I 
 
 
PFT Results  (Last 48 hours) None Echo Results  (Last 48 hours) None Imaging: 
I have personally reviewed the patients radiographs and have reviewed the reports: No new studies in the interval 
Chest x-ray from 4/25/2020 shows bilateral lower lobe opacities, right lower lobe now has dense consolidation consistent with aspiration pneumonia. No masses seen. CXR Results  (Last 48 hours) None CT Results  (Last 48 hours) None High complexity decision making was performed during the evaluation of this patient at high risk for decompensation with multiple organ involvement Above mentioned total time spent on reviewing the case/medical record/data/notes/EMR/patient examination/documentation/coordinating care with nurse/consultants, exclusive of procedures with complex decision making performed and > 50% time spent in face to face evaluation.  
  
 
Vj Moyer MD 
   
 Pulmonary, Critical Care Medicine [facility]

## 2020-04-27 NOTE — ROUTINE PROCESS
Bedside and Verbal shift change report given to Valentin Pollock RN (oncoming nurse) by Scout Jorgensen RN (offgoing nurse). Report included the following information SBAR, Kardex and Recent Results.

## 2020-04-27 NOTE — PROGRESS NOTES
[facility] Hospitalist Group Progress Note Patient: Aurelia Angulo Age: 81 y.o. : 1935 MR#: [id number] SSN: [id number] Date/Time: 2020 Subjective:  
Pt has no complaints. Assessment/Plan:  
81year old female admitted on  after presenting w/ complaint of fall and right leg pain. Has had respiratory complications in recent past. 
 
-Acute hypoxic respiratory failure, thought to be due to aspiration, atelectasis. Pulmonary following. On HFNC. Stable On abx (rocephin and flagyl). -Pafib, new dx this admission. Cardiology following. Not an ideal candidate for anticoagulation due to rectal bleeding this admission. On bb and amio 
 
-Elevated trop. Cardiology following. On asa, statin, bb 
 
-Rectal bleed w/ drop in hgb requiring transfusion. Currently stable. 
 
-Acute metabolic encephalopathy in the setting of above, improved -Mechanical fall as reason for admission s/p ORIF 
 
 
HISTORY OF: 
-Multinodular goiter w/ Graves' disease: endocrine following, on tapazole -HTN 
-CKD3 PLAN: 
-cont abx 
-02 supplementation 
-cont rate control meds, asa, and statin 
-cont to monitor hgb Additional Notes:   
 
Case discussed with:  [x]Patient  []Family  []Nursing  []Case Management DVT Prophylaxis:  []Lovenox  []Hep SQ  [x]SCDs  []Coumadin   []On Heparin gtt Objective:  
VS:  
Visit Vitals /58 (BP 1 Location: Right arm, BP Patient Position: Supine) Pulse (!) 55 Temp 97.5 °F (36.4 °C) Resp 19 Ht 5' (1.524 m) Wt 73 kg (160 lb 15 oz) SpO2 100% BMI 31.43 kg/m² Tmax/24hrs: Temp (24hrs), Av.9 °F (36.6 °C), Min:97.5 °F (36.4 °C), Max:98.5 °F (36.9 °C) Input/Output:  
 
Intake/Output Summary (Last 24 hours) at 2020 1840 Last data filed at 2020 1000 Gross per 24 hour Intake 240 ml Output 1200 ml Net -960 ml General:  Alert, awake, oriented to self only Cardiovascular:  Rrr, no murmurs Pulmonary:  ctab GI:  Soft, nt, nd 
Extremities:  No edema Additional:   
 
Labs:   
Recent Results (from the past 24 hour(s)) HGB & HCT Collection Time: 04/26/20 11:17 PM  
Result Value Ref Range HGB 7.9 (L) 12.0 - 16.0 g/dL HCT 25.2 (L) 35.0 - 45.0 %  
T4, FREE Collection Time: 04/27/20 10:30 AM  
Result Value Ref Range T4, Free 0.9 0.7 - 1.5 NG/DL  
HGB & HCT Collection Time: 04/27/20 10:30 AM  
Result Value Ref Range HGB 8.2 (L) 12.0 - 16.0 g/dL HCT 26.7 (L) 35.0 - 45.0 % METABOLIC PANEL, BASIC Collection Time: 04/27/20 10:30 AM  
Result Value Ref Range Sodium 145 136 - 145 mmol/L Potassium 4.8 3.5 - 5.5 mmol/L Chloride 110 100 - 111 mmol/L  
 CO2 29 21 - 32 mmol/L Anion gap 6 3.0 - 18 mmol/L Glucose 149 (H) 74 - 99 mg/dL BUN 79 (H) 7.0 - 18 MG/DL Creatinine 1.64 (H) 0.6 - 1.3 MG/DL  
 BUN/Creatinine ratio 48 (H) 12 - 20 GFR est AA 36 (L) >60 ml/min/1.73m2 GFR est non-AA 30 (L) >60 ml/min/1.73m2 Calcium 9.0 8.5 - 10.1 MG/DL Additional Data Reviewed:   
 
Signed By: Monica Woodall MD   
 April 27, 2020 6:40 PM

## 2020-04-27 NOTE — PROGRESS NOTES
Problem: Mobility Impaired (Adult and Pediatric) Goal: *Acute Goals and Plan of Care (Insert Text) Description: Physical Therapy Goals Initiated 4/13/2020, reevaluated 4/21/2020 and to be accomplished within 7 day(s) 1. Patient will move from supine to sit and sit to supine , scoot up and down and roll side to side in bed with moderate assistance. 2.  Patient will transfer from bed to chair and chair to bed with moderate assistance using the least restrictive device. 3.  Patient will perform sit to stand with moderate assistance. 4.  Patient will ambulate with moderate assistance for 10-25 feet with the least restrictive device. 5.  Assess stairs when appropriate. PLOF: Patient reports she is independent with ADLs and functional mobility. Outcome: Progressing Towards Goal 
 PHYSICAL THERAPY TREATMENT Patient: Esteban Lacy (49 y.o. female) Date: 4/27/2020 Diagnosis: Hip fracture requiring operative repair ([facility] [address] Lawerence Floresita <principal problem not specified> Procedure(s) (LRB): 
RIGHT FEMORAL INTERTROCHANTERIC NAIL INSERTION (Right) 15 Days Post-Op Precautions: Fall, Skin, PWB(right LE) ASSESSMENT: 
Pt found supine HOB elevated willing to participate w/ therapy. Tx performed w/ OT to maximize safety of pt and staff as well as to facilitate balance and stability while performing mobility and self care tasks. Pt was able to make functional gains this visit, amb to [facility] and to chair w/ RW. Pt does cont to req quite a bit of assistance to obtain an upright position, however much of it is self limiting as pt frequently stated \"I'm too weak. \" As pt was facilitated at UEs and LEs w/ proper sequencing, less assistance was eventually needed as pt req little assistance to maintain good balance once upright. Pt sat at EOB for approx 10' during self care tasks prior to standing to RW.  Pt was able to better maintain PWBing precautions this visit, performing a step to gait pattern as instructed w/ shift to left side. Pt would intermittently shift additional weight through right LE when turning and when experienced fatigue and imbalance. As pt was amb to chair, pt noticed to be actively having BM, thus provided BSC. Pt req additional time w/ little to no outcome on BSC, stood to RW w/ min A again for pericare, and proceeded to amb to recliner. W/ additional cueing for gait and RW sequencing, pt safely sat into recliner, elevated feet, provided all needs in reach, and notified nursing of position and recommendations to return back to bed. Progression toward goals: good  
[]      Improving appropriately and progressing toward goals [x]      Improving slowly and progressing toward goals 
[]      Not making progress toward goals and plan of care will be adjusted PLAN: 
Patient continues to benefit from skilled intervention to address the above impairments. Continue treatment per established plan of care. Discharge Recommendations:  Dimas Mccracken Further Equipment Recommendations for Discharge:  bedside commode and rolling walker SUBJECTIVE:  
Patient stated I'm just so weak, I can't do it.  OBJECTIVE DATA SUMMARY:  
Critical Behavior: 
Neurologic State: Alert, Confused Orientation Level: Oriented to person, Oriented to place Cognition: Follows commands Safety/Judgement: Fall prevention Functional Mobility Training: 
Bed Mobility: 
Supine to Sit: Maximum assistance;Assist x2 Scooting: Maximum assistance Transfers: 
Sit to Stand: Minimum assistance(bed height elevated ) Stand to Sit: Minimum assistance Bed to Chair: Minimum assistance Balance: 
Sitting: Intact Standing: Impaired; With support Standing - Static: Fair Standing - Dynamic : Fair Ambulation/Gait Training: 
Distance (ft): 6 Feet (ft) Assistive Device: Walker, rolling Ambulation - Level of Assistance: Minimal assistance Gait Abnormalities: Decreased step clearance; Step to gait Right Side Weight Bearing: Partial (%) Base of Support: Center of gravity altered;Shift to left Speed/Shirlene: Slow Step Length: Right shortened;Left shortened Pain: 
Pain level pre-treatment: 0/10 Pain level post-treatment: 0/10 Did voice some pain in right hip/leg w/ bed mobility and scooting to EOB Activity Tolerance:  
Good Please refer to the flowsheet for vital signs taken during this treatment. After treatment:  
[x] Patient left in no apparent distress sitting up in chair 
[] Patient left in no apparent distress in bed 
[x] Call bell left within reach [x] Nursing notified 
[] Caregiver present [x] Chair alarm activated 
[] SCDs applied COMMUNICATION/EDUCATION:  
[x]         Role of Physical Therapy in the acute care setting. [x]         Fall prevention education was provided and the patient/caregiver indicated understanding. [x]         Patient/family have participated as able in working toward goals and plan of care. [x]         Patient/family agree to work toward stated goals and plan of care. []         Patient understands intent and goals of therapy, but is neutral about his/her participation. []         Patient is unable to participate in stated goals/plan of care: ongoing with therapy staff. 
[]         Other: 
 
   
Juan A Byrne PTA Time Calculation: 29 mins

## 2020-04-27 NOTE — ROUTINE PROCESS
Bedside and Verbal shift change report given to [employer], RN (oncoming nurse) by Leo Pabon RN (offgoing nurse). Report included the following information SBAR, Kardex, Intake/Output, MAR and Cardiac Rhythm sinus rhythm/sinus bradycardia.

## 2020-04-27 NOTE — PROGRESS NOTES
Problem: Self Care Deficits Care Plan (Adult) Goal: *Acute Goals and Plan of Care (Insert Text) Description: Occupational Therapy Goals Initiated 4/13/2020. Pt re-evaluated 4/21/2020. Goals modified for the pt to achieve within 7 day(s). 1.  Patient will perform bed mobility in preparation for selfcare with moderate assist.  
2.  Patient will perform functional activity seated EOB for 4-7 minutes with supervision/setup and F+ balance. 3.  Patient will perform lower body dressing with moderate assist using AE prn. 
4.  Patient will perform toilet transfers with moderate assist. 
5.  Patient will perform all aspects of toileting with moderate assist. 
6.  Patient will participate in upper extremity therapeutic exercise/activities with minimal assistance for 8 minutes to increase ROM/strength for selfcare tasks. 7.  Patient will utilize energy conservation techniques during functional activities with verbal cues. Prior Level of Function: Patient lives with daughter in 2 story condo, 8 steps to bedroom and was independent with self-care and functional mobility PTA. Patient reports having shower chair at home Outcome: Progressing Towards Goal 
 OCCUPATIONAL THERAPY TREATMENT Patient: Vasquez Downey (10 y.o. female) Date: 4/27/2020 Diagnosis: Hip fracture requiring operative repair ([facility] [address] Erin Christopher <principal problem not specified> Procedure(s) (LRB): 
RIGHT FEMORAL INTERTROCHANTERIC NAIL INSERTION (Right) 15 Days Post-Op Precautions: Fall, Skin, PWB(right LE) Chart, occupational therapy assessment, plan of care, and goals were reviewed. ASSESSMENT: 
Pt co-treated w/PT to maximize safety w/functional transfer training. Pt is pleasantly confused, oriented to self and place. Generalized weakness requires 2 person assist w/bed mobility to maneuver to EOB. Pt requires moderate vc's for sequencing oral care, seated at EOB.  Pt requires Min Assist w/functional tranfers using RW and vc's for hand placement. Decrease dynamic standing balance requires Max Assist w/toileting ADL. HR 59 SpO2% 99 on 7L O2 nc w/activity Pt left in chair and reinforced importance of calling for assistance. Progression toward goals: 
[]          Improving appropriately and progressing toward goals [x]          Improving slowly and progressing toward goals 
[]          Not making progress toward goals and plan of care will be adjusted PLAN: 
Patient continues to benefit from skilled intervention to address the above impairments. Continue treatment per established plan of care. Discharge Recommendations:  [facility] Further Equipment Recommendations for Discharge:  shower chair and rolling walker SUBJECTIVE:  
Patient stated It's hard when you just come in here and I don't even know about it.  OBJECTIVE DATA SUMMARY:  
Cognitive/Behavioral Status: 
Neurologic State: Alert, Confused Orientation Level: Oriented to person, Oriented to place Cognition: Follows commands Safety/Judgement: Fall prevention Functional Mobility and Transfers for ADLs: 
 Bed Mobility: 
Supine to Sit: Maximum assistance;Assist x2(w/HOB raised and SR) Scooting: Maximum assistance Transfers: 
Sit to Stand: Minimum assistance(w/RW) Bed to Chair: Minimum assistance(w/RW) Toilet Transfer : Minimum assistance(EOB --> BSC xfer w/RW) Balance: 
Sitting: Intact Standing: Impaired; With support Standing - Static: Fair Standing - Dynamic : Fair ADL Intervention: 
Grooming Position Performed: Seated edge of bed Brushing Teeth: Stand-by assistance Upper Body Dressing Assistance Shirt simulation with hospital gown: Moderate assistance Toileting Toileting Assistance: Maximum assistance Bowel Hygiene: Maximum assistance Clothing Management: Maximum assistance Pain: 
Pain level pre-treatment: 0/10 Pain level post-treatment: 0/10 Pt c/o R hip pain w/movement Activity Tolerance:   
Fair Please refer to the flowsheet for vital signs taken during this treatment. After treatment:  
[x]  Patient left in no apparent distress sitting up in chair 
[]  Patient left in no apparent distress in bed 
[x]  Call bell left within reach [x]  Nursing notified 
[]  Caregiver present [x]  chair alarm activated COMMUNICATION/EDUCATION:  
[] Role of Occupational Therapy in the acute care setting 
[] Home safety education was provided and the patient/caregiver indicated understanding. [] Patient/family have participated as able in working towards goals and plan of care. [x] Patient/family agree to work toward stated goals and plan of care. [] Patient understands intent and goals of therapy, but is neutral about his/her participation. [] Patient is unable to participate in goal setting and plan of care. Thank you for this referral. 
JANEL Rizvi Time Calculation: 29 mins

## 2020-04-27 NOTE — ROUTINE PROCESS
Bedside and Verbal shift change report given to Dandre Ventura RN (oncoming nurse) by Bertha Shaw RN (offgoing nurse). Report included the following information SBAR, Kardex, ED Summary, OR Summary, Procedure Summary, Intake/Output, MAR, Recent Results and Cardiac Rhythm NSR.

## 2020-04-27 NOTE — PROGRESS NOTES
[facility] 
 
 
CARDIOLOGY PROGRESS NOTE 
RECS: 
 
 
1. Paroxymal atrial fibrillation-- continue low dose metoprolol per parameters. Continue Amiodarone  200 mg daily. Recent Echo showed preserved EF%. No an ideal  candidate for anticoagulation due to severe anemia requiring multiple transfusions. 2. Acute hypoxic respiratory failure- on high flow O2- [facility] following 3. Elevated troponin- currently chest pain free. Troponin peak 1.87. now down trending. continue  asa,statin and bb.   
4. Elevated D-dimer-  High risk for PE/DVT- consider V/Q scan 5. SOB- multifactorial with severe anemia,  possible aspiration pneumonia vs PE. Elevated NT pro BNP- no significant peripheral edema- will give lasix iv 20 mg once 6. Hyperthyroidism: Patient on methimazole. Treatment per medicine. 7. Severe anemia- noted hgb drop pos op-s/p 5 units PRBC's. GI was consulted 8. AMS- better 9. 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 Right hip fracture s/p ORIF 11. CKD-elevated renal indices. Renal following ASSESSMENT: 
Hospital Problems  Date Reviewed: 4/25/2020 Codes Class Noted POA Hyperkalemia ICD-10-CM: E87.5 ICD-9-CM: 276.7  4/25/2020 Unknown Acidosis, metabolic IPL-73-RC: I02.6 ICD-9-CM: 276.2  4/25/2020 Unknown Aspiration pneumonia ([facility] [address] ICD-10-CM: J69.0 ICD-9-CM: 507.0  4/23/2020 Unknown Leucocytosis ICD-10-CM: H27.293 ICD-9-CM: 288.60  4/23/2020 Unknown Atrial fibrillation [facility]) ICD-10-CM: I48.91 
ICD-9-CM: 427.31  4/21/2020 Unknown Chronic renal disease, stage III (HCC) ICD-10-CM: N18.3 ICD-9-CM: 585.3  4/20/2020 Unknown Anemia ICD-10-CM: D64.9 ICD-9-CM: 285.9  4/20/2020 Unknown Secondary hyperparathyroidism of renal origin [facility]) ICD-10-CM: N25.81 ICD-9-CM: 588.81  4/20/2020 Unknown Hip fracture requiring operative repair [facility]) ICD-10-CM: E28.224Y ICD-9-CM: 820.8  4/11/2020 Unknown Essential hypertension, benign ICD-10-CM: I10 
ICD-9-CM: 401.1  Unknown Yes Overview Signed 7/10/2013  6:40 PM by Jody Garland SUBJECTIVE: 
SOB on high flow O2 No chest pain OBJECTIVE: 
 
VS:  
Visit Vitals /76 (BP 1 Location: Right arm, BP Patient Position: Sitting) Pulse (!) 57 Temp 97.6 °F (36.4 °C) Resp 19 Ht 5' (1.524 m) Wt 73 kg (160 lb 15 oz) SpO2 100% BMI 31.43 kg/m² Intake/Output Summary (Last 24 hours) at 4/27/2020 1637 Last data filed at 4/27/2020 6890 Gross per 24 hour Intake  Output 1350 ml Net -1350 ml  
 
TELE: afib with RVR General: alert, well developed, anxious, confused and in mild distress HENT: Normocephalic, atraumatic. Normal external eye. Neck :  no bruit, no JVD Cardiac:  irregular rate and rhythm, S1, S2 normal, no murmur, click, rub or gallop Lungs: diminished breath sounds  bilaterally Abdomen: Soft, nontender, no masses Extremities:  No c/c/e, peripheral pulses present Labs: Results:  
   
Chemistry Recent Labs  
  04/27/20 
1030 04/25/20 
1516 04/25/20 
0533 * 104* 119*  135* 129*  
K 4.8 5.2 5.4  103 101 CO2 29 23 20* BUN 79* 89* 94* CREA 1.64* 2.03* 2.08* CA 9.0 9.5 9.3 AGAP 6 9 8 BUCR 48* 44* 45* CBC w/Diff Recent Labs  
  04/27/20 
1030 04/26/20 
2317 04/26/20 
1637 HGB 8.2* 7.9* 7.8* HCT 26.7* 25.2* 24.7* Cardiac Enzymes No results for input(s): CPK, CKND1, MICHELLE in the last 72 hours. No lab exists for component: Caralee Churchman Coagulation No results for input(s): PTP, INR, APTT, INREXT, INREXT in the last 72 hours. Lipid Panel Lab Results Component Value Date/Time  Cholesterol, total 182 01/16/2020 01:39 PM  
 HDL Cholesterol 55 01/16/2020 01:39 PM  
 LDL, calculated 99 01/16/2020 01:39 PM  
 Triglyceride 140 01/16/2020 01:39 PM  
 CHOL/HDL Ratio 3.3 01/16/2020 01:39 PM  
  
 BNP No results for input(s): BNPP in the last 72 hours. Liver Enzymes No results for input(s): TP, ALB, TBIL, AP, SGOT, GPT in the last 72 hours. No lab exists for component: DBIL Thyroid Studies Lab Results Component Value Date/Time  TSH 48.20 (H) 04/13/2020 05:18 AM  
    
 
 
 
Addis Glez MD

## 2020-04-27 NOTE — PROGRESS NOTES
[facility] Hospitalist Group Progress Note Patient: Elinor Box Age: 81 y.o. : 1935 MR#: [id number] SSN: [id number] Date: 2020 Subjective/24-hour events:  
 
Sitting in chair at bedside. Has been weaned off of HFNC. Assessment:  
Paroxysmal atrial fibrillation, currently NSR Troponin elevation secondary to above, doubt ACS 
RLL pneumonia suspect secondary to aspiration Acute hypoxic respiratory failure Displaced intertrochanteric right femur fracture status post ORIF Multinodular goiter and Graves' disease Hypertension CKD 3 with secondary hyperparathyroidism Anemia, multifactorial   
Moderate pulmonary HTN 
Obesity Acute encephalopathy, metabolic/toxic, improved Plan: 
Supplemental oxygen via NC - decrease flow rate as tolerated. Antibiotic therapy to continue as ordered. H/H trending up, follow PRN. No need for transfusion. Amiodarone/asa/statin as prescribed. Edgardo Lagos PT/OT. Anticipate SNF placement on discharge. Edgardo Lagos Follow. Case discussed with:  [x]Patient  []Family  [x]Nursing  [x]Case Management DVT Prophylaxis:  []Lovenox  []Hep SQ  [x]SCDs  []Coumadin   []On Heparin gtt Objective:  
VS:  
Visit Vitals /56 Pulse (!) 56 Temp 98.5 °F (36.9 °C) Resp 19 Ht 5' (1.524 m) Wt 73 kg (160 lb 15 oz) SpO2 100% BMI 31.43 kg/m² Tmax/24hrs: Temp (24hrs), Av.1 °F (36.7 °C), Min:97.5 °F (36.4 °C), Max:98.5 °F (36.9 °C) Intake/Output Summary (Last 24 hours) at 2020 1003 Last data filed at 2020 0790 Gross per 24 hour Intake 120 ml Output 1350 ml Net -1230 ml General: In NAD. Nontoxic-appearing. High flow nasal cannula in place. Cardiovascular: RRR. Pulmonary: No active wheezing. No accessory muscle use noted. GI: Abdomen soft, nontender. Extremities: Warm no edema or ischemia. Neuro: Awake, moves extremities spontaneously Labs:   
Recent Results (from the past 24 hour(s)) MAGNESIUM  
 Collection Time: 04/26/20 10:50 AM  
Result Value Ref Range Magnesium 2.3 1.6 - 2.6 mg/dL URINALYSIS W/ RFLX MICROSCOPIC Collection Time: 04/26/20  1:54 PM  
Result Value Ref Range Color YELLOW Appearance CLEAR Specific gravity 1.009 1.005 - 1.030    
 pH (UA) 5.0 5.0 - 8.0 Protein Negative NEG mg/dL Glucose Negative NEG mg/dL Ketone Negative NEG mg/dL Bilirubin Negative NEG Blood Negative NEG Urobilinogen 0.2 0.2 - 1.0 EU/dL Nitrites Negative NEG Leukocyte Esterase SMALL (A) NEG URINE MICROSCOPIC ONLY Collection Time: 04/26/20  1:54 PM  
Result Value Ref Range WBC 4 to 10 0 - 4 /hpf  
 RBC NONE 0 - 5 /hpf Epithelial cells FEW 0 - 5 /lpf Bacteria FEW (A) NEG /hpf HGB & HCT Collection Time: 04/26/20  4:37 PM  
Result Value Ref Range HGB 7.8 (L) 12.0 - 16.0 g/dL HCT 24.7 (L) 35.0 - 45.0 % HGB & HCT Collection Time: 04/26/20 11:17 PM  
Result Value Ref Range HGB 7.9 (L) 12.0 - 16.0 g/dL HCT 25.2 (L) 35.0 - 45.0 % Signed By: Renan Payne MD   
 April 27, 2020

## 2020-04-27 NOTE — PROGRESS NOTES
Progress Note Cece Hernandez 
81 y.o. Admit Date: 4/11/2020 Active Problems: 
  Essential hypertension, benign () POA: Yes Overview: CONTROLLED Hip fracture requiring operative repair [facility]) (4/11/2020) POA: Unknown Chronic renal disease, stage III ([facility] [address] (4/20/2020) POA: Unknown Anemia (4/20/2020) POA: Unknown [facility] (4/20/2020) POA: Unknown Atrial fibrillation ([facility] (4/21/2020) POA: Unknown Aspiration pneumonia ([facility] (4/23/2020) POA: Unknown Leucocytosis (4/23/2020) POA: Unknown Hyperkalemia (4/25/2020) POA: Unknown Acidosis, metabolic (6/17/6585) POA: Unknown Subjective:  
 
Patient is OOB to chair, on Oxygen by nasal cannula,off High Flow Oxygen. A comprehensive review of systems was negative except for that written in the History of Present Illness. Objective:  
 
Visit Vitals /56 Pulse (!) 56 Temp 98.5 °F (36.9 °C) Resp 19 Ht 5' (1.524 m) Wt 73 kg (160 lb 15 oz) SpO2 100% BMI 31.43 kg/m² Intake/Output Summary (Last 24 hours) at 4/27/2020 1125 Last data filed at 4/27/2020 4675 Gross per 24 hour Intake 120 ml Output 1350 ml Net -1230 ml  
 
 
Current Facility-Administered Medications Medication Dose Route Frequency Provider Last Rate Last Dose  metoprolol tartrate (LOPRESSOR) tablet 12.5 mg  12.5 mg Oral Q12H Mariah Ewing NP   12.5 mg at 04/27/20 0828  
 amiodarone (CORDARONE) tablet 200 mg  200 mg Oral DAILY Mariah Ewing NP   200 mg at 04/27/20 0829  
 pantoprazole (PROTONIX) tablet 40 mg  40 mg Oral ACB&D Bc Irvin MD   40 mg at 04/27/20 8147  sodium bicarbonate tablet 650 mg  650 mg Oral TID Odell Mckeon MD   650 mg at 04/27/20 9030  LORazepam (ATIVAN) tablet 0.5 mg  0.5 mg Oral BID PRN Jaime Ambrocio MD      
 metroNIDAZOLE (FLAGYL) IVPB premix 500 mg  500 mg IntraVENous Q8H René Cora Ochoa  mL/hr at 04/27/20 0826 500 mg at 04/27/20 6637  cefTRIAXone (ROCEPHIN) 2 g in sterile water (preservative free) 20 mL IV syringe  2 g IntraVENous Q24H Leslie Mendez MD   2 g at 04/26/20 1520  
 methIMAzole (TAPAZOLE) tablet 10 mg  10 mg Oral DAILY Jaspreet Celis MD   10 mg at 04/27/20 2169  aspirin delayed-release tablet 81 mg  81 mg Oral DAILY Anoop Stiles MD   81 mg at 04/27/20 0829  
 calcitRIOL (ROCALTROL) capsule 0.25 mcg  0.25 mcg Oral Q MON, WED & Grace Rodriguez MD   0.25 mcg at 04/24/20 2232  B complex-vitaminC-folic acid (NEPHROCAP) cap  1 Cap Oral DAILY Kelly Zamudio MD   1 Cap at 04/27/20 3849  atorvastatin (LIPITOR) tablet 40 mg  40 mg Oral QHS Lisa Jimenez MD   40 mg at 04/26/20 2155  acetaminophen (TYLENOL) tablet 650 mg  650 mg Oral Q8H Daniele Warner MD   650 mg at 04/27/20 6873  hydrocortisone (ANUSOL-HC) suppository 25 mg  25 mg Rectal Q12H Elhinae Redhead, NP   25 mg at 04/27/20 0827  
 aluminum-magnesium hydroxide (MAALOX) oral suspension 15 mL  15 mL Oral QID PRN Daniele Warner MD   15 mL at 04/21/20 1236  polyethylene glycol (MIRALAX) packet 17 g  17 g Oral DAILY Kiera Centenoma   Stopped at 04/26/20 0900  
 senna-docusate (PERICOLACE) 8.6-50 mg per tablet 1 Tab  1 Tab Oral DAILY Sandra Centeno   1 Tab at 04/27/20 4885  ondansetron (ZOFRAN) injection 8 mg  8 mg IntraVENous Q6H PRN HIEN Centeno   8 mg at 04/21/20 1136  allopurinoL (ZYLOPRIM) tablet 100 mg  100 mg Oral DAILY Kiera Centenoma   100 mg at 04/27/20 9709 Physical Exam:  
 
Physical Exam:  
General:  Alert, cooperative, no distress, appears stated age. Neck: Supple, symmetrical, trachea midline, no adenopathy, thyroid: no enlargement/tenderness/nodules, no carotid bruit and no JVD. Lungs:   Clear to auscultation bilaterally.   
Heart:  Regular rate and rhythm, S1, S2 normal, no murmur, click, rub or gallop. Abdomen:   Soft, non-tender. Bowel sounds normal. No masses,  No organomegaly. Extremities: Extremities normal, atraumatic, no cyanosis or edema. Data Review: CBC w/Diff Recent Labs  
  04/27/20 
1030 04/26/20 
2317 04/26/20 
1637 HGB 8.2* 7.9* 7.8* HCT 26.7* 25.2* 24.7* No results for input(s): BANDS, LYMPHOCYTES, MONOS, EOS, BASOS, PROMYELOCYTE, BLAST, RDW in the last 72 hours. No lab exists for component: RELNEU, RELLYM, RELMONO, RELEOS, RELBAS, SEGS, MYELOCYTE, META Comprehensive Metabolic Profile Recent Labs  
  04/27/20 
1030 04/25/20 
1516 04/25/20 
0533  135* 129*  
K 4.8 5.2 5.4  103 101 CO2 29 23 20* BUN 79* 89* 94* CREA 1.64* 2.03* 2.08* Recent Labs  
  04/27/20 
1030 04/25/20 
1516 04/25/20 
0533 CA 9.0 9.5 9.3 Impression: Active Hospital Problems Diagnosis Date Noted  Hyperkalemia 04/25/2020  Acidosis, metabolic 74/56/7355  Aspiration pneumonia ([address] 04/23/2020  Leucocytosis 04/23/2020  Atrial fibrillation ([facility] [address] 04/21/2020  Chronic renal disease, stage III ([address] 04/20/2020  Anemia 04/20/2020  Secondary hyperparathyroidism of renal origin ([address] 04/20/2020  Hip fracture requiring operative repair ([facility] [address] 04/11/2020  Essential hypertension, benign CONTROLLED Renal function is better, not acidotic, K is OK, Na is high normal, received  Low dose of Lasix yesterday. Plan:  
 
Decrease Bicarb to Bid, drink more free water. Needs OT/PT,continue antibiotics for aspiration Pneumonia.  
 
 
Allison Guerra MD

## 2020-04-27 NOTE — PROGRESS NOTES
Several people have tried to get new iv on pt. Unable to get access.  Have called  nursing supervisor Maria Ireland said they will add this pt to her list.

## 2020-04-28 NOTE — PROGRESS NOTES
Progress Note Vasquez Mealing 
81 y.o. Admit Date: 4/11/2020 Active Problems: 
  Essential hypertension, benign () POA: Yes Overview: CONTROLLED Hip fracture requiring operative repair [facility]) (4/11/2020) POA: Unknown Chronic renal disease, stage III ([facility] [address] (4/20/2020) POA: Unknown Anemia (4/20/2020) POA: Unknown Secondary hyperparathyroidism of renal origin ([email or web address] (4/20/2020) POA: Unknown Atrial fibrillation ([email or web address] (4/21/2020) POA: Unknown Aspiration pneumonia ([email or web address] (4/23/2020) POA: Unknown Leucocytosis (4/23/2020) POA: Unknown Hyperkalemia (4/25/2020) POA: Unknown Acidosis, metabolic (8/82/4736) POA: Unknown Subjective:  
 
Patient feels OK, doing OT  &PT,no SOB, has poon. Esau Ra A comprehensive review of systems was negative except for that written in the History of Present Illness. Objective:  
 
Visit Vitals /53 Pulse (!) 58 Temp 98.4 °F (36.9 °C) Resp 22 Ht 5' (1.524 m) Wt 73 kg (160 lb 15 oz) SpO2 100% Comment: 8L of O2 Nasal Cannula BMI 31.43 kg/m² Intake/Output Summary (Last 24 hours) at 4/28/2020 1037 Last data filed at 4/28/2020 4038 Gross per 24 hour Intake  Output 2045 ml Net -2045 ml  
 
 
Current Facility-Administered Medications Medication Dose Route Frequency Provider Last Rate Last Dose  sodium bicarbonate tablet 650 mg  650 mg Oral BID Delroy Dennis MD   650 mg at 04/28/20 1216  metoprolol tartrate (LOPRESSOR) tablet 12.5 mg  12.5 mg Oral Q12H Mariah Ewing NP   12.5 mg at 04/28/20 0859  
 amiodarone (CORDARONE) tablet 200 mg  200 mg Oral DAILY Mariah Ewing NP   200 mg at 04/28/20 0900  pantoprazole (PROTONIX) tablet 40 mg  40 mg Oral ACB&D Karyn Weinstein MD   40 mg at 04/28/20 0900  LORazepam (ATIVAN) tablet 0.5 mg  0.5 mg Oral BID PRN Alicia Andrade MD   0.5 mg at 04/27/20 1709  metroNIDAZOLE (FLAGYL) IVPB premix 500 mg  500 mg IntraVENous Q8H René, Buddy Valverde  mL/hr at 04/28/20 0858 500 mg at 04/28/20 3691  cefTRIAXone (ROCEPHIN) 2 g in sterile water (preservative free) 20 mL IV syringe  2 g IntraVENous Q24H Yoselyn Alonso MD   Stopped at 04/27/20 1700  
 methIMAzole (TAPAZOLE) tablet 10 mg  10 mg Oral DAILY Lenka Webster MD   10 mg at 04/28/20 7301  aspirin delayed-release tablet 81 mg  81 mg Oral DAILY Yady Boss MD   81 mg at 04/28/20 0996  calcitRIOL (ROCALTROL) capsule 0.25 mcg  0.25 mcg Oral Q MON, WED & Kate Voss MD   0.25 mcg at 04/27/20 2116  B complex-vitaminC-folic acid (NEPHROCAP) cap  1 Cap Oral DAILY Inocencia Son MD   1 Cap at 04/28/20 3324  atorvastatin (LIPITOR) tablet 40 mg  40 mg Oral QHS Nicole Sorto MD   40 mg at 04/27/20 2116  acetaminophen (TYLENOL) tablet 650 mg  650 mg Oral Q8H Gaby Bennett MD   650 mg at 04/28/20 5072  hydrocortisone (ANUSOL-HC) suppository 25 mg  25 mg Rectal Q12H Sunny Nati, NP   25 mg at 04/28/20 0900  
 aluminum-magnesium hydroxide (MAALOX) oral suspension 15 mL  15 mL Oral QID PRN Gaby Bennett MD   15 mL at 04/21/20 1236  polyethylene glycol (MIRALAX) packet 17 g  17 g Oral DAILY [facility]   Stopped at 04/26/20 0900  
 senna-docusate (PERICOLACE) 8.6-50 mg per tablet 1 Tab  1 Tab Oral DAILY Kiera Hopkinsma   Stopped at 04/28/20 4718  ondansetron (ZOFRAN) injection 8 mg  8 mg IntraVENous Q6H PRN HIEN Hopkins   8 mg at 04/21/20 1136  allopurinoL (ZYLOPRIM) tablet 100 mg  100 mg Oral DAILY HIEN Hopkins   100 mg at 04/28/20 0900 Physical Exam:  
 
Physical Exam:  
General:  Alert, cooperative, no distress, appears stated age. Mouth/Throat: Lips, mucosa, and tongue normal. Teeth and gums normal.  
Neck: Supple, symmetrical, trachea midline, no adenopathy, thyroid: no enlargement/tenderness/nodules, no carotid bruit and no JVD. Lungs:   Clear to auscultation bilaterally. Heart:  Regular rate and rhythm, S1, S2 normal, no murmur, click, rub or gallop. Abdomen:   Soft, non-tender. Bowel sounds normal. No masses,  No organomegaly. Extremities: Extremities normal, Right hip surgery site is clean, Data Review: CBC w/Diff Recent Labs  
  04/27/20 
1913 04/27/20 
1030 04/26/20 
2317 HGB 8.4* 8.2* 7.9*  
HCT 28.1* 26.7* 25.2* No results for input(s): BANDS, LYMPHOCYTES, MONOS, EOS, BASOS, PROMYELOCYTE, BLAST, RDW in the last 72 hours. No lab exists for component: RELNEU, RELLYM, RELMONO, RELEOS, RELBAS, SEGS, MYELOCYTE, META Comprehensive Metabolic Profile Recent Labs  
  04/28/20 
0237 04/27/20 
1030 04/25/20 
1516  145 135* K 4.9 4.8 5.2  110 103 CO2 28 29 23 BUN 72* 79* 89* CREA 1.55* 1.64* 2.03* Recent Labs  
  04/28/20 
0237 04/27/20 
1030 04/25/20 
1516 CA 9.3 9.0 9.5 Impression: Active Hospital Problems Diagnosis Date Noted  Hyperkalemia 04/25/2020  Acidosis, metabolic 39/77/6816  Aspiration pneumonia ([facility] [address] 04/23/2020  Leucocytosis 04/23/2020  Atrial fibrillation ([facility] 04/21/2020  Chronic renal disease, stage III ([address] 04/20/2020  Anemia 04/20/2020  Secondary hyperparathyroidism of renal origin ([facility] 04/20/2020  Hip fracture requiring operative repair ([address] 04/11/2020  Essential hypertension, benign CONTROLLED Renal function improved further,not Hyperkalemic, not acidotic,not Hyponatremuic Plan:  
 
Continue Current care. ,german Mathur.   
 
 
Allison Guerra MD

## 2020-04-28 NOTE — PROGRESS NOTES
[facility] Hospitalist Group Progress Note Patient: Lisa Meehan Age: 81 y.o. : 1935 MR#: [id number] SSN: [id number] Date/Time: 2020 Subjective:  
Pt has c/o perioral numbness Assessment/Plan:  
81year old female admitted on  after presenting w/ complaint of fall and right leg pain. Has had respiratory complications in recent past. 
 
-Acute hypoxic respiratory failure, thought to be due to aspiration, atelectasis. Pulmonary following. On HFNC. Stable On abx. Work up included PVL lower ext to eval for thromboembolism, PVL negative. 
 
-Pafib, new dx this admission. Cardiology following. Not an ideal candidate for anticoagulation due to rectal bleeding this admission. On bb and amio 
 
-Elevated trop. Cardiology following. On asa, statin, bb 
 
-Rectal bleed w/ drop in hgb requiring transfusion. Currently stable. S/p GI eval w/ no endoscopy recommended. Hgb remains stable, no further reports of GI bleed. 
 
-Acute metabolic encephalopathy in the setting of above, waxing and waning. Has Grave's and abnl TFT's today w/ low TSH, but nl free T4 and T3, on rx, unclear if contributing, discussed w/ endocrine, endocrine to eval and make adjustments if necessary. -Mechanical fall as reason for admission s/p ORIF 
 
-Reason for perioral numbness compliant unclear today, has nl calcium. No focal neuro abnormalities noted. -RUPALI, creat w/ significant improvement, in setting of underlying derangements listed above. HISTORY OF: 
-Multinodular goiter w/ Graves' disease: endocrine following, on tapazole, see above, abnl TSH, but nl FT4 and T3 today, discussed w/ endocrine -HTN 
-CKD3 PLAN: 
-cont abx, switched to oral abx 
-titrate 02 supplementation as tolerated 
-cont rate control meds, asa, and statin 
-cont to monitor hgb 
-cont pt/ot -cont to monitor renal function If stable and able to tolerate 02 down titration, should be okay to be dc'd tomorrow. Additional Notes:   
 
Case discussed with:  [x]Patient  []Family  []Nursing  []Case Management DVT Prophylaxis:  []Lovenox  []Hep SQ  [x]SCDs  []Coumadin   []On Heparin gtt Objective:  
VS:  
Visit Vitals /64 Pulse (!) 57 Comment: Dianna Modi RN Temp 97.7 °F (36.5 °C) Resp 18 Ht 5' (1.524 m) Wt 73 kg (160 lb 15 oz) SpO2 97% BMI 31.43 kg/m² Tmax/24hrs: Temp (24hrs), Av.9 °F (36.6 °C), Min:97.5 °F (36.4 °C), Max:98.4 °F (36.9 °C) Input/Output:  
 
Intake/Output Summary (Last 24 hours) at 2020 1843 Last data filed at 2020 9326 Gross per 24 hour Intake  Output 2045 ml Net -2045 ml General:  Alert, awake, oriented to self only Cardiovascular:  Rrr, no murmurs Pulmonary:  ctab GI:  Soft, nt, nd 
Extremities:  No edema Additional:  exophthalmos and goiter present Labs:   
Recent Results (from the past 24 hour(s)) HGB & HCT Collection Time: 20  7:13 PM  
Result Value Ref Range HGB 8.4 (L) 12.0 - 16.0 g/dL HCT 28.1 (L) 35.0 - 45.0 %  
T4, FREE Collection Time: 20  2:37 AM  
Result Value Ref Range T4, Free 0.8 0.7 - 1.5 NG/DL  
METABOLIC PANEL, BASIC Collection Time: 20  2:37 AM  
Result Value Ref Range Sodium 142 136 - 145 mmol/L Potassium 4.9 3.5 - 5.5 mmol/L Chloride 109 100 - 111 mmol/L  
 CO2 28 21 - 32 mmol/L Anion gap 5 3.0 - 18 mmol/L Glucose 100 (H) 74 - 99 mg/dL BUN 72 (H) 7.0 - 18 MG/DL Creatinine 1.55 (H) 0.6 - 1.3 MG/DL  
 BUN/Creatinine ratio 46 (H) 12 - 20 GFR est AA 39 (L) >60 ml/min/1.73m2 GFR est non-AA 32 (L) >60 ml/min/1.73m2 Calcium 9.3 8.5 - 10.1 MG/DL  
T3, FREE Collection Time: 20  2:37 AM  
Result Value Ref Range Triiodothyronine (T3), free 2.6 2.18 - 3.98 PG/ML  
TSH 3RD GENERATION Collection Time: 20  2:37 AM  
Result Value Ref Range TSH 0.08 (L) 0.36 - 3.74 uIU/mL Additional Data Reviewed:   
 
Signed By: Emely Carr MD   
 April 28, 2020 6:40 PM

## 2020-04-28 NOTE — PROGRESS NOTES
NUTRITION Nutrition Consult: General Nutrition Management & Supplements, Diet Education RECOMMENDATIONS / PLAN:  
 
- Continue current nutrition interventions - Encourage po intake of meals/supplements - Assistance with meals as needed - Continue RD inpatient monitoring and evaluation. NUTRITION INTERVENTIONS & DIAGNOSIS:  
 
- Meals/snacks: general/healthful diet - Medical food supplement therapy: continue - Collaboration and referral of nutrition care: interdisciplinary rounds - Nutrition counseling: general healthy diet on 4/20 Nutrition Diagnosis: Inadequate oral intake related to decreased appetite as evidenced by poor meal intake since admission. Food and nutrition related knowledge deficit related to general healthy diet as evidenced by pt requiring additional information/ reinforcement. ASSESSMENT:  
 
4/28: Pt more alert today then previous RD visit. Poor meal intake still. Does like the Ensure drinks and Magic Cup; consuming, good intake. Encouraged meal/nutrition supplement intake. 4/23: Pt starting to be more alert per chart review. Had chest pain and SOB yesterday; now on high flow nasal cannula. Continues to have poor po intake per nursing. No plan for appetite stimulant; nor supplemental EN support, pt not medically appropriate per MD. Code status changed to DNR today. Encouraged pt to increase po intake. GI following due to persistent anemia. Noted weight discrepancy during recent days; wt rechecked today 4/20: Per RN, pt with poor/no meal intake during the weekend; was able to get pt to drink 2 Ensure drinks yesterday. Pt did not eat breakfast today; lethargic during lunch. Pt had good meal intake last Thursday per RN and pt report. This writer had to wake up pt several times during visit. Food preferences discussed. Agreeable to trying Magic Cup supplement; noted 3-4 unopened bottles of Ensure Enlive at bed side.  Pt reported having poor appetite. Discussed with Dr Jonathan Crews. Provided education on general healthy diet; explained to pt the importance of adequate nutrition and consuming a variety of healthy foods. 4/17: Pt off unit at time of visit. Only drank the Ensure this morning at breakfast per CNA. Poor meal intake per chart documentation. Noted wt loss in past several years PTA per chart hx. Admitted due to hip fracture; s/p open reduction internal fixation on 4/15. Nutritional intake adequate to meet patients estimated nutritional needs:  No 
 
Diet: DIET NUTRITIONAL SUPPLEMENTS Lunch, Dinner; MAGIC CUPS 
DIET NUTRITIONAL SUPPLEMENTS Breakfast, Lunch; ENSURE ENLIVE 
DIET RENAL WITH OPTIONS 100-80-80; Regular Food Allergies:  None known Current Appetite: poor/fair Appetite/meal intake prior to admission: Unable to determine at this time Feeding Limitations:  [] Swallowing difficulty    [] Chewing difficulty    [] Other: 
Current Meal Intake:  
Patient Vitals for the past 100 hrs: 
 % Diet Eaten 04/26/20 1356 25 % 04/25/20 1925 0 % 04/25/20 1733 25 % 04/25/20 1300 25 % 04/25/20 0902 25 % 04/24/20 1925 0 % BM: 4/28 Skin Integrity: right hip surgical incision; stage I right heel pressure injury Edema:   [x] No     [] Yes Pertinent Medications: Reviewed: maalox prn, atorvastatin, nephrocap, calcitriol, ondansetron prn, pantoprazole, bowel regimen, sodium bicarbonate Recent Labs  
  04/28/20 
0237 04/27/20 
1030 04/26/20 
1050 04/25/20 
1516  145  --  135* K 4.9 4.8  --  5.2  110  --  103 CO2 28 29  --  23 * 149*  --  104* BUN 72* 79*  --  89* CREA 1.55* 1.64*  --  2.03* CA 9.3 9.0  --  9.5 MG  --   --  2.3  -- Intake/Output Summary (Last 24 hours) at 4/28/2020 1406 Last data filed at 4/28/2020 9695 Gross per 24 hour Intake  Output 2045 ml Net -2045 ml Anthropometrics: 
Ht Readings from Last 1 Encounters:  
04/25/20 5' (1.524 m) Last 3 Recorded Weights in this Encounter 04/23/20 1432 04/24/20 1925 04/25/20 1925 Weight: 73 kg (160 lb 15 oz) 73 kg (160 lb 15 oz) 73 kg (160 lb 15 oz) Body mass index is 31.43 kg/m². Obese, Class I Weight History:   -13 lb, 7.6% x 7 years PTA Weight Metrics 4/25/2020 5/31/2018 7/10/2013 Weight 160 lb 15 oz 155 lb 9.6 oz 171 lb BMI 31.43 kg/m2 30.39 kg/m2 32.33 kg/m2 Admitting Diagnosis: Hip fracture requiring operative repair ([facility] [address] Kamille Lapping Pertinent PMHx: bell's palsy, HTN, renal insufficiency, partial thyroidectomy, goiter Education Needs:        [] None identified  [] Identified - Not appropriate at this time  [x]  Identified and addressed - refer to education log Learning Limitations:   [x] None identified  [] Identified Cultural, Denominational & ethnic food preferences:  [x] None identified    [] Identified and addressed ESTIMATED NUTRITION NEEDS:  
 
Calories: 8916-9656 kcal (MSJx1.2-1.4) based on  [x] Actual BW: 72 kg      [] IBW Protein: 58-86 gm (0.8-1.2 gm/kg) based on  [x] Actual BW      [] IBW Fluid: 1 mL/kcal 
  
MONITORING & EVALUATION:  
 
Nutrition Goal(s):  
- PO nutrition intake will meet >75% of patient estimated nutritional needs within the next 7 days. Outcome: Progressing towards goal   
- Patient will increase knowledge of appropriate food choices on a general healthy diet prior to discharge. Outcome: Met/Continue Monitoring:   [x] Food and nutrient intake   [x] Food and nutrient administration  [x] Comparative standards   [x] Nutrition-focused physical findings   [x] Anthropometric Measurements   [x] Treatment/therapy   [x] Biochemical data, medical tests, and procedures Previous Recommendations (for follow-up assessments only): Implemented Discharge Planning: No nutritional discharge needs at this time. Participated in care planning, discharge planning, & interdisciplinary rounds as appropriate.  
 
 
Andrés Schneider RD 
 Pager: [phone]

## 2020-04-28 NOTE — PROGRESS NOTES
[facility]ists. Pulmonary, Critical Care, and Sleep Medicine Progress Note Name: Martha Hummel MRN: [id number] : 1935 Hospital: [facility] Date: 2020 This patient has been seen and evaluated at the request of Dr. Maya Ureña for hypoxia. IMPRESSION:  
· Acute dyspnea/SOB with acute hypoxic resp failure: Suspect etiology due to aspiration given CXR showing new LL infiltrate in the setting of AMS. Pt maintained on Salter cannula -- likely due to atelectasis as well as aspiration PNA · Aspiration pneumonia: Given new right lower lobe infiltrate, as well as elevated procalcitonin at 6.3 · Hip fracture s/p ORIF · Elevated D-dimer: In the setting of aspiration, likely acute phase reactant, although with dyspnea and hypoxia and fracture with surgery with immobilizaiton, pt is at risk for VTE. Pt placed on heparin gtt by cardiology but stopped due to severe anemia. Unable to CTA due to kidney disease · Acute encephalopathy/delirium:  Likely toxic/metabolic in nature, resolved · PAF with controlled ventricular response · Anemia:  Pt had some episodes of LGIB requiring transfusion during hospitalization. No signs of active bleeding, Hb stable · Hyperthyroidism/Grave's disease · Acute on chronic kidney disease Patient Active Problem List  
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  
 Obesity, unspecified E66.9  Nonspecific abnormal electrocardiogram (ECG) (EKG) R94.31  
 Renal insufficiency N28.9  Hip fracture requiring operative repair ([facility] [address] S72.009A  Chronic renal disease, stage III (HCC) N18.3  Anemia D64.9  Secondary hyperparathyroidism of renal origin ([address] N25.81  
 Atrial fibrillation (HCC) I48.91  
 Aspiration pneumonia ([address] J69.0  Leucocytosis D72.829  
 Hyperkalemia E87.5  Acidosis, metabolic V36.9 RECOMMENDATIONS:  
Complete  empiric ABx for aspiration pneumonia -- ceftriaxone and flagyl -- total duration 7-10 days of therapy (on discharge can switch to Augmentin 875/125mg BID OR vantin and flagyl BID) Pt unable to tolerate anticoagulation due to anemia requiring transfusion. Hypoxia likely due to aspiration rather than PE given that tachypnea has improved with ABx. Supplemental oxygen to maintain SpO2 >90%. Titrated down to 2 LPM 
Assess home oxygen needs prior to discharge Aggressive pulmonary toilette/bronchial hygiene Encouraged  incentive spirometry Judicious use of sedating meds given patient history of delirium. Strict aspiration precautions including elevating HOB >30deg. Advise SLP evaluation. Patient will need monitored feedings given delirium PT/OT, OOB, ambulate with assistance as tolerated Remove Mathur catheter, place PurWick if needed DVT ppx per primary service Will follow Subjective:  
04/28/20 Patient seen and examined at bedside. No acute events overnight. Patient now on O2 by Salter cannula. OOB in chair without problems Complains of R hip pain and stiffness, otherwise doing well. Patient unrestrained. HPI: 
  
Patient is a 81 y.o. female with a past medical history of A. fib, Graves' disease, multinodular goiter, CKD, hypertension, presented to [facility] after falling at home, developed right leg pain. In the ER, patient found to have right hip fracture, patient admitted for evaluation and treatment. During the course of the hospitalization, patient was treated with ORIF. Patient also developed anemia requiring transfusion of PRBC on 4/15 and on admission. Patient being followed by cardiology, on amiodarone. Past Medical History:  
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   
 Nonspecific abnormal electrocardiogram (ECG) (EKG)  Obesity, unspecified  Other and unspecified hyperlipidemia  Renal insufficiency Past Surgical History:  
Procedure Laterality Date [address] [place] UNLISTED Explor lap  COLONOSCOPY N/A 5/31/2018 COLONOSCOPY w polypectomy performed by Caroline Machado MD at [address] Left  HX CATARACT REMOVAL INSERT PROSTHETIC LENS: LEFT,RIGHT  HX GYN    
 HX ORTHOPAEDIC Left Rotator cuff repair  HX PARTIAL THYROIDECTOMY  SD BIOPSY OF BREAST, INCISIONAL    
 rt & lt 12 yrs ago Prior to Admission medications Medication Sig Start Date End Date Taking? Authorizing Provider  
amLODIPine (NORVASC) 5 mg tablet Take 5 mg by mouth daily. Yes Other, MD Max  
allopurinoL (ZYLOPRIM) 100 mg tablet Take 100 mg by mouth daily. Yes Other, MD Max  
simvastatin (ZOCOR) 20 mg tablet Take 20 mg by mouth nightly. Yes Other, MD Max  
furosemide (LASIX) 20 mg tablet Take 20 mg by mouth every other day. Yes Other, MD Max  
losartan (COZAAR) 50 mg tablet Take 50 mg by mouth daily. Yes Provider, Historical  
FOLIC ACID/VITAMIN B COMP W-C (VIKTOR-LUCERO PO) Take 1 Tab by mouth daily. Yes Provider, Historical  
omeprazole (PRILOSEC) 20 mg capsule Take 20 mg by mouth daily. Yes Provider, Historical  
aspirin 81 mg tablet Take 81 mg by mouth. Yes Provider, Historical  
LORazepam (ATIVAN) 1 mg tablet Take  by mouth two (2) times a day. Yes Provider, Historical  
 
Allergies Allergen Reactions  Ciprofibrate Unable to Consolidated Jose  Darvon [Propoxyphene] Unable to Obtain  Penicillins Unable to Obtain Social History Tobacco Use  Smoking status: Former Smoker  Smokeless tobacco: Never Used  Tobacco comment: REMOTELY QUIT 1980'S Substance Use Topics  Alcohol use: No  
  
History reviewed. No pertinent family history. Current Facility-Administered Medications Medication Dose Route Frequency  sodium bicarbonate tablet 650 mg  650 mg Oral BID  metoprolol tartrate (LOPRESSOR) tablet 12.5 mg  12.5 mg Oral Q12H  pantoprazole (PROTONIX) tablet 40 mg  40 mg Oral ACB&D  
 metroNIDAZOLE (FLAGYL) IVPB premix 500 mg  500 mg IntraVENous Q8H  
 cefTRIAXone (ROCEPHIN) 2 g in sterile water (preservative free) 20 mL IV syringe  2 g IntraVENous Q24H  
 methIMAzole (TAPAZOLE) tablet 10 mg  10 mg Oral DAILY  aspirin delayed-release tablet 81 mg  81 mg Oral DAILY  calcitRIOL (ROCALTROL) capsule 0.25 mcg  0.25 mcg Oral Q MON, WED & FRI  B complex-vitaminC-folic acid (NEPHROCAP) cap  1 Cap Oral DAILY  atorvastatin (LIPITOR) tablet 40 mg  40 mg Oral QHS  acetaminophen (TYLENOL) tablet 650 mg  650 mg Oral Q8H  
 hydrocortisone (ANUSOL-HC) suppository 25 mg  25 mg Rectal Q12H  polyethylene glycol (MIRALAX) packet 17 g  17 g Oral DAILY  senna-docusate (PERICOLACE) 8.6-50 mg per tablet 1 Tab  1 Tab Oral DAILY  allopurinoL (ZYLOPRIM) tablet 100 mg  100 mg Oral DAILY Review of Systems: 
Remains unable to obtain ROS due to patient factors, secondary to delirium Objective:  
Vital Signs:   
Visit Vitals /68 Pulse 68 Temp 97.5 °F (36.4 °C) Resp 20 Ht 5' (1.524 m) Wt 73 kg (160 lb 15 oz) SpO2 100% BMI 31.43 kg/m² O2 Device: Nasal cannula(2L per MD)  
O2 Flow Rate (L/min): 8 l/min Temp (24hrs), Av.8 °F (36.6 °C), Min:97.5 °F (36.4 °C), Max:98.4 °F (36.9 °C) Intake/Output:  
Last shift:      No intake/output data recorded. Last 3 shifts:  1901 -  0700 In: 240 [P.O.:240] Out: 3245 [HSAOI:1534] Intake/Output Summary (Last 24 hours) at 2020 1459 Last data filed at 2020 4933 Gross per 24 hour Intake  Output 2045 ml Net -2045 ml Physical Exam:  
General:  Alert, cooperative, NAD, appears stated age, OOB in chair Head:  Normocephalic, without obvious abnormality, atraumatic. Eyes:  Conjunctivae/corneas clear. Anicteric, PERRLA, EOMI Nose: Nares normal. Mucosa normal. No drainage or sinus tenderness. Throat: Lips, mucosa dry. NO thrush; poor dentition, no oral lesions Neck: Supple, symmetrical, trachea midline, no adenopathy, no carotid bruit and no JVD. No crepitus Back:   Symmetric, no curvature, no spine tenderness or flank pain  
Lungs:    Poor air entry bilaterally, some fine rhonchi over right lower lobe, rales and egophony LLL otherwise clear to auscultation, no wheezes throughout all lung fields Chest wall:  No tenderness or deformity. NO CREPITUS Heart:   Irregularly irregular rhythm, controlled rate, S1, S2 normal, no murmur, click, rub or gallop. Abdomen:   Soft, non-tender. Bowel sounds normal. No masses,  No organomegaly. No paradoxical motion Extremities: normal, atraumatic, no cyanosis or edema. No clubbing. Right hip surgical site dry, healing well. Pulses: 1-2+ and symmetric all extremities. Skin: Skin color, texture, turgor normal. No rashes or lesions Lymph nodes: Cervical, supraclavicular nodes normal.  
Neurologic: Grossly nonfocal, gross movement intact and strength intact in all extremities, sensation also grossly intact. Patient A Ox3 Data review:  
Labs: 
Recent Results (from the past 24 hour(s)) HGB & HCT Collection Time: 04/27/20  7:13 PM  
Result Value Ref Range HGB 8.4 (L) 12.0 - 16.0 g/dL HCT 28.1 (L) 35.0 - 45.0 %  
T4, FREE Collection Time: 04/28/20  2:37 AM  
Result Value Ref Range T4, Free 0.8 0.7 - 1.5 NG/DL  
METABOLIC PANEL, BASIC Collection Time: 04/28/20  2:37 AM  
Result Value Ref Range Sodium 142 136 - 145 mmol/L Potassium 4.9 3.5 - 5.5 mmol/L Chloride 109 100 - 111 mmol/L  
 CO2 28 21 - 32 mmol/L Anion gap 5 3.0 - 18 mmol/L Glucose 100 (H) 74 - 99 mg/dL BUN 72 (H) 7.0 - 18 MG/DL  Creatinine 1.55 (H) 0.6 - 1.3 MG/DL  
 BUN/Creatinine ratio 46 (H) 12 - 20    
 GFR est AA 39 (L) >60 ml/min/1.73m2 GFR est non-AA 32 (L) >60 ml/min/1.73m2 Calcium 9.3 8.5 - 10.1 MG/DL  
T3, FREE Collection Time: 04/28/20  2:37 AM  
Result Value Ref Range Triiodothyronine (T3), free 2.6 2.18 - 3.98 PG/ML  
TSH 3RD GENERATION Collection Time: 04/28/20  2:37 AM  
Result Value Ref Range TSH 0.08 (L) 0.36 - 3.74 uIU/mL ABG:   
No results found for: PH, PHI, PCO2, PCO2I, PO2, PO2I, HCO3, HCO3I, FIO2, FIO2I 
 
 
PFT Results  (Last 48 hours) None Echo Results  (Last 48 hours) None Imaging: 
I have personally reviewed the patients radiographs and have reviewed the reports: No new studies in the interval 
Chest x-ray from 4/25/2020 shows bilateral lower lobe opacities, right lower lobe now has dense consolidation consistent with aspiration pneumonia. No masses seen. CXR Results  (Last 48 hours) None CT Results  (Last 48 hours) None High complexity decision making was performed during the evaluation of this patient at high risk for decompensation with multiple organ involvement Above mentioned total time spent on reviewing the case/medical record/data/notes/EMR/patient examination/documentation/coordinating care with nurse/consultants, exclusive of procedures with complex decision making performed and > 50% time spent in face to face evaluation. Farrah Ball MD 
   
Pulmonary, Critical Care Medicine [address] [place] [address] [facility]

## 2020-04-28 NOTE — PROGRESS NOTES
[facility] Hospitalist Group Progress Note Patient: Raad Mcmanus Age: 81 y.o. : 1935 MR#: [id number] SSN: [id number] Date: 2020 Subjective/24-hour events: No new issues overnight. Respiratory status remains stable. Assessment:  
Paroxysmal atrial fibrillation, currently NSR Troponin elevation secondary to above, doubt ACS 
RLL pneumonia suspect secondary to aspiration Acute hypoxic respiratory failure Displaced intertrochanteric right femur fracture status post ORIF Multinodular goiter and Graves' disease Hypertension CKD 3 with secondary hyperparathyroidism Anemia, multifactorial   
Moderate pulmonary HTN 
Obesity Acute encephalopathy, metabolic/toxic, improved Plan: 
Wean oxygen via NC as tolerated. Decreased from 8 LPM to 4 LPM - monitor. D/C IV antibiotics, transition to PO. H/H remaining stable, follow PRN. Cardiac regimen as prescribed. Tolerating well. D/C Mathur. PT/OT, mobilize as tolerated. Disposition soon if continues to improve. Will need SNF. Case discussed with:  [x]Patient  []Family  [x]Nursing  [x]Case Management DVT Prophylaxis:  []Lovenox  []Hep SQ  [x]SCDs  []Coumadin   []On Heparin gtt Objective:  
VS:  
Visit Vitals /68 Pulse 68 Temp 97.5 °F (36.4 °C) Resp 20 Ht 5' (1.524 m) Wt 73 kg (160 lb 15 oz) SpO2 100% BMI 31.43 kg/m² Tmax/24hrs: Temp (24hrs), Av.8 °F (36.6 °C), Min:97.5 °F (36.4 °C), Max:98.4 °F (36.9 °C) Intake/Output Summary (Last 24 hours) at 2020 1237 Last data filed at 2020 1034 Gross per 24 hour Intake  Output 2045 ml Net -2045 ml General: In NAD. Nontoxic-appearing. High flow nasal cannula in place. Cardiovascular: RRR. Pulmonary: No active wheezing. No accessory muscle use noted. GI: Abdomen soft, nontender. Extremities: Warm no edema or ischemia. Neuro: Awake, moves extremities spontaneously Labs: Recent Results (from the past 24 hour(s)) HGB & HCT Collection Time: 04/27/20  7:13 PM  
Result Value Ref Range HGB 8.4 (L) 12.0 - 16.0 g/dL HCT 28.1 (L) 35.0 - 45.0 %  
T4, FREE Collection Time: 04/28/20  2:37 AM  
Result Value Ref Range T4, Free 0.8 0.7 - 1.5 NG/DL  
METABOLIC PANEL, BASIC Collection Time: 04/28/20  2:37 AM  
Result Value Ref Range Sodium 142 136 - 145 mmol/L Potassium 4.9 3.5 - 5.5 mmol/L Chloride 109 100 - 111 mmol/L  
 CO2 28 21 - 32 mmol/L Anion gap 5 3.0 - 18 mmol/L Glucose 100 (H) 74 - 99 mg/dL BUN 72 (H) 7.0 - 18 MG/DL Creatinine 1.55 (H) 0.6 - 1.3 MG/DL  
 BUN/Creatinine ratio 46 (H) 12 - 20 GFR est AA 39 (L) >60 ml/min/1.73m2 GFR est non-AA 32 (L) >60 ml/min/1.73m2 Calcium 9.3 8.5 - 10.1 MG/DL  
T3, FREE Collection Time: 04/28/20  2:37 AM  
Result Value Ref Range Triiodothyronine (T3), free 2.6 2.18 - 3.98 PG/ML  
TSH 3RD GENERATION Collection Time: 04/28/20  2:37 AM  
Result Value Ref Range TSH 0.08 (L) 0.36 - 3.74 uIU/mL Signed By: Algis Bamberger, MD   
 April 28, 2020

## 2020-04-28 NOTE — ROUTINE PROCESS
Pt very confused.  Unable to obtain iv site MD called and IV meds changed to po since patient for possible discharge in am

## 2020-04-28 NOTE — PROGRESS NOTES
Problem: Self Care Deficits Care Plan (Adult) Goal: *Acute Goals and Plan of Care (Insert Text) Description: Occupational Therapy Goals Initiated 4/13/2020. Pt re-evaluated 4/21/2020 and 4/28/2020. Continue all goals to achieve within 7 day(s). 1.  Patient will perform bed mobility in preparation for selfcare with moderate assist.  
2.  Patient will perform functional activity seated EOB for 4-7 minutes with supervision/setup and G balance. 3.  Patient will perform lower body dressing with moderate assist using AE prn. 
4.  Patient will perform toilet transfers with moderate assist. 
5.  Patient will perform all aspects of toileting with moderate assist. 
6.  Patient will participate in upper extremity therapeutic exercise/activities with minimal assistance for 8 minutes to increase ROM/strength for selfcare tasks. 7.  Patient will utilize energy conservation techniques during functional activities with verbal cues. Prior Level of Function: Patient lives with daughter in 2 story condo, 8 steps to bedroom and was independent with self-care and functional mobility PTA. Patient reports having shower chair at home Outcome: Progressing Towards Goal 
  
OCCUPATIONAL THERAPY RE-EVALUATION Patient: Izabella Vazquez (89 y.o. female) Date: 4/28/2020 Primary Diagnosis: Hip fracture requiring operative repair ([facility] [address] Ian Siddiqui Procedure(s) (LRB): 
RIGHT FEMORAL INTERTROCHANTERIC NAIL INSERTION (Right) 16 Days Post-Op Precautions: Fall, Skin, PWB(RLE) ASSESSMENT : 
Patient cleared to participate in OT re-evaluation by RN. Upon entering the room, the patient was supine in bed, alert, and agreeable to participate in OT re-evaluation. Patient was seen with PT to maximize patient safety, participation, and functional mobility in preparation for self-care tasks.  Patient initially frustrated as she reports she used the call bell multiple times after previous session to notify that she wanted to get back into bed after sitting up for 2 hours and was left in chair. Therapists reassured patient she would not have same experience this day and RN and CNA agreeable with patient sitting up for approx. 1.5 hours today. Patient max assist x2 for bed mobility, min assists for functional transfers, and stand by assist for donning gown while seated in chair. Patient able to reach to knees to simulate donning pants, still requires total assist for socks. Patient re-educated on PWB status this session however unable to maintain. Patient also with c/o bilateral arm pain while using RW and observed leaning onto elbows at times. Patient stated she felt \"short\" while seated in chair, O2 99 on 7L and HR 68. Patient left seated in chair, all needs met, and MD present. Based on the objective data described below, the patient presents with decreased strength, decreased endurance, decreased safety awareness, decreased independence, decreased functional balance, and decreased functional mobility, which impedes pt performance in basic self-care/ADL tasks. Pt would benefit from skilled OT to restore PLOF and maximize function. Patient will benefit from skilled intervention to address the above impairments. Patient's rehabilitation potential is considered to be Good Factors which may influence rehabilitation potential include:  
[]             None noted [x]             Mental ability/status [x]             Medical condition [x]             Home/family situation and support systems [x]             Safety awareness [x]             Pain tolerance/management 
[]             Other: PLAN : 
Recommendations and Planned Interventions:  
[x]               Self Care Training                  [x]      Therapeutic Activities [x]               Functional Mobility Training   [x]      Cognitive Retraining 
[x]               Therapeutic Exercises           [x]      Endurance Activities [x]               Balance Training                    [x]      Neuromuscular Re-Education []               Visual/Perceptual Training     [x]      Home Safety Training 
[x]               Patient Education                   [x]      Family Training/Education []               Other (comment): Frequency/Duration: Patient will be followed by occupational therapy 1-2 times per day/4-7 days per week to address goals. Discharge Recommendations: Dimas Mccracken Further Equipment Recommendations for Discharge: N/A  
 
SUBJECTIVE:  
Patient stated I felt like a kid who was just left there OBJECTIVE DATA SUMMARY:  
Hospital course since last seen and reason for reevaluation: Patient has been progressing slowly as a result of receiving skilled OT services. OT will continue to follow the patient for further intervention during this hospitalization, in order to maximize ADL performance and overall functional independence. Past Medical History:  
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   
 Nonspecific abnormal electrocardiogram (ECG) (EKG) Obesity, unspecified Other and unspecified hyperlipidemia Renal insufficiency Past Surgical History:  
Procedure Laterality Date ABDOMEN SURGERY PROC UNLISTED Explor lap COLONOSCOPY N/A 5/31/2018 COLONOSCOPY w polypectomy performed by Bailey Dick MD at [address] [place] [address] Left HX CATARACT REMOVAL INSERT PROSTHETIC LENS: LEFT,RIGHT  
 HX GYN    
 HX ORTHOPAEDIC Left Rotator cuff repair HX PARTIAL THYROIDECTOMY    
 LA BIOPSY OF BREAST, INCISIONAL    
 rt & lt 12 yrs ago Barriers to Learning/Limitations: yes;  altered mental status (I.e. Confusion) Compensate with: visual, verbal, tactile, kinesthetic cues/model Home Situation:  
Home Situation Home Environment: Private residence One/Two Story Residence: Two story Living Alone: No 
Support Systems: Child(do) Patient Expects to be Discharged to[de-identified] Rehabilitation facility Current DME Used/Available at Home: None Tub or Shower Type: Tub/Shower combination 
[x]  Right hand dominant   []  Left hand dominant Cognitive/Behavioral Status: 
Neurologic State: Alert Orientation Level: Oriented to person;Oriented to place Cognition: Follows commands;Decreased attention/concentration Safety/Judgement: Fall prevention Skin: Intact Edema: None noted Vision/Perceptual:   
Acuity: Within Defined Limits Coordination: BUE Fine Motor Skills-Upper: Left Intact; Right Intact Gross Motor Skills-Upper: Left Intact; Right Intact Balance: 
Sitting: Intact Standing: Impaired; With support Standing - Static: Fair Standing - Dynamic : Fair Strength: BUE Strength: Generally decreased, functional 
 
Tone & Sensation: BUE Tone: Normal 
Sensation: Intact Range of Motion: BUE 
AROM: Generally decreased, functional 
 
Functional Mobility and Transfers for ADLs: 
Bed Mobility: 
Supine to Sit: Maximum assistance;Assist x2 Scooting: Moderate assistance;Assist x2(towards EOB) Transfers: 
Sit to Stand: Minimum assistance;Assist x2 Stand to Sit: Minimum assistance;Assist x2 ADL Assessment:  
Feeding: Modified independent Oral Facial Hygiene/Grooming: Stand-by assistance Bathing: Moderate assistance Upper Body Dressing: Stand-by assistance Lower Body Dressing: Maximum assistance Toileting: Maximum assistance ADL Intervention: 
Upper Body Dressing Assistance Dressing Assistance: Stand-by assistance Hospital Gown: Stand-by assistance Cues: Verbal cues provided Lower Body Dressing Assistance Dressing Assistance: (patient able to reach to knees to simulate donning pants) Socks: (unable to reach socks, would require total asssist) Cognitive Retraining Safety/Judgement: Fall prevention Pain: Patient reported pain \"everywhere\", no numerical pain level given Pain Intervention(s): Medication (see MAR); Response to intervention: Nurse notified, See doc flow Activity Tolerance:  
Fair Please refer to the flowsheet for vital signs taken during this treatment. After treatment:  
[x] Patient left in no apparent distress sitting up in chair 
[] Patient left in no apparent distress in bed 
[x] Call bell left within reach [x] Nursing notified 
[] Caregiver present 
[] Bed alarm activated COMMUNICATION/EDUCATION:  
[x] Role of Occupational Therapy in the acute care setting 
[x] Home safety education was provided and the patient/caregiver indicated understanding. [x] Patient/family have participated as able in goal setting and plan of care. [x] Patient/family agree to work toward stated goals and plan of care. [] Patient understands intent and goals of therapy, but is neutral about his/her participation. [] Patient is unable to participate in goal setting and plan of care. Thank you for this referral. 
Nola Nicole, OTR/L Time Calculation: 41 mins

## 2020-04-28 NOTE — PROGRESS NOTES
Problem: Mobility Impaired (Adult and Pediatric) Goal: *Acute Goals and Plan of Care (Insert Text) Description: Physical Therapy Goals Initiated 4/13/2020, reevaluated 4/21/2020 and to be accomplished within 7 day(s) 1. Patient will move from supine to sit and sit to supine , scoot up and down and roll side to side in bed with moderate assistance. 2.  Patient will transfer from bed to chair and chair to bed with moderate assistance using the least restrictive device. 3.  Patient will perform sit to stand with moderate assistance. 4.  Patient will ambulate with moderate assistance for 10-25 feet with the least restrictive device. 5.  Assess stairs when appropriate. PLOF: Patient reports she is independent with ADLs and functional mobility. Outcome: Progressing Towards Goal 
 PHYSICAL THERAPY TREATMENT Patient: Esteban Lacy (78 y.o. female) Date: 4/28/2020 Diagnosis: Hip fracture requiring operative repair ([facility] [address] Lawerence Floresita <principal problem not specified> Procedure(s) (LRB): 
RIGHT FEMORAL INTERTROCHANTERIC NAIL INSERTION (Right) 16 Days Post-Op Precautions: Fall, Skin, PWB(RLE) ASSESSMENT: 
Pt found supine HOB elevated willing to participate w/ therapy w/ encouragement. Tx performed w/ OT to maximize safety of pt and staff as well as to facilitate balance and stability. Prior to mobility, pt voiced concerns w/ maintaining an upright position in chair for prolonged periods of time as yesterday, pt experienced an increase in fatigue resulting in great difficulty to return back to bed. Pt educated on importance of gradually improving activity and upright tolerance to return to PLOF. Pt voiced understanding, however also cont to voice concerns w/ an upright position. Pt eventually agreeable to transfer bed to chair w/ understanding of sitting in recliner and returning back to bed by 11:45, just after eating lunch.  Both nurse, CNA, and charge nurse notified of back to bed times. Also notified pt of communicating recommendations and all are in agreement. Pt continues to make slow progression, displaying self-limiting behavior at times. Once an upright position was obtained, pt was better able to maintain balance w/ less prop sitting. Pt stood to RW and amb to chair. Pt better maintained precautions this visit, displaying less shift to right LE w/ a step to gait pattern, however pt did voice increased pain w/ gait this visit. Pt sat in recliner safely, provided all needs in reach, and left safely w/ understanding she will return back to supine post lunch. Pt would most benefit from SPT back to supine when ready to maximize safety incase of added fatigue. Progression toward goals: good [x]      Improving appropriately and progressing toward goals 
[]      Improving slowly and progressing toward goals 
[]      Not making progress toward goals and plan of care will be adjusted PLAN: 
Patient continues to benefit from skilled intervention to address the above impairments. Continue treatment per established plan of care. Discharge Recommendations:  Dimas Mccracken Further Equipment Recommendations for Discharge:  bedside commode and rolling walker SUBJECTIVE:  
Patient stated Yesterday, you guys just left me and no one came to put me back in bed.  OBJECTIVE DATA SUMMARY:  
Critical Behavior: 
Neurologic State: Alert Orientation Level: Oriented to person, Oriented to place Cognition: Follows commands, Decreased attention/concentration Safety/Judgement: Fall prevention Functional Mobility Training: 
Bed Mobility: 
Supine to Sit: Maximum assistance;Assist x2 Scooting: Moderate assistance;Assist x2 Transfers: 
Sit to Stand: Minimum assistance;Assist x2 Stand to Sit: Minimum assistance;Assist x2 Balance: 
Sitting: Intact Standing: Impaired; With support Standing - Static: Fair Standing - Dynamic : Fair Range Of Motion: AROM: Generally decreased, functional 
Ambulation/Gait Training: 
Distance (ft): 6 Feet (ft) Assistive Device: Walker, rolling Ambulation - Level of Assistance: Minimal assistance Gait Abnormalities: Decreased step clearance; Step to gait Right Side Weight Bearing: Partial (%) Base of Support: Center of gravity altered;Shift to left Speed/Shirlene: Slow Step Length: Right shortened;Left shortened Pain: 
Pain level pre-treatment: 0/10 Pain level post-treatment: 0/10 Activity Tolerance:  
Good Please refer to the flowsheet for vital signs taken during this treatment. After treatment:  
[x] Patient left in no apparent distress sitting up in chair 
[] Patient left in no apparent distress in bed 
[x] Call bell left within reach [x] Nursing notified 
[] Caregiver present [x] Chair alarm activated 
[] SCDs applied COMMUNICATION/EDUCATION:  
[x]         Role of Physical Therapy in the acute care setting. [x]         Fall prevention education was provided and the patient/caregiver indicated understanding. [x]         Patient/family have participated as able in working toward goals and plan of care. [x]         Patient/family agree to work toward stated goals and plan of care. []         Patient understands intent and goals of therapy, but is neutral about his/her participation. []         Patient is unable to participate in stated goals/plan of care: ongoing with therapy staff. 
[]         Other: 
 
   
Adalberto Hsu PTA Time Calculation: 39 mins

## 2020-04-28 NOTE — PROGRESS NOTES
Called and informed [facility] of [address] that if pt is stable tomorrow as per Dr. Luis Eduardo Hudson, she is a possible discharge tomorrow. [facility] stated she still has pt's bed and will accept pt. Yobani Arrington, BSN RN Care Management Pager: [phone]

## 2020-04-28 NOTE — PROGRESS NOTES
[facility] 
 
 
CARDIOLOGY PROGRESS NOTE 
RECS: 
 
 
1. Paroxymal atrial fibrillation-- continue low dose metoprolol per parameters. Will discontinue amiodarone as her thyroid function is now near normal. Recent Echo showed preserved EF%. No an ideal  candidate for anticoagulation due to severe anemia requiring multiple transfusions. 2. Acute hypoxic respiratory failure- on high flow O2- [facility] following 3. Elevated troponin- currently chest pain free. Troponin peak 1.87. now down trending. continue  asa,statin and bb.   
4. Elevated D-dimer-  High risk for PE/DVT- consider V/Q scan 5. SOB- multifactorial with severe anemia,  possible aspiration pneumonia. 6. Hyperthyroidism: Patient on methimazole. Treatment per medicine/endocrinology. 7. Severe anemia- noted hgb drop pos op-s/p 5 units PRBC's. GI was consulted 8. AMS- better 9. 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 Right hip fracture s/p ORIF 11. CKD-elevated renal indices. Renal following Discharge planning per medicine. ASSESSMENT: 
Hospital Problems  Date Reviewed: 4/25/2020 Codes Class Noted POA Hyperkalemia ICD-10-CM: E87.5 ICD-9-CM: 276.7  4/25/2020 Unknown Acidosis, metabolic QTJ-24-IR: C40.2 ICD-9-CM: 276.2  4/25/2020 Unknown Aspiration pneumonia ([facility] [address] ICD-10-CM: J69.0 ICD-9-CM: 507.0  4/23/2020 Unknown Leucocytosis ICD-10-CM: M10.637 ICD-9-CM: 288.60  4/23/2020 Unknown Atrial fibrillation [facility]) ICD-10-CM: I48.91 
ICD-9-CM: 427.31  4/21/2020 Unknown Chronic renal disease, stage III (HCC) ICD-10-CM: N18.3 ICD-9-CM: 585.3  4/20/2020 Unknown Anemia ICD-10-CM: D64.9 ICD-9-CM: 285.9  4/20/2020 Unknown Secondary hyperparathyroidism of renal origin [facility]) ICD-10-CM: N25.81 ICD-9-CM: 588.81  4/20/2020 Unknown Hip fracture requiring operative repair [facility]) ICD-10-CM: V42.916C ICD-9-CM: 820.8  4/11/2020 Unknown Essential hypertension, benign ICD-10-CM: I10 
ICD-9-CM: 401.1  Unknown Yes Overview Signed 7/10/2013  6:40 PM by Jarad Flores SUBJECTIVE: 
 
No chest pain Denies shortness of breath. OBJECTIVE: 
 
VS:  
Visit Vitals /68 Pulse 68 Temp 97.5 °F (36.4 °C) Resp 20 Ht 5' (1.524 m) Wt 73 kg (160 lb 15 oz) SpO2 100% BMI 31.43 kg/m² Intake/Output Summary (Last 24 hours) at 4/28/2020 1238 Last data filed at 4/28/2020 8756 Gross per 24 hour Intake  Output 2045 ml Net -2045 ml  
 
TELE: Sinus rhythm General: alert, well developed, anxious, confused and in mild distress HENT: Normocephalic, atraumatic. Normal external eye. Neck :  no bruit, no JVD Cardiac:  irregular rate and rhythm, S1, S2 normal, no murmur, click, rub or gallop Lungs: diminished breath sounds  bilaterally Abdomen: Soft, nontender, no masses Extremities:  No c/c/e, peripheral pulses present Labs: Results:  
   
Chemistry Recent Labs  
  04/28/20 
0237 04/27/20 
1030 04/25/20 
1516 * 149* 104*  145 135* K 4.9 4.8 5.2  110 103 CO2 28 29 23 BUN 72* 79* 89* CREA 1.55* 1.64* 2.03* CA 9.3 9.0 9.5 AGAP 5 6 9 BUCR 46* 48* 44* CBC w/Diff Recent Labs  
  04/27/20 
1913 04/27/20 
1030 04/26/20 
2317 HGB 8.4* 8.2* 7.9*  
HCT 28.1* 26.7* 25.2* Cardiac Enzymes No results for input(s): CPK, CKND1, MICHELLE in the last 72 hours. No lab exists for component: [facility] [place] Coagulation No results for input(s): PTP, INR, APTT, INREXT, INREXT in the last 72 hours. Lipid Panel Lab Results Component Value Date/Time Cholesterol, total 182 01/16/2020 01:39 PM  
 HDL Cholesterol 55 01/16/2020 01:39 PM  
 LDL, calculated 99 01/16/2020 01:39 PM  
 Triglyceride 140 01/16/2020 01:39 PM  
 CHOL/HDL Ratio 3.3 01/16/2020 01:39 PM  
  
BNP No results for input(s): BNPP in the last 72 hours. Liver Enzymes No results for input(s): TP, ALB, TBIL, AP, SGOT, GPT in the last 72 hours. No lab exists for component: DBIL Thyroid Studies Lab Results Component Value Date/Time  TSH 0.08 (L) 04/28/2020 02:37 AM  
    
 
 
 
Shiela Nugent MD

## 2020-04-28 NOTE — ROUTINE PROCESS
Bedside and Verbal shift change report given to Francisco Bates RN (oncoming nurse) by Bo López (offgoing nurse). Report included the following information SBAR, Kardex and Recent Results.

## 2020-04-28 NOTE — PROGRESS NOTES
Interdisciplinary Round Note Patient Information: Patient Information: Elisa Badillo [facility] Reason for Admission: Hip fracture requiring operative repair ([address] Rahat Silva Attending Provider:   Renetta John MD 
 Past Medical History:  
Past Medical History:  
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   
 Nonspecific abnormal electrocardiogram (ECG) (EKG)  Obesity, unspecified  Other and unspecified hyperlipidemia  Renal insufficiency Hospital day: 17 
Estimated discharge date: 4/29/30 RRAT Score: Medium Risk [facility] Total Score 3 Patient Length of Stay (>5 days = 3)  
 5 Pt. Coverage (Medicare=5 , Medicaid, or Self-Pay=4) 6 Charlson Comorbidity Score (Age + Comorbid Conditions) Criteria that do not apply:  
 Has Seen PCP in Last 6 Months (Yes=3, No=0) . Living with Significant Other. Assisted Living. LTAC. SNF. or  
Rehab  
 IP Visits Last 12 Months (1-3=4, 4=9, >4=11) Goals for Today: d/c poon, decrease oxygen flow, pt/ot VITAL SIGNS Vitals:  
 04/28/20 0010 04/28/20 5866 04/28/20 0815 04/28/20 1207 BP: 115/81 111/62 123/53 114/68 Pulse: 60 (!) 58 (!) 58 68 Resp: 17 16 22 20 Temp: 97.5 °F (36.4 °C) 98.3 °F (36.8 °C) 98.4 °F (36.9 °C) 97.5 °F (36.4 °C) SpO2: 100% 98% 100% 100% Weight:      
Height:      
 
 
 
 Lines, Drains, & Airways [REMOVED] Peripheral IV 04/11/20 Right Antecubital-Site Assessment: Clean, dry, & intact [REMOVED] Peripheral IV 04/20/20 Right;Upper Arm-Site Assessment: Clean, dry, & intact [REMOVED] Peripheral IV 04/21/20 Left;Posterior Forearm-Site Assessment: Clean, dry, & intact [REMOVED] Peripheral IV 04/22/20 Anterior;Left;Proximal Forearm-Site Assessment: Clean, dry, & intact [REMOVED] Peripheral IV 04/25/20 Right Hand-Site Assessment: Clean, dry, & intact, Painful [REMOVED] Peripheral IV 04/26/20 Left Hand-Site Assessment: Clean, dry, & intact, Painful [REMOVED] Peripheral IV 04/27/20 Right Antecubital-Site Assessment: Clean, dry, & intact [REMOVED] Midline Catheter 04/13/20 Right;Brachial-Site Assessment: Clean, dry, & intact VTE Prophylaxis Venous Foot Pump: Bilateral 
     Sequential/Intermittent Compression Device: Bilateral 
     Graduated Compression Stockings: Bilateral 
     Patient Refused VTE Prophylaxis: Yes Intake and Output:  
04/26 1901 - 04/28 0700 In: 240 [P.O.:240] [facility]: 3245 [facility] No intake/output data recorded. Current Diet: DIET NUTRITIONAL SUPPLEMENTS Lunch, Dinner; MAGIC CUPS 
DIET NUTRITIONAL SUPPLEMENTS Breakfast, Lunch; ENSURE ENLIVE 
DIET RENAL WITH OPTIONS 100-80-80; Regular Abdominal  
Last Bowel Movement Date: 04/28/20 Stool Appearance: Loose Abdominal Assessment: Soft Appetite: Poor Bowel Sounds: Active Recent Glucose Results:  
Lab Results Component Value Date/Time  (H) 04/28/2020 02:37 AM  
 
 
  
IV Antibiotics? Yes Activity Level: Activity Level: Up with Assistance Needs assistance with ADLs: yes PT Consult Status: YES Current Immunizations: There is no immunization history on file for this patient. Recommendations:  
Discharge Disposition: SNF Medication Reconciliation Completed: YES Cardiac/Pulmonary Rehab Ordered:  NO 
 
Needs for Discharge: no  Recommendations from IDR team:  
  
 
Natalia Alpers, BSN RN Care Management Pager: [phone]

## 2020-04-28 NOTE — ROUTINE PROCESS
Bedside and Verbal shift change report given to Sidney Conn RN (oncoming nurse) by Ligia Hemphill RN (offgoing nurse). Report included the following information SBAR, Kardex, Intake/Output, MAR and Cardiac Rhythm sinus rhythm.

## 2020-04-29 NOTE — PROGRESS NOTES
Problem: Self Care Deficits Care Plan (Adult) Goal: *Acute Goals and Plan of Care (Insert Text) Description: Occupational Therapy Goals Initiated 4/13/2020. Pt re-evaluated 4/21/2020 and 4/28/2020. Continue all goals to achieve within 7 day(s). 1.  Patient will perform bed mobility in preparation for selfcare with moderate assist.  
2.  Patient will perform functional activity seated EOB for 4-7 minutes with supervision/setup and G balance. 3.  Patient will perform lower body dressing with moderate assist using AE prn. 
4.  Patient will perform toilet transfers with moderate assist. 
5.  Patient will perform all aspects of toileting with moderate assist. 
6.  Patient will participate in upper extremity therapeutic exercise/activities with minimal assistance for 8 minutes to increase ROM/strength for selfcare tasks. 7.  Patient will utilize energy conservation techniques during functional activities with verbal cues. Prior Level of Function: Patient lives with daughter in 2 story condo, 8 steps to bedroom and was independent with self-care and functional mobility PTA. Patient reports having shower chair at home Outcome: Progressing Towards Goal 
 OCCUPATIONAL THERAPY TREATMENT Patient: Vasquez Downey (32 y.o. female) Date: 4/29/2020 Diagnosis: Hip fracture requiring operative repair ([facility] [address] Erin Christopher <principal problem not specified> Procedure(s) (LRB): 
RIGHT FEMORAL INTERTROCHANTERIC NAIL INSERTION (Right) 17 Days Post-Op Precautions: Fall, Skin, PWB(RLE) Chart, occupational therapy assessment, plan of care, and goals were reviewed. ASSESSMENT: 
Returned for continued functional transfer training w/PT for return to supine from chair. Noted untouched lunch on tray table. Pt states that she doesn't want to eat. Pt and gown soiled. Pt tolerates standing for incontinent care.  Continues to require max verbal/tactile cues for hand placement w/functional tranfers. Pt w/increase c/o pain w/standing activity. Pt requires Max/Total Assist w/LB dressing task, donning/doffing socks. Increase time and max verbal/tactile cues for safety w/RW and sequencing functional transfer to EOB. Pt requires 2 person assist w/return to supine. Provided chilled Ensure for pt. Pt exhibiting self-limiting behaviors as evidenced by pt requires vc's to reach for cup to perform self-feeding task. Progression toward goals: 
[]          Improving appropriately and progressing toward goals [x]          Improving slowly and progressing toward goals 
[]          Not making progress toward goals and plan of care will be adjusted PLAN: 
Patient continues to benefit from skilled intervention to address the above impairments. Continue treatment per established plan of care. Discharge Recommendations:  Dimas Mccracken Further Equipment Recommendations for Discharge:  TBD by next level of care SUBJECTIVE:  
Patient stated You left me here too long.  reference OOB in chair OBJECTIVE DATA SUMMARY:  
Cognitive/Behavioral Status: 
Neurologic State: Alert Orientation Level: Disoriented to time Cognition: Poor safety awareness, Follows commands Safety/Judgement: Fall prevention Functional Mobility and Transfers for ADLs: 
 Bed Mobility: 
Sit to Supine: Maximum assistance;Assist x2 Transfers: 
Sit to Stand: Minimum assistance;Assist x2 Chair to bed: Minimum assistance;Assist x2 Balance: 
Sitting: Intact Standing: Impaired; With support Standing - Static: Fair Standing - Dynamic : Poor ADL Intervention: 
Grooming Position Performed: Seated in chair Washing Face: Set-up Washing Hands: Set-up Upper Body Dressing Assistance Hospital Gown: Moderate assistance Lower Body Dressing Assistance Socks: Total assistance (dependent) Position Performed: Seated in chair Toileting Toileting Assistance: Maximum assistance Bladder Hygiene: Maximum assistance Bowel Hygiene: Maximum assistance Clothing Management: Maximum assistance Pain: 
Pain level pre-treatment: 0/10 Pain level post-treatment: 0/10 Pt c/o R hip pain w/standing activity. Activity Tolerance:   
Poor Please refer to the flowsheet for vital signs taken during this treatment. After treatment:  
[]  Patient left in no apparent distress sitting up in chair 
[x]  Patient left in no apparent distress in bed 
[x]  Call bell left within reach 
[]  Nursing notified 
[]  Caregiver present 
[]  Bed alarm activated COMMUNICATION/EDUCATION:  
[] Role of Occupational Therapy in the acute care setting 
[] Home safety education was provided and the patient/caregiver indicated understanding. [] Patient/family have participated as able in working towards goals and plan of care. [] Patient/family agree to work toward stated goals and plan of care. [x] Patient understands intent and goals of therapy, but is neutral about his/her participation. [] Patient is unable to participate in goal setting and plan of care. Thank you for this referral. 
JANEL Moore Time Calculation: 27 mins

## 2020-04-29 NOTE — PROGRESS NOTES
conducted a Follow up consultation and Spiritual Assessment for Cece Hernandez, who is a 81 y.o.,female. The  provided the following Interventions: 
Patient was quietly resting when I entered her room and she easily aroused. I expressed to her my desire to know why she has refused to receive communion for the past weeks now Listened empathically as she shared that she just felt she's not ready because she is feeling hurt and resentful by not being able to be with her family when she needed them because they are not allowed to come and visit her in the hospital. 
Offered an Act of spiritual communion prayer, compassionate word of support and assurance of continued prayer on patient's behalf. Chart reviewed. The following outcomes were achieved: 
 
Patient denies feeling that God has abandoned her. She remains faithful despite her ill-feelings toward the hospital policy due to [id number]. Patient expressed gratitude for 's visit. Assessment: 
There are no further spiritual or Church issues which require Spiritual Care Services interventions at this time. Plan: 
Chaplains will continue to follow and will provide pastoral care on an as needed/requested basis.  recommends bedside caregivers page  on duty if patient shows signs of acute spiritual or emotional distress. [facility], [address]  
[phone]

## 2020-04-29 NOTE — PROGRESS NOTES
[facility] 
 
 
CARDIOLOGY PROGRESS NOTE 
RECS: 
 
 
1. Paroxymal atrial fibrillation-- continue low dose metoprolol per parameters. Amiodarone d/tianna on 4/28/2020 as her thyroid function is now near normal. Recent Echo showed preserved EF%. No an ideal  candidate for anticoagulation due to severe anemia requiring multiple transfusions. 2. Acute hypoxic respiratory failure- on O2-bilateral infiltrates on chest x-ray. [facility] following 3. Elevated troponin- currently chest pain free. Troponin peak 1.87. now down trending. continue  asa,statin and bb.   
4. Elevated D-dimer-  High risk for PE/DVT- consider V/Q scan 5. SOB- multifactorial with severe anemia,  possible aspiration pneumonia. 6. Hyperthyroidism: Patient on methimazole. Treatment per medicine/endocrinology. 7. Severe anemia- noted hgb drop pos op-s/p 5 units PRBC's. GI was consulted 8. AMS- better 9. 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 Right hip fracture s/p ORIF 11. CKD-elevated renal indices. Slowly improving. Renal following Continue supportive care. Discharge planning per medicine. ASSESSMENT: 
Hospital Problems  Date Reviewed: 4/25/2020 Codes Class Noted POA Hyperkalemia ICD-10-CM: E87.5 ICD-9-CM: 276.7  4/25/2020 Unknown Acidosis, metabolic CATIA-67-VM: R76.0 ICD-9-CM: 276.2  4/25/2020 Unknown Aspiration pneumonia ([facility] [address] ICD-10-CM: J69.0 ICD-9-CM: 507.0  4/23/2020 Unknown Leucocytosis ICD-10-CM: Z82.626 ICD-9-CM: 288.60  4/23/2020 Unknown Atrial fibrillation [facility]) ICD-10-CM: I48.91 
ICD-9-CM: 427.31  4/21/2020 Unknown Chronic renal disease, stage III (HCC) ICD-10-CM: N18.3 ICD-9-CM: 585.3  4/20/2020 Unknown Anemia ICD-10-CM: D64.9 ICD-9-CM: 285.9  4/20/2020 Unknown Secondary hyperparathyroidism of renal origin [facility]) ICD-10-CM: N25.81 ICD-9-CM: 588.81  4/20/2020 Unknown Hip fracture requiring operative repair [facility]) ICD-10-CM: P55.458H ICD-9-CM: 820.8  4/11/2020 Unknown Essential hypertension, benign ICD-10-CM: I10 
ICD-9-CM: 401.1  Unknown Yes Overview Signed 7/10/2013  6:40 PM by Bhavana Reyez SUBJECTIVE: 
 
No chest pain Denies shortness of breath. OBJECTIVE: 
 
VS:  
Visit Vitals /52 (BP 1 Location: Right arm, BP Patient Position: At rest) Pulse 62 Temp 97.1 °F (36.2 °C) Resp 20 Ht 5' (1.524 m) Wt 73 kg (160 lb 15 oz) SpO2 93% BMI 31.43 kg/m² Intake/Output Summary (Last 24 hours) at 4/29/2020 9980 Last data filed at 4/29/2020 4601 Gross per 24 hour Intake  Output 100 ml Net -100 ml TELE: Sinus rhythm/intermittent sinus bradycardia General: alert, well developed, anxious, confused and in mild distress HENT: Normocephalic, atraumatic. Normal external eye. Neck :  no bruit, no JVD Cardiac:  irregular rate and rhythm, S1, S2 normal, no murmur, click, rub or gallop Lungs: diminished breath sounds  bilaterally Abdomen: Soft, nontender, no masses Extremities:  No c/c/e, peripheral pulses present Labs: Results:  
   
Chemistry Recent Labs  
  04/29/20 0153 04/28/20 
0237 04/27/20 
1030 * 100* 149*  142 145  
K 5.2 4.9 4.8  
 109 110 CO2 28 28 29 BUN 72* 72* 79* CREA 1.38* 1.55* 1.64* CA 9.1 9.3 9.0 AGAP 5 5 6 BUCR 52* 46* 48* CBC w/Diff Recent Labs  
  04/29/20 0153 04/27/20 
1913 04/27/20 
1030 HGB 7.9* 8.4* 8.2* HCT 26.6* 28.1* 26.7* Cardiac Enzymes No results for input(s): CPK, CKND1, MICHELLE in the last 72 hours. No lab exists for component: Jenny Inderjit Coagulation No results for input(s): PTP, INR, APTT, INREXT, INREXT in the last 72 hours. Lipid Panel Lab Results Component Value Date/Time  Cholesterol, total 182 01/16/2020 01:39 PM  
 HDL Cholesterol 55 01/16/2020 01:39 PM  
 LDL, calculated 99 01/16/2020 01:39 PM  
 Triglyceride 140 01/16/2020 01:39 PM  
 CHOL/HDL Ratio 3.3 01/16/2020 01:39 PM  
  
BNP No results for input(s): BNPP in the last 72 hours. Liver Enzymes No results for input(s): TP, ALB, TBIL, AP, SGOT, GPT in the last 72 hours. No lab exists for component: DBIL Thyroid Studies Lab Results Component Value Date/Time TSH 0.08 (L) 04/28/2020 02:37 AM  
    
 
 
 
TERESA Merchant I have independently evaluated and examined the patient. All relevant labs and testing data are reviewed. Care plan discussed and updated after review.  
Gumaro Moses MD

## 2020-04-29 NOTE — PROGRESS NOTES
Transition of Care Plan to SNF/Rehab 
 
SNF/Rehab Transition: 
Patient has been accepted to [facility] and meets criteria for admission. Patient will  be transported by [facility]  and expected to leave at 5:00pm. 
 
Communication to Patient/Family: Met with patient and daughter (identified care giver) and they are agreeable to the transition plan. Communication to SNF/Rehab: 
Bedside RN, has been notified of the transition plan to the facility and to call report (phone number [phone]). Discharge information has been updated on the AVS. And communicated to facility via [facility], or CC link. SNF/Rehab Transition: PCP/Specialist:  
 
Nursing Please include all hard scripts for controlled substances, med rec and dc summary, and AVS in packet. Reviewed and confirmed with facility representative, James  at [facility] they can manage the patient care needs for the following:  
 
Lit Ramirez with (X) only those applicable: 
 
Medication: 
[x]  Medications will be available at the facility []  IV Antibiotics   Controlled Substance - hard copy to be sent with patient  
[]  Weekly Labs Documents: 
[x] Hard RX  Number sent  
[] MAR 
[] Kardex [x] AVS 
[] Wound care note [x]Transfer Summary/Discharge Summary Equipment: 
[]  CPAP/BiPAP []  Wound Vacuum [x]  Mathur or Urinary Device 
[]  PICC/Central Line 
[]  Nebulizer 
[]  Ventilator Treatment: 
[]Isolation (for MRSA, VRE, etc.) []Surgical Drain Management []Tracheostomy Care 
[]Dressing Changes []Dialysis with transportation and chair time  Center Mode of tranpsort []PEG Care 
[x]Oxygen []Daily Weights for Heart Failure Dietary: 
[]Any diet limitations []Tube Feedings []Total Parenteral Management (TPN) Eligible for Medicaid Long Term Services and Supports Yes: 
[] Eligible for medical assistance or will become eligible within 180 days and LTSS completed. [] Provider/Patient and/or support system has requested screening. 
[] LTSS copy provided to patient or responsible party, . [x] LTSS unavailable at discharge will send once processed to SNF provider. 
[] LTSS  unavailable at discharged mailed to patient 
[] LTSS performed by outside agency  on  with tracking number No:  
[x] Private pay and is not financially eligible for Medicaid within the next 180 days. [] Reside out-of-state. [] A residents of a state owned/operated facility that is licensed 
by [facility] or [facility] 
[] Enrollment in [facility] hospice services 
[]  [facility] 
[] Patient /Family declines to have screening completed or provide financial information for screening Uploaded in [facility]. Financial Resources: 
Medicaid   
[] Initiated and application pending  
[] Full coverage Advanced Care Plan: 
[]Surrogate Decision Maker of Care 
[]POA [x]Communicated Code Status/ sent  (DDNR, POST, Advance Directive) Other

## 2020-04-29 NOTE — PROGRESS NOTES
[facility]ed to [facility] at number [phone] and was sent to [employer]. Voicemail was left and awaiting a call back. Report called to [employer] at [phone].

## 2020-04-29 NOTE — PROGRESS NOTES
Problem: Self Care Deficits Care Plan (Adult) Goal: *Acute Goals and Plan of Care (Insert Text) Description: Occupational Therapy Goals Initiated 4/13/2020. Pt re-evaluated 4/21/2020 and 4/28/2020. Continue all goals to achieve within 7 day(s). 1.  Patient will perform bed mobility in preparation for selfcare with moderate assist.  
2.  Patient will perform functional activity seated EOB for 4-7 minutes with supervision/setup and G balance. 3.  Patient will perform lower body dressing with moderate assist using AE prn. 
4.  Patient will perform toilet transfers with moderate assist. 
5.  Patient will perform all aspects of toileting with moderate assist. 
6.  Patient will participate in upper extremity therapeutic exercise/activities with minimal assistance for 8 minutes to increase ROM/strength for selfcare tasks. 7.  Patient will utilize energy conservation techniques during functional activities with verbal cues. Prior Level of Function: Patient lives with daughter in 2 story condo, 8 steps to bedroom and was independent with self-care and functional mobility PTA. Patient reports having shower chair at home Outcome: Progressing Towards Goal 
 OCCUPATIONAL THERAPY TREATMENT Patient: Farheen Rausch (33 y.o. female) Date: 4/29/2020 Diagnosis: Hip fracture requiring operative repair ([facility] [address] Kamille Lapping <principal problem not specified> Procedure(s) (LRB): 
RIGHT FEMORAL INTERTROCHANTERIC NAIL INSERTION (Right) 17 Days Post-Op Precautions: Fall, Skin, PWB(RLE) Chart, occupational therapy assessment, plan of care, and goals were reviewed. ASSESSMENT: 
Pt co-treated w/PT to maximize safety w/functional transfer training. Pt requires encouragement for OOB activity 2/2 c/o pain in chair and left for too long. Reassured pt, therapy would return after lunch for return to supine.  Pt requires increase time and 2 person assist w/bed mobility to maneuver to EOB. Pt requires max verbal/tactile cues for hand placement w/functional transfers. Poor safety awareness requires assist w/RW mgt and functional transfers. Poor dynamic standing balance requires assist w/marcello-care and clothing mgt. Pt performs simple aDL grooming tasks at chair level w/set-up. Pt left in chair w/all items within reach. Pt appears depressed. Progression toward goals: 
[]          Improving appropriately and progressing toward goals [x]          Improving slowly and progressing toward goals 
[]          Not making progress toward goals and plan of care will be adjusted PLAN: 
Patient continues to benefit from skilled intervention to address the above impairments. Continue treatment per established plan of care. Discharge Recommendations:  Dimsa Mccracken Further Equipment Recommendations for Discharge:  TBD by next level of care SUBJECTIVE:  
Patient stated Jessica Raf just always get me up and leave me here. I'm cold and in pain and nobody does anything.  OBJECTIVE DATA SUMMARY:  
Cognitive/Behavioral Status: 
Neurologic State: Alert Orientation Level: Disoriented to time Cognition: Poor safety awareness, Follows commands Safety/Judgement: Fall prevention Functional Mobility and Transfers for ADLs: 
 Bed Mobility: 
Supine to Sit: Moderate assistance;Assist x2 Transfers: 
Sit to Stand: Moderate assistance;Assist x2 Bed to Chair: Moderate assistance;Assist x2 Toilet Transfer : Moderate assistance;Assist x2(EOB --> BSC xfer w/RW) Balance: 
Sitting: Intact Standing: Impaired; With support Standing - Static: Fair Standing - Dynamic : Poor ADL Intervention: 
Grooming Position Performed: Seated in chair Washing Face: Set-up Washing Hands: Set-up Toileting Toileting Assistance: Maximum assistance Bladder Hygiene: Maximum assistance Bowel Hygiene: Maximum assistance Clothing Management: Maximum assistance UE Therapeutic Exercises: AROM BUE elbow flexion/extension AROM BUE shoulder horizontal ab/adduction Pain: 
Pain level pre-treatment: 0/10 Pain level post-treatment: 0/10 Pt c/o R hip pain e/standing activity Activity Tolerance:   
Fair Please refer to the flowsheet for vital signs taken during this treatment. After treatment:  
[x]  Patient left in no apparent distress sitting up in chair 
[]  Patient left in no apparent distress in bed 
[x]  Call bell left within reach 
[]  Nursing notified 
[]  Caregiver present [x]  Chair alarm activated COMMUNICATION/EDUCATION:  
[] Role of Occupational Therapy in the acute care setting 
[] Home safety education was provided and the patient/caregiver indicated understanding. [] Patient/family have participated as able in working towards goals and plan of care. [x] Patient/family agree to work toward stated goals and plan of care. [] Patient understands intent and goals of therapy, but is neutral about his/her participation. [] Patient is unable to participate in goal setting and plan of care. Thank you for this referral. 
JANEL Lopez Time Calculation: 27 mins

## 2020-04-29 NOTE — DISCHARGE SUMMARY
Discharge Summary Patient: Esdras Sam MRN: [id number]  CSN: [id number] YOB: 1935  Age: 81 y.o. Sex: female DOA: 4/11/2020 LOS:  LOS: 18 days   Discharge Date: 4/29/2020 Admission Diagnoses: Hip fracture requiring operative repair ([address] Parker Bolaños Discharge Diagnoses:   
Displaced intertrochanteric right femur fracture status post ORIF Paroxysmal atrial fibrillation, currently NSR Troponin elevation secondary to above, doubt ACS 
RLL pneumonia suspect secondary to aspiration Acute hypoxic respiratory failure, resolved Multinodular goiter and Graves' disease Hypertension CKD 3 with secondary hyperparathyroidism Anemia, multifactorial   
Moderate pulmonary HTN 
Obesity Acute encephalopathy, metabolic/toxic, improved Discharge Condition: Stable PHYSICAL EXAM 
Visit Vitals /52 (BP 1 Location: Right arm, BP Patient Position: At rest) Pulse 62 Temp 97.1 °F (36.2 °C) Resp 20 Ht 5' (1.524 m) Wt 73 kg (160 lb 15 oz) SpO2 93% BMI 31.43 kg/m² General: In NAD. HEENT: NCAT. Sclerae anicteric. Lungs:  Clear, no wheezes. Effort nonlabored. Heart:  RRR. Abdomen: Soft, NTTP. Extremities: No c/c/e Psych:   Good insight. Not anxious or agitated. Neurologic:  Awake, alert. Neuro Hospital Course:  
See admission H&P for full details of HPI. Patient was admitted to the hospital initially on 4/11/2020 after presenting with a fall and complaint of right leg/hip pain. On arrival to the emergency department she was noted to have a right hip fracture and was admitted for further evaluation and treatment. Orthopedic surgery evaluation was obtained and patient subsequently underwent ORIF of her right femur fracture 4/12/2020. Endocrinology evaluation was obtained due to abnormal thyroid function studies. She had been previously been followed on the outpatient basis for hyperthyroidism and multinodular goiter. Recommendation is that patient may need surgical intervention for this but this can be done on outpatient basis once her acute medical issues resolved. In the interim, patient is on methimazole 10 mg daily per endocrinology recommendations. Patient had some rectal bleeding postoperatively and this is possibly secondary to be hemorrhoidal in nature. She did receive transfusion of packed red blood cells and hemoglobin/hematocrit have been monitored since. There is been no evidence for any active bleeding and H/H has been stable in the interim. Patient has been treated with Anusol suppositories and has had no further active bleeding. No recommendation for endoscopic intervention has been made and management has been conservative. Urology was asked to see the patient postoperatively due to urinary retention. Patient had failed a voiding trial and required Mathur placement. Mathur catheter was continued for about a week but was discontinued 2 to 3 days ago and patient has been voiding without difficulty since that time. A cardiology evaluation was requested on 4/17 due to development of atrial fibrillation. Patient has not felt to be a candidate for anticoagulation therapy due to rectal bleeding and anemia requiring transfusion. She spontaneously converted to normal sinus rhythm and has been maintaining this without any further issue. Troponin was slightly elevated and indeterminate but is felt to be due to demand ischemia and no acute intervention has been felt to be necessary. Patient is noted to be quite deconditioned and has had physical and occupational therapy evaluations. Recommendation is for skilled nursing placement and patient has been accepted to a local facility. She is medically stable for transition there today for continued care. Consults:  
Orthopedic Surgery Cardiology Endocrinology [facility] Nephrology Gastroenterology Urology Significant Diagnostic Studies: XR R hip: 
FINDINGS: 4 images of the right hip obtained intraoperatively. Fixation hardware 
of the right hip fracture. Displacement of the lesser trochanter. Hardware 
appears intact. 
  
IMPRESSION IMPRESSION: As above. 
  
 
 
 
CXR: 
IMPRESSION: 
  
Similar lung opacities left greater than right. 
  
Stable tracheal deviation to the left likely secondary to enlarged right thyroid 
gland CT head: 
IMPRESSION: 
1. No acute intracranial hemorrhage, mass effect or midline structural shift. 
  
Follow-up with MRI is recommended if acute ischemia is suspected given 
limitations of CT. Thyroid uptake scan: 
IMPRESSION: 
  
1. Elevated uptake values in keeping with hyperthyroidism. Similar heterogeneous 
scan appearance of the remnant enlarged right thyroid gland. Overall findings 
suggestive of toxic multinodular goiter. Lower extremity venous PVL: 
Interpretation Summary  
 
  
· No evidence of acute deep vein thrombosis in the right common femoral, superficial femoral, popliteal, posterior tibial, and peroneal veins. The veins were imaged in the transverse and longitudinal planes. The vessels showed normal color filling and compressibility. Doppler interrogation showed phasic and spontaneous flow. · No evidence of acute deep vein thrombosis in the left common femoral, superficial femoral, popliteal, posterior tibial, and peroneal veins. The veins were imaged in the transverse and longitudinal planes. The vessels showed normal color filling and compressibility. Doppler interrogation showed phasic and spontaneous flow. No DVT demonstrated bilateral lower extremity. Discharge Medications:    
Current Discharge Medication List  
 
Current Discharge Medication List  
  
START taking these medications Details  
atorvastatin (LIPITOR) 40 mg tablet Take 1 Tab by mouth nightly.  
Qty: 30 Tab, Refills: 0  
  
b complex-vitamin c-folic acid (NEPHROCAPS) 1 mg capsule Take 1 Cap by mouth daily. Qty: 30 Cap, Refills: 0  
  
calcitRIOL (ROCALTROL) 0.25 mcg capsule Take 1 Cap by mouth every Monday, Wednesday, Friday. Qty: 12 Cap, Refills: 0  
  
polyethylene glycol (MIRALAX) 17 gram packet Take 1 Packet by mouth daily. Qty: 30 Packet, Refills: 0  
  
metoprolol tartrate (LOPRESSOR) 25 mg tablet Take 0.5 Tabs by mouth every twelve (12) hours. Qty: 15 Tab, Refills: 0  
  
methIMAzole (TAPAZOLE) 10 mg tablet Take 1 Tab by mouth daily. Qty: 30 Tab, Refills: 0  
  
pantoprazole (PROTONIX) 40 mg tablet Take 1 Tab by mouth Before breakfast and dinner. Qty: 60 Tab, Refills: 0  
  
cefpodoxime (VANTIN) 200 mg tablet Take 1 Tab by mouth every twenty-four (24) hours for 5 days. Qty: 5 Tab, Refills: 0  
  
metroNIDAZOLE (FLAGYL) 500 mg tablet Take 1 Tab by mouth every twelve (12) hours for 5 days. Qty: 10 Tab, Refills: 0 CONTINUE these medications which have CHANGED Details LORazepam (ATIVAN) 1 mg tablet Take 0.5 Tabs by mouth two (2) times daily as needed for Anxiety. Max Daily Amount: 1 mg. Qty: 6 Tab, Refills: 0 Associated Diagnoses: Anxiety CONTINUE these medications which have NOT CHANGED Details  
allopurinoL (ZYLOPRIM) 100 mg tablet Take 100 mg by mouth daily. aspirin 81 mg tablet Take 81 mg by mouth. STOP taking these medications  
  
 amLODIPine (NORVASC) 5 mg tablet Comments:  
Reason for Stopping:   
   
 simvastatin (ZOCOR) 20 mg tablet Comments:  
Reason for Stopping:   
   
 furosemide (LASIX) 20 mg tablet Comments:  
Reason for Stopping:   
   
 losartan (COZAAR) 50 mg tablet Comments:  
Reason for Stopping: FOLIC ACID/VITAMIN B COMP W-C (VIKTOR-LUCERO PO) Comments:  
Reason for Stopping:   
   
 omeprazole (PRILOSEC) 20 mg capsule Comments:  
Reason for Stopping:   
   
  
 
 
 
Activity: As tolerated Diet: Cardiac Disposition: Home.  
 
Follow-up: Patient needs follow-up with [facility] (Dr. Ana Felipe) in 6-8 weeks or as directed. Minutes spent on discharge: > 30 minutes spent coordinating this discharge. Karen Gilmore.  Kishore Charles MD

## 2020-04-29 NOTE — PROGRESS NOTES
Problem: Mobility Impaired (Adult and Pediatric) Goal: *Acute Goals and Plan of Care (Insert Text) Description: Physical Therapy Goals Initiated 4/13/2020, reevaluated 4/21/2020, reevaluated 4/29/2020 and to be accomplished within 7 day(s) 1. Patient will move from supine to sit and sit to supine , scoot up and down and roll side to side in bed with moderate assistance. 2.  Patient will transfer from bed to chair and chair to bed with moderate assistance using the least restrictive device. 3.  Patient will perform sit to stand with moderate assistance. 4.  Patient will ambulate with moderate assistance for 10-25 feet with the least restrictive device. 5.  Assess stairs when appropriate. PLOF: Patient reports she is independent with ADLs and functional mobility. 4/29/2020 1440 by Jose Maria Smith PT Outcome: Progressing Towards Goal 
 
PHYSICAL THERAPY TREATMENT Patient: Izabella Vazquez (89 y.o. female) Date: 4/29/2020 Diagnosis: [facility] requiring operative repair [facility] Ian Siddiqui <principal problem not specified> Procedure(s) (LRB): 
RIGHT FEMORAL INTERTROCHANTERIC NAIL INSERTION (Right) 17 Days Post-Op Precautions: Fall, Skin, PWB(right LE) ASSESSMENT: 
Pt seen for PT treatment session and she requests to return to bed. She demos increased ease of sit to stand transfers from chair, but continues to need verbal cues to push from arm rests. Pt stood and was incontinent of urine; helped to facilitate standing balance with RW to get cleaned up. Patient with limited tolerance to gait, reports increased right hip pain, cued to reduce weight shift onto right LE in order to maintain WB precaution. Max verbal cues provided for sequencing, management of RW, and safety during gait with 2 person assist. Pt returned to bed with max A x2 and repositioned for comfort. Progression toward goals:  
[]      Improving appropriately and progressing toward goals [x]      Improving slowly and progressing toward goals 
[]      Not making progress toward goals and plan of care will be adjusted PLAN: 
Patient continues to benefit from skilled intervention to address the above impairments. Continue treatment per established plan of care. Discharge Recommendations:  Dimas Mccracken Further Equipment Recommendations for Discharge:  TBD SUBJECTIVE:  
Patient stated I am trying OBJECTIVE DATA SUMMARY:  
Critical Behavior: 
Neurologic State: Alert Orientation Level: Disoriented to time Cognition: Poor safety awareness, Follows commands Safety/Judgement: Fall prevention Functional Mobility Training: 
Bed Mobility: 
 
Supine to Sit: Moderate assistance;Assist x2 Sit to Supine: Maximum assistance;Assist x2 Transfers: 
Sit to Stand: Minimum assistance;Assist x2 Balance: 
Sitting: Intact Standing: Impaired; With support Standing - Static: Fair Standing - Dynamic : Poor Ambulation/Gait Training: 
Distance (ft): (6) Assistive Device: Walker, rolling Ambulation - Level of Assistance: Minimal assistance;Assist x2; Moderate assistance Gait Abnormalities: Decreased step clearance; Step to gait Base of Support: Narrowed Speed/Shirlene: Pace decreased (<100 feet/min); Shuffled Step Length: Right shortened;Left shortened Pain: 
Pain level pre-treatment: -/10 pt does not report numerical value Pain level post-treatment: -/10 Pain Intervention(s): Medication (see MAR); Rest, Ice, Repositioning Response to intervention: Nurse notified, See doc flow Activity Tolerance:  
Fair Please refer to the flowsheet for vital signs taken during this treatment. After treatment:  
[] Patient left in no apparent distress sitting up in chair 
[x] Patient left in no apparent distress in bed 
[x] Call bell left within reach 
[] Nursing notified 
[] Caregiver present 
[] Bed alarm activated 
[] SCDs applied COMMUNICATION/EDUCATION:  
 []         Role of Physical Therapy in the acute care setting. []         Fall prevention education was provided and the patient/caregiver indicated understanding. [x]         Patient/family have participated as able in working toward goals and plan of care. []         Patient/family agree to work toward stated goals and plan of care. []         Patient understands intent and goals of therapy, but is neutral about his/her participation. []         Patient is unable to participate in stated goals/plan of care: ongoing with therapy staff. 
[]         Other: 
 
   
Jenny Alvarez, PT Time Calculation: 25 mins

## 2020-04-29 NOTE — PROGRESS NOTES
Progress Note Diana Bloodgood 
81 y.o. Admit Date: 4/11/2020 Active Problems: 
  Essential hypertension, benign () POA: Yes Overview: CONTROLLED Hip fracture requiring operative repair [facility]) (4/11/2020) POA: Unknown [facility] (4/20/2020) POA: Unknown Anemia (4/20/2020) POA: Unknown Secondary hyperparathyroidism of renal origin ([facility] (4/20/2020) POA: Unknown Atrial fibrillation ([facility] (4/21/2020) POA: Unknown Aspiration pneumonia ([facility] (4/23/2020) POA: Unknown Leucocytosis (4/23/2020) POA: Unknown Hyperkalemia (4/25/2020) POA: Unknown Acidosis, metabolic (9/19/5589) POA: Unknown Subjective:  
 
Patient feels good, on bed ,on Oxygen by Nasal cannula ,Mathur is taken out, no urinary complain A comprehensive review of systems was negative except for that written in the History of Present Illness. Objective:  
 
Visit Vitals /52 (BP 1 Location: Right arm, BP Patient Position: At rest) Pulse 62 Temp 97.1 °F (36.2 °C) Resp 20 Ht 5' (1.524 m) Wt 73 kg (160 lb 15 oz) SpO2 93% Comment: 2L nasal cannula BMI 31.43 kg/m² Intake/Output Summary (Last 24 hours) at 4/29/2020 1115 Last data filed at 4/29/2020 1003 Gross per 24 hour Intake 120 ml Output 100 ml Net 20 ml  
 
 
Current Facility-Administered Medications Medication Dose Route Frequency Provider Last Rate Last Dose  metroNIDAZOLE (FLAGYL) tablet 500 mg  500 mg Oral Q12H Alonso Decker MD   500 mg at 04/29/20 1009  cefpodoxime (VANTIN) tablet 200 mg  200 mg Oral Q24H Alonso Decker MD   200 mg at 04/28/20 2054  sodium bicarbonate tablet 650 mg  650 mg Oral BID Miesha Cuellar MD   650 mg at 04/29/20 1008  metoprolol tartrate (LOPRESSOR) tablet 12.5 mg  12.5 mg Oral Q12H Mariah Ewing NP   12.5 mg at 04/29/20 1008  pantoprazole (PROTONIX) tablet 40 mg  40 mg Oral ACB&ZAHRA López MD KEERTHI   40 mg at 04/29/20 1009  LORazepam (ATIVAN) tablet 0.5 mg  0.5 mg Oral BID PRN Alicia Andrade MD   0.5 mg at 04/27/20 1709  methIMAzole (TAPAZOLE) tablet 10 mg  10 mg Oral DAILY Tomy Strickland MD   10 mg at 04/29/20 1007  aspirin delayed-release tablet 81 mg  81 mg Oral DAILY Saundra Medina MD   81 mg at 04/29/20 1008  calcitRIOL (ROCALTROL) capsule 0.25 mcg  0.25 mcg Oral Q MON, WED & Hollie Pickard MD   0.25 mcg at 04/27/20 2116  B complex-vitaminC-folic acid (NEPHROCAP) cap  1 Cap Oral DAILY Delroy Dennis MD   1 Cap at 04/29/20 1008  atorvastatin (LIPITOR) tablet 40 mg  40 mg Oral QHS Mireya Azul MD   40 mg at 04/28/20 2100  acetaminophen (TYLENOL) tablet 650 mg  650 mg Oral Q8H Soren Ivan MD   650 mg at 04/29/20 2680  hydrocortisone (ANUSOL-HC) suppository 25 mg  25 mg Rectal Q12H Aruna Semen, NP   25 mg at 04/29/20 1009  aluminum-magnesium hydroxide (MAALOX) oral suspension 15 mL  15 mL Oral QID PRN Soren Ivan MD   15 mL at 04/21/20 1236  polyethylene glycol (MIRALAX) packet 17 g  17 g Oral DAILY Sandra Jama   Stopped at 04/26/20 0900  
 senna-docusate (PERICOLACE) 8.6-50 mg per tablet 1 Tab  1 Tab Oral DAILY Kathe Westbrook, [place]   1 Tab at 04/29/20 1009  ondansetron (ZOFRAN) injection 8 mg  8 mg IntraVENous Q6H PRN Kathe Westbrook PA   8 mg at 04/21/20 1136  allopurinoL (ZYLOPRIM) tablet 100 mg  100 mg Oral DAILY Kathe Westbrook [place]   100 mg at 04/29/20 1008 Physical Exam:  
 
Physical Exam:  
General:  Alert, cooperative, no distress, appears stated age. Lungs:   Clear to auscultation bilaterally. Heart:  Regular rate and rhythm, S1, S2 normal, no murmur, click, rub or gallop. Abdomen:   Soft, non-tender. Bowel sounds normal. No masses,  No organomegaly. Extremities: Extremities normal, atraumatic, no cyanosis or edema Skin: Skin color, texture, turgor normal. No rashes or lesions Data Review: CBC w/Diff Recent Labs  
  04/29/20 
0153 04/27/20 
1913 04/27/20 
1030 HGB 7.9* 8.4* 8.2* HCT 26.6* 28.1* 26.7* No results for input(s): BANDS, LYMPHOCYTES, MONOS, EOS, BASOS, PROMYELOCYTE, BLAST, RDW in the last 72 hours. No lab exists for component: RELNEU, RELLYM, RELMONO, RELEOS, RELBAS, SEGS, MYELOCYTE, META Comprehensive Metabolic Profile Recent Labs  
  04/29/20 
0153 04/28/20 
0237 04/27/20 
1030  142 145  
K 5.2 4.9 4.8  
 109 110 CO2 28 28 29 BUN 72* 72* 79* CREA 1.38* 1.55* 1.64* Recent Labs  
  04/29/20 
0153 04/28/20 
0237 04/27/20 
1030 CA 9.1 9.3 9.0 Impression: Active Hospital Problems Diagnosis Date Noted  Hyperkalemia 04/25/2020  Acidosis, metabolic 02/54/7978  Aspiration pneumonia ([facility] 04/23/2020  Leucocytosis 04/23/2020  Atrial fibrillation ([facility] 04/21/2020  Chronic renal disease, stage III ([facility] 04/20/2020  Anemia 04/20/2020  Secondary hyperparathyroidism of renal origin ([facility] 04/20/2020  Hip fracture requiring operative repair ([facility] 04/11/2020  Essential hypertension, benign CONTROLLED Stable, no worsening of renal function. Plan:  
 
Continue current supportive care.  
 
 
Agustin Beltran MD

## 2020-04-29 NOTE — PROGRESS NOTES
Problem: Mobility Impaired (Adult and Pediatric) Goal: *Acute Goals and Plan of Care (Insert Text) Description: Physical Therapy Goals Initiated 4/13/2020, reevaluated 4/21/2020, reevaluated 4/29/2020 and to be accomplished within 7 day(s) 1. Patient will move from supine to sit and sit to supine , scoot up and down and roll side to side in bed with moderate assistance. 2.  Patient will transfer from bed to chair and chair to bed with moderate assistance using the least restrictive device. 3.  Patient will perform sit to stand with moderate assistance. 4.  Patient will ambulate with moderate assistance for 10-25 feet with the least restrictive device. 5.  Assess stairs when appropriate. PLOF: Patient reports she is independent with ADLs and functional mobility. Outcome: Progressing Towards Goal 
 
PHYSICAL THERAPY RE-EVALUATION Patient: Esteban Lacy (56 y.o. female) Date: 4/29/2020 Primary Diagnosis: Hip fracture requiring operative repair ([facility] [address] Lawerence Floresita Procedure(s) (LRB): 
RIGHT FEMORAL INTERTROCHANTERIC NAIL INSERTION (Right) 17 Days Post-Op Precautions:   Fall, Skin, PWB(right LE) ASSESSMENT : 
Patient semi reclined in bed agreeable to skilled PT re-evaluation. She requires verbal support to encourage participation in therapy session. Patient seen in conjunction with OT to maximize pt safety with mobility and to facilitate standing balance. She expresses that she is disappointed because she was left sitting in the chair for extended period of time and was uncomfortable. Educated pt on the importance of sitting OOB in order to restore PLOF. She requires 2 person assist for safety with transfers and gait. Max verbal/ tactile cues provided for safe hand placement and sequencing during sit to stand and bed<> BSC transfers. Pt is unable to recall weight bearing precautions; re-educated at the beginning of session.  She requires min/mod A x2 during gait due to instability; cues provided to increase MIN and facilitate steps. Max safety cues during gait to maintain WB precaution and to turn around fully before sitting in the chair. Based on the objective data described below, the patient presents with decreased strength, impaired balance, decreased activity tolerance, and decreased safety with functional mobility. Patient left in chair, call bell within reach, and all needs addressed. Patient will benefit from discharge to SNF to increase safety and independence with functional mobility. Patient will benefit from skilled intervention to address the above impairments. Patient's rehabilitation potential is considered to be Good Factors which may influence rehabilitation potential include:  
[]         None noted 
[]         Mental ability/status [x]         Medical condition 
[]         Home/family situation and support systems 
[]         Safety awareness [x]         Pain tolerance/management 
[]         Other: PLAN : 
Recommendations and Planned Interventions:  
[x]           Bed Mobility Training             [x]    Neuromuscular Re-Education 
[x]           Transfer Training                   []    Orthotic/Prosthetic Training 
[x]           Gait Training                          []    Modalities [x]           Therapeutic Exercises           []    Edema Management/Control 
[x]           Therapeutic Activities            [x]    Family Training/Education 
[x]           Patient Education 
[]           Other (comment): Frequency/Duration: Patient will be followed by physical therapy 1-2 times per day/4-7 days per week to address goals. Discharge Recommendations: Dimas Mccracken Further Equipment Recommendations for Discharge: TBD SUBJECTIVE:  
Patient stated I am disappointed .  OBJECTIVE DATA SUMMARY:  
Hospital course since last seen and reason for re-evaluation: Patient is slowly progressing towards goals.  She has decreased motivation and requires verbal encouragement to participate in therapy session. Patient will benefit from continued skilled PT to maximize functional mobility. Past Medical History:  
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   
 Nonspecific abnormal electrocardiogram (ECG) (EKG) Obesity, unspecified Other and unspecified hyperlipidemia Renal insufficiency Past Surgical History:  
Procedure Laterality Date ABDOMEN SURGERY PROC UNLISTED Explor lap COLONOSCOPY N/A 5/31/2018 COLONOSCOPY w polypectomy performed by Chip Bains MD at [address] [place] [address] Left HX CATARACT REMOVAL INSERT PROSTHETIC LENS: LEFT,RIGHT  
 HX GYN    
 HX ORTHOPAEDIC Left Rotator cuff repair HX PARTIAL THYROIDECTOMY    
 FL BIOPSY OF BREAST, INCISIONAL    
 rt & lt 12 yrs ago Barriers to Learning/Limitations: yes;  emotional and cognitive Compensate with: Visual Cues, Verbal Cues, and Tactile Cues Home Situation:  
Home Situation Home Environment: Private residence One/Two Story Residence: Two story Living Alone: No 
Support Systems: Child(do) Patient Expects to be Discharged to[de-identified] Rehabilitation facility Current DME Used/Available at Home: None Tub or Shower Type: Tub/Shower combination Critical Behavior: 
Neurologic State: Alert Orientation Level: Disoriented to time Cognition: Poor safety awareness; Follows commands Psychosocial 
Patient Behaviors: Calm Purposeful Interaction: Yes Pt Identified Daily Priority: Clinical issues (comment) Car[employer] Process: Nurture loving kindness;Establish trust;Attend basic human needs Caring Interventions: Reassure Reassure: Informing;Caring rounds Therapeutic Modalities: Humor; Intentional therapeutic touch Strength:   
Strength: Generally decreased, functional 
 
Tone & Sensation:  
Tone: Normal 
Sensation: Intact 
  
nge Of Motion: AROM: Generally decreased, functional 
  
 
Functional Mobility: 
Bed Mobility: 
  
Supine to Sit: Moderate assistance;Assist x2 Sit to Supine: Maximum assistance;Assist x2 Transfers: 
Sit to Stand: Moderate assistance;Assist x2 Bed to Chair: Moderate assistance;Assist x2 Balance:  
Sitting: Intact Standing: Impaired; With support Standing - Static: Fair Standing - Dynamic : Poor Ambulation/Gait Training: 
Distance (ft): (6) Assistive Device: Walker, rolling Ambulation - Level of Assistance: Minimal assistance;Assist x2; Moderate assistance Gait Abnormalities: Decreased step clearance; Step to gait Base of Support: Narrowed Speed/Shirlene: Pace decreased (<100 feet/min); Shuffled Step Length: Right shortened;Left shortened Pain: 
Pain level pre-treatment: 5/10 right hip Pain level post-treatment: 5/10 Pain Intervention(s) : Medication (see MAR); Rest, Ice, Repositioning Response to intervention: Nurse notified, See doc flow Activity Tolerance:  
Fair Please refer to the flowsheet for vital signs taken during this treatment. After treatment:  
[x]         Patient left in no apparent distress sitting up in chair 
[]         Patient left in no apparent distress in bed 
[x]         Call bell left within reach 
[]         Nursing notified 
[]         Caregiver present 
[]         Bed alarm activated 
[]         SCDs applied COMMUNICATION/EDUCATION:  
[x]         Role of Physical Therapy in the acute care setting. [x]         Fall prevention education was provided and the patient/caregiver indicated understanding. []         Patient/family have participated as able in goal setting and plan of care. []         Patient/family agree to work toward stated goals and plan of care. []         Patient understands intent and goals of therapy, but is neutral about his/her participation. []         Patient is unable to participate in goal setting/plan of care: ongoing with therapy staff. []         Other: Thank you for this referral. 
Chante Grider, PT Time Calculation: 26 mins

## 2020-04-30 NOTE — DISCHARGE INSTRUCTIONS
DISCHARGE SUMMARY from Nurse    PATIENT INSTRUCTIONS:    After general anesthesia or intravenous sedation, for 24 hours or while taking prescription Narcotics:  · Limit your activities  · Do not drive and operate hazardous machinery  · Do not make important personal or business decisions  · Do  not drink alcoholic beverages  · If you have not urinated within 8 hours after discharge, please contact your surgeon on call. Report the following to your surgeon:  · Excessive pain, swelling, redness or odor of or around the surgical area  · Temperature over 100.5  · Nausea and vomiting lasting longer than 4 hours or if unable to take medications  · Any signs of decreased circulation or nerve impairment to extremity: change in color, persistent  numbness, tingling, coldness or increase pain  · Any questions    What to do at Home:  Recommended activity: Activity as tolerated. If you experience any of the following symptoms increased pain, chest pain or difficulty breathing please follow up with [facility] staff. *  Please give a list of your current medications to your Primary Care Provider. *  Please update this list whenever your medications are discontinued, doses are      changed, or new medications (including over-the-counter products) are added. *  Please carry medication information at all times in case of emergency situations. These are general instructions for a healthy lifestyle:    No smoking/ No tobacco products/ Avoid exposure to second hand smoke  Surgeon General's Warning:  Quitting smoking now greatly reduces serious risk to your health.     Obesity, smoking, and sedentary lifestyle greatly increases your risk for illness    A healthy diet, regular physical exercise & weight monitoring are important for maintaining a healthy lifestyle    You may be retaining fluid if you have a history of heart failure or if you experience any of the following symptoms:  Weight gain of 3 pounds or more overnight or 5 pounds in a week, increased swelling in our hands or feet or shortness of breath while lying flat in bed. Please call your doctor as soon as you notice any of these symptoms; do not wait until your next office visit. The discharge information has been reviewed with the patient. The patient verbalized understanding. Discharge medications reviewed with the patient and appropriate educational materials and side effects teaching were provided.   ___________________________________________________________________________________________________________________________________

## 2020-04-30 NOTE — ROUTINE PROCESS
Pt is alert and cooperative. Respiration regular and non labored. Pt D/C to Nursing home via ambulance. Report given by Jake Sims to incoming Nurse at [address] [place] [address]. V/s stable. Personal belongings with pts. Pt denies pain.

## 2020-04-30 NOTE — ROUTINE PROCESS
Bedside and Verbal shift change report given to Jose Villa RN (oncoming nurse) by WAI Yoder RN (offgoing nurse). Report included the following information SBAR.

## 2020-05-07 NOTE — PROGRESS NOTES
Community Care Team Documentation for Patient in [facility]     Patient discharged from [facility]  to [address] [place] [address], on 4/29/20. Hospital Discharge diagnosis:       1. Displaced intertrochanteric right femur fracture status post ORIF   2. Paroxysmal atrial fibrillation, currently NSR   3. Troponin elevation secondary to above, doubt ACS   4. RLL pneumonia suspect secondary to aspiration   5. Acute hypoxic respiratory failure, resolved   6. Multinodular goiter and Graves' disease   7. Hypertension   8. CKD 3 with secondary hyperparathyroidism   9. Anemia, multifactorial     10. Moderate pulmonary HTN   11. Obesity    12. Acute encephalopathy, metabolic/toxic, improved    SNF Attending Provider:  Dr. Genevieve Davison    Anticipated discharge date from SNF:  May Transition to LTC    PCP : Pita De La Cruz MD    [facility] Team rounds completed, updates provided by facility. Brief Summary of Care:    Patient receiving PT/OT. Dispo:  Patient was living with daughter PTA. Family is considering transition to LTC once therapy complete. thumb  RRAT:  Low Risk            6       Total Score        6 Charlson Comorbidity Score (Age + Comorbid Conditions)        Criteria that do not apply:    Has Seen PCP in Last 6 Months (Yes=3, No=0)    . Living with Significant Other. Assisted Living. LTAC. SNF. or   Rehab    Patient Length of Stay (>5 days = 3)    IP Visits Last 12 Months (1-3=4, 4=9, >4=11)    Pt.  Coverage (Medicare=5 , Medicaid, or Self-Pay=4)        Active Ambulatory Problems     Diagnosis Date Noted    Mitral valve disorders(424.0)     Other and unspecified hyperlipidemia     Essential hypertension, benign     Obesity, unspecified     Nonspecific abnormal electrocardiogram (ECG) (EKG)     Renal insufficiency     Hip fracture requiring operative repair ([address] 04/11/2020    Chronic renal disease, stage III ([address] 04/20/2020    Anemia 04/20/2020    Secondary hyperparathyroidism of renal origin ([facility] [address] 04/20/2020    Atrial fibrillation (HCC) 04/21/2020    Aspiration pneumonia ([facility] 04/23/2020    Leucocytosis 04/23/2020    Hyperkalemia 04/25/2020    Acidosis, metabolic 25/36/5213     Resolved Ambulatory Problems     Diagnosis Date Noted    No Resolved Ambulatory Problems     Past Medical History:   Diagnosis Date    Bell's palsy

## 2020-05-13 NOTE — PROGRESS NOTES
Community Care Team Documentation for Patient in [facility]     Patient discharged from [facility]  to [facility], on 4/29/20. Hospital Discharge diagnosis:       1. Displaced intertrochanteric right femur fracture status post ORIF   2. Paroxysmal atrial fibrillation, currently NSR   3. Troponin elevation secondary to above, doubt ACS   4. RLL pneumonia suspect secondary to aspiration   5. Acute hypoxic respiratory failure, resolved   6. Multinodular goiter and Graves' disease   7. Hypertension   8. CKD 3 with secondary hyperparathyroidism   9. Anemia, multifactorial     10. Moderate pulmonary HTN   11. Obesity    12. Acute encephalopathy, metabolic/toxic, improved    SNF Attending Provider:  Dr. Shantel Joseph    Anticipated discharge date from SNF:  TBD    PCP : Jenny Garcia MD    [facility] Team rounds completed, updates provided by facility. Brief Summary of Care:    Patient receiving PT/OT. Making slow progress. Dispo:  Patient was living with daughter PTA        RRAT:  Low Risk            6       Total Score        6 Charlson Comorbidity Score (Age + Comorbid Conditions)        Criteria that do not apply:    Has Seen PCP in Last 6 Months (Yes=3, No=0)    . Living with Significant Other. Assisted Living. LTAC. SNF. or   Rehab    Patient Length of Stay (>5 days = 3)    IP Visits Last 12 Months (1-3=4, 4=9, >4=11)    Pt.  Coverage (Medicare=5 , Medicaid, or Self-Pay=4)        Active Ambulatory Problems     Diagnosis Date Noted    Mitral valve disorders(424.0)     Other and unspecified hyperlipidemia     Essential hypertension, benign     Obesity, unspecified     Nonspecific abnormal electrocardiogram (ECG) (EKG)     Renal insufficiency     Hip fracture requiring operative repair ([address] 04/11/2020    Chronic renal disease, stage III ([address] 04/20/2020    Anemia 04/20/2020    Secondary hyperparathyroidism of renal origin ([facility] 04/20/2020    Atrial fibrillation ([facility] 04/21/2020    Aspiration pneumonia ([facility] 04/23/2020    Leucocytosis 04/23/2020    Hyperkalemia 04/25/2020    Acidosis, metabolic 02/82/9549     Resolved Ambulatory Problems     Diagnosis Date Noted    No Resolved Ambulatory Problems     Past Medical History:   Diagnosis Date    Bell's palsy

## 2020-05-21 NOTE — PROGRESS NOTES
Community Care Team Documentation for Patient in [facility]     Patient discharged from [facility]  to [facility], on 4/29/20. Hospital Discharge diagnosis:       1. Displaced intertrochanteric right femur fracture status post ORIF   2. Paroxysmal atrial fibrillation, currently NSR   3. Troponin elevation secondary to above, doubt ACS   4. RLL pneumonia suspect secondary to aspiration   5. Acute hypoxic respiratory failure, resolved   6. Multinodular goiter and Graves' disease   7. Hypertension   8. CKD 3 with secondary hyperparathyroidism   9. Anemia, multifactorial     10. Moderate pulmonary HTN   11. Obesity    12. Acute encephalopathy, metabolic/toxic, improved    SNF Attending Provider:  Dr. Milton Perez    Anticipated discharge date from SNF:  TBD    PCP : Flex Alcantar MD    [facility] Team rounds completed, updates provided by facility. Brief Summary of Care:    Patient receiving PT/OT. Making slow progress. Dispo:  Patient was living with daughter PTA  And plan is for her to return to daughters. RRAT:  Low Risk            6       Total Score        6 Charlson Comorbidity Score (Age + Comorbid Conditions)        Criteria that do not apply:    Has Seen PCP in Last 6 Months (Yes=3, No=0)    . Living with Significant Other. Assisted Living. LTAC. SNF. or   Rehab    Patient Length of Stay (>5 days = 3)    IP Visits Last 12 Months (1-3=4, 4=9, >4=11)    Pt.  Coverage (Medicare=5 , Medicaid, or Self-Pay=4)        Active Ambulatory Problems     Diagnosis Date Noted    Mitral valve disorders(424.0)     Other and unspecified hyperlipidemia     Essential hypertension, benign     Obesity, unspecified     Nonspecific abnormal electrocardiogram (ECG) (EKG)     Renal insufficiency     Hip fracture requiring operative repair ([address] 04/11/2020    Chronic renal disease, stage III ([address] 04/20/2020    Anemia 04/20/2020    Secondary hyperparathyroidism of renal origin ([facility] [address] 04/20/2020    [facility] 04/21/2020    Aspiration pneumonia ([facility] 04/23/2020    Leucocytosis 04/23/2020    Hyperkalemia 04/25/2020    Acidosis, metabolic 13/98/4288     Resolved Ambulatory Problems     Diagnosis Date Noted    No Resolved Ambulatory Problems     Past Medical History:   Diagnosis Date    Bell's palsy

## 2020-05-29 NOTE — TELEPHONE ENCOUNTER
Please advise the unit mgr at [address] [place] [address] if 6/2 post op appt can be changed to virtual visit, [phone]

## 2020-05-29 NOTE — TELEPHONE ENCOUNTER
Spoke with HIEN Allen, she states ok to change to virtual visit. Message printed and given to the [facility].

## 2020-06-02 NOTE — PROGRESS NOTES
Yahaira Martinez is a 81 y.o. female who was seen by synchronous (real-time) audio-video technology on 6/2/2020 via [email or web address]. The visit was performed by myself at home while the patient was in the office. Tiffanie Robles LPN helped to initiate the visit and was present during entire visit. Subjective:  
Yahaira Martinez is a 81 y.o. female who presents today for reevaluation of right hip s/p short troch nail on 4/12/2020. She is currently in a rehab facility. She is improving. Working with PT daily. Walking with assist. Pain score 4/10. Patient denies any fever, chills, chest pain, shortness of breath or calf pain. The remainder of the review of systems is negative. There are no new illness or injuries to report since last seen in the office. No changes in medications, allergies, social or family history. Prior to Admission medications Medication Sig Start Date End Date Taking? Authorizing Provider LORazepam (ATIVAN) 1 mg tablet Take 0.5 Tabs by mouth two (2) times daily as needed for Anxiety. Max Daily Amount: 1 mg. 4/29/20   Jaun Khoury MD  
atorvastatin (LIPITOR) 40 mg tablet Take 1 Tab by mouth nightly. 4/29/20   Jaun Khoury MD  
b complex-vitamin c-folic acid (NEPHROCAPS) 1 mg capsule Take 1 Cap by mouth daily. 4/30/20   Jaun Khoury MD  
calcitRIOL (ROCALTROL) 0.25 mcg capsule Take 1 Cap by mouth every Monday, Wednesday, Friday. 4/29/20   Jaun Khoury MD  
polyethylene glycol (MIRALAX) 17 gram packet Take 1 Packet by mouth daily. 4/30/20   Jaun Khoury MD  
metoprolol tartrate (LOPRESSOR) 25 mg tablet Take 0.5 Tabs by mouth every twelve (12) hours. 4/29/20   Jaun Khoury MD  
methIMAzole (TAPAZOLE) 10 mg tablet Take 1 Tab by mouth daily. 4/30/20   Jaun Khoury MD  
pantoprazole (PROTONIX) 40 mg tablet Take 1 Tab by mouth Before breakfast and dinner. 4/29/20   Jaun Khoury MD  
allopurinoL (ZYLOPRIM) 100 mg tablet Take 100 mg by mouth daily.     Other, Phys, MD  
 aspirin 81 mg tablet Take 81 mg by mouth. Provider, Historical  
 
Allergies Allergen Reactions  Ciprofibrate Unable to [employer] Jose  Darvon [Propoxyphene] Unable to Obtain  Penicillins Unable to Obtain ROS Objective:  
 
General: alert, cooperative, no distress Mental  status: mental status: alert, oriented to person, place, and time, normal mood, behavior, speech, dress, motor activity, and thought processes Resp: resp: normal effort and no respiratory distress Neuro: neuro: no gross deficits Skin: skin: no discoloration or lesions of concern on visible areas ORTHO exam of right hip: 
Incision well healed Due to this being a TeleHealth evaluation, many elements of the physical examination are unable to be assessed. Assessment & Plan:  
Diagnoses and all orders for this visit: 1. Closed nondisplaced intertrochanteric fracture of right femur with routine healing, subsequent encounter 1. Patient doing well post operatively. Will cont with PT. WBAT. Risk factors include: n/a 2. No cortisone injection indicated today 3. Yes Physical/Occupational Therapy indicated todayWBAT right leg 4. No diagnostic test indicated today: 5. No durable medical equipment indicated today 6. No referral indicated today 7. No medications indicated today: 8. No Narcotic indicated today RTC 8 weeks We discussed the expected course, resolution and complications of the diagnosis(es) in detail. Medication risks, benefits, costs, interactions, and alternatives were discussed as indicated. I advised her to contact the office if her condition worsens, changes or fails to improve as anticipated. She expressed understanding with the diagnosis(es) and plan. CPT Codes 11760-03352 for Established Patients may apply to this Telehealth Visit Time-based coding, delete if not needed: I spent at least 15 minutes with this established patient, and >50% of the time was spent counseling and/or coordinating care. Pursuant to the emergency declaration under the [facility], [facility] waiver authority and the [employer] Act, this Virtual  Visit was conducted, with patient's consent, to reduce the patient's risk of exposure to COVID-19 and provide continuity of care for an established patient. Services were provided through a video synchronous discussion virtually to substitute for in-person clinic visit. [facility]

## 2020-06-02 NOTE — PROGRESS NOTES
Office visit note dated 6/2/2020 and physical therapy order was faxed to [facility] and rehab. Fax confirmation was received.

## 2020-06-02 NOTE — PROGRESS NOTES
Diana Deleon is a 81 y.o. female evaluated via telephone on 6/2/2020. Consent: 
She and/or health care decision maker is aware that that she may receive a bill for this telephone service, depending on her insurance coverage, and has provided verbal consent to proceed: Yes Documentation: I communicated with the patient and/or health care decision maker about right leg. Details of this discussion including any medical advice provided: I affirm this is a Patient Initiated Episode with an Established Patient who has not had a related appointment within my department in the past 7 days or scheduled within the next 24 hours. Note: not billable if this call serves to triage the patient into an appointment for the relevant concern Since your last office visit have you had any 1. New medical diagnoses No 
2. Changes in your medications  No 
3. Surgical procedures No 
4. Changes in your Allergies to medication No 
5. What is your pain level today 4/10 Lito Ramos LPN

## 2020-06-04 NOTE — PROGRESS NOTES
Community Care Team Documentation for Patient in [facility]     Patient discharged from [facility]  to [address] [place] [address], on 4/29/20. Hospital Discharge diagnosis:       1. Displaced intertrochanteric right femur fracture status post ORIF   2. Paroxysmal atrial fibrillation, currently NSR   3. Troponin elevation secondary to above, doubt ACS   4. RLL pneumonia suspect secondary to aspiration   5. Acute hypoxic respiratory failure, resolved   6. Multinodular goiter and Graves' disease   7. Hypertension   8. CKD 3 with secondary hyperparathyroidism   9. Anemia, multifactorial     10. Moderate pulmonary HTN   11. Obesity    12. Acute encephalopathy, metabolic/toxic, improved    SNF Attending Provider:  Dr. Sara Fuentes    Anticipated discharge date from SNF:  TBD    PCP : Marcelle Espinal MD    [facility] Team rounds completed, updates provided by facility. Brief Summary of Care:    Patient receiving PT/OT. Making slow progress. Saw ortho this week. WBAT now. DNR    Dispo:  Patient was living with daughter PTA  And plan is for her to return to daughters. RRAT:  Low Risk            6       Total Score        6 Charlson Comorbidity Score (Age + Comorbid Conditions)        Criteria that do not apply:    Has Seen PCP in Last 6 Months (Yes=3, No=0)    . Living with Significant Other. Assisted Living. LTAC. SNF. or   Rehab    Patient Length of Stay (>5 days = 3)    IP Visits Last 12 Months (1-3=4, 4=9, >4=11)    Pt.  Coverage (Medicare=5 , Medicaid, or Self-Pay=4)        Active Ambulatory Problems     Diagnosis Date Noted    Mitral valve disorders(424.0)     Other and unspecified hyperlipidemia     Essential hypertension, benign     Obesity, unspecified     Nonspecific abnormal electrocardiogram (ECG) (EKG)     Renal insufficiency     Hip fracture requiring operative repair ([facility] [address] [facility] 04/11/2020    Chronic renal disease, stage III ([facility] 04/20/2020    Anemia 04/20/2020    Secondary hyperparathyroidism of renal origin ([facility] 04/20/2020    Atrial fibrillation ([place] [address] 04/21/2020    Aspiration pneumonia ([place] [address] 04/23/2020    Leucocytosis 04/23/2020    Hyperkalemia 04/25/2020    Acidosis, metabolic 22/53/6523     Resolved Ambulatory Problems     Diagnosis Date Noted    No Resolved Ambulatory Problems     Past Medical History:   Diagnosis Date    Bell's palsy

## 2020-06-04 NOTE — PROGRESS NOTES
Martha Hummel is a 81 y.o. female who was seen by synchronous (real-time) audio-video technology on 6/4/2020. Consent: Martha Hummel, who was seen by synchronous (real-time) audio-video technology, and/or her healthcare decision maker, is aware that this patient-initiated, Telehealth encounter on 6/4/2020 is a billable service, with coverage as determined by her insurance carrier. She is aware that she may receive a bill and has provided verbal consent to proceed: Yes. Assessment & Plan:  
Diagnoses and all orders for this visit: 1. Closed fracture of right hip requiring operative repair with routine healing, subsequent encounter 2. Acute deep vein thrombosis (DVT) of femoral vein of right lower extremity (HCC) 3. Anticoagulated As long as she is tolerating anticoagulation, continue for duration of at least 6 months based on location of the DVTs. If at any point having issues with the eliquis will have to re-evaluate the DVTs for recommendations. But will arrange follow up imaging in 6 months to determine if then able to d/c the eliquis after standard treatment time based on location of her DVTs I spent at least 15 minutes on this visit with this new patient. [address] Subjective:  
Martha Hummel is a 81 y.o. female who was seen for DVT. Patient and caretaker at her current SNF both participated in this video call. Patient was admitted to the hospital initially on 4/11/2020 after presenting with a fall and complaint of right leg/hip pain. On arrival to the emergency department she was noted to have a right hip fracture and was admitted for further evaluation and treatment. Orthopedic surgery evaluation was obtained and patient subsequently underwent ORIF of her right femur fracture 4/12/2020. She did have a PVL of lower legs prior to d/c but it was negative for DVT Since she was in the SNF she developed swelling and mobile imaging arranged and confirmed to have RUE and RLE dvt. This was around May 15. She was started on eliquis and seems to be tolerating that. No further swelling reported. She is hopeful to continue with rehabilitation and be able to be discharged home after her recovery Prior to Admission medications Medication Sig Start Date End Date Taking? Authorizing Provider LORazepam (ATIVAN) 1 mg tablet Take 0.5 Tabs by mouth two (2) times daily as needed for Anxiety. Max Daily Amount: 1 mg. 4/29/20   Samuel Cornejo MD  
atorvastatin (LIPITOR) 40 mg tablet Take 1 Tab by mouth nightly. 4/29/20   Samuel Cornejo MD  
b complex-vitamin c-folic acid (NEPHROCAPS) 1 mg capsule Take 1 Cap by mouth daily. 4/30/20   Samuel Cornejo MD  
calcitRIOL (ROCALTROL) 0.25 mcg capsule Take 1 Cap by mouth every Monday, Wednesday, Friday. 4/29/20   Samuel Cornejo MD  
polyethylene glycol (MIRALAX) 17 gram packet Take 1 Packet by mouth daily. 4/30/20   Samuel Cornejo MD  
metoprolol tartrate (LOPRESSOR) 25 mg tablet Take 0.5 Tabs by mouth every twelve (12) hours. 4/29/20   Samuel Cornejo MD  
methIMAzole (TAPAZOLE) 10 mg tablet Take 1 Tab by mouth daily. 4/30/20   Samuel Cornejo MD  
pantoprazole (PROTONIX) 40 mg tablet Take 1 Tab by mouth Before breakfast and dinner. 4/29/20   Samuel Cornejo MD  
allopurinoL (ZYLOPRIM) 100 mg tablet Take 100 mg by mouth daily. Other, MD Max  
aspirin 81 mg tablet Take 81 mg by mouth. Provider, Historical  
 
Allergies Allergen Reactions  Ciprofibrate Unable to [employer]  Darvon [Propoxyphene] Unable to Obtain  Penicillins Unable to Obtain Patient Active Problem List  
 Diagnosis Date Noted  Hyperkalemia 04/25/2020  Acidosis, metabolic 55/98/4410  Aspiration pneumonia ([address] 04/23/2020  Leucocytosis 04/23/2020  Atrial fibrillation ([facility] [address] [facility] 04/21/2020  Chronic renal disease, stage III ([facility] [address] [facility] 04/20/2020  Anemia 04/20/2020  Secondary hyperparathyroidism of renal origin ([facility] 04/20/2020  Hip fracture requiring operative repair ([facility] 04/11/2020  Mitral valve disorders(424.0)  Other and unspecified hyperlipidemia  Essential hypertension, benign  Obesity, unspecified  Nonspecific abnormal electrocardiogram (ECG) (EKG)  Renal insufficiency Allergies Allergen Reactions  Ciprofibrate Unable to [employer]  Darvon [Propoxyphene] Unable to Obtain  Penicillins Unable to Obtain Past Medical History:  
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   
 Nonspecific abnormal electrocardiogram (ECG) (EKG)  Obesity, unspecified  Other and unspecified hyperlipidemia  Renal insufficiency Past Surgical History:  
Procedure Laterality Date [address] UNLISTED Explor lap  COLONOSCOPY N/A 5/31/2018 COLONOSCOPY w polypectomy performed by Hugh Collins MD at [address] [facility] Left  HX CATARACT REMOVAL INSERT PROSTHETIC LENS: LEFT,RIGHT  HX GYN    
 HX ORTHOPAEDIC Left Rotator cuff repair  HX PARTIAL THYROIDECTOMY  DE BIOPSY OF BREAST, INCISIONAL    
 rt & lt 12 yrs ago No family history on file. Social History Tobacco Use  Smoking status: Former Smoker  Smokeless tobacco: Never Used  Tobacco comment: REMOTELY QUIT 1980'S Substance Use Topics  Alcohol use: No  
 
 
 
Objective: There were no vitals taken for this visit. General: alert, cooperative, no distress Mental  status: normal mood, behavior, speech, dress, motor activity, and thought processes, able to follow commands HENT: NCAT Neck: no visualized mass Resp: no respiratory distress Neuro: no gross deficits Skin: no discoloration or lesions of concern on visible areas Psychiatric: normal affect, consistent with stated mood, no evidence of hallucinations PVL from facility mid May confirmed thrombus of the right CFV, deep femoral vein, popliteal vein, and tibial veins. She also had right arm DVT in the brachial and subclavian veins. We discussed the expected course, resolution and complications of the diagnosis(es) in detail. Medication risks, benefits, costs, interactions, and alternatives were discussed as indicated. I advised her to contact the office if her condition worsens, changes or fails to improve as anticipated. She expressed understanding with the diagnosis(es) and plan. Diana Deleon is a 81 y.o. female who was evaluated by a video visit encounter for concerns as above. Patient identification was verified prior to start of the visit. A caregiver was present when appropriate. Due to this being a TeleHealth encounter (During [facility] public health emergency), evaluation of the following organ systems was limited: Vitals/Constitutional/EENT/Resp/CV/GI//MS/Neuro/Skin/Heme-Lymph-Imm. Pursuant to the emergency declaration under the [facility], [facility] waiver authority and the [employer] Act, this Virtual  Visit was conducted, with patient's (and/or legal guardian's) consent, to reduce the patient's risk of exposure to COVID-19 and provide necessary medical care. Services were provided through a video synchronous discussion virtually to substitute for in-person clinic visit. Patient was located at their individual home and provider in the office.  
 
 
HIEN Villalobos

## 2020-06-11 NOTE — PROGRESS NOTES
Community Care Team Documentation for Patient in [facility]     Patient discharged from [facility] [address] [facility] [place], on 4/29/20. Hospital Discharge diagnosis:       1. Displaced intertrochanteric right femur fracture status post ORIF   2. Paroxysmal atrial fibrillation, currently NSR   3. Troponin elevation secondary to above, doubt ACS   4. RLL pneumonia suspect secondary to aspiration   5. Acute hypoxic respiratory failure, resolved   6. Multinodular goiter and Graves' disease   7. Hypertension   8. CKD 3 with secondary hyperparathyroidism   9. Anemia, multifactorial     10. Moderate pulmonary HTN   11. Obesity    12. Acute encephalopathy, metabolic/toxic, improved    SNF Attending Provider:  Dr. Nathaly Goodman    Anticipated discharge date from SNF:  TBD    PCP : Adrianna Dwyer MD    [facility] Team rounds completed, updates provided by facility. Brief Summary of Care:    Patient receiving PT/OT. Making slow progress. Saw ortho last week. WBAT now. DNR    Dispo:  Patient was living with daughter PTA  And plan is for her to return to daughters. RRAT:  Low Risk            6       Total Score        6 Charlson Comorbidity Score (Age + Comorbid Conditions)        Criteria that do not apply:    Has Seen PCP in Last 6 Months (Yes=3, No=0)    . Living with Significant Other. Assisted Living. LTAC. SNF. or   Rehab    Patient Length of Stay (>5 days = 3)    IP Visits Last 12 Months (1-3=4, 4=9, >4=11)    Pt.  Coverage (Medicare=5 , Medicaid, or Self-Pay=4)        Active Ambulatory Problems     Diagnosis Date Noted    Mitral valve disorders(424.0)     Other and unspecified hyperlipidemia     Essential hypertension, benign     Obesity, unspecified     Nonspecific abnormal electrocardiogram (ECG) (EKG)     Renal insufficiency     Hip fracture requiring operative repair ([address] 04/11/2020    Chronic renal disease, stage III ([facility] 04/20/2020    Anemia 04/20/2020    Secondary hyperparathyroidism of renal origin ([facility] 04/20/2020    Atrial fibrillation ([place] [address] 04/21/2020    Aspiration pneumonia ([place] [address] 04/23/2020    Leucocytosis 04/23/2020    Hyperkalemia 04/25/2020    Acidosis, metabolic 67/13/3668     Resolved Ambulatory Problems     Diagnosis Date Noted    No Resolved Ambulatory Problems     Past Medical History:   Diagnosis Date    Bell's palsy

## 2020-06-19 NOTE — PROGRESS NOTES
Community Care Team Documentation for Patient in [facility]     Patient discharged from [facility]  to [facility], on 4/29/20. Hospital Discharge diagnosis:       1. Displaced intertrochanteric right femur fracture status post ORIF   2. Paroxysmal atrial fibrillation, currently NSR   3. Troponin elevation secondary to above, doubt ACS   4. RLL pneumonia suspect secondary to aspiration   5. Acute hypoxic respiratory failure, resolved   6. Multinodular goiter and Graves' disease   7. Hypertension   8. CKD 3 with secondary hyperparathyroidism   9. Anemia, multifactorial     10. Moderate pulmonary HTN   11. Obesity    12. Acute encephalopathy, metabolic/toxic, improved    SNF Attending Provider:  Dr. Ivan Bernard    Anticipated discharge date from SNF:  TBD    PCP : Berenice Navarro MD    [facility] Team rounds completed, updates provided by facility. Brief Summary of Care:    Patient receiving PT/OT. Making slow progress. Saw ortho last week. WBAT now. DNR    Dispo:  Patient was living with daughter PTA  And plan is for her to return to daughters. Have followed pt for DANY >30 days. Pt is stable and will be going home with daughter once ready. Signing off at this time and episode closed. RRAT:  Low Risk            6       Total Score        6 Charlson Comorbidity Score (Age + Comorbid Conditions)        Criteria that do not apply:    Has Seen PCP in Last 6 Months (Yes=3, No=0)    . Living with Significant Other. Assisted Living. LTAC. SNF. or   Rehab    Patient Length of Stay (>5 days = 3)    IP Visits Last 12 Months (1-3=4, 4=9, >4=11)    Pt.  Coverage (Medicare=5 , Medicaid, or Self-Pay=4)        Active Ambulatory Problems     Diagnosis Date Noted    Mitral valve disorders(424.0)     Other and unspecified hyperlipidemia     Essential hypertension, benign     Obesity, unspecified     Nonspecific abnormal electrocardiogram (ECG) (EKG)     Renal insufficiency     Hip fracture requiring operative repair ([facility] 04/11/2020    Chronic renal disease, stage III ([facility] 04/20/2020    Anemia 04/20/2020    Secondary hyperparathyroidism of renal origin ([facility] 04/20/2020    Atrial fibrillation ([facility] 04/21/2020    Aspiration pneumonia ([facility] 04/23/2020    Leucocytosis 04/23/2020    Hyperkalemia 04/25/2020    Acidosis, metabolic 56/28/3645     Resolved Ambulatory Problems     Diagnosis Date Noted    No Resolved Ambulatory Problems     Past Medical History:   Diagnosis Date    Bell's palsy

## 2020-07-25 NOTE — ED TRIAGE NOTES
Patient presents with c/o fall while walking to get clothing out of drawer. She states turning around and falling. Denies LOC. Denies striking head during fall. Patient c/o pain to right hand. Patient noted to have reddened area to right eye. She states eye problem occurred during fall. Patient advises that she was just released from [facility] on June 26.

## 2020-07-25 NOTE — ED PROVIDER NOTES
HPI patient says she was at home today and she bent over to  something and she slipped and fell. She fell landing on her right hand. And she says the right side of her head hit the wall as she fell. She presents today complaining of pain and soreness in the right hand and also saying she has a hematoma in the sclera of her right eye. She denies any headache or loss of consciousness. She denies any head pain or neck pain. She says her vision is unchanged. She has a right hand is sore especially along the middle finger. She denies any other trauma or complaints at this time. Past Medical History:  
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   
 Nonspecific abnormal electrocardiogram (ECG) (EKG)  Obesity, unspecified  Other and unspecified hyperlipidemia  Renal insufficiency Past Surgical History:  
Procedure Laterality Date [address] UNLISTED Explor lap  COLONOSCOPY N/A 5/31/2018 COLONOSCOPY w polypectomy performed by Adam Macdonald MD at [facility] Left  HX CATARACT REMOVAL INSERT PROSTHETIC LENS: LEFT,RIGHT  HX GYN    
 HX ORTHOPAEDIC Left Rotator cuff repair  HX PARTIAL THYROIDECTOMY  [place] BIOPSY OF BREAST, INCISIONAL    
 rt & lt 12 yrs ago History reviewed. No pertinent family history. Social History Socioeconomic History  Marital status:  Spouse name: Not on file  Number of children: Not on file  Years of education: Not on file  Highest education level: Not on file Occupational History  Not on file Social Needs  Financial resource strain: Not on file  Food insecurity Worry: Not on file Inability: Not on file  Transportation needs Medical: Not on file Non-medical: Not on file Tobacco Use  Smoking status: Former Smoker  Smokeless tobacco: Never Used  Tobacco comment: REMOTELY QUIT 1980'S Substance and Sexual Activity  Alcohol use: No  
 Drug use: No  
 Sexual activity: Not on file Lifestyle  Physical activity Days per week: Not on file Minutes per session: Not on file  Stress: Not on file Relationships  Social connections Talks on phone: Not on file Gets together: Not on file Attends [employer] service: Not on file Active member of club or organization: Not on file Attends meetings of clubs or organizations: Not on file Relationship status: Not on file  Intimate partner violence Fear of current or ex partner: Not on file Emotionally abused: Not on file Physically abused: Not on file Forced sexual activity: Not on file Other Topics Concern  Not on file Social History Narrative  Not on file ALLERGIES: Ciprofibrate; Darvon [propoxyphene]; and Penicillins Review of Systems Constitutional: Negative. HENT: Negative. Eyes: Positive for redness. Respiratory: Negative. Cardiovascular: Negative. Gastrointestinal: Negative. Genitourinary: Negative. Musculoskeletal: Positive for arthralgias. Neurological: Negative. Psychiatric/Behavioral: Negative. Vitals:  
 07/25/20 1644 BP: 143/62 Pulse: (!) 47 Resp: 16 Temp: 98 °F (36.7 °C) SpO2: 97% Weight: 59.9 kg (132 lb) Height: 5' (1.524 m) Physical Exam 
Vitals signs and nursing note reviewed. Constitutional:   
   Appearance: She is well-developed. HENT:  
   Head: Normocephalic and atraumatic. Eyes:  
   Extraocular Movements: Extraocular movements intact. Pupils: Pupils are equal, round, and reactive to light. Comments: There is a large right sub-conjunctival hematoma in the right eye. Anterior chambers clear pupil is round and reactive. Extraocular movements are normal in the eye. Left eye exam is normal  
Neck: Musculoskeletal: Normal range of motion and neck supple. Cardiovascular:  
   Rate and Rhythm: Normal rate and regular rhythm. Pulmonary:  
   Effort: Pulmonary effort is normal.  
   Breath sounds: Normal breath sounds. Abdominal:  
   Palpations: Abdomen is soft. Musculoskeletal: Normal range of motion. General: Tenderness present. Comments: Right hand is diffusely tender along the proximal phalange of the middle finger and diffusely tender dorsally along the metacarpals. Normal neurovascular exam distally in the finger Skin: 
   General: Skin is warm and dry. Neurological:  
   Mental Status: She is alert and oriented to person, place, and time. MDM Number of Diagnoses or Management Options Closed nondisplaced fracture of base of third metacarpal bone of right hand, initial encounter:  
Closed nondisplaced fracture of fourth metacarpal bone of right hand, unspecified portion of metacarpal, initial encounter:  
Contusion of globe of right eye, initial encounter:  
 
  
 
Procedures Right forearm and hand splint was applied by the ER technician and checked by the physician. Normal neurovascular exam distally after splint application. Kristen Jang MD 
 
 
I reviewed the plain films of the right hand they reveal acute fractures of the third and fourth metacarpal bones. I reviewed the results of the ER evaluation with the patient. We will place her in a splint sling for her hand fracture and have discussed expectant management of her right scleral hematoma. We will also get her an orthopedic referral to follow-up next week. Patient understands and agrees with the disposition and follow-up plan.  
Kristen Jang MD 5:30 PM

## 2020-07-25 NOTE — DISCHARGE INSTRUCTIONS
Patient Education        Broken Hand: Care Instructions  Your Care Instructions  A hand can break (fracture) during sports, a fall, or other accidents. The break may happen when your hand twists, is hit, or is used to protect you in a fall. Fractures can range from a small, hairline crack, to a bone or bones broken into two or more pieces. Your treatment depends on how bad the break is. Your doctor may have put your hand in a brace, splint, or cast to allow it to heal or to keep it stable until you see another doctor. It may take weeks or months for your hand to heal. You can help it heal with some care at home. You heal best when you take good care of yourself. Eat a variety of healthy foods, and don't smoke. Follow-up care is a key part of your treatment and safety. Be sure to make and go to all appointments, and call your doctor if you are having problems. It's also a good idea to know your test results and keep a list of the medicines you take. How can you care for yourself at home? · Put ice or a cold pack on your hand for 10 to 20 minutes at a time. Try to do this every 1 to 2 hours for the next 3 days (when you are awake). Put a thin cloth between the ice and your cast or splint. Keep your cast or splint dry. · Follow the cast care instructions your doctor gives you. If you have a splint, do not take it off unless your doctor tells you to. · Be safe with medicines. Take pain medicines exactly as directed. ? If the doctor gave you a prescription medicine for pain, take it as prescribed. ? If you are not taking a prescription pain medicine, ask your doctor if you can take an over-the-counter medicine. · Prop up your hand on pillows when you sit or lie down in the first few days after the injury. Keep your hand higher than the level of your heart. This will help reduce swelling. · Follow instructions for exercises to keep your arm strong.   · Wiggle your uninjured fingers often to reduce swelling and stiffness, but do not use that hand to grasp or carry anything. When should you call for help? Call your doctor now or seek immediate medical care if:  · You have new or worse pain. · Your hand or fingers are cool or pale or change color. · Your cast or splint feels too tight. · You have tingling, weakness, or numbness in your hand or fingers. Watch closely for changes in your health, and be sure to contact your doctor if:  · You do not get better as expected. · You have problems with your cast or splint. Where can you learn more? Go to http://www.adhikari.com/  Enter Q241 in the search box to learn more about \"Broken Hand: Care Instructions. \"  Current as of: March 2, 2020               Content Version: 12.5  © 8278-4212 [employer]wise, Incorporated. Care instructions adapted under license by [employer] (which disclaims liability or warranty for this information). If you have questions about a medical condition or this instruction, always ask your healthcare professional. [address] any warranty or liability for your use of this information.

## 2020-07-30 NOTE — PROGRESS NOTES
Noe Jauregui is a 81 y.o. female right handed retiree. Worker's Compensation and legal considerations: none filed. Vitals:  
 07/30/20 7896 BP: 107/48 Pulse: (!) 56 Resp: 15 Temp: 97.1 °F (36.2 °C) TempSrc: Temporal  
SpO2: 100% Weight: 132 lb (59.9 kg) Height: 5' (1.524 m) PainSc:   2 PainLoc: Finger Chief Complaint Patient presents with  
 Hand Pain  
  right hand pain HPI: Patient comes in today as emergency department follow-up after falling and injuring her right hand. She was diagnosed with third and fourth metacarpal fractures and placed into a splint. Date of onset: 7/25/2020 Injury: Yes: Comment: Fall onto outstretched hand Prior Treatment:  Yes: Comment: ed 
 
Numbness/ Tingling: No 
 
 
ROS: Review of Systems - General ROS: negative Psychological ROS: negative ENT ROS: negative Allergy and Immunology ROS: negative Hematological and Lymphatic ROS: negative Respiratory ROS: no cough, shortness of breath, or wheezing Cardiovascular ROS: no chest pain or dyspnea on exertion Gastrointestinal ROS: no abdominal pain, change in bowel habits, or black or bloody stools Musculoskeletal ROS: positive for - swelling in hand - right Neurological ROS: negative Dermatological ROS: negative Past Medical History:  
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   
 Nonspecific abnormal electrocardiogram (ECG) (EKG)  Obesity, unspecified  Other and unspecified hyperlipidemia  Renal insufficiency Past Surgical History:  
Procedure Laterality Date [address] [place] [address] UNLISTED Explor lap  COLONOSCOPY N/A 5/31/2018 COLONOSCOPY w polypectomy performed by Rob Tidwell MD at [address] [facility] Left  HX CATARACT REMOVAL INSERT PROSTHETIC LENS: LEFT,RIGHT  HX GYN    
 HX ORTHOPAEDIC Left Rotator cuff repair  HX PARTIAL THYROIDECTOMY  NE BIOPSY OF BREAST, INCISIONAL    
 rt & lt 12 yrs ago Current Outpatient Medications Medication Sig Dispense Refill  LORazepam (ATIVAN) 1 mg tablet Take 0.5 Tabs by mouth two (2) times daily as needed for Anxiety. Max Daily Amount: 1 mg. 6 Tab 0  
 atorvastatin (LIPITOR) 40 mg tablet Take 1 Tab by mouth nightly. 30 Tab 0  
 b complex-vitamin c-folic acid (NEPHROCAPS) 1 mg capsule Take 1 Cap by mouth daily. 30 Cap 0  
 calcitRIOL (ROCALTROL) 0.25 mcg capsule Take 1 Cap by mouth every Monday, Wednesday, Friday. 12 Cap 0  
 polyethylene glycol (MIRALAX) 17 gram packet Take 1 Packet by mouth daily. 30 Packet 0  
 metoprolol tartrate (LOPRESSOR) 25 mg tablet Take 0.5 Tabs by mouth every twelve (12) hours. 15 Tab 0  
 methIMAzole (TAPAZOLE) 10 mg tablet Take 1 Tab by mouth daily. 30 Tab 0  
 pantoprazole (PROTONIX) 40 mg tablet Take 1 Tab by mouth Before breakfast and dinner. 60 Tab 0  
 allopurinoL (ZYLOPRIM) 100 mg tablet Take 100 mg by mouth daily.  aspirin 81 mg tablet Take 81 mg by mouth. Allergies Allergen Reactions  Ciprofibrate Unable to [employer] Jose  Darvon [Propoxyphene] Unable to Obtain  Penicillins Unable to Obtain PE:  
 
Physical Exam 
Vitals signs and nursing note reviewed. Constitutional:   
   General: She is not in acute distress. Appearance: Normal appearance. She is not ill-appearing. Eyes:  
   Pupils: Pupils are equal, round, and reactive to light. Neck: Musculoskeletal: Normal range of motion. Pulmonary:  
   Effort: Pulmonary effort is normal. No respiratory distress. Abdominal:  
   General: Abdomen is flat. Musculoskeletal:     
   General: Swelling, tenderness and signs of injury present. No deformity. Right lower leg: No edema. Left lower leg: No edema. Skin: 
   General: Skin is warm and dry. Capillary Refill: Capillary refill takes less than 2 seconds. Findings: No bruising or erythema. Neurological:  
   General: No focal deficit present. Mental Status: She is alert and oriented to person, place, and time. Psychiatric:     
   Mood and Affect: Mood normal.     
   Behavior: Behavior normal.  
 
  
 
 
Right hand: There is edema noted over the dorsum of the hand. Range of motion of the digits is limited to a palm to pulp distance of approximately 1-1/2 to 2 cm. There is no gross rotational deformity or extensor lag noted about the digits. Sensation is grossly intact and cap refill is brisk. Imagin2020 plain films of right hand Findings: There is oblique fracture at the third metacarpal with 4 angulation. No extension to the joint space. There is oblique fracture through the fourth 
metacarpal with volar regulation. No extension to the proximal joint space. Remaining bones intact. Patient osteopenic. 
  
Joint relationships are narrowed at the first metacarpal phalangeal joint and 
first carpal metacarpal joint, DIP joint second digit, radiocarpal row. There 
are a few cysts at the wrist including distal scaphoid and lunate bone. 
  
Diffuse dorsal soft tissue swelling of the hand. 
  
IMPRESSION IMPRESSION[de-identified] 
  
Fractures of the fourth and fifth metacarpals as above. ICD-10-CM ICD-9-CM 1. Displaced fracture of shaft of third metacarpal bone, right hand, initial encounter for closed fracture  S62.322A 815.03 AMB SUPPLY ORDER  
2. Closed displaced fracture of shaft of fourth metacarpal bone of right hand, initial encounter  S62.324A 815.03 AMB SUPPLY ORDER Plan:  
 
Right TKO brace with middle finger and ring finger immobilized. I have instructed the patient to take the brace off twice a day for gentle range of motion exercises and to ice her hand. Follow-up and Dispositions · Return in about 3 weeks (around 2020) for Reevaluation and x-rays. Plan was reviewed with patient, who verbalized agreement and understanding of the plan

## 2020-07-30 NOTE — PATIENT INSTRUCTIONS
Broken Hand: Care Instructions Your Care Instructions A hand can break (fracture) during sports, a fall, or other accidents. The break may happen when your hand twists, is hit, or is used to protect you in a fall. Fractures can range from a small, hairline crack, to a bone or bones broken into two or more pieces. Your treatment depends on how bad the break is. Your doctor may have put your hand in a brace, splint, or cast to allow it to heal or to keep it stable until you see another doctor. It may take weeks or months for your hand to heal. You can help it heal with some care at home. You heal best when you take good care of yourself. Eat a variety of healthy foods, and don't smoke. Follow-up care is a key part of your treatment and safety. Be sure to make and go to all appointments, and call your doctor if you are having problems. It's also a good idea to know your test results and keep a list of the medicines you take. How can you care for yourself at home? · Put ice or a cold pack on your hand for 10 to 20 minutes at a time. Try to do this every 1 to 2 hours for the next 3 days (when you are awake). Put a thin cloth between the ice and your cast or splint. Keep your cast or splint dry. · Follow the cast care instructions your doctor gives you. If you have a splint, do not take it off unless your doctor tells you to. · Be safe with medicines. Take pain medicines exactly as directed. ? If the doctor gave you a prescription medicine for pain, take it as prescribed. ? If you are not taking a prescription pain medicine, ask your doctor if you can take an over-the-counter medicine. · Prop up your hand on pillows when you sit or lie down in the first few days after the injury. Keep your hand higher than the level of your heart. This will help reduce swelling. · Follow instructions for exercises to keep your arm strong.  
· Wiggle your uninjured fingers often to reduce swelling and stiffness, but do not use that hand to grasp or carry anything. When should you call for help? Call your doctor now or seek immediate medical care if: 
· You have new or worse pain. · Your hand or fingers are cool or pale or change color. · Your cast or splint feels too tight. · You have tingling, weakness, or numbness in your hand or fingers. Watch closely for changes in your health, and be sure to contact your doctor if: 
· You do not get better as expected. · You have problems with your cast or splint. Where can you learn more? Go to http://www.gray.com/ Enter [phone] in the search box to learn more about \"Broken Hand: Care Instructions. \" Current as of: March 2, 2020               Content Version: 12.5 © 2512-3687 Healthwise, Incorporated. Care instructions adapted under license by [employer] (which disclaims liability or warranty for this information). If you have questions about a medical condition or this instruction, always ask your healthcare professional. [address] any warranty or liability for your use of this information.

## 2020-08-20 NOTE — PROGRESS NOTES
Brittney Amaya is a 81 y.o. female right handed retiree. Worker's Compensation and legal considerations: none filed. Vitals:  
 08/20/20 1050 BP: 132/59 Pulse: 65 Resp: 16 Temp: 96.9 °F (36.1 °C) SpO2: 98% Weight: 132 lb (59.9 kg) Height: 5' (1.524 m) PainSc:   0 - No pain Chief Complaint Patient presents with  
 Hand Pain  
  right HPI: Patient comes in today nearly 4 weeks after sustaining an injury to her right hand. She had third and fourth metacarpal fractures for which she has been treated in a TKO brace. She has been using a walker and she has intermittent swelling of her hand that may also be associated with a previous skin graft to the area. Reports minimal to no pain. She has been doing range of motion exercises at home. Initial HPI: Patient comes in today as emergency department follow-up after falling and injuring her right hand. She was diagnosed with third and fourth metacarpal fractures and placed into a splint. Date of onset: 7/25/2020 Injury: Yes: Comment: Fall onto outstretched hand Prior Treatment:  Yes: Comment: TKO brace Numbness/ Tingling: No 
 
 
ROS: Review of Systems - General ROS: negative Psychological ROS: negative ENT ROS: negative Allergy and Immunology ROS: negative Hematological and Lymphatic ROS: negative Respiratory ROS: no cough, shortness of breath, or wheezing Cardiovascular ROS: no chest pain or dyspnea on exertion Gastrointestinal ROS: no abdominal pain, change in bowel habits, or black or bloody stools Musculoskeletal ROS: positive for - swelling in hand - right Neurological ROS: negative Dermatological ROS: negative Past Medical History:  
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   
 Nonspecific abnormal electrocardiogram (ECG) (EKG)  Obesity, unspecified  Other and unspecified hyperlipidemia  Renal insufficiency Past Surgical History:  
Procedure Laterality Date [address] [place] [address] UNLISTED Explor lap  COLONOSCOPY N/A 5/31/2018 COLONOSCOPY w polypectomy performed by Abena Rosenberg MD at [address] Left  HX CATARACT REMOVAL INSERT PROSTHETIC LENS: LEFT,RIGHT  HX GYN    
 HX ORTHOPAEDIC Left Rotator cuff repair  HX PARTIAL THYROIDECTOMY  [place] BIOPSY OF BREAST, INCISIONAL    
 rt & lt 12 yrs ago Current Outpatient Medications Medication Sig Dispense Refill  LORazepam (ATIVAN) 1 mg tablet Take 0.5 Tabs by mouth two (2) times daily as needed for Anxiety. Max Daily Amount: 1 mg. 6 Tab 0  
 atorvastatin (LIPITOR) 40 mg tablet Take 1 Tab by mouth nightly. 30 Tab 0  
 b complex-vitamin c-folic acid (NEPHROCAPS) 1 mg capsule Take 1 Cap by mouth daily. 30 Cap 0  
 calcitRIOL (ROCALTROL) 0.25 mcg capsule Take 1 Cap by mouth every Monday, Wednesday, Friday. 12 Cap 0  
 polyethylene glycol (MIRALAX) 17 gram packet Take 1 Packet by mouth daily. 30 Packet 0  
 metoprolol tartrate (LOPRESSOR) 25 mg tablet Take 0.5 Tabs by mouth every twelve (12) hours. 15 Tab 0  
 methIMAzole (TAPAZOLE) 10 mg tablet Take 1 Tab by mouth daily. 30 Tab 0  
 pantoprazole (PROTONIX) 40 mg tablet Take 1 Tab by mouth Before breakfast and dinner. 60 Tab 0  
 allopurinoL (ZYLOPRIM) 100 mg tablet Take 100 mg by mouth daily.  aspirin 81 mg tablet Take 81 mg by mouth. Allergies Allergen Reactions  Ciprofibrate Unable to [employer]  Darvon [Propoxyphene] Unable to Obtain  Penicillins Unable to Obtain PE:  
 
Physical Exam 
Vitals signs and nursing note reviewed. Constitutional:   
   General: She is not in acute distress. Appearance: Normal appearance. She is not ill-appearing. Eyes:  
   Pupils: Pupils are equal, round, and reactive to light. Neck: Musculoskeletal: Normal range of motion. Pulmonary: Effort: Pulmonary effort is normal. No respiratory distress. Abdominal:  
   General: Abdomen is flat. Musculoskeletal:     
   General: Swelling present. No tenderness, deformity or signs of injury. Right lower leg: No edema. Left lower leg: No edema. Skin: 
   General: Skin is warm and dry. Capillary Refill: Capillary refill takes less than 2 seconds. Findings: No bruising or erythema. Neurological:  
   General: No focal deficit present. Mental Status: She is alert and oriented to person, place, and time. Psychiatric:     
   Mood and Affect: Mood normal.     
   Behavior: Behavior normal.  
 
  
 
 
Right hand: There is continued edema about the hand. There is no tenderness to palpation about the hand. Range of motion of the digits is full. Sensation is intact distally. Cap refill is brisk. Imagin2020 plain films of the right hand redemonstrates fractures of the third and fourth metacarpals long oblique in nature. There is no interval displacement and only minimal callus formation noted. 2020 plain films of right hand Findings: There is oblique fracture at the third metacarpal with 4 angulation. No extension to the joint space. There is oblique fracture through the fourth 
metacarpal with volar regulation. No extension to the proximal joint space. Remaining bones intact. Patient osteopenic. 
  
Joint relationships are narrowed at the first metacarpal phalangeal joint and 
first carpal metacarpal joint, DIP joint second digit, radiocarpal row. There 
are a few cysts at the wrist including distal scaphoid and lunate bone. 
  
Diffuse dorsal soft tissue swelling of the hand. 
  
IMPRESSION IMPRESSION[de-identified] 
  
Fractures of the fourth and fifth metacarpals as above. ICD-10-CM ICD-9-CM 1. Closed displaced fracture of shaft of third metacarpal bone of right hand with routine healing, subsequent encounter  S62.322D V54.19 AMB POC XRAY, HAND; 3+ VIEWS 2. Closed displaced fracture of shaft of fourth metacarpal bone of right hand with routine healing, subsequent encounter  S62.324D V54.19 Plan:  
 
Right TKO brace with middle finger and ring finger immobilized. I have instructed the patient to take the brace off twice a day for gentle range of motion exercises and to ice her hand. Follow-up and Dispositions · Return in about 4 weeks (around 9/17/2020) for Reevaluation and x-rays. Plan was reviewed with patient, who verbalized agreement and understanding of the plan

## 2020-10-12 NOTE — PROGRESS NOTES
Evgeny Scott is a 81 y.o. female right handed retiree. Worker's Compensation and legal considerations: none filed. Vitals:  
 10/12/20 1124 BP: (!) 90/58 Pulse: 61 Resp: 16 Temp: 98 °F (36.7 °C) TempSrc: Temporal  
SpO2: 98% Weight: 142 lb (64.4 kg) Height: 5' (1.524 m) PainSc:   2 PainLoc: Hand Chief Complaint Patient presents with  
 Hand Pain  
  right hand fx f/u HPI: Patient comes in today approximately 10 weeks status post right third and fourth metacarpal fractures. She has been in a TKO brace and is doing well. She reports overall no pain and no difficulty with motion. 4wks HPI: Patient comes in today nearly 4 weeks after sustaining an injury to her right hand. She had third and fourth metacarpal fractures for which she has been treated in a TKO brace. She has been using a walker and she has intermittent swelling of her hand that may also be associated with a previous skin graft to the area. Reports minimal to no pain. She has been doing range of motion exercises at home. Initial HPI: Patient comes in today as emergency department follow-up after falling and injuring her right hand. She was diagnosed with third and fourth metacarpal fractures and placed into a splint. Date of onset: 7/25/2020 Injury: Yes: Comment: Fall onto outstretched hand Prior Treatment:  Yes: Comment: TKO brace Numbness/ Tingling: No 
 
 
ROS: Review of Systems - General ROS: negative Psychological ROS: negative ENT ROS: negative Allergy and Immunology ROS: negative Hematological and Lymphatic ROS: negative Respiratory ROS: no cough, shortness of breath, or wheezing Cardiovascular ROS: no chest pain or dyspnea on exertion Gastrointestinal ROS: no abdominal pain, change in bowel habits, or black or bloody stools Musculoskeletal ROS: positive for - swelling in hand - right Neurological ROS: negative Dermatological ROS: negative Past Medical History: Diagnosis Date  Bell's palsy  Essential hypertension, benign CONTROLLED  Mitral valve disorders(424.0) 2007 TRACE MR   
 Nonspecific abnormal electrocardiogram (ECG) (EKG)  Obesity, unspecified  Other and unspecified hyperlipidemia  Renal insufficiency Past Surgical History:  
Procedure Laterality Date [address] UNLISTED Explor lap  COLONOSCOPY N/A 5/31/2018 COLONOSCOPY w polypectomy performed by Anh Zaragoza MD at [address] Left  HX CATARACT REMOVAL INSERT PROSTHETIC LENS: LEFT,RIGHT  HX GYN    
 HX ORTHOPAEDIC Left Rotator cuff repair  HX PARTIAL THYROIDECTOMY  ME BIOPSY OF BREAST, INCISIONAL    
 rt & lt 12 yrs ago Current Outpatient Medications Medication Sig Dispense Refill  LORazepam (ATIVAN) 1 mg tablet Take 0.5 Tabs by mouth two (2) times daily as needed for Anxiety. Max Daily Amount: 1 mg. 6 Tab 0  
 atorvastatin (LIPITOR) 40 mg tablet Take 1 Tab by mouth nightly. 30 Tab 0  
 b complex-vitamin c-folic acid (NEPHROCAPS) 1 mg capsule Take 1 Cap by mouth daily. 30 Cap 0  
 calcitRIOL (ROCALTROL) 0.25 mcg capsule Take 1 Cap by mouth every Monday, Wednesday, Friday. 12 Cap 0  
 polyethylene glycol (MIRALAX) 17 gram packet Take 1 Packet by mouth daily. 30 Packet 0  
 metoprolol tartrate (LOPRESSOR) 25 mg tablet Take 0.5 Tabs by mouth every twelve (12) hours. 15 Tab 0  
 methIMAzole (TAPAZOLE) 10 mg tablet Take 1 Tab by mouth daily. 30 Tab 0  
 pantoprazole (PROTONIX) 40 mg tablet Take 1 Tab by mouth Before breakfast and dinner. 60 Tab 0  
 allopurinoL (ZYLOPRIM) 100 mg tablet Take 100 mg by mouth daily.  aspirin 81 mg tablet Take 81 mg by mouth. Allergies Allergen Reactions  Ciprofibrate Unable to [employer]  Darvon [Propoxyphene] Unable to Obtain  Penicillins Unable to Obtain PE:  
 
Physical Exam 
Vitals signs and nursing note reviewed. Constitutional:   
   General: She is not in acute distress. Appearance: Normal appearance. She is not ill-appearing. Eyes:  
   Pupils: Pupils are equal, round, and reactive to light. Neck: Musculoskeletal: Normal range of motion. Pulmonary:  
   Effort: Pulmonary effort is normal. No respiratory distress. Abdominal:  
   General: Abdomen is flat. Musculoskeletal:     
   General: Swelling present. No tenderness, deformity or signs of injury. Right lower leg: No edema. Left lower leg: No edema. Skin: 
   General: Skin is warm and dry. Capillary Refill: Capillary refill takes less than 2 seconds. Findings: No bruising or erythema. Neurological:  
   General: No focal deficit present. Mental Status: She is alert and oriented to person, place, and time. Psychiatric:     
   Mood and Affect: Mood normal.     
   Behavior: Behavior normal.  
 
  
 
 
Right hand: Edema is much improved about the right hand and nearly completely resolved. Range of motion is full and there is no rotational deformity or scissoring of the digits. Sensation is intact distally and cap refill is brisk. Imaging:  
 
10/12/2020 plain films of the right hand shows interval callus formation of the third and fourth metacarpal shaft fractures with continued displacement though no interval displacement. 8/20/2020 plain films of the right hand redemonstrates fractures of the third and fourth metacarpals long oblique in nature. There is no interval displacement and only minimal callus formation noted. 7/25/2020 plain films of right hand Findings: There is oblique fracture at the third metacarpal with 4 angulation. No extension to the joint space. There is oblique fracture through the fourth 
metacarpal with volar regulation. No extension to the proximal joint space. Remaining bones intact.  Patient osteopenic. 
  
Joint relationships are narrowed at the first metacarpal phalangeal joint and 
first carpal metacarpal joint, DIP joint second digit, radiocarpal row. There 
are a few cysts at the wrist including distal scaphoid and lunate bone. 
  
Diffuse dorsal soft tissue swelling of the hand. 
  
IMPRESSION IMPRESSION[de-identified] 
  
Fractures of the fourth and fifth metacarpals as above. ICD-10-CM ICD-9-CM 1. Closed displaced fracture of shaft of third metacarpal bone of right hand with routine healing, subsequent encounter  S62.322D V54.19 AMB POC XRAY, HAND; 3+ VIEWS 2. Closed displaced fracture of shaft of fourth metacarpal bone of right hand with routine healing, subsequent encounter  S62.324D V54.19 Plan:  
 
DC TKO brace and increase activities as tolerated. Follow-up and Dispositions · Return if symptoms worsen or fail to improve. Plan was reviewed with patient, who verbalized agreement and understanding of the plan

## 2020-12-03 NOTE — PROGRESS NOTES
1. Have you been to the ER, urgent care clinic since your last visit? Hospitalized since your last visit? Yes Reason for visit: [facility] emergency dept in 07/2020    2. Have you seen or consulted any other health care providers outside of the [address] [place] [address] since your last visit? Include any pap smears or colon screening.  Yes Reason for visit: pcp               3 most recent PHQ Screens 12/3/2020   Little interest or pleasure in doing things Not at all   Feeling down, depressed, irritable, or hopeless Not at all   Total Score PHQ 2 0
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
## 2021-01-01 ENCOUNTER — HOSPITAL ENCOUNTER (INPATIENT)
Age: 86
LOS: 9 days | DRG: 853 | End: 2021-01-17
Attending: EMERGENCY MEDICINE | Admitting: SURGERY
Payer: MEDICARE

## 2021-01-01 ENCOUNTER — APPOINTMENT (OUTPATIENT)
Dept: NON INVASIVE DIAGNOSTICS | Age: 86
DRG: 853 | End: 2021-01-01
Attending: INTERNAL MEDICINE
Payer: MEDICARE

## 2021-01-01 ENCOUNTER — APPOINTMENT (OUTPATIENT)
Dept: GENERAL RADIOLOGY | Age: 86
DRG: 853 | End: 2021-01-01
Attending: HOSPITALIST
Payer: MEDICARE

## 2021-01-01 ENCOUNTER — APPOINTMENT (OUTPATIENT)
Dept: CT IMAGING | Age: 86
DRG: 853 | End: 2021-01-01
Attending: PHYSICIAN ASSISTANT
Payer: MEDICARE

## 2021-01-01 ENCOUNTER — APPOINTMENT (OUTPATIENT)
Dept: GENERAL RADIOLOGY | Age: 86
DRG: 853 | End: 2021-01-01
Attending: EMERGENCY MEDICINE
Payer: MEDICARE

## 2021-01-01 ENCOUNTER — ANESTHESIA (OUTPATIENT)
Dept: SURGERY | Age: 86
DRG: 853 | End: 2021-01-01
Payer: MEDICARE

## 2021-01-01 ENCOUNTER — APPOINTMENT (OUTPATIENT)
Dept: GENERAL RADIOLOGY | Age: 86
DRG: 853 | End: 2021-01-01
Attending: PHYSICIAN ASSISTANT
Payer: MEDICARE

## 2021-01-01 ENCOUNTER — ANESTHESIA EVENT (OUTPATIENT)
Dept: SURGERY | Age: 86
DRG: 853 | End: 2021-01-01
Payer: MEDICARE

## 2021-01-01 VITALS
RESPIRATION RATE: 18 BRPM | HEART RATE: 102 BPM | BODY MASS INDEX: 32 KG/M2 | DIASTOLIC BLOOD PRESSURE: 71 MMHG | WEIGHT: 163 LBS | OXYGEN SATURATION: 96 % | TEMPERATURE: 98 F | HEIGHT: 60 IN | SYSTOLIC BLOOD PRESSURE: 115 MMHG

## 2021-01-01 DIAGNOSIS — R11.10 VOMITING: ICD-10-CM

## 2021-01-01 DIAGNOSIS — R09.89 WIDENED PULSE PRESSURE: ICD-10-CM

## 2021-01-01 DIAGNOSIS — A41.9: ICD-10-CM

## 2021-01-01 DIAGNOSIS — K56.2 INTESTINAL VOLVULUS (HCC): Primary | ICD-10-CM

## 2021-01-01 DIAGNOSIS — R65.21 SEVERE SEPSIS WITH SEPTIC SHOCK (HCC): ICD-10-CM

## 2021-01-01 DIAGNOSIS — K56.691 OTHER COMPLETE INTESTINAL OBSTRUCTION (HCC): ICD-10-CM

## 2021-01-01 DIAGNOSIS — R11.14 BILIOUS VOMITING WITH NAUSEA: ICD-10-CM

## 2021-01-01 DIAGNOSIS — N17.8: ICD-10-CM

## 2021-01-01 DIAGNOSIS — A41.9 SEVERE SEPSIS WITH SEPTIC SHOCK (HCC): ICD-10-CM

## 2021-01-01 DIAGNOSIS — R10.9 ABDOMINAL PAIN: ICD-10-CM

## 2021-01-01 DIAGNOSIS — K56.601 COMPLETE SMALL BOWEL OBSTRUCTION (HCC): ICD-10-CM

## 2021-01-01 DIAGNOSIS — N17.9 ACUTE RENAL FAILURE, UNSPECIFIED ACUTE RENAL FAILURE TYPE (HCC): ICD-10-CM

## 2021-01-01 DIAGNOSIS — R65.21: ICD-10-CM

## 2021-01-01 DIAGNOSIS — K55.9 MESENTERIC ISCHEMIA (HCC): ICD-10-CM

## 2021-01-01 DIAGNOSIS — D62 ACUTE BLOOD LOSS ANEMIA: ICD-10-CM

## 2021-01-01 DIAGNOSIS — K56.52 INTESTINAL ADHESIONS WITH COMPLETE OBSTRUCTION (HCC): ICD-10-CM

## 2021-01-01 DIAGNOSIS — G89.18 POSTOPERATIVE PAIN: ICD-10-CM

## 2021-01-01 LAB
ABO + RH BLD: NORMAL
ALBUMIN SERPL-MCNC: 1.9 G/DL (ref 3.4–5)
ALBUMIN SERPL-MCNC: 2 G/DL (ref 3.4–5)
ALBUMIN SERPL-MCNC: 2.1 G/DL (ref 3.4–5)
ALBUMIN SERPL-MCNC: 2.2 G/DL (ref 3.4–5)
ALBUMIN SERPL-MCNC: 2.3 G/DL (ref 3.4–5)
ALBUMIN SERPL-MCNC: 2.3 G/DL (ref 3.4–5)
ALBUMIN SERPL-MCNC: 3.1 G/DL (ref 3.4–5)
ALBUMIN/GLOB SERPL: 0.6 {RATIO} (ref 0.8–1.7)
ALBUMIN/GLOB SERPL: 0.6 {RATIO} (ref 0.8–1.7)
ALBUMIN/GLOB SERPL: 0.7 {RATIO} (ref 0.8–1.7)
ALBUMIN/GLOB SERPL: 0.8 {RATIO} (ref 0.8–1.7)
ALP SERPL-CCNC: 53 U/L (ref 45–117)
ALP SERPL-CCNC: 56 U/L (ref 45–117)
ALP SERPL-CCNC: 56 U/L (ref 45–117)
ALP SERPL-CCNC: 77 U/L (ref 45–117)
ALT SERPL-CCNC: 12 U/L (ref 13–56)
ALT SERPL-CCNC: 14 U/L (ref 13–56)
ALT SERPL-CCNC: 15 U/L (ref 13–56)
ALT SERPL-CCNC: 19 U/L (ref 13–56)
ANION GAP SERPL CALC-SCNC: 4 MMOL/L (ref 3–18)
ANION GAP SERPL CALC-SCNC: 5 MMOL/L (ref 3–18)
ANION GAP SERPL CALC-SCNC: 6 MMOL/L (ref 3–18)
ANION GAP SERPL CALC-SCNC: 7 MMOL/L (ref 3–18)
ANION GAP SERPL CALC-SCNC: 8 MMOL/L (ref 3–18)
APPEARANCE UR: CLEAR
ARTERIAL PATENCY WRIST A: YES
AST SERPL-CCNC: 16 U/L (ref 10–38)
AST SERPL-CCNC: 17 U/L (ref 10–38)
AST SERPL-CCNC: 21 U/L (ref 10–38)
AST SERPL-CCNC: 22 U/L (ref 10–38)
ATRIAL RATE: 74 BPM
ATRIAL RATE: 79 BPM
BACTERIA SPEC CULT: NORMAL
BACTERIA SPEC CULT: NORMAL
BACTERIA URNS QL MICRO: NEGATIVE /HPF
BASE DEFICIT BLD-SCNC: 2 MMOL/L
BASOPHILS # BLD: 0 K/UL (ref 0–0.06)
BASOPHILS # BLD: 0 K/UL (ref 0–0.1)
BASOPHILS NFR BLD: 0 % (ref 0–2)
BASOPHILS NFR BLD: 0 % (ref 0–3)
BDY SITE: ABNORMAL
BILIRUB SERPL-MCNC: 0.3 MG/DL (ref 0.2–1)
BILIRUB SERPL-MCNC: 0.4 MG/DL (ref 0.2–1)
BILIRUB SERPL-MCNC: 0.9 MG/DL (ref 0.2–1)
BILIRUB SERPL-MCNC: 1 MG/DL (ref 0.2–1)
BILIRUB UR QL: NEGATIVE
BLD PROD TYP BPU: NORMAL
BLOOD GROUP ANTIBODIES SERPL: NORMAL
BODY TEMPERATURE: 97.5
BPU ID: NORMAL
BUN SERPL-MCNC: 25 MG/DL (ref 7–18)
BUN SERPL-MCNC: 27 MG/DL (ref 7–18)
BUN SERPL-MCNC: 28 MG/DL (ref 7–18)
BUN SERPL-MCNC: 30 MG/DL (ref 7–18)
BUN SERPL-MCNC: 31 MG/DL (ref 7–18)
BUN SERPL-MCNC: 33 MG/DL (ref 7–18)
BUN SERPL-MCNC: 37 MG/DL (ref 7–18)
BUN SERPL-MCNC: 41 MG/DL (ref 7–18)
BUN SERPL-MCNC: 47 MG/DL (ref 7–18)
BUN SERPL-MCNC: 52 MG/DL (ref 7–18)
BUN SERPL-MCNC: 53 MG/DL (ref 7–18)
BUN SERPL-MCNC: 57 MG/DL (ref 7–18)
BUN SERPL-MCNC: 59 MG/DL (ref 7–18)
BUN SERPL-MCNC: 59 MG/DL (ref 7–18)
BUN SERPL-MCNC: 60 MG/DL (ref 7–18)
BUN SERPL-MCNC: 65 MG/DL (ref 7–18)
BUN SERPL-MCNC: 66 MG/DL (ref 7–18)
BUN SERPL-MCNC: 66 MG/DL (ref 7–18)
BUN SERPL-MCNC: 67 MG/DL (ref 7–18)
BUN/CREAT SERPL: 16 (ref 12–20)
BUN/CREAT SERPL: 18 (ref 12–20)
BUN/CREAT SERPL: 18 (ref 12–20)
BUN/CREAT SERPL: 19 (ref 12–20)
BUN/CREAT SERPL: 20 (ref 12–20)
BUN/CREAT SERPL: 21 (ref 12–20)
BUN/CREAT SERPL: 21 (ref 12–20)
BUN/CREAT SERPL: 22 (ref 12–20)
CA-I SERPL-SCNC: 1.38 MMOL/L (ref 1.12–1.32)
CALCIUM SERPL-MCNC: 10.2 MG/DL (ref 8.5–10.1)
CALCIUM SERPL-MCNC: 11.4 MG/DL (ref 8.5–10.1)
CALCIUM SERPL-MCNC: 8.8 MG/DL (ref 8.5–10.1)
CALCIUM SERPL-MCNC: 8.9 MG/DL (ref 8.5–10.1)
CALCIUM SERPL-MCNC: 9 MG/DL (ref 8.5–10.1)
CALCIUM SERPL-MCNC: 9.2 MG/DL (ref 8.5–10.1)
CALCIUM SERPL-MCNC: 9.2 MG/DL (ref 8.5–10.1)
CALCIUM SERPL-MCNC: 9.4 MG/DL (ref 8.5–10.1)
CALCIUM SERPL-MCNC: 9.5 MG/DL (ref 8.5–10.1)
CALCIUM SERPL-MCNC: 9.5 MG/DL (ref 8.5–10.1)
CALCIUM SERPL-MCNC: 9.6 MG/DL (ref 8.5–10.1)
CALCIUM SERPL-MCNC: 9.6 MG/DL (ref 8.5–10.1)
CALCIUM SERPL-MCNC: 9.7 MG/DL (ref 8.5–10.1)
CALCIUM SERPL-MCNC: 9.9 MG/DL (ref 8.5–10.1)
CALCULATED P AXIS, ECG09: 48 DEGREES
CALCULATED P AXIS, ECG09: 57 DEGREES
CALCULATED R AXIS, ECG10: 46 DEGREES
CALCULATED R AXIS, ECG10: 66 DEGREES
CALCULATED T AXIS, ECG11: 67 DEGREES
CALCULATED T AXIS, ECG11: 84 DEGREES
CALLED TO:,BCALL1: NORMAL
CHLORIDE SERPL-SCNC: 103 MMOL/L (ref 100–111)
CHLORIDE SERPL-SCNC: 110 MMOL/L (ref 100–111)
CHLORIDE SERPL-SCNC: 110 MMOL/L (ref 100–111)
CHLORIDE SERPL-SCNC: 111 MMOL/L (ref 100–111)
CHLORIDE SERPL-SCNC: 111 MMOL/L (ref 100–111)
CHLORIDE SERPL-SCNC: 112 MMOL/L (ref 100–111)
CHLORIDE SERPL-SCNC: 113 MMOL/L (ref 100–111)
CHLORIDE SERPL-SCNC: 113 MMOL/L (ref 100–111)
CHLORIDE SERPL-SCNC: 114 MMOL/L (ref 100–111)
CHLORIDE SERPL-SCNC: 115 MMOL/L (ref 100–111)
CHLORIDE SERPL-SCNC: 115 MMOL/L (ref 100–111)
CHLORIDE SERPL-SCNC: 118 MMOL/L (ref 100–111)
CHLORIDE SERPL-SCNC: 119 MMOL/L (ref 100–111)
CHLORIDE SERPL-SCNC: 121 MMOL/L (ref 100–111)
CHLORIDE SERPL-SCNC: 123 MMOL/L (ref 100–111)
CO2 SERPL-SCNC: 23 MMOL/L (ref 21–32)
CO2 SERPL-SCNC: 24 MMOL/L (ref 21–32)
CO2 SERPL-SCNC: 25 MMOL/L (ref 21–32)
CO2 SERPL-SCNC: 26 MMOL/L (ref 21–32)
CO2 SERPL-SCNC: 28 MMOL/L (ref 21–32)
CO2 SERPL-SCNC: 31 MMOL/L (ref 21–32)
COLOR UR: YELLOW
COVID-19 RAPID TEST, COVR: NOT DETECTED
CREAT SERPL-MCNC: 1.25 MG/DL (ref 0.6–1.3)
CREAT SERPL-MCNC: 1.37 MG/DL (ref 0.6–1.3)
CREAT SERPL-MCNC: 1.41 MG/DL (ref 0.6–1.3)
CREAT SERPL-MCNC: 1.44 MG/DL (ref 0.6–1.3)
CREAT SERPL-MCNC: 1.45 MG/DL (ref 0.6–1.3)
CREAT SERPL-MCNC: 1.46 MG/DL (ref 0.6–1.3)
CREAT SERPL-MCNC: 1.59 MG/DL (ref 0.6–1.3)
CREAT SERPL-MCNC: 1.68 MG/DL (ref 0.6–1.3)
CREAT SERPL-MCNC: 1.83 MG/DL (ref 0.6–1.3)
CREAT SERPL-MCNC: 2.06 MG/DL (ref 0.6–1.3)
CREAT SERPL-MCNC: 2.18 MG/DL (ref 0.6–1.3)
CREAT SERPL-MCNC: 2.7 MG/DL (ref 0.6–1.3)
CREAT SERPL-MCNC: 3.01 MG/DL (ref 0.6–1.3)
CREAT SERPL-MCNC: 3.09 MG/DL (ref 0.6–1.3)
CREAT SERPL-MCNC: 3.11 MG/DL (ref 0.6–1.3)
CREAT SERPL-MCNC: 3.22 MG/DL (ref 0.6–1.3)
CREAT SERPL-MCNC: 3.24 MG/DL (ref 0.6–1.3)
CREAT SERPL-MCNC: 3.3 MG/DL (ref 0.6–1.3)
CREAT SERPL-MCNC: 3.37 MG/DL (ref 0.6–1.3)
CREAT SERPL-MCNC: 3.38 MG/DL (ref 0.6–1.3)
CREAT SERPL-MCNC: 3.41 MG/DL (ref 0.6–1.3)
CREAT UR-MCNC: 79 MG/DL (ref 30–125)
CROSSMATCH RESULT,%XM: NORMAL
DIAGNOSIS, 93000: NORMAL
DIAGNOSIS, 93000: NORMAL
DIFFERENTIAL METHOD BLD: ABNORMAL
ECHO AO ASC DIAM: 3.22 CM
ECHO AO ROOT DIAM: 2.71 CM
ECHO EST RA PRESSURE: 3 MMHG
ECHO LA AREA 4C: 19.88 CM2
ECHO LA MAJOR AXIS: 3.34 CM
ECHO LA MINOR AXIS: 1.98 CM
ECHO LA VOL 2C: 77.01 ML (ref 22–52)
ECHO LA VOL 4C: 50.82 ML (ref 22–52)
ECHO LA VOL BP: 69.16 ML (ref 22–52)
ECHO LA VOL/BSA BIPLANE: 40.96 ML/M2 (ref 16–28)
ECHO LA VOLUME INDEX A2C: 45.6 ML/M2 (ref 16–28)
ECHO LA VOLUME INDEX A4C: 30.09 ML/M2 (ref 16–28)
ECHO LV INTERNAL DIMENSION DIASTOLIC: 3.54 CM (ref 3.9–5.3)
ECHO LV INTERNAL DIMENSION SYSTOLIC: 2.31 CM
ECHO LV IVSD: 1.17 CM (ref 0.6–0.9)
ECHO LV MASS 2D: 146.1 G (ref 67–162)
ECHO LV MASS INDEX 2D: 86.5 G/M2 (ref 43–95)
ECHO LV POSTERIOR WALL DIASTOLIC: 1.32 CM (ref 0.6–0.9)
ECHO MV A VELOCITY: 127.8 CM/S
ECHO MV E DECELERATION TIME (DT): 303.67 MS
ECHO MV E VELOCITY: 95.2 CM/S
ECHO MV E/A RATIO: 0.74
ECHO RIGHT VENTRICULAR SYSTOLIC PRESSURE (RVSP): 39.29 MMHG
ECHO TV REGURGITANT MAX VELOCITY: 301.2 CM/S
ECHO TV REGURGITANT PEAK GRADIENT: 36.29 MMHG
EOSINOPHIL # BLD: 0 K/UL (ref 0–0.4)
EOSINOPHIL # BLD: 0.1 K/UL (ref 0–0.4)
EOSINOPHIL # BLD: 0.1 K/UL (ref 0–0.4)
EOSINOPHIL # BLD: 0.2 K/UL (ref 0–0.4)
EOSINOPHIL NFR BLD: 0 % (ref 0–5)
EOSINOPHIL NFR BLD: 1 % (ref 0–5)
EOSINOPHIL NFR BLD: 2 % (ref 0–5)
EOSINOPHIL NFR BLD: 2 % (ref 0–5)
EPITH CASTS URNS QL MICRO: NEGATIVE /LPF (ref 0–5)
ERYTHROCYTE [DISTWIDTH] IN BLOOD BY AUTOMATED COUNT: 14.9 % (ref 11.6–14.5)
ERYTHROCYTE [DISTWIDTH] IN BLOOD BY AUTOMATED COUNT: 15.3 % (ref 11.6–14.5)
ERYTHROCYTE [DISTWIDTH] IN BLOOD BY AUTOMATED COUNT: 15.3 % (ref 11.6–14.5)
ERYTHROCYTE [DISTWIDTH] IN BLOOD BY AUTOMATED COUNT: 16.2 % (ref 11.6–14.5)
ERYTHROCYTE [DISTWIDTH] IN BLOOD BY AUTOMATED COUNT: 16.3 % (ref 11.6–14.5)
ERYTHROCYTE [DISTWIDTH] IN BLOOD BY AUTOMATED COUNT: 16.8 % (ref 11.6–14.5)
GAS FLOW.O2 O2 DELIVERY SYS: ABNORMAL L/MIN
GAS FLOW.O2 SETTING OXYMISER: 3 L/M
GLOBULIN SER CALC-MCNC: 3 G/DL (ref 2–4)
GLOBULIN SER CALC-MCNC: 3.2 G/DL (ref 2–4)
GLOBULIN SER CALC-MCNC: 3.3 G/DL (ref 2–4)
GLOBULIN SER CALC-MCNC: 3.9 G/DL (ref 2–4)
GLUCOSE BLD STRIP.AUTO-MCNC: 125 MG/DL (ref 70–110)
GLUCOSE BLD STRIP.AUTO-MCNC: 133 MG/DL (ref 70–110)
GLUCOSE BLD STRIP.AUTO-MCNC: 93 MG/DL (ref 70–110)
GLUCOSE BLD STRIP.AUTO-MCNC: 95 MG/DL (ref 70–110)
GLUCOSE BLD STRIP.AUTO-MCNC: 96 MG/DL (ref 70–110)
GLUCOSE SERPL-MCNC: 108 MG/DL (ref 74–99)
GLUCOSE SERPL-MCNC: 109 MG/DL (ref 74–99)
GLUCOSE SERPL-MCNC: 111 MG/DL (ref 74–99)
GLUCOSE SERPL-MCNC: 120 MG/DL (ref 74–99)
GLUCOSE SERPL-MCNC: 122 MG/DL (ref 74–99)
GLUCOSE SERPL-MCNC: 123 MG/DL (ref 74–99)
GLUCOSE SERPL-MCNC: 124 MG/DL (ref 74–99)
GLUCOSE SERPL-MCNC: 127 MG/DL (ref 74–99)
GLUCOSE SERPL-MCNC: 133 MG/DL (ref 74–99)
GLUCOSE SERPL-MCNC: 134 MG/DL (ref 74–99)
GLUCOSE SERPL-MCNC: 135 MG/DL (ref 74–99)
GLUCOSE SERPL-MCNC: 139 MG/DL (ref 74–99)
GLUCOSE SERPL-MCNC: 141 MG/DL (ref 74–99)
GLUCOSE SERPL-MCNC: 144 MG/DL (ref 74–99)
GLUCOSE SERPL-MCNC: 149 MG/DL (ref 74–99)
GLUCOSE SERPL-MCNC: 159 MG/DL (ref 74–99)
GLUCOSE SERPL-MCNC: 90 MG/DL (ref 74–99)
GLUCOSE SERPL-MCNC: 92 MG/DL (ref 74–99)
GLUCOSE SERPL-MCNC: 93 MG/DL (ref 74–99)
GLUCOSE SERPL-MCNC: 93 MG/DL (ref 74–99)
GLUCOSE SERPL-MCNC: 96 MG/DL (ref 74–99)
GLUCOSE UR STRIP.AUTO-MCNC: NEGATIVE MG/DL
HCO3 BLD-SCNC: 24.9 MMOL/L (ref 22–26)
HCT VFR BLD AUTO: 21.5 % (ref 35–45)
HCT VFR BLD AUTO: 23.5 % (ref 35–45)
HCT VFR BLD AUTO: 23.9 % (ref 35–45)
HCT VFR BLD AUTO: 24.3 % (ref 35–45)
HCT VFR BLD AUTO: 25 % (ref 35–45)
HCT VFR BLD AUTO: 25.9 % (ref 35–45)
HCT VFR BLD AUTO: 37.1 % (ref 35–45)
HEALTH STATUS, XMCV2T: NORMAL
HGB BLD-MCNC: 12 G/DL (ref 12–16)
HGB BLD-MCNC: 6.8 G/DL (ref 12–16)
HGB BLD-MCNC: 7.4 G/DL (ref 12–16)
HGB BLD-MCNC: 7.7 G/DL (ref 12–16)
HGB BLD-MCNC: 7.7 G/DL (ref 12–16)
HGB BLD-MCNC: 7.9 G/DL (ref 12–16)
HGB BLD-MCNC: 8.4 G/DL (ref 12–16)
HGB UR QL STRIP: NEGATIVE
HISTORY CHECKED?,CKHIST: NORMAL
HYALINE CASTS URNS QL MICRO: ABNORMAL /LPF (ref 0–2)
INR PPP: 1.2 (ref 0.8–1.2)
KETONES UR QL STRIP.AUTO: NEGATIVE MG/DL
LACTATE BLD-SCNC: 1.74 MMOL/L (ref 0.4–2)
LACTATE BLD-SCNC: 3.33 MMOL/L (ref 0.4–2)
LACTATE SERPL-SCNC: 2 MMOL/L (ref 0.4–2)
LACTATE SERPL-SCNC: 2.6 MMOL/L (ref 0.4–2)
LEUKOCYTE ESTERASE UR QL STRIP.AUTO: ABNORMAL
LIPASE SERPL-CCNC: 73 U/L (ref 73–393)
LYMPHOCYTES # BLD: 1.3 K/UL (ref 0.9–3.6)
LYMPHOCYTES # BLD: 1.3 K/UL (ref 0.9–3.6)
LYMPHOCYTES # BLD: 1.6 K/UL (ref 0.9–3.6)
LYMPHOCYTES # BLD: 1.7 K/UL (ref 0.9–3.6)
LYMPHOCYTES # BLD: 2.2 K/UL (ref 0.9–3.6)
LYMPHOCYTES # BLD: 2.5 K/UL (ref 0.8–3.5)
LYMPHOCYTES NFR BLD: 13 % (ref 20–51)
LYMPHOCYTES NFR BLD: 14 % (ref 21–52)
LYMPHOCYTES NFR BLD: 17 % (ref 21–52)
LYMPHOCYTES NFR BLD: 18 % (ref 21–52)
LYMPHOCYTES NFR BLD: 20 % (ref 21–52)
LYMPHOCYTES NFR BLD: 23 % (ref 21–52)
MAGNESIUM SERPL-MCNC: 1.3 MG/DL (ref 1.6–2.6)
MAGNESIUM SERPL-MCNC: 1.4 MG/DL (ref 1.6–2.6)
MAGNESIUM SERPL-MCNC: 2 MG/DL (ref 1.6–2.6)
MAGNESIUM SERPL-MCNC: 2.1 MG/DL (ref 1.6–2.6)
MAGNESIUM SERPL-MCNC: 2.1 MG/DL (ref 1.6–2.6)
MCH RBC QN AUTO: 30.8 PG (ref 24–34)
MCH RBC QN AUTO: 31.6 PG (ref 24–34)
MCH RBC QN AUTO: 32.2 PG (ref 24–34)
MCH RBC QN AUTO: 32.4 PG (ref 24–34)
MCH RBC QN AUTO: 33.1 PG (ref 24–34)
MCH RBC QN AUTO: 33.2 PG (ref 24–34)
MCHC RBC AUTO-ENTMCNC: 30.8 G/DL (ref 31–37)
MCHC RBC AUTO-ENTMCNC: 31.5 G/DL (ref 31–37)
MCHC RBC AUTO-ENTMCNC: 31.6 G/DL (ref 31–37)
MCHC RBC AUTO-ENTMCNC: 32.2 G/DL (ref 31–37)
MCHC RBC AUTO-ENTMCNC: 32.3 G/DL (ref 31–37)
MCHC RBC AUTO-ENTMCNC: 32.4 G/DL (ref 31–37)
MCV RBC AUTO: 100 FL (ref 74–97)
MCV RBC AUTO: 100 FL (ref 74–97)
MCV RBC AUTO: 100.4 FL (ref 74–97)
MCV RBC AUTO: 102 FL (ref 74–97)
MCV RBC AUTO: 102.4 FL (ref 74–97)
MCV RBC AUTO: 102.8 FL (ref 74–97)
MONOCYTES # BLD: 0.3 K/UL (ref 0.05–1.2)
MONOCYTES # BLD: 0.6 K/UL (ref 0.05–1.2)
MONOCYTES # BLD: 0.7 K/UL (ref 0.05–1.2)
MONOCYTES # BLD: 0.7 K/UL (ref 0.05–1.2)
MONOCYTES # BLD: 1.2 K/UL (ref 0.05–1.2)
MONOCYTES # BLD: 1.2 K/UL (ref 0–1)
MONOCYTES NFR BLD: 12 % (ref 3–10)
MONOCYTES NFR BLD: 4 % (ref 3–10)
MONOCYTES NFR BLD: 6 % (ref 2–9)
MONOCYTES NFR BLD: 7 % (ref 3–10)
MONOCYTES NFR BLD: 7 % (ref 3–10)
MONOCYTES NFR BLD: 9 % (ref 3–10)
MUCOUS THREADS URNS QL MICRO: ABNORMAL /LPF
MYOGLOBIN UR QL: NEGATIVE
NEUTS BAND NFR BLD MANUAL: 16 % (ref 0–5)
NEUTS SEG # BLD: 15.6 K/UL (ref 1.8–8)
NEUTS SEG # BLD: 5.5 K/UL (ref 1.8–8)
NEUTS SEG # BLD: 5.9 K/UL (ref 1.8–8)
NEUTS SEG # BLD: 6.3 K/UL (ref 1.8–8)
NEUTS SEG # BLD: 7.4 K/UL (ref 1.8–8)
NEUTS SEG # BLD: 7.8 K/UL (ref 1.8–8)
NEUTS SEG NFR BLD: 63 % (ref 40–73)
NEUTS SEG NFR BLD: 65 % (ref 42–75)
NEUTS SEG NFR BLD: 69 % (ref 40–73)
NEUTS SEG NFR BLD: 75 % (ref 40–73)
NEUTS SEG NFR BLD: 78 % (ref 40–73)
NEUTS SEG NFR BLD: 79 % (ref 40–73)
NITRITE UR QL STRIP.AUTO: NEGATIVE
O2/TOTAL GAS SETTING VFR VENT: 32 %
OSMOLALITY UR: 350 MOSMOL/KG
P-R INTERVAL, ECG05: 146 MS
P-R INTERVAL, ECG05: 148 MS
PCO2 BLD: 55.2 MMHG (ref 35–45)
PH BLD: 7.26 [PH] (ref 7.35–7.45)
PH UR STRIP: 5 [PH] (ref 5–8)
PHOSPHATE SERPL-MCNC: 1.6 MG/DL (ref 2.5–4.9)
PHOSPHATE SERPL-MCNC: 1.9 MG/DL (ref 2.5–4.9)
PHOSPHATE SERPL-MCNC: 1.9 MG/DL (ref 2.5–4.9)
PHOSPHATE SERPL-MCNC: 2 MG/DL (ref 2.5–4.9)
PHOSPHATE SERPL-MCNC: 2.2 MG/DL (ref 2.5–4.9)
PHOSPHATE SERPL-MCNC: 2.5 MG/DL (ref 2.5–4.9)
PHOSPHATE SERPL-MCNC: 2.5 MG/DL (ref 2.5–4.9)
PHOSPHATE SERPL-MCNC: 2.6 MG/DL (ref 2.5–4.9)
PHOSPHATE SERPL-MCNC: 2.7 MG/DL (ref 2.5–4.9)
PHOSPHATE SERPL-MCNC: 3.1 MG/DL (ref 2.5–4.9)
PHOSPHATE SERPL-MCNC: 3.2 MG/DL (ref 2.5–4.9)
PHOSPHATE SERPL-MCNC: 3.3 MG/DL (ref 2.5–4.9)
PHOSPHATE SERPL-MCNC: 3.4 MG/DL (ref 2.5–4.9)
PHOSPHATE SERPL-MCNC: 3.4 MG/DL (ref 2.5–4.9)
PHOSPHATE SERPL-MCNC: 3.5 MG/DL (ref 2.5–4.9)
PHOSPHATE SERPL-MCNC: 3.8 MG/DL (ref 2.5–4.9)
PHOSPHATE SERPL-MCNC: 3.9 MG/DL (ref 2.5–4.9)
PHOSPHATE SERPL-MCNC: 4 MG/DL (ref 2.5–4.9)
PLATELET # BLD AUTO: 128 K/UL (ref 135–420)
PLATELET # BLD AUTO: 145 K/UL (ref 135–420)
PLATELET # BLD AUTO: 150 K/UL (ref 135–420)
PLATELET # BLD AUTO: 172 K/UL (ref 135–420)
PLATELET # BLD AUTO: 201 K/UL (ref 135–420)
PLATELET # BLD AUTO: 217 K/UL (ref 135–420)
PLATELET COMMENTS,PCOM: ABNORMAL
PMV BLD AUTO: 10.8 FL (ref 9.2–11.8)
PMV BLD AUTO: 11.8 FL (ref 9.2–11.8)
PMV BLD AUTO: 12.1 FL (ref 9.2–11.8)
PMV BLD AUTO: 12.1 FL (ref 9.2–11.8)
PO2 BLD: 73 MMHG (ref 80–100)
POTASSIUM SERPL-SCNC: 3.3 MMOL/L (ref 3.5–5.5)
POTASSIUM SERPL-SCNC: 3.4 MMOL/L (ref 3.5–5.5)
POTASSIUM SERPL-SCNC: 3.6 MMOL/L (ref 3.5–5.5)
POTASSIUM SERPL-SCNC: 3.6 MMOL/L (ref 3.5–5.5)
POTASSIUM SERPL-SCNC: 3.7 MMOL/L (ref 3.5–5.5)
POTASSIUM SERPL-SCNC: 3.7 MMOL/L (ref 3.5–5.5)
POTASSIUM SERPL-SCNC: 3.8 MMOL/L (ref 3.5–5.5)
POTASSIUM SERPL-SCNC: 3.9 MMOL/L (ref 3.5–5.5)
POTASSIUM SERPL-SCNC: 4 MMOL/L (ref 3.5–5.5)
POTASSIUM SERPL-SCNC: 4.1 MMOL/L (ref 3.5–5.5)
POTASSIUM SERPL-SCNC: 4.3 MMOL/L (ref 3.5–5.5)
POTASSIUM SERPL-SCNC: 4.5 MMOL/L (ref 3.5–5.5)
POTASSIUM SERPL-SCNC: 4.5 MMOL/L (ref 3.5–5.5)
POTASSIUM SERPL-SCNC: 4.7 MMOL/L (ref 3.5–5.5)
POTASSIUM SERPL-SCNC: 4.8 MMOL/L (ref 3.5–5.5)
POTASSIUM SERPL-SCNC: 4.9 MMOL/L (ref 3.5–5.5)
POTASSIUM SERPL-SCNC: 5 MMOL/L (ref 3.5–5.5)
PROCALCITONIN SERPL-MCNC: 8.32 NG/ML
PROT SERPL-MCNC: 5 G/DL (ref 6.4–8.2)
PROT SERPL-MCNC: 5.2 G/DL (ref 6.4–8.2)
PROT SERPL-MCNC: 5.4 G/DL (ref 6.4–8.2)
PROT SERPL-MCNC: 7 G/DL (ref 6.4–8.2)
PROT UR STRIP-MCNC: ABNORMAL MG/DL
PROTHROMBIN TIME: 15.3 SEC (ref 11.5–15.2)
PTH-INTACT SERPL-MCNC: 199.7 PG/ML (ref 18.4–88)
Q-T INTERVAL, ECG07: 342 MS
Q-T INTERVAL, ECG07: 374 MS
QRS DURATION, ECG06: 76 MS
QRS DURATION, ECG06: 92 MS
QTC CALCULATION (BEZET), ECG08: 392 MS
QTC CALCULATION (BEZET), ECG08: 415 MS
RBC # BLD AUTO: 2.1 M/UL (ref 4.2–5.3)
RBC # BLD AUTO: 2.34 M/UL (ref 4.2–5.3)
RBC # BLD AUTO: 2.39 M/UL (ref 4.2–5.3)
RBC # BLD AUTO: 2.5 M/UL (ref 4.2–5.3)
RBC # BLD AUTO: 2.54 M/UL (ref 4.2–5.3)
RBC # BLD AUTO: 3.61 M/UL (ref 4.2–5.3)
RBC #/AREA URNS HPF: NEGATIVE /HPF (ref 0–5)
RBC MORPH BLD: ABNORMAL
SAO2 % BLD: 92 % (ref 92–97)
SARS-COV-2, COV2NT: NOT DETECTED
SERVICE CMNT-IMP: ABNORMAL
SERVICE CMNT-IMP: NORMAL
SERVICE CMNT-IMP: NORMAL
SODIUM SERPL-SCNC: 141 MMOL/L (ref 136–145)
SODIUM SERPL-SCNC: 142 MMOL/L (ref 136–145)
SODIUM SERPL-SCNC: 143 MMOL/L (ref 136–145)
SODIUM SERPL-SCNC: 144 MMOL/L (ref 136–145)
SODIUM SERPL-SCNC: 144 MMOL/L (ref 136–145)
SODIUM SERPL-SCNC: 145 MMOL/L (ref 136–145)
SODIUM SERPL-SCNC: 146 MMOL/L (ref 136–145)
SODIUM SERPL-SCNC: 147 MMOL/L (ref 136–145)
SODIUM SERPL-SCNC: 148 MMOL/L (ref 136–145)
SODIUM SERPL-SCNC: 151 MMOL/L (ref 136–145)
SODIUM SERPL-SCNC: 151 MMOL/L (ref 136–145)
SODIUM UR-SCNC: 10 MMOL/L (ref 20–110)
SOURCE, COVRS: NORMAL
SOURCE, COVRS: NORMAL
SP GR UR REFRACTOMETRY: 1.02 (ref 1–1.03)
SPECIMEN EXP DATE BLD: NORMAL
SPECIMEN TYPE, XMCV1T: NORMAL
SPECIMEN TYPE, XMCV1T: NORMAL
SPECIMEN TYPE: ABNORMAL
STATUS OF UNIT,%ST: NORMAL
T3FREE SERPL-MCNC: 11.9 PG/ML (ref 2.18–3.98)
T3FREE SERPL-MCNC: 17.4 PG/ML (ref 2.18–3.98)
T4 FREE SERPL-MCNC: 0.4 NG/DL (ref 0.7–1.5)
T4 FREE SERPL-MCNC: 1.1 NG/DL (ref 0.7–1.5)
T4 FREE SERPL-MCNC: 1.9 NG/DL (ref 0.7–1.5)
T4 FREE SERPL-MCNC: 4.2 NG/DL (ref 0.7–1.5)
T4 FREE SERPL-MCNC: 5.7 NG/DL (ref 0.7–1.5)
T4 FREE SERPL-MCNC: 7 NG/DL (ref 0.7–1.5)
T4 FREE SERPL-MCNC: >8 NG/DL (ref 0.7–1.5)
TOTAL RESP. RATE, ITRR: 14
TSH SERPL DL<=0.05 MIU/L-ACNC: 0.47 UIU/ML (ref 0.36–3.74)
TSH SERPL DL<=0.05 MIU/L-ACNC: 1.09 UIU/ML (ref 0.36–3.74)
TSH SERPL DL<=0.05 MIU/L-ACNC: 74.6 UIU/ML (ref 0.36–3.74)
UNIT DIVISION, %UDIV: 0
UROBILINOGEN UR QL STRIP.AUTO: 0.2 EU/DL (ref 0.2–1)
VENTRICULAR RATE, ECG03: 74 BPM
VENTRICULAR RATE, ECG03: 79 BPM
WBC # BLD AUTO: 19.3 K/UL (ref 4.6–13.2)
WBC # BLD AUTO: 7.8 K/UL (ref 4.6–13.2)
WBC # BLD AUTO: 8 K/UL (ref 4.6–13.2)
WBC # BLD AUTO: 9.4 K/UL (ref 4.6–13.2)
WBC # BLD AUTO: 9.8 K/UL (ref 4.6–13.2)
WBC # BLD AUTO: 9.9 K/UL (ref 4.6–13.2)
WBC MORPH BLD: ABNORMAL
WBC URNS QL MICRO: ABNORMAL /HPF (ref 0–4)

## 2021-01-01 PROCEDURE — 82570 ASSAY OF URINE CREATININE: CPT

## 2021-01-01 PROCEDURE — 99233 SBSQ HOSP IP/OBS HIGH 50: CPT | Performed by: FAMILY MEDICINE

## 2021-01-01 PROCEDURE — 65610000006 HC RM INTENSIVE CARE

## 2021-01-01 PROCEDURE — 74011000250 HC RX REV CODE- 250: Performed by: INTERNAL MEDICINE

## 2021-01-01 PROCEDURE — 74011250637 HC RX REV CODE- 250/637: Performed by: SURGERY

## 2021-01-01 PROCEDURE — 85025 COMPLETE CBC W/AUTO DIFF WBC: CPT

## 2021-01-01 PROCEDURE — 77030038269 HC DRN EXT URIN PURWCK BARD -A

## 2021-01-01 PROCEDURE — 74011250637 HC RX REV CODE- 250/637: Performed by: INTERNAL MEDICINE

## 2021-01-01 PROCEDURE — 77010033678 HC OXYGEN DAILY

## 2021-01-01 PROCEDURE — 84100 ASSAY OF PHOSPHORUS: CPT

## 2021-01-01 PROCEDURE — 82040 ASSAY OF SERUM ALBUMIN: CPT

## 2021-01-01 PROCEDURE — 84439 ASSAY OF FREE THYROXINE: CPT

## 2021-01-01 PROCEDURE — 65270000029 HC RM PRIVATE

## 2021-01-01 PROCEDURE — 83970 ASSAY OF PARATHORMONE: CPT

## 2021-01-01 PROCEDURE — 74011000250 HC RX REV CODE- 250: Performed by: PHYSICIAN ASSISTANT

## 2021-01-01 PROCEDURE — 77030002966 HC SUT PDS J&J -A: Performed by: SURGERY

## 2021-01-01 PROCEDURE — 74011250637 HC RX REV CODE- 250/637: Performed by: FAMILY MEDICINE

## 2021-01-01 PROCEDURE — 74011250636 HC RX REV CODE- 250/636: Performed by: INTERNAL MEDICINE

## 2021-01-01 PROCEDURE — 30233N1 TRANSFUSION OF NONAUTOLOGOUS RED BLOOD CELLS INTO PERIPHERAL VEIN, PERCUTANEOUS APPROACH: ICD-10-PCS | Performed by: HOSPITALIST

## 2021-01-01 PROCEDURE — 2709999900 HC NON-CHARGEABLE SUPPLY

## 2021-01-01 PROCEDURE — 80048 BASIC METABOLIC PNL TOTAL CA: CPT

## 2021-01-01 PROCEDURE — 74011250636 HC RX REV CODE- 250/636: Performed by: SURGERY

## 2021-01-01 PROCEDURE — 74011250636 HC RX REV CODE- 250/636: Performed by: FAMILY MEDICINE

## 2021-01-01 PROCEDURE — 99223 1ST HOSP IP/OBS HIGH 75: CPT | Performed by: SURGERY

## 2021-01-01 PROCEDURE — 97162 PT EVAL MOD COMPLEX 30 MIN: CPT

## 2021-01-01 PROCEDURE — 36430 TRANSFUSION BLD/BLD COMPNT: CPT

## 2021-01-01 PROCEDURE — 36592 COLLECT BLOOD FROM PICC: CPT

## 2021-01-01 PROCEDURE — 80069 RENAL FUNCTION PANEL: CPT

## 2021-01-01 PROCEDURE — 93306 TTE W/DOPPLER COMPLETE: CPT

## 2021-01-01 PROCEDURE — 80053 COMPREHEN METABOLIC PANEL: CPT

## 2021-01-01 PROCEDURE — 97166 OT EVAL MOD COMPLEX 45 MIN: CPT

## 2021-01-01 PROCEDURE — 77030040831 HC BAG URINE DRNG MDII -A

## 2021-01-01 PROCEDURE — 84443 ASSAY THYROID STIM HORMONE: CPT

## 2021-01-01 PROCEDURE — 82962 GLUCOSE BLOOD TEST: CPT

## 2021-01-01 PROCEDURE — 81002 URINALYSIS NONAUTO W/O SCOPE: CPT

## 2021-01-01 PROCEDURE — 83735 ASSAY OF MAGNESIUM: CPT

## 2021-01-01 PROCEDURE — C9113 INJ PANTOPRAZOLE SODIUM, VIA: HCPCS | Performed by: PHYSICIAN ASSISTANT

## 2021-01-01 PROCEDURE — 99291 CRITICAL CARE FIRST HOUR: CPT | Performed by: INTERNAL MEDICINE

## 2021-01-01 PROCEDURE — 97535 SELF CARE MNGMENT TRAINING: CPT

## 2021-01-01 PROCEDURE — 51702 INSERT TEMP BLADDER CATH: CPT

## 2021-01-01 PROCEDURE — 84145 PROCALCITONIN (PCT): CPT

## 2021-01-01 PROCEDURE — 36415 COLL VENOUS BLD VENIPUNCTURE: CPT

## 2021-01-01 PROCEDURE — 0DBB0ZZ EXCISION OF ILEUM, OPEN APPROACH: ICD-10-PCS | Performed by: SURGERY

## 2021-01-01 PROCEDURE — P9016 RBC LEUKOCYTES REDUCED: HCPCS

## 2021-01-01 PROCEDURE — 83690 ASSAY OF LIPASE: CPT

## 2021-01-01 PROCEDURE — 99239 HOSP IP/OBS DSCHRG MGMT >30: CPT | Performed by: HOSPITALIST

## 2021-01-01 PROCEDURE — 83605 ASSAY OF LACTIC ACID: CPT

## 2021-01-01 PROCEDURE — 77030002996 HC SUT SLK J&J -A: Performed by: SURGERY

## 2021-01-01 PROCEDURE — 94762 N-INVAS EAR/PLS OXIMTRY CONT: CPT

## 2021-01-01 PROCEDURE — 74011250636 HC RX REV CODE- 250/636: Performed by: PHYSICIAN ASSISTANT

## 2021-01-01 PROCEDURE — 87040 BLOOD CULTURE FOR BACTERIA: CPT

## 2021-01-01 PROCEDURE — 99100 ANES PT EXTEME AGE<1 YR&>70: CPT | Performed by: ANESTHESIOLOGY

## 2021-01-01 PROCEDURE — 97530 THERAPEUTIC ACTIVITIES: CPT

## 2021-01-01 PROCEDURE — 74011000258 HC RX REV CODE- 258: Performed by: INTERNAL MEDICINE

## 2021-01-01 PROCEDURE — 74011000250 HC RX REV CODE- 250: Performed by: NURSE PRACTITIONER

## 2021-01-01 PROCEDURE — 85018 HEMOGLOBIN: CPT

## 2021-01-01 PROCEDURE — 87635 SARS-COV-2 COVID-19 AMP PRB: CPT

## 2021-01-01 PROCEDURE — 71045 X-RAY EXAM CHEST 1 VIEW: CPT

## 2021-01-01 PROCEDURE — 74011250636 HC RX REV CODE- 250/636: Performed by: EMERGENCY MEDICINE

## 2021-01-01 PROCEDURE — 99291 CRITICAL CARE FIRST HOUR: CPT | Performed by: PHYSICIAN ASSISTANT

## 2021-01-01 PROCEDURE — 99292 CRITICAL CARE ADDL 30 MIN: CPT | Performed by: INTERNAL MEDICINE

## 2021-01-01 PROCEDURE — 77030018673: Performed by: SURGERY

## 2021-01-01 PROCEDURE — 74011250636 HC RX REV CODE- 250/636: Performed by: NURSE PRACTITIONER

## 2021-01-01 PROCEDURE — 97110 THERAPEUTIC EXERCISES: CPT

## 2021-01-01 PROCEDURE — 84481 FREE ASSAY (FT-3): CPT

## 2021-01-01 PROCEDURE — 74011000258 HC RX REV CODE- 258: Performed by: NURSE ANESTHETIST, CERTIFIED REGISTERED

## 2021-01-01 PROCEDURE — 2709999900 HC NON-CHARGEABLE SUPPLY: Performed by: SURGERY

## 2021-01-01 PROCEDURE — 74011250636 HC RX REV CODE- 250/636

## 2021-01-01 PROCEDURE — 77030036731 HC STPLR ENDOSC J&J -F: Performed by: SURGERY

## 2021-01-01 PROCEDURE — 99232 SBSQ HOSP IP/OBS MODERATE 35: CPT | Performed by: FAMILY MEDICINE

## 2021-01-01 PROCEDURE — 77030011278 HC ELECTRD LIG IMPT COVD -F: Performed by: SURGERY

## 2021-01-01 PROCEDURE — 74176 CT ABD & PELVIS W/O CONTRAST: CPT

## 2021-01-01 PROCEDURE — 83935 ASSAY OF URINE OSMOLALITY: CPT

## 2021-01-01 PROCEDURE — 76010000153 HC OR TIME 1.5 TO 2 HR: Performed by: SURGERY

## 2021-01-01 PROCEDURE — 74011250636 HC RX REV CODE- 250/636: Performed by: NURSE ANESTHETIST, CERTIFIED REGISTERED

## 2021-01-01 PROCEDURE — 77030040830 HC CATH URETH FOL MDII -A: Performed by: SURGERY

## 2021-01-01 PROCEDURE — 93005 ELECTROCARDIOGRAM TRACING: CPT

## 2021-01-01 PROCEDURE — 76210000011 HC REC RM PH I  7.5 TO 8 HR: Performed by: SURGERY

## 2021-01-01 PROCEDURE — 86900 BLOOD TYPING SEROLOGIC ABO: CPT

## 2021-01-01 PROCEDURE — 74011250637 HC RX REV CODE- 250/637: Performed by: HOSPITALIST

## 2021-01-01 PROCEDURE — C1751 CATH, INF, PER/CENT/MIDLINE: HCPCS

## 2021-01-01 PROCEDURE — 77030002933 HC SUT MCRYL J&J -A: Performed by: SURGERY

## 2021-01-01 PROCEDURE — 99232 SBSQ HOSP IP/OBS MODERATE 35: CPT | Performed by: HOSPITALIST

## 2021-01-01 PROCEDURE — 81001 URINALYSIS AUTO W/SCOPE: CPT

## 2021-01-01 PROCEDURE — 76060000034 HC ANESTHESIA 1.5 TO 2 HR: Performed by: SURGERY

## 2021-01-01 PROCEDURE — 82803 BLOOD GASES ANY COMBINATION: CPT

## 2021-01-01 PROCEDURE — 02HV33Z INSERTION OF INFUSION DEVICE INTO SUPERIOR VENA CAVA, PERCUTANEOUS APPROACH: ICD-10-PCS | Performed by: HOSPITALIST

## 2021-01-01 PROCEDURE — 77030031139 HC SUT VCRL2 J&J -A: Performed by: SURGERY

## 2021-01-01 PROCEDURE — 99285 EMERGENCY DEPT VISIT HI MDM: CPT

## 2021-01-01 PROCEDURE — 36600 WITHDRAWAL OF ARTERIAL BLOOD: CPT

## 2021-01-01 PROCEDURE — 74011000258 HC RX REV CODE- 258: Performed by: SURGERY

## 2021-01-01 PROCEDURE — 00840 ANES IPER PX LOWER ABD NOS: CPT | Performed by: ANESTHESIOLOGY

## 2021-01-01 PROCEDURE — 96374 THER/PROPH/DIAG INJ IV PUSH: CPT

## 2021-01-01 PROCEDURE — 88307 TISSUE EXAM BY PATHOLOGIST: CPT

## 2021-01-01 PROCEDURE — 74011250636 HC RX REV CODE- 250/636: Performed by: HOSPITALIST

## 2021-01-01 PROCEDURE — 99140 ANES COMP EMERGENCY COND: CPT | Performed by: ANESTHESIOLOGY

## 2021-01-01 PROCEDURE — 92610 EVALUATE SWALLOWING FUNCTION: CPT

## 2021-01-01 PROCEDURE — 74011000250 HC RX REV CODE- 250: Performed by: NURSE ANESTHETIST, CERTIFIED REGISTERED

## 2021-01-01 PROCEDURE — 86923 COMPATIBILITY TEST ELECTRIC: CPT

## 2021-01-01 PROCEDURE — 85610 PROTHROMBIN TIME: CPT

## 2021-01-01 PROCEDURE — 96375 TX/PRO/DX INJ NEW DRUG ADDON: CPT

## 2021-01-01 PROCEDURE — 77030009979 HC RELD STPLR TCR J&J -C: Performed by: SURGERY

## 2021-01-01 PROCEDURE — 96361 HYDRATE IV INFUSION ADD-ON: CPT

## 2021-01-01 PROCEDURE — 75810000455 HC PLCMT CENT VENOUS CATH LVL 2 5182

## 2021-01-01 PROCEDURE — 82330 ASSAY OF CALCIUM: CPT

## 2021-01-01 PROCEDURE — 74011250637 HC RX REV CODE- 250/637: Performed by: PHYSICIAN ASSISTANT

## 2021-01-01 PROCEDURE — 84300 ASSAY OF URINE SODIUM: CPT

## 2021-01-01 PROCEDURE — 44120 REMOVAL OF SMALL INTESTINE: CPT | Performed by: SURGERY

## 2021-01-01 PROCEDURE — 77030011264 HC ELECTRD BLD EXT COVD -A: Performed by: SURGERY

## 2021-01-01 RX ORDER — SODIUM CHLORIDE 9 MG/ML
250 INJECTION, SOLUTION INTRAVENOUS AS NEEDED
Status: DISCONTINUED | OUTPATIENT
Start: 2021-01-01 | End: 2021-01-01 | Stop reason: HOSPADM

## 2021-01-01 RX ORDER — PROPOFOL 10 MG/ML
INJECTION, EMULSION INTRAVENOUS AS NEEDED
Status: DISCONTINUED | OUTPATIENT
Start: 2021-01-01 | End: 2021-01-01 | Stop reason: HOSPADM

## 2021-01-01 RX ORDER — OXYCODONE HCL 5 MG/5 ML
5 SOLUTION, ORAL ORAL
Status: DISCONTINUED | OUTPATIENT
Start: 2021-01-01 | End: 2021-01-01 | Stop reason: HOSPADM

## 2021-01-01 RX ORDER — MINERAL OIL
30 OIL (ML) ORAL DAILY
Status: DISCONTINUED | OUTPATIENT
Start: 2021-01-01 | End: 2021-01-01

## 2021-01-01 RX ORDER — NOREPINEPHRINE BITARTRATE/D5W 8 MG/250ML
.5-5 PLASTIC BAG, INJECTION (ML) INTRAVENOUS
Status: DISCONTINUED | OUTPATIENT
Start: 2021-01-01 | End: 2021-01-01

## 2021-01-01 RX ORDER — DEXTROSE MONOHYDRATE AND SODIUM CHLORIDE 5; .9 G/100ML; G/100ML
75 INJECTION, SOLUTION INTRAVENOUS CONTINUOUS
Status: DISCONTINUED | OUTPATIENT
Start: 2021-01-01 | End: 2021-01-01

## 2021-01-01 RX ORDER — SODIUM CHLORIDE 0.9 % (FLUSH) 0.9 %
5-40 SYRINGE (ML) INJECTION EVERY 8 HOURS
Status: CANCELLED | OUTPATIENT
Start: 2021-01-01

## 2021-01-01 RX ORDER — BISACODYL 5 MG
5 TABLET, DELAYED RELEASE (ENTERIC COATED) ORAL DAILY
Status: DISCONTINUED | OUTPATIENT
Start: 2021-01-01 | End: 2021-01-01 | Stop reason: HOSPADM

## 2021-01-01 RX ORDER — LANOLIN ALCOHOL/MO/W.PET/CERES
400 CREAM (GRAM) TOPICAL 2 TIMES DAILY
Status: DISCONTINUED | OUTPATIENT
Start: 2021-01-01 | End: 2021-01-01 | Stop reason: HOSPADM

## 2021-01-01 RX ORDER — METRONIDAZOLE 500 MG/100ML
500 INJECTION, SOLUTION INTRAVENOUS EVERY 8 HOURS
Status: DISCONTINUED | OUTPATIENT
Start: 2021-01-01 | End: 2021-01-01

## 2021-01-01 RX ORDER — FAMOTIDINE 40 MG/5ML
20 POWDER, FOR SUSPENSION ORAL 2 TIMES DAILY
Status: CANCELLED | OUTPATIENT
Start: 2021-01-01

## 2021-01-01 RX ORDER — DOPAMINE HYDROCHLORIDE 160 MG/100ML
5 INJECTION, SOLUTION INTRAVENOUS
Status: DISCONTINUED | OUTPATIENT
Start: 2021-01-01 | End: 2021-01-01

## 2021-01-01 RX ORDER — GLYCOPYRROLATE 0.2 MG/ML
INJECTION INTRAMUSCULAR; INTRAVENOUS AS NEEDED
Status: DISCONTINUED | OUTPATIENT
Start: 2021-01-01 | End: 2021-01-01 | Stop reason: HOSPADM

## 2021-01-01 RX ORDER — SODIUM CHLORIDE, SODIUM LACTATE, POTASSIUM CHLORIDE, CALCIUM CHLORIDE 600; 310; 30; 20 MG/100ML; MG/100ML; MG/100ML; MG/100ML
25 INJECTION, SOLUTION INTRAVENOUS CONTINUOUS
Status: DISCONTINUED | OUTPATIENT
Start: 2021-01-01 | End: 2021-01-01

## 2021-01-01 RX ORDER — SODIUM CHLORIDE 0.9 % (FLUSH) 0.9 %
5-40 SYRINGE (ML) INJECTION AS NEEDED
Status: DISCONTINUED | OUTPATIENT
Start: 2021-01-01 | End: 2021-01-01 | Stop reason: HOSPADM

## 2021-01-01 RX ORDER — NOREPINEPHRINE BITARTRATE/D5W 8 MG/250ML
2-16 PLASTIC BAG, INJECTION (ML) INTRAVENOUS
Status: DISCONTINUED | OUTPATIENT
Start: 2021-01-01 | End: 2021-01-01

## 2021-01-01 RX ORDER — MORPHINE SULFATE 2 MG/ML
2 INJECTION, SOLUTION INTRAMUSCULAR; INTRAVENOUS
Status: ACTIVE | OUTPATIENT
Start: 2021-01-01 | End: 2021-01-01

## 2021-01-01 RX ORDER — HEPARIN SODIUM 5000 [USP'U]/ML
5000 INJECTION, SOLUTION INTRAVENOUS; SUBCUTANEOUS EVERY 8 HOURS
Status: CANCELLED | OUTPATIENT
Start: 2021-01-01

## 2021-01-01 RX ORDER — SODIUM CHLORIDE, SODIUM LACTATE, POTASSIUM CHLORIDE, CALCIUM CHLORIDE 600; 310; 30; 20 MG/100ML; MG/100ML; MG/100ML; MG/100ML
25 INJECTION, SOLUTION INTRAVENOUS CONTINUOUS
Status: DISCONTINUED | OUTPATIENT
Start: 2021-01-01 | End: 2021-01-01 | Stop reason: HOSPADM

## 2021-01-01 RX ORDER — SODIUM CHLORIDE, SODIUM LACTATE, POTASSIUM CHLORIDE, CALCIUM CHLORIDE 600; 310; 30; 20 MG/100ML; MG/100ML; MG/100ML; MG/100ML
INJECTION, SOLUTION INTRAVENOUS
Status: DISCONTINUED | OUTPATIENT
Start: 2021-01-01 | End: 2021-01-01 | Stop reason: HOSPADM

## 2021-01-01 RX ORDER — LEVOFLOXACIN 5 MG/ML
750 INJECTION, SOLUTION INTRAVENOUS ONCE
Status: DISCONTINUED | OUTPATIENT
Start: 2021-01-01 | End: 2021-01-01

## 2021-01-01 RX ORDER — SODIUM CHLORIDE, SODIUM LACTATE, POTASSIUM CHLORIDE, CALCIUM CHLORIDE 600; 310; 30; 20 MG/100ML; MG/100ML; MG/100ML; MG/100ML
100 INJECTION, SOLUTION INTRAVENOUS CONTINUOUS
Status: DISCONTINUED | OUTPATIENT
Start: 2021-01-01 | End: 2021-01-01

## 2021-01-01 RX ORDER — ONDANSETRON 2 MG/ML
INJECTION INTRAMUSCULAR; INTRAVENOUS AS NEEDED
Status: DISCONTINUED | OUTPATIENT
Start: 2021-01-01 | End: 2021-01-01 | Stop reason: HOSPADM

## 2021-01-01 RX ORDER — CALCITRIOL 0.25 UG/1
0.25 CAPSULE ORAL
Status: DISCONTINUED | OUTPATIENT
Start: 2021-01-01 | End: 2021-01-01 | Stop reason: HOSPADM

## 2021-01-01 RX ORDER — POLYETHYLENE GLYCOL 3350 17 G/17G
17 POWDER, FOR SOLUTION ORAL EVERY OTHER DAY
Status: DISCONTINUED | OUTPATIENT
Start: 2021-01-18 | End: 2021-01-01 | Stop reason: HOSPADM

## 2021-01-01 RX ORDER — MORPHINE SULFATE 4 MG/ML
2 INJECTION, SOLUTION INTRAMUSCULAR; INTRAVENOUS ONCE
Status: COMPLETED | OUTPATIENT
Start: 2021-01-01 | End: 2021-01-01

## 2021-01-01 RX ORDER — FENTANYL CITRATE 50 UG/ML
INJECTION, SOLUTION INTRAMUSCULAR; INTRAVENOUS AS NEEDED
Status: DISCONTINUED | OUTPATIENT
Start: 2021-01-01 | End: 2021-01-01 | Stop reason: HOSPADM

## 2021-01-01 RX ORDER — DIPHENHYDRAMINE HYDROCHLORIDE 50 MG/ML
12.5 INJECTION, SOLUTION INTRAMUSCULAR; INTRAVENOUS
Status: DISCONTINUED | OUTPATIENT
Start: 2021-01-01 | End: 2021-01-01 | Stop reason: HOSPADM

## 2021-01-01 RX ORDER — ACETAMINOPHEN 650 MG/1
650 SUPPOSITORY RECTAL
Status: DISCONTINUED | OUTPATIENT
Start: 2021-01-01 | End: 2021-01-01 | Stop reason: HOSPADM

## 2021-01-01 RX ORDER — NEOSTIGMINE METHYLSULFATE 1 MG/ML
INJECTION, SOLUTION INTRAVENOUS AS NEEDED
Status: DISCONTINUED | OUTPATIENT
Start: 2021-01-01 | End: 2021-01-01 | Stop reason: HOSPADM

## 2021-01-01 RX ORDER — METRONIDAZOLE 500 MG/100ML
500 INJECTION, SOLUTION INTRAVENOUS EVERY 8 HOURS
Status: CANCELLED | OUTPATIENT
Start: 2021-01-01 | End: 2021-01-01

## 2021-01-01 RX ORDER — FENTANYL CITRATE 50 UG/ML
INJECTION, SOLUTION INTRAMUSCULAR; INTRAVENOUS
Status: COMPLETED
Start: 2021-01-01 | End: 2021-01-01

## 2021-01-01 RX ORDER — NOREPINEPHRINE BITARTRATE/D5W 8 MG/250ML
.5-5 PLASTIC BAG, INJECTION (ML) INTRAVENOUS
Status: DISCONTINUED | OUTPATIENT
Start: 2021-01-01 | End: 2021-01-01 | Stop reason: HOSPADM

## 2021-01-01 RX ORDER — SODIUM CHLORIDE 0.9 % (FLUSH) 0.9 %
5-40 SYRINGE (ML) INJECTION EVERY 8 HOURS
Status: DISCONTINUED | OUTPATIENT
Start: 2021-01-01 | End: 2021-01-01 | Stop reason: HOSPADM

## 2021-01-01 RX ORDER — SODIUM CHLORIDE 0.9 % (FLUSH) 0.9 %
5-10 SYRINGE (ML) INJECTION AS NEEDED
Status: DISCONTINUED | OUTPATIENT
Start: 2021-01-01 | End: 2021-01-01 | Stop reason: HOSPADM

## 2021-01-01 RX ORDER — LEVOFLOXACIN 5 MG/ML
500 INJECTION, SOLUTION INTRAVENOUS
Status: DISCONTINUED | OUTPATIENT
Start: 2021-01-01 | End: 2021-01-01

## 2021-01-01 RX ORDER — PROPRANOLOL HYDROCHLORIDE 20 MG/1
40 TABLET ORAL 3 TIMES DAILY
Status: DISCONTINUED | OUTPATIENT
Start: 2021-01-01 | End: 2021-01-01 | Stop reason: HOSPADM

## 2021-01-01 RX ORDER — METHIMAZOLE 5 MG/1
20 TABLET ORAL EVERY 8 HOURS
Status: DISCONTINUED | OUTPATIENT
Start: 2021-01-01 | End: 2021-01-01 | Stop reason: HOSPADM

## 2021-01-01 RX ORDER — OXYCODONE HYDROCHLORIDE 5 MG/1
5 TABLET ORAL
Qty: 20 TAB | Refills: 0 | Status: SHIPPED | OUTPATIENT
Start: 2021-01-01 | End: 2021-01-18

## 2021-01-01 RX ORDER — FACIAL-BODY WIPES
10 EACH TOPICAL DAILY
Status: DISCONTINUED | OUTPATIENT
Start: 2021-01-01 | End: 2021-01-01 | Stop reason: HOSPADM

## 2021-01-01 RX ORDER — ACETAMINOPHEN 325 MG/1
650 TABLET ORAL ONCE
Status: DISCONTINUED | OUTPATIENT
Start: 2021-01-01 | End: 2021-01-01

## 2021-01-01 RX ORDER — ONDANSETRON 2 MG/ML
4 INJECTION INTRAMUSCULAR; INTRAVENOUS
Status: DISCONTINUED | OUTPATIENT
Start: 2021-01-01 | End: 2021-01-01 | Stop reason: HOSPADM

## 2021-01-01 RX ORDER — FENTANYL CITRATE 50 UG/ML
50 INJECTION, SOLUTION INTRAMUSCULAR; INTRAVENOUS ONCE
Status: COMPLETED | OUTPATIENT
Start: 2021-01-01 | End: 2021-01-01

## 2021-01-01 RX ORDER — HEPARIN SODIUM 5000 [USP'U]/ML
5000 INJECTION, SOLUTION INTRAVENOUS; SUBCUTANEOUS EVERY 8 HOURS
Status: DISCONTINUED | OUTPATIENT
Start: 2021-01-01 | End: 2021-01-01 | Stop reason: HOSPADM

## 2021-01-01 RX ORDER — FAMOTIDINE 20 MG/1
20 TABLET, FILM COATED ORAL DAILY
Status: DISCONTINUED | OUTPATIENT
Start: 2021-01-01 | End: 2021-01-01 | Stop reason: HOSPADM

## 2021-01-01 RX ORDER — EPHEDRINE SULFATE/0.9% NACL/PF 25 MG/5 ML
5 SYRINGE (ML) INTRAVENOUS
Status: DISCONTINUED | OUTPATIENT
Start: 2021-01-01 | End: 2021-01-01 | Stop reason: HOSPADM

## 2021-01-01 RX ORDER — FUROSEMIDE 10 MG/ML
40 INJECTION INTRAMUSCULAR; INTRAVENOUS ONCE
Status: DISCONTINUED | OUTPATIENT
Start: 2021-01-01 | End: 2021-01-01 | Stop reason: HOSPADM

## 2021-01-01 RX ORDER — SODIUM CHLORIDE 9 MG/ML
INJECTION, SOLUTION INTRAVENOUS
Status: DISCONTINUED | OUTPATIENT
Start: 2021-01-01 | End: 2021-01-01 | Stop reason: HOSPADM

## 2021-01-01 RX ORDER — ONDANSETRON 2 MG/ML
4 INJECTION INTRAMUSCULAR; INTRAVENOUS
Status: COMPLETED | OUTPATIENT
Start: 2021-01-01 | End: 2021-01-01

## 2021-01-01 RX ORDER — SUCCINYLCHOLINE CHLORIDE 20 MG/ML
INJECTION INTRAMUSCULAR; INTRAVENOUS AS NEEDED
Status: DISCONTINUED | OUTPATIENT
Start: 2021-01-01 | End: 2021-01-01 | Stop reason: HOSPADM

## 2021-01-01 RX ORDER — LEVOFLOXACIN 5 MG/ML
500 INJECTION, SOLUTION INTRAVENOUS
Status: COMPLETED | OUTPATIENT
Start: 2021-01-01 | End: 2021-01-01

## 2021-01-01 RX ORDER — ALBUMIN HUMAN 50 G/1000ML
25 SOLUTION INTRAVENOUS ONCE
Status: DISCONTINUED | OUTPATIENT
Start: 2021-01-01 | End: 2021-01-01

## 2021-01-01 RX ORDER — DEXTROSE MONOHYDRATE 50 MG/ML
50 INJECTION, SOLUTION INTRAVENOUS CONTINUOUS
Status: DISCONTINUED | OUTPATIENT
Start: 2021-01-01 | End: 2021-01-01

## 2021-01-01 RX ORDER — OXYCODONE HCL 5 MG/5 ML
5 SOLUTION, ORAL ORAL
Status: CANCELLED | OUTPATIENT
Start: 2021-01-01

## 2021-01-01 RX ORDER — BISACODYL 5 MG
5 TABLET, DELAYED RELEASE (ENTERIC COATED) ORAL DAILY PRN
Status: DISCONTINUED | OUTPATIENT
Start: 2021-01-01 | End: 2021-01-01

## 2021-01-01 RX ORDER — SODIUM CHLORIDE 9 MG/ML
999 INJECTION, SOLUTION INTRAVENOUS ONCE
Status: COMPLETED | OUTPATIENT
Start: 2021-01-01 | End: 2021-01-01

## 2021-01-01 RX ORDER — DIPHENHYDRAMINE HYDROCHLORIDE 50 MG/ML
12.5 INJECTION, SOLUTION INTRAMUSCULAR; INTRAVENOUS
Status: CANCELLED | OUTPATIENT
Start: 2021-01-01

## 2021-01-01 RX ORDER — ACETAMINOPHEN 500 MG
500 TABLET ORAL
Status: DISCONTINUED | OUTPATIENT
Start: 2021-01-01 | End: 2021-01-01

## 2021-01-01 RX ORDER — NALOXONE HYDROCHLORIDE 0.4 MG/ML
0.4 INJECTION, SOLUTION INTRAMUSCULAR; INTRAVENOUS; SUBCUTANEOUS AS NEEDED
Status: CANCELLED | OUTPATIENT
Start: 2021-01-01

## 2021-01-01 RX ORDER — DEXTROSE, SODIUM CHLORIDE, AND POTASSIUM CHLORIDE 5; .45; .15 G/100ML; G/100ML; G/100ML
100 INJECTION INTRAVENOUS CONTINUOUS
Status: DISCONTINUED | OUTPATIENT
Start: 2021-01-01 | End: 2021-01-01

## 2021-01-01 RX ORDER — LEVOFLOXACIN 5 MG/ML
750 INJECTION, SOLUTION INTRAVENOUS EVERY 24 HOURS
Status: DISCONTINUED | OUTPATIENT
Start: 2021-01-01 | End: 2021-01-01

## 2021-01-01 RX ORDER — POLYETHYLENE GLYCOL 3350 17 G/17G
17 POWDER, FOR SOLUTION ORAL DAILY PRN
Status: DISCONTINUED | OUTPATIENT
Start: 2021-01-01 | End: 2021-01-01

## 2021-01-01 RX ORDER — PROMETHAZINE HYDROCHLORIDE 25 MG/1
12.5 TABLET ORAL
Status: DISCONTINUED | OUTPATIENT
Start: 2021-01-01 | End: 2021-01-01 | Stop reason: HOSPADM

## 2021-01-01 RX ORDER — SODIUM CHLORIDE, SODIUM LACTATE, POTASSIUM CHLORIDE, CALCIUM CHLORIDE 600; 310; 30; 20 MG/100ML; MG/100ML; MG/100ML; MG/100ML
150 INJECTION, SOLUTION INTRAVENOUS CONTINUOUS
Status: DISCONTINUED | OUTPATIENT
Start: 2021-01-01 | End: 2021-01-01

## 2021-01-01 RX ORDER — METRONIDAZOLE 500 MG/100ML
500 INJECTION, SOLUTION INTRAVENOUS EVERY 8 HOURS
Status: COMPLETED | OUTPATIENT
Start: 2021-01-01 | End: 2021-01-01

## 2021-01-01 RX ORDER — SODIUM CHLORIDE 0.9 % (FLUSH) 0.9 %
5-40 SYRINGE (ML) INJECTION AS NEEDED
Status: CANCELLED | OUTPATIENT
Start: 2021-01-01

## 2021-01-01 RX ORDER — ROCURONIUM BROMIDE 10 MG/ML
INJECTION, SOLUTION INTRAVENOUS AS NEEDED
Status: DISCONTINUED | OUTPATIENT
Start: 2021-01-01 | End: 2021-01-01 | Stop reason: HOSPADM

## 2021-01-01 RX ORDER — METRONIDAZOLE 500 MG/100ML
INJECTION, SOLUTION INTRAVENOUS AS NEEDED
Status: DISCONTINUED | OUTPATIENT
Start: 2021-01-01 | End: 2021-01-01 | Stop reason: HOSPADM

## 2021-01-01 RX ORDER — ACETAMINOPHEN 325 MG/1
650 TABLET ORAL
Status: DISCONTINUED | OUTPATIENT
Start: 2021-01-01 | End: 2021-01-01 | Stop reason: HOSPADM

## 2021-01-01 RX ORDER — ONDANSETRON 2 MG/ML
4 INJECTION INTRAMUSCULAR; INTRAVENOUS ONCE
Status: DISCONTINUED | OUTPATIENT
Start: 2021-01-01 | End: 2021-01-01 | Stop reason: HOSPADM

## 2021-01-01 RX ORDER — MIDODRINE HYDROCHLORIDE 5 MG/1
5 TABLET ORAL
Status: DISCONTINUED | OUTPATIENT
Start: 2021-01-01 | End: 2021-01-01

## 2021-01-01 RX ORDER — METRONIDAZOLE 500 MG/100ML
500 INJECTION, SOLUTION INTRAVENOUS
Status: DISCONTINUED | OUTPATIENT
Start: 2021-01-01 | End: 2021-01-01 | Stop reason: HOSPADM

## 2021-01-01 RX ORDER — FACIAL-BODY WIPES
10 EACH TOPICAL DAILY PRN
Status: CANCELLED | OUTPATIENT
Start: 2021-01-01

## 2021-01-01 RX ORDER — CALCIUM CHLORIDE INJECTION 100 MG/ML
INJECTION, SOLUTION INTRAVENOUS AS NEEDED
Status: DISCONTINUED | OUTPATIENT
Start: 2021-01-01 | End: 2021-01-01 | Stop reason: HOSPADM

## 2021-01-01 RX ORDER — ONDANSETRON 2 MG/ML
4 INJECTION INTRAMUSCULAR; INTRAVENOUS
Status: CANCELLED | OUTPATIENT
Start: 2021-01-01

## 2021-01-01 RX ADMIN — Medication 5 ML: at 15:20

## 2021-01-01 RX ADMIN — SUGAMMADEX 120 MG: 100 INJECTION, SOLUTION INTRAVENOUS at 20:53

## 2021-01-01 RX ADMIN — ACETAMINOPHEN 650 MG: 325 TABLET ORAL at 08:25

## 2021-01-01 RX ADMIN — HEPARIN SODIUM 5000 UNITS: 5000 INJECTION INTRAVENOUS; SUBCUTANEOUS at 22:48

## 2021-01-01 RX ADMIN — SODIUM CHLORIDE: 9 INJECTION, SOLUTION INTRAVENOUS at 19:29

## 2021-01-01 RX ADMIN — METRONIDAZOLE 500 MG: 500 INJECTION, SOLUTION INTRAVENOUS at 14:18

## 2021-01-01 RX ADMIN — HEPARIN SODIUM 5000 UNITS: 5000 INJECTION INTRAVENOUS; SUBCUTANEOUS at 05:34

## 2021-01-01 RX ADMIN — PROPRANOLOL HYDROCHLORIDE 40 MG: 20 TABLET ORAL at 22:48

## 2021-01-01 RX ADMIN — FENTANYL CITRATE 25 MCG: 50 INJECTION, SOLUTION INTRAMUSCULAR; INTRAVENOUS at 19:51

## 2021-01-01 RX ADMIN — FENTANYL CITRATE 25 MCG: 50 INJECTION, SOLUTION INTRAMUSCULAR; INTRAVENOUS at 19:16

## 2021-01-01 RX ADMIN — ACETAMINOPHEN ORAL SOLUTION 500 MG: 650 SOLUTION ORAL at 21:38

## 2021-01-01 RX ADMIN — METHIMAZOLE 20 MG: 5 TABLET ORAL at 17:32

## 2021-01-01 RX ADMIN — Medication 10 ML: at 22:00

## 2021-01-01 RX ADMIN — Medication 2 MCG/MIN: at 06:20

## 2021-01-01 RX ADMIN — HEPARIN SODIUM 5000 UNITS: 5000 INJECTION INTRAVENOUS; SUBCUTANEOUS at 05:16

## 2021-01-01 RX ADMIN — POTASSIUM CHLORIDE AND DEXTROSE MONOHYDRATE: 150; 5 INJECTION, SOLUTION INTRAVENOUS at 05:16

## 2021-01-01 RX ADMIN — Medication 10 ML: at 06:15

## 2021-01-01 RX ADMIN — PHENYLEPHRINE HYDROCHLORIDE 200 MCG: 10 INJECTION INTRAVENOUS at 19:39

## 2021-01-01 RX ADMIN — ACETAMINOPHEN ORAL SOLUTION 500 MG: 650 SOLUTION ORAL at 03:46

## 2021-01-01 RX ADMIN — MIDODRINE HYDROCHLORIDE 5 MG: 5 TABLET ORAL at 11:48

## 2021-01-01 RX ADMIN — HEPARIN SODIUM 5000 UNITS: 5000 INJECTION INTRAVENOUS; SUBCUTANEOUS at 14:39

## 2021-01-01 RX ADMIN — CALCIUM CHLORIDE 1 G: 100 INJECTION INTRAVENOUS; INTRAVENTRICULAR at 19:43

## 2021-01-01 RX ADMIN — Medication 400 MG: at 08:25

## 2021-01-01 RX ADMIN — POTASSIUM CHLORIDE AND DEXTROSE MONOHYDRATE: 150; 5 INJECTION, SOLUTION INTRAVENOUS at 06:13

## 2021-01-01 RX ADMIN — METRONIDAZOLE 500 MG: 500 INJECTION, SOLUTION INTRAVENOUS at 08:23

## 2021-01-01 RX ADMIN — LEVOFLOXACIN 500 MG: 5 INJECTION, SOLUTION INTRAVENOUS at 19:48

## 2021-01-01 RX ADMIN — Medication 5 MG: at 22:55

## 2021-01-01 RX ADMIN — GLYCOPYRROLATE 0.5 MG: 0.2 INJECTION INTRAMUSCULAR; INTRAVENOUS at 20:20

## 2021-01-01 RX ADMIN — METRONIDAZOLE 500 MG: 500 INJECTION, SOLUTION INTRAVENOUS at 22:00

## 2021-01-01 RX ADMIN — AZTREONAM 1 G: 1 INJECTION, POWDER, LYOPHILIZED, FOR SOLUTION INTRAMUSCULAR; INTRAVENOUS at 02:16

## 2021-01-01 RX ADMIN — POTASSIUM BICARBONATE 40 MEQ: 782 TABLET, EFFERVESCENT ORAL at 12:30

## 2021-01-01 RX ADMIN — Medication 10 ML: at 14:21

## 2021-01-01 RX ADMIN — SODIUM CHLORIDE, SODIUM LACTATE, POTASSIUM CHLORIDE, AND CALCIUM CHLORIDE 150 ML/HR: 600; 310; 30; 20 INJECTION, SOLUTION INTRAVENOUS at 17:41

## 2021-01-01 RX ADMIN — SODIUM CHLORIDE 40 MG: 9 INJECTION INTRAMUSCULAR; INTRAVENOUS; SUBCUTANEOUS at 09:20

## 2021-01-01 RX ADMIN — CEFEPIME 2 G: 2 INJECTION, POWDER, FOR SOLUTION INTRAVENOUS at 14:42

## 2021-01-01 RX ADMIN — SODIUM CHLORIDE 40 MG: 9 INJECTION, SOLUTION INTRAMUSCULAR; INTRAVENOUS; SUBCUTANEOUS at 11:53

## 2021-01-01 RX ADMIN — Medication 10 ML: at 21:07

## 2021-01-01 RX ADMIN — ACETAMINOPHEN ORAL SOLUTION 500 MG: 650 SOLUTION ORAL at 11:49

## 2021-01-01 RX ADMIN — DIBASIC SODIUM PHOSPHATE, MONOBASIC POTASSIUM PHOSPHATE AND MONOBASIC SODIUM PHOSPHATE 1 TABLET: 852; 155; 130 TABLET ORAL at 12:30

## 2021-01-01 RX ADMIN — BISACODYL 5 MG: 5 TABLET, COATED ORAL at 10:53

## 2021-01-01 RX ADMIN — ROCURONIUM BROMIDE 20 MG: 50 INJECTION INTRAVENOUS at 19:51

## 2021-01-01 RX ADMIN — Medication 10 ML: at 05:10

## 2021-01-01 RX ADMIN — Medication 10 ML: at 05:04

## 2021-01-01 RX ADMIN — METHIMAZOLE 20 MG: 5 TABLET ORAL at 06:13

## 2021-01-01 RX ADMIN — Medication 400 MG: at 15:30

## 2021-01-01 RX ADMIN — BISACODYL 10 MG: 10 SUPPOSITORY RECTAL at 11:37

## 2021-01-01 RX ADMIN — ACETAMINOPHEN 650 MG: 325 TABLET ORAL at 21:31

## 2021-01-01 RX ADMIN — PROPOFOL 30 MG: 10 INJECTION, EMULSION INTRAVENOUS at 19:52

## 2021-01-01 RX ADMIN — FAMOTIDINE 20 MG: 20 TABLET, FILM COATED ORAL at 08:25

## 2021-01-01 RX ADMIN — HEPARIN SODIUM 5000 UNITS: 5000 INJECTION INTRAVENOUS; SUBCUTANEOUS at 23:36

## 2021-01-01 RX ADMIN — FAMOTIDINE 20 MG: 20 TABLET, FILM COATED ORAL at 09:16

## 2021-01-01 RX ADMIN — POTASSIUM BICARBONATE 40 MEQ: 782 TABLET, EFFERVESCENT ORAL at 15:55

## 2021-01-01 RX ADMIN — ACETAMINOPHEN ORAL SOLUTION 500 MG: 650 SOLUTION ORAL at 21:11

## 2021-01-01 RX ADMIN — BISACODYL 10 MG: 10 SUPPOSITORY RECTAL at 09:54

## 2021-01-01 RX ADMIN — BISACODYL 5 MG: 5 TABLET, COATED ORAL at 09:54

## 2021-01-01 RX ADMIN — HEPARIN SODIUM 5000 UNITS: 5000 INJECTION INTRAVENOUS; SUBCUTANEOUS at 14:42

## 2021-01-01 RX ADMIN — Medication 10 ML: at 14:00

## 2021-01-01 RX ADMIN — HEPARIN SODIUM 5000 UNITS: 5000 INJECTION INTRAVENOUS; SUBCUTANEOUS at 21:31

## 2021-01-01 RX ADMIN — Medication 10 ML: at 23:37

## 2021-01-01 RX ADMIN — HEPARIN SODIUM 5000 UNITS: 5000 INJECTION INTRAVENOUS; SUBCUTANEOUS at 21:07

## 2021-01-01 RX ADMIN — Medication 10 ML: at 21:31

## 2021-01-01 RX ADMIN — PROPOFOL 100 MG: 10 INJECTION, EMULSION INTRAVENOUS at 19:38

## 2021-01-01 RX ADMIN — CEFEPIME 2 G: 2 INJECTION, POWDER, FOR SOLUTION INTRAVENOUS at 15:28

## 2021-01-01 RX ADMIN — METRONIDAZOLE 500 MG: 500 INJECTION, SOLUTION INTRAVENOUS at 09:19

## 2021-01-01 RX ADMIN — PHENYLEPHRINE HYDROCHLORIDE 200 MCG: 10 INJECTION INTRAVENOUS at 19:43

## 2021-01-01 RX ADMIN — Medication 3 MG: at 20:20

## 2021-01-01 RX ADMIN — METRONIDAZOLE 500 MG: 500 INJECTION, SOLUTION INTRAVENOUS at 06:00

## 2021-01-01 RX ADMIN — ACETAMINOPHEN ORAL SOLUTION 500 MG: 650 SOLUTION ORAL at 09:19

## 2021-01-01 RX ADMIN — SODIUM CHLORIDE 40 MG: 9 INJECTION INTRAMUSCULAR; INTRAVENOUS; SUBCUTANEOUS at 11:43

## 2021-01-01 RX ADMIN — ACETAMINOPHEN ORAL SOLUTION 500 MG: 650 SOLUTION ORAL at 15:34

## 2021-01-01 RX ADMIN — ACETAMINOPHEN ORAL SOLUTION 650 MG: 650 SOLUTION ORAL at 11:55

## 2021-01-01 RX ADMIN — FAMOTIDINE 20 MG: 10 INJECTION INTRAVENOUS at 08:20

## 2021-01-01 RX ADMIN — SODIUM CHLORIDE 40 MG: 9 INJECTION INTRAMUSCULAR; INTRAVENOUS; SUBCUTANEOUS at 09:00

## 2021-01-01 RX ADMIN — LEVOTHYROXINE SODIUM 150 MCG: 100 INJECTION, POWDER, LYOPHILIZED, FOR SOLUTION INTRAVENOUS at 08:23

## 2021-01-01 RX ADMIN — FAMOTIDINE 20 MG: 10 INJECTION INTRAVENOUS at 08:23

## 2021-01-01 RX ADMIN — Medication 10 ML: at 21:40

## 2021-01-01 RX ADMIN — ACETAMINOPHEN ORAL SOLUTION 500 MG: 650 SOLUTION ORAL at 02:09

## 2021-01-01 RX ADMIN — DEXTROSE MONOHYDRATE AND SODIUM CHLORIDE 100 ML/HR: 5; .9 INJECTION, SOLUTION INTRAVENOUS at 23:31

## 2021-01-01 RX ADMIN — HEPARIN SODIUM 5000 UNITS: 5000 INJECTION INTRAVENOUS; SUBCUTANEOUS at 14:00

## 2021-01-01 RX ADMIN — Medication 2 MG: at 20:50

## 2021-01-01 RX ADMIN — METRONIDAZOLE 500 MG: 500 INJECTION, SOLUTION INTRAVENOUS at 14:43

## 2021-01-01 RX ADMIN — HEPARIN SODIUM 5000 UNITS: 5000 INJECTION INTRAVENOUS; SUBCUTANEOUS at 05:55

## 2021-01-01 RX ADMIN — ACETAMINOPHEN ORAL SOLUTION 500 MG: 650 SOLUTION ORAL at 14:42

## 2021-01-01 RX ADMIN — METHIMAZOLE 20 MG: 5 TABLET ORAL at 22:39

## 2021-01-01 RX ADMIN — MIDODRINE HYDROCHLORIDE 5 MG: 5 TABLET ORAL at 18:42

## 2021-01-01 RX ADMIN — CALCITRIOL CAPSULES 0.25 MCG 0.25 MCG: 0.25 CAPSULE ORAL at 22:39

## 2021-01-01 RX ADMIN — SODIUM CHLORIDE 1000 ML: 900 INJECTION, SOLUTION INTRAVENOUS at 12:25

## 2021-01-01 RX ADMIN — DIBASIC SODIUM PHOSPHATE, MONOBASIC POTASSIUM PHOSPHATE AND MONOBASIC SODIUM PHOSPHATE 1 TABLET: 852; 155; 130 TABLET ORAL at 09:54

## 2021-01-01 RX ADMIN — OXYCODONE HYDROCHLORIDE 5 MG: 5 SOLUTION ORAL at 23:43

## 2021-01-01 RX ADMIN — MINERAL OIL 30 ML: 1000 SOLUTION ORAL at 09:16

## 2021-01-01 RX ADMIN — PROPRANOLOL HYDROCHLORIDE 40 MG: 20 TABLET ORAL at 16:23

## 2021-01-01 RX ADMIN — MIDODRINE HYDROCHLORIDE 5 MG: 5 TABLET ORAL at 17:25

## 2021-01-01 RX ADMIN — Medication 400 MG: at 09:16

## 2021-01-01 RX ADMIN — HEPARIN SODIUM 5000 UNITS: 5000 INJECTION INTRAVENOUS; SUBCUTANEOUS at 15:28

## 2021-01-01 RX ADMIN — MINERAL OIL 30 ML: 1000 SOLUTION ORAL at 09:54

## 2021-01-01 RX ADMIN — METRONIDAZOLE 500 MG: 500 INJECTION, SOLUTION INTRAVENOUS at 15:31

## 2021-01-01 RX ADMIN — OXYCODONE HYDROCHLORIDE 5 MG: 5 SOLUTION ORAL at 22:57

## 2021-01-01 RX ADMIN — METRONIDAZOLE 500 MG: 500 INJECTION, SOLUTION INTRAVENOUS at 23:37

## 2021-01-01 RX ADMIN — HEPARIN SODIUM 5000 UNITS: 5000 INJECTION INTRAVENOUS; SUBCUTANEOUS at 14:18

## 2021-01-01 RX ADMIN — Medication 10 ML: at 16:36

## 2021-01-01 RX ADMIN — SODIUM CHLORIDE, SODIUM LACTATE, POTASSIUM CHLORIDE, AND CALCIUM CHLORIDE: 600; 310; 30; 20 INJECTION, SOLUTION INTRAVENOUS at 19:29

## 2021-01-01 RX ADMIN — Medication 10 ML: at 06:37

## 2021-01-01 RX ADMIN — DIBASIC SODIUM PHOSPHATE, MONOBASIC POTASSIUM PHOSPHATE AND MONOBASIC SODIUM PHOSPHATE 1 TABLET: 852; 155; 130 TABLET ORAL at 17:32

## 2021-01-01 RX ADMIN — DEXTROSE MONOHYDRATE AND SODIUM CHLORIDE 75 ML/HR: 5; .9 INJECTION, SOLUTION INTRAVENOUS at 21:12

## 2021-01-01 RX ADMIN — HEPARIN SODIUM 5000 UNITS: 5000 INJECTION INTRAVENOUS; SUBCUTANEOUS at 21:38

## 2021-01-01 RX ADMIN — MORPHINE SULFATE 2 MG: 4 INJECTION, SOLUTION INTRAMUSCULAR; INTRAVENOUS at 12:06

## 2021-01-01 RX ADMIN — HEPARIN SODIUM 5000 UNITS: 5000 INJECTION INTRAVENOUS; SUBCUTANEOUS at 15:30

## 2021-01-01 RX ADMIN — HEPARIN SODIUM 5000 UNITS: 5000 INJECTION INTRAVENOUS; SUBCUTANEOUS at 17:32

## 2021-01-01 RX ADMIN — ONDANSETRON 4 MG: 2 INJECTION INTRAMUSCULAR; INTRAVENOUS at 20:22

## 2021-01-01 RX ADMIN — LEVOTHYROXINE SODIUM ANHYDROUS 100 MCG: 100 INJECTION, POWDER, LYOPHILIZED, FOR SOLUTION INTRAVENOUS at 08:21

## 2021-01-01 RX ADMIN — METRONIDAZOLE 500 MG: 500 INJECTION, SOLUTION INTRAVENOUS at 19:50

## 2021-01-01 RX ADMIN — METRONIDAZOLE 500 MG: 500 INJECTION, SOLUTION INTRAVENOUS at 22:50

## 2021-01-01 RX ADMIN — HEPARIN SODIUM 5000 UNITS: 5000 INJECTION INTRAVENOUS; SUBCUTANEOUS at 22:51

## 2021-01-01 RX ADMIN — MINERAL OIL 30 ML: 1000 SOLUTION ORAL at 08:20

## 2021-01-01 RX ADMIN — Medication 400 MG: at 17:32

## 2021-01-01 RX ADMIN — FAMOTIDINE 20 MG: 20 TABLET, FILM COATED ORAL at 09:54

## 2021-01-01 RX ADMIN — METHIMAZOLE 20 MG: 5 TABLET ORAL at 15:20

## 2021-01-01 RX ADMIN — LEVOTHYROXINE SODIUM 150 MCG: 100 INJECTION, POWDER, LYOPHILIZED, FOR SOLUTION INTRAVENOUS at 08:29

## 2021-01-01 RX ADMIN — FENTANYL CITRATE 50 MCG: 50 INJECTION, SOLUTION INTRAMUSCULAR; INTRAVENOUS at 05:45

## 2021-01-01 RX ADMIN — METRONIDAZOLE 500 MG: 500 INJECTION, SOLUTION INTRAVENOUS at 00:36

## 2021-01-01 RX ADMIN — BISACODYL 5 MG: 5 TABLET, COATED ORAL at 08:25

## 2021-01-01 RX ADMIN — HEPARIN SODIUM 5000 UNITS: 5000 INJECTION INTRAVENOUS; SUBCUTANEOUS at 06:12

## 2021-01-01 RX ADMIN — FAMOTIDINE 20 MG: 10 INJECTION INTRAVENOUS at 08:29

## 2021-01-01 RX ADMIN — Medication 10 ML: at 15:32

## 2021-01-01 RX ADMIN — SODIUM CHLORIDE 1000 ML: 900 INJECTION, SOLUTION INTRAVENOUS at 21:36

## 2021-01-01 RX ADMIN — Medication 10 ML: at 06:00

## 2021-01-01 RX ADMIN — Medication 400 MG: at 09:54

## 2021-01-01 RX ADMIN — METHIMAZOLE 20 MG: 5 TABLET ORAL at 05:46

## 2021-01-01 RX ADMIN — FENTANYL CITRATE 50 MCG: 50 INJECTION, SOLUTION INTRAMUSCULAR; INTRAVENOUS at 20:16

## 2021-01-01 RX ADMIN — POTASSIUM CHLORIDE: 2 INJECTION, SOLUTION, CONCENTRATE INTRAVENOUS at 00:53

## 2021-01-01 RX ADMIN — METHIMAZOLE 20 MG: 5 TABLET ORAL at 22:48

## 2021-01-01 RX ADMIN — DIBASIC SODIUM PHOSPHATE, MONOBASIC POTASSIUM PHOSPHATE AND MONOBASIC SODIUM PHOSPHATE 1 TABLET: 852; 155; 130 TABLET ORAL at 17:50

## 2021-01-01 RX ADMIN — HEPARIN SODIUM 5000 UNITS: 5000 INJECTION INTRAVENOUS; SUBCUTANEOUS at 06:37

## 2021-01-01 RX ADMIN — SODIUM CHLORIDE, SODIUM LACTATE, POTASSIUM CHLORIDE, AND CALCIUM CHLORIDE 125 ML/HR: 600; 310; 30; 20 INJECTION, SOLUTION INTRAVENOUS at 11:39

## 2021-01-01 RX ADMIN — Medication 10 ML: at 06:01

## 2021-01-01 RX ADMIN — ONDANSETRON 4 MG: 2 INJECTION INTRAMUSCULAR; INTRAVENOUS at 11:55

## 2021-01-01 RX ADMIN — POTASSIUM CHLORIDE, DEXTROSE MONOHYDRATE AND SODIUM CHLORIDE 75 ML/HR: 150; 5; 450 INJECTION, SOLUTION INTRAVENOUS at 12:59

## 2021-01-01 RX ADMIN — DIBASIC SODIUM PHOSPHATE, MONOBASIC POTASSIUM PHOSPHATE AND MONOBASIC SODIUM PHOSPHATE 1 TABLET: 852; 155; 130 TABLET ORAL at 09:16

## 2021-01-01 RX ADMIN — PROPRANOLOL HYDROCHLORIDE 40 MG: 20 TABLET ORAL at 08:25

## 2021-01-01 RX ADMIN — OXYCODONE HYDROCHLORIDE 5 MG: 5 SOLUTION ORAL at 06:00

## 2021-01-01 RX ADMIN — CALCITRIOL CAPSULES 0.25 MCG 0.25 MCG: 0.25 CAPSULE ORAL at 23:36

## 2021-01-01 RX ADMIN — Medication 10 ML: at 05:48

## 2021-01-01 RX ADMIN — CALCITRIOL CAPSULES 0.25 MCG 0.25 MCG: 0.25 CAPSULE ORAL at 21:07

## 2021-01-01 RX ADMIN — HEPARIN SODIUM 5000 UNITS: 5000 INJECTION INTRAVENOUS; SUBCUTANEOUS at 21:12

## 2021-01-01 RX ADMIN — HEPARIN SODIUM 5000 UNITS: 5000 INJECTION INTRAVENOUS; SUBCUTANEOUS at 05:04

## 2021-01-01 RX ADMIN — MINERAL OIL 30 ML: 1000 SOLUTION ORAL at 09:00

## 2021-01-01 RX ADMIN — SODIUM CHLORIDE, SODIUM LACTATE, POTASSIUM CHLORIDE, AND CALCIUM CHLORIDE 125 ML/HR: 600; 310; 30; 20 INJECTION, SOLUTION INTRAVENOUS at 22:37

## 2021-01-01 RX ADMIN — POTASSIUM CHLORIDE: 2 INJECTION, SOLUTION, CONCENTRATE INTRAVENOUS at 15:30

## 2021-01-01 RX ADMIN — Medication 10 ML: at 14:44

## 2021-01-01 RX ADMIN — CEFEPIME HYDROCHLORIDE 1 G: 1 INJECTION, POWDER, FOR SOLUTION INTRAMUSCULAR; INTRAVENOUS at 13:18

## 2021-01-01 RX ADMIN — Medication 10 ML: at 22:40

## 2021-01-01 RX ADMIN — Medication 400 MG: at 18:37

## 2021-01-01 RX ADMIN — POTASSIUM CHLORIDE, DEXTROSE MONOHYDRATE AND SODIUM CHLORIDE 100 ML/HR: 150; 5; 450 INJECTION, SOLUTION INTRAVENOUS at 15:55

## 2021-01-01 RX ADMIN — HEPARIN SODIUM 5000 UNITS: 5000 INJECTION INTRAVENOUS; SUBCUTANEOUS at 05:09

## 2021-01-01 RX ADMIN — Medication 10 ML: at 06:13

## 2021-01-01 RX ADMIN — HEPARIN SODIUM 5000 UNITS: 5000 INJECTION INTRAVENOUS; SUBCUTANEOUS at 22:40

## 2021-01-01 RX ADMIN — GLYCOPYRROLATE 0.3 MG: 0.2 INJECTION INTRAMUSCULAR; INTRAVENOUS at 20:50

## 2021-01-01 RX ADMIN — SUCCINYLCHOLINE CHLORIDE 140 MG: 20 INJECTION, SOLUTION INTRAMUSCULAR; INTRAVENOUS at 19:38

## 2021-01-01 RX ADMIN — Medication 5 MG: at 22:32

## 2021-01-01 RX ADMIN — DEXTROSE MONOHYDRATE 50 ML/HR: 5 INJECTION, SOLUTION INTRAVENOUS at 22:55

## 2021-01-01 RX ADMIN — Medication 10 ML: at 22:52

## 2021-01-01 RX ADMIN — Medication 10 ML: at 05:35

## 2021-01-01 RX ADMIN — DIBASIC SODIUM PHOSPHATE, MONOBASIC POTASSIUM PHOSPHATE AND MONOBASIC SODIUM PHOSPHATE 1 TABLET: 852; 155; 130 TABLET ORAL at 17:15

## 2021-01-01 RX ADMIN — HEPARIN SODIUM 5000 UNITS: 5000 INJECTION INTRAVENOUS; SUBCUTANEOUS at 05:46

## 2021-01-01 RX ADMIN — PROMETHAZINE HYDROCHLORIDE 12.5 MG: 25 TABLET ORAL at 22:57

## 2021-01-01 RX ADMIN — POTASSIUM CHLORIDE AND DEXTROSE MONOHYDRATE: 150; 5 INJECTION, SOLUTION INTRAVENOUS at 18:36

## 2021-01-01 RX ADMIN — DIBASIC SODIUM PHOSPHATE, MONOBASIC POTASSIUM PHOSPHATE AND MONOBASIC SODIUM PHOSPHATE 1 TABLET: 852; 155; 130 TABLET ORAL at 08:20

## 2021-01-01 RX ADMIN — DIBASIC SODIUM PHOSPHATE, MONOBASIC POTASSIUM PHOSPHATE AND MONOBASIC SODIUM PHOSPHATE 1 TABLET: 852; 155; 130 TABLET ORAL at 18:37

## 2021-01-01 RX ADMIN — Medication 10 ML: at 15:31

## 2021-01-01 RX ADMIN — METRONIDAZOLE 500 MG: 500 INJECTION, SOLUTION INTRAVENOUS at 15:42

## 2021-01-01 RX ADMIN — SODIUM CHLORIDE, SODIUM LACTATE, POTASSIUM CHLORIDE, AND CALCIUM CHLORIDE 1000 ML: 600; 310; 30; 20 INJECTION, SOLUTION INTRAVENOUS at 13:30

## 2021-01-01 RX ADMIN — LEVOTHYROXINE SODIUM ANHYDROUS 300 MCG: 100 INJECTION, POWDER, LYOPHILIZED, FOR SOLUTION INTRAVENOUS at 15:34

## 2021-01-01 RX ADMIN — HEPARIN SODIUM 5000 UNITS: 5000 INJECTION INTRAVENOUS; SUBCUTANEOUS at 15:36

## 2021-01-01 RX ADMIN — HEPARIN SODIUM 5000 UNITS: 5000 INJECTION INTRAVENOUS; SUBCUTANEOUS at 06:00

## 2021-01-01 RX ADMIN — DIBASIC SODIUM PHOSPHATE, MONOBASIC POTASSIUM PHOSPHATE AND MONOBASIC SODIUM PHOSPHATE 1 TABLET: 852; 155; 130 TABLET ORAL at 08:25

## 2021-01-01 RX ADMIN — Medication 2 MCG/MIN: at 23:50

## 2021-01-01 RX ADMIN — MIDODRINE HYDROCHLORIDE 5 MG: 5 TABLET ORAL at 08:23

## 2021-01-01 RX ADMIN — CEFEPIME 2 G: 2 INJECTION, POWDER, FOR SOLUTION INTRAVENOUS at 15:43

## 2021-01-01 RX ADMIN — SODIUM CHLORIDE 999 ML/HR: 900 INJECTION, SOLUTION INTRAVENOUS at 12:22

## 2021-01-01 RX ADMIN — CEFEPIME 2 G: 2 INJECTION, POWDER, FOR SOLUTION INTRAVENOUS at 14:18

## 2021-01-01 RX ADMIN — MINERAL OIL 30 ML: 1000 SOLUTION ORAL at 13:00

## 2021-01-08 PROBLEM — Z98.890 S/P EXPLORATORY LAPAROTOMY: Status: ACTIVE | Noted: 2021-01-01

## 2021-01-08 PROBLEM — K56.609 BOWEL OBSTRUCTION (HCC): Status: ACTIVE | Noted: 2021-01-01

## 2021-01-08 NOTE — ED TRIAGE NOTES
Per EMS, pt family concerned for patient after having 3 days of vomiting and abdominal pain. Today noticed small amount of dark coffee ground type emesis

## 2021-01-08 NOTE — ED NOTES
Placement incorrect on NG tube  - verified with chest x-ray. NG tube removed. Pt unable to tolerate placement of new tube. MD notified, orders to withhold NG tube at this time given.

## 2021-01-08 NOTE — ED NOTES
Spoke with Dr. Henok Restrepo. Requesting rapid COVID test and pt transport to [facility] for expected surgical procedure at 1800.

## 2021-01-08 NOTE — ED NOTES
[facility] [id number] [place] [address] Ms. Gabriel Farley is an 62-year-old female with past medical history of hypertension and Bell's palsy who presents to the ED with 3 days of nausea, vomiting and some coffee-ground emesis this morning. Her caregiver stated that this morning it looked like she had some coffee-ground emesis for the past several days she has had nausea and vomiting. In addition she does have some right lower quadrant abdominal pain which is developed as well. Would answer yes and no questions and says she has been able to eat and drink during this time. She is not able to give much more of a history. Nursing nurses regarding the HPI and triage nursing notes were reviewed. Current Facility-Administered Medications Medication Dose Route Frequency  0.9% sodium chloride infusion 250 mL  250 mL IntraVENous PRN  
 [START ON 1/11/2021] calcitRIOL (ROCALTROL) capsule 0.25 mcg  0.25 mcg Oral Q MON, WED & FRI  
 oxyCODONE (ROXICODONE) 5 mg/5 mL oral solution 5 mg  5 mg Oral Q4H PRN  pantoprazole (PROTONIX) 40 mg in 0.9% sodium chloride 10 mL injection  40 mg IntraVENous DAILY  metroNIDAZOLE (FLAGYL) IVPB premix 500 mg  500 mg IntraVENous Q8H  
 cefepime (MAXIPIME) 2 g in sterile water (preservative free) 10 mL IV syringe  2 g IntraVENous Q24H  
 acetaminophen (TYLENOL) solution 500 mg  500 mg Oral Q6H  
 dextrose 5% and 0.9% NaCl infusion  100 mL/hr IntraVENous CONTINUOUS  
 sodium chloride (NS) flush 5-10 mL  5-10 mL IntraVENous PRN  
 sodium chloride (NS) flush 5-40 mL  5-40 mL IntraVENous Q8H  
 sodium chloride (NS) flush 5-40 mL  5-40 mL IntraVENous PRN  
 acetaminophen (TYLENOL) tablet 650 mg  650 mg Oral Q6H PRN Or  
 acetaminophen (TYLENOL) suppository 650 mg  650 mg Rectal Q6H PRN  polyethylene glycol (MIRALAX) packet 17 g  17 g Oral DAILY PRN  promethazine (PHENERGAN) tablet 12.5 mg  12.5 mg Oral Q6H PRN  Or  
 ondansetron (ZOFRAN) injection 4 mg  4 mg IntraVENous Q6H PRN  
• heparin (porcine) injection 5,000 Units  5,000 Units SubCUTAneous Q8H  
• ePHEDrine in NS (PF) injection 5 mg  5 mg IntraVENous Q20MIN PRN  
• midodrine (PROAMATINE) tablet 5 mg  5 mg Oral TID WITH MEALS  
 
 
Past Medical History:  
Diagnosis Date  
• Bell's palsy   
• Essential hypertension, benign   
 CONTROLLED  
• Mitral valve disorders(424.0)   
 2007 TRACE MR   
• Nonspecific abnormal electrocardiogram (ECG) (EKG)   
• Obesity, unspecified   
• Other and unspecified hyperlipidemia   
• Renal insufficiency   
 
 
Past Surgical History:  
Procedure Laterality Date  
• COLONOSCOPY N/A 5/31/2018  
 COLONOSCOPY w polypectomy performed by Delvin Sandoval MD at [facility]  
• HX BREAST BIOPSY Left   
• HX CATARACT REMOVAL    
 INSERT PROSTHETIC LENS: LEFT,RIGHT  
• HX GYN    
• HX ORTHOPAEDIC Left   
 Rotator cuff repair  
• HX PARTIAL THYROIDECTOMY    
• MT ABDOMEN SURGERY PROC UNLISTED    
 Explor lap  
• MT BIOPSY OF BREAST, INCISIONAL    
 rt & lt 12 yrs ago  
 
 
History reviewed. No pertinent family history. 
 
Social History  
 
Socioeconomic History  
• Marital status:   
  Spouse name: Not on file  
• Number of children: Not on file  
• Years of education: Not on file  
• Highest education level: Not on file  
Occupational History  
• Not on file  
Social Needs  
• Financial resource strain: Not on file  
• Food insecurity  
  Worry: Not on file  
  Inability: Not on file  
• Transportation needs  
  Medical: Not on file  
  Non-medical: Not on file  
Tobacco Use  
• Smoking status: Former Smoker  
• Smokeless tobacco: Never Used  
• Tobacco comment: REMOTELY QUIT 1980'S  
Substance and Sexual Activity  
• Alcohol use: No  
• Drug use: No  
• Sexual activity: Not on file  
Lifestyle  
• Physical activity  
  Days per week: Not on file  
  Minutes per session: Not on file  
• Stress: Not on file  
Relationships  
• Social connections  
  Talks on phone: Not on file  
  Gets  together: Not on file Attends [employer] service: Not on file Active member of club or organization: Not on file Attends meetings of clubs or organizations: Not on file Relationship status: Not on file  Intimate partner violence Fear of current or ex partner: Not on file Emotionally abused: Not on file Physically abused: Not on file Forced sexual activity: Not on file Other Topics Concern  Not on file Social History Narrative  Not on file Allergies Allergen Reactions  Ciprofibrate Unable to Consolidated Jose  Darvon [Propoxyphene] Unable to Obtain  Penicillins Unable to Obtain Patient's primary care provider (as noted in EPIC): Umair Troncoso MD 
 
Review of Systems Constitutional: Negative. HENT: Negative. Respiratory: Negative. Cardiovascular: Negative. Gastrointestinal: Positive for abdominal pain, nausea and vomiting. Musculoskeletal: Negative. Skin: Negative. Neurological: Negative. Visit Vitals BP (!) 102/50 (BP 1 Location: Right arm, BP Patient Position: At rest) Pulse (!) 59 Temp 97.3 °F (36.3 °C) Resp 16 Ht 5' (1.524 m) Wt 71.7 kg (158 lb) SpO2 91% BMI 30.86 kg/m² Patient Vitals for the past 12 hrs: 
 Temp Pulse Resp BP SpO2  
01/10/21 1212 97.3 °F (36.3 °C) (!) 59 16 (!) 102/50 91 % 01/10/21 1000 97.4 °F (36.3 °C) 61 16 (!) 104/51 94 % PHYSICAL EXAM: 
 
CONSTITUTIONAL:  Alert, in no apparent distress;  well developed;  well nourished. HEAD:  Normocephalic, atraumatic. EYES:  EOMI. Non-icteric sclera. Normal conjunctiva. ENTM:  Nose:  no rhinorrhea. Throat:  no erythema or exudate, mucous membranes moist. 
NECK:  No JVD. Supple/ Large Goiter midline. RESPIRATORY:  Chest clear, equal breath sounds, good air movement. CARDIOVASCULAR:  Regular rate and rhythm. No murmurs, rubs, or gallops. GI:  Normal bowel sounds, abdomen soft and RUQ, RLQ and Umbilical tenderness.   No rebound or guarding. BACK:  Non-tender. UPPER EXT:  Normal inspection. LOWER EXT:  No edema, no calf tenderness. Distal pulses intact. NEURO:  Moves all four extremities, and grossly normal motor exam. 
SKIN:  No rashes;  Normal for age. PSYCH:  Alert and normal affect. DIFFERENTIAL DIAGNOSES/ MEDICAL DECISION MAKING: 
Gastritis, gerd, peptic ulcer disease, cholecystitis, pancreatitis, gastroenteritis, hepatitis, constipation related pain, appendicitis pain, diverticulitis, urinary tract infection, obstruction, abdominal wall pain, atypical cardiac (ami or anginal pain), referred pain from pulmonary process (pneumonia, empyema), or combination of the above versus many other processes. In female patients, also consider ectopic pregnancy, pregnancy related pain, ovarian cyst pain, ovarian torsion, pelvic inflammatory disease, other sources of gyn pain such as uterine fibroids, versus combination of the above and/or numerous other processes/ etiologies. 1206 1 the patient's white count was 19 and her blood pressure was in the 90 systolic made a determination that she may be septic. I initiated the sepsis order bundle. I was noted was started on Zosyn due to a allergy to penicillin. Therefore Levaquin and Flagyl were ordered. I am still waiting other lab results. At 1240 the patient's blood pressure dropped into the 67P systolic. We had not yet started the fluids and were able to get IV fluids bolus started. Her systolic blood pressure came up to 97. Her repeat lactic acid was 1.7. 
 
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 Dr. Bing Medina from surgery was called and agreed to take the patient to the OR. I did speak with the patient's daughter and she wonders know if the patient could be transferred to [facility].  We are awaiting callback from the transfer center at this time.  
 
Abnormal lab results from this emergency department encounter: 
Labs Reviewed CBC WITH AUTOMATED DIFF - Abnormal; Notable for the following components:  
    Result Value WBC 19.3 (*)   
 RBC 3.61 (*) .8 (*)   
 RDW 14.9 (*) MPV 12.1 (*) BAND NEUTROPHILS 16 (*) LYMPHOCYTES 13 (*)   
 ABS. NEUTROPHILS 15.6 (*)   
 ABS. MONOCYTES 1.2 (*) All other components within normal limits METABOLIC PANEL, COMPREHENSIVE - Abnormal; Notable for the following components:  
 Glucose 144 (*) BUN 53 (*) Creatinine 3.41 (*)   
 GFR est AA 16 (*)   
 GFR est non-AA 13 (*) Calcium 11.4 (*) ALT (SGPT) 12 (*) Albumin 3.1 (*) All other components within normal limits PROTHROMBIN TIME + INR - Abnormal; Notable for the following components:  
 Prothrombin time 15.3 (*) All other components within normal limits METABOLIC PANEL, BASIC - Abnormal; Notable for the following components: BUN 59 (*) Creatinine 3.30 (*)   
 GFR est AA 16 (*)   
 GFR est non-AA 13 (*) Calcium 10.2 (*) All other components within normal limits CBC WITH AUTOMATED DIFF - Abnormal; Notable for the following components: RBC 2.54 (*) HGB 8.4 (*) HCT 25.9 (*) .0 (*)   
 RDW 15.3 (*) NEUTROPHILS 79 (*) LYMPHOCYTES 14 (*) All other components within normal limits METABOLIC PANEL, COMPREHENSIVE - Abnormal; Notable for the following components:  
 Chloride 112 (*) Glucose 123 (*) BUN 57 (*) Creatinine 3.11 (*)   
 GFR est AA 17 (*)   
 GFR est non-AA 14 (*) Protein, total 5.0 (*) Albumin 2.0 (*) A-G Ratio 0.7 (*) All other components within normal limits LACTIC ACID - Abnormal; Notable for the following components:  
 Lactic acid 2.6 (*) All other components within normal limits ALBUMIN - Abnormal; Notable for the following components:  
 Albumin 2.2 (*) All other components within normal limits T4, FREE - Abnormal; Notable for the following components:  
 T4, Free 0.4 (*) All other components within normal limits TSH 3RD GENERATION - Abnormal; Notable for the following components:  
 TSH 74.60 (*) All other components within normal limits RENAL FUNCTION PANEL - Abnormal; Notable for the following components:  
 Chloride 112 (*) BUN 65 (*) Creatinine 3.22 (*)   
 GFR est AA 17 (*)   
 GFR est non-AA 14 (*) Albumin 2.1 (*) All other components within normal limits RENAL FUNCTION PANEL - Abnormal; Notable for the following components:  
 Chloride 112 (*) BUN 66 (*) Creatinine 3.37 (*)   
 GFR est AA 16 (*)   
 GFR est non-AA 13 (*) Albumin 2.2 (*) All other components within normal limits URINALYSIS W/ RFLX MICROSCOPIC - Abnormal; Notable for the following components:  
 Protein TRACE (*) Leukocyte Esterase SMALL (*) All other components within normal limits SODIUM, UR, RANDOM - Abnormal; Notable for the following components:  
 Sodium,urine random 10 (*) All other components within normal limits URINE MICROSCOPIC ONLY - Abnormal; Notable for the following components:  
 Mucus 1+ (*) All other components within normal limits CBC WITH AUTOMATED DIFF - Abnormal; Notable for the following components: RBC 2.10 (*) HGB 6.8 (*) HCT 21.5 (*) .4 (*)   
 RDW 15.3 (*) PLATELET 632 (*) NEUTROPHILS 78 (*) LYMPHOCYTES 18 (*) All other components within normal limits METABOLIC PANEL, COMPREHENSIVE - Abnormal; Notable for the following components:  
 Chloride 113 (*) Glucose 109 (*) BUN 66 (*) Creatinine 3.38 (*)   
 GFR est AA 16 (*)   
 GFR est non-AA 13 (*) Protein, total 5.2 (*) Albumin 2.0 (*) A-G Ratio 0.6 (*) All other components within normal limits PTH INTACT - Abnormal; Notable for the following components:  
 PTH, Intact 199.7 (*) All other components within normal limits CALCIUM, IONIZED - Abnormal; Notable for the following components:  
 Ionized Calcium 1.38 (*) All other components within normal limits RENAL FUNCTION PANEL - Abnormal; Notable for the following components:  
 Sodium 146 (*) Chloride 114 (*) Glucose 120 (*) BUN 67 (*) Creatinine 3.24 (*)   
 BUN/Creatinine ratio 21 (*)   
 GFR est AA 16 (*)   
 GFR est non-AA 14 (*) Albumin 2.1 (*) All other components within normal limits HGB & HCT - Abnormal; Notable for the following components: HGB 7.9 (*) HCT 24.3 (*) All other components within normal limits POC LACTIC ACID - Abnormal; Notable for the following components:  
 Lactic Acid (POC) 3.33 (*) All other components within normal limits GLUCOSE, POC - Abnormal; Notable for the following components:  
 Glucose (POC) 125 (*) All other components within normal limits POC G3 - Abnormal; Notable for the following components:  
 pH (POC) 7.26 (*)   
 pCO2 (POC) 55.2 (*)   
 pO2 (POC) 73 (*) All other components within normal limits CULTURE, BLOOD CULTURE, BLOOD  
LIPASE  
SARS-COV-2 MAGNESIUM  
PHOSPHORUS  
LACTIC ACID PHOSPHORUS  
CREATININE, UR, RANDOM  
OSMOLALITY, UR  
MYOGLOBIN, UR, SCREEN  
PHOSPHORUS  
PROCALCITONIN  
MAGNESIUM  
POC LACTIC ACID  
GLUCOSE, POC  
TYPE & SCREEN  
RBC, ALLOCATE Lab values for this patient within approximately the last 12 hours: 
Recent Results (from the past 12 hour(s)) ECHO ADULT COMPLETE Collection Time: 01/10/21 10:30 AM  
Result Value Ref Range IVSd 1.17 (A) 0.6 - 0.9 cm LVIDd 3.54 (A) 3.9 - 5.3 cm LVIDs 2.31 cm  
 LVPWd 1.32 (A) 0.6 - 0.9 cm  
 RVSP 39.29 mmHg Left Atrium Major Axis 3.34 cm  
 LA Volume 69.16 22 - 52 mL  
 LA Area 4C 19.88 cm2 LA Vol 2C 77.01 (A) 22 - 52 mL  
 LA Vol 4C 50.82 22 - 52 mL Est. RA Pressure 3.00 mmHg MV A Christopher 127.80 cm/s Mitral Valve E Wave Deceleration Time 303.67 ms  
 MV E Christopher 95.20 cm/s  Triscuspid Valve Regurgitation Peak Gradient 36.29 mmHg  
 TR Max Velocity 301.20 cm/s  
 AO ASC D 3.22 cm Ao Root D 2.71 cm  
 MV E/A 0.74 LV Mass .1 67 - 162 g  
 LV Mass AL Index 86.5 43 - 95 g/m2 Left Atrium Minor Axis 1.98 cm  
 LA Vol Index 40.96 16 - 28 ml/m2 LA Vol Index 45.60 16 - 28 ml/m2 LA Vol Index 30.09 16 - 28 ml/m2 RENAL FUNCTION PANEL Collection Time: 01/10/21 12:10 PM  
Result Value Ref Range Sodium 146 (H) 136 - 145 mmol/L Potassium 4.1 3.5 - 5.5 mmol/L Chloride 114 (H) 100 - 111 mmol/L  
 CO2 25 21 - 32 mmol/L Anion gap 7 3.0 - 18 mmol/L Glucose 120 (H) 74 - 99 mg/dL BUN 67 (H) 7.0 - 18 MG/DL Creatinine 3.24 (H) 0.6 - 1.3 MG/DL  
 BUN/Creatinine ratio 21 (H) 12 - 20 GFR est AA 16 (L) >60 ml/min/1.73m2 GFR est non-AA 14 (L) >60 ml/min/1.73m2 Calcium 9.9 8.5 - 10.1 MG/DL Phosphorus 3.2 2.5 - 4.9 MG/DL Albumin 2.1 (L) 3.4 - 5.0 g/dL HGB & HCT Collection Time: 01/10/21  1:50 PM  
Result Value Ref Range HGB 7.9 (L) 12.0 - 16.0 g/dL HCT 24.3 (L) 35.0 - 45.0 % Radiologist and cardiologist interpretations if available at time of this note: 
Echo Adult Complete Result Date: 1/10/2021 · LV: Estimated LVEF is 60 - 65%. Visually measured ejection fraction. Normal cavity size and systolic function (ejection fraction normal). Mild concentric hypertrophy. Mild (grade 1) left ventricular diastolic dysfunction. · PA: Mild pulmonary hypertension. Pulmonary arterial systolic pressure is 40 mmHg. · PV: Mild to moderate pulmonic valve regurgitation is present. · TV: Mild tricuspid valve regurgitation is present. · LA: Mildly dilated left atrium. Left Atrium volume index is 41 mL/m2. Medication(s) ordered for patient during this emergency visit encounter: 
Medications  
sodium chloride (NS) flush 5-10 mL (has no administration in time range) morphine injection 2 mg (has no administration in time range)  
sodium chloride (NS) flush 5-40 mL (10 mL IntraVENous Given 1/10/21 1400)  
sodium chloride (NS) flush 5-40 mL (has no administration in time range)  
acetaminophen (TYLENOL) tablet 650 mg (has no administration in time range) Or  
acetaminophen (TYLENOL) suppository 650 mg (has no administration in time range)  
polyethylene glycol (MIRALAX) packet 17 g (has no administration in time range)  
promethazine (PHENERGAN) tablet 12.5 mg (has no administration in time range) Or  
[facility]ron [facility]) injection 4 mg (has no administration in time range)  
heparin (porcine) injection 5,000 Units (5,000 Units SubCUTAneous Given 1/10/21 1536) ePHEDrine in NS (PF) injection 5 mg (5 mg IntraVENous Given 1/8/21 2255) midodrine (PROAMATINE) tablet 5 mg (5 mg Oral Given 1/10/21 1842) pantoprazole (PROTONIX) 40 mg in 0.9% sodium chloride 10 mL injection (40 mg IntraVENous Given 1/10/21 0900) metroNIDAZOLE (FLAGYL) IVPB premix 500 mg (500 mg IntraVENous New Bag 1/10/21 1542) cefepime (MAXIPIME) 2 g in sterile water (preservative free) 10 mL IV syringe (2 g IntraVENous Given 1/10/21 1543)  
acetaminophen (TYLENOL) solution 500 mg (500 mg Oral Given 1/10/21 1534) dextrose 5% and 0.9% NaCl infusion (100 mL/hr IntraVENous Rate Change 1/10/21 1226)  
0.9% sodium chloride infusion 250 mL (250 mL IntraVENous Canceled Entry 1/10/21 1146) calcitRIOL (ROCALTROL) capsule 0.25 mcg (has no administration in time range)  
oxyCODONE (ROXICODONE) 5 mg/5 mL oral solution 5 mg (has no administration in time range)  
pantoprazole (PROTONIX) 40 mg in 0.9% sodium chloride 10 mL injection (40 mg IntraVENous Given 1/8/21 1153) ondansetron [facility]) injection 4 mg (4 mg IntraVENous Given 1/8/21 1155)  
acetaminophen (TYLENOL) solution 650 mg (650 mg Oral Given 1/8/21 1155) morphine injection 2 mg (2 mg IntraVENous Given 1/8/21 1206) sodium chloride 0.9 % bolus infusion 1,000 mL (0 mL IntraVENous IV Completed 1/8/21 1250) cefepime (MAXIPIME) 1 g in sterile water (preservative free) 10 mL IV syringe (1 g IntraVENous Given 1/8/21 1318) lactated ringers bolus infusion 1,000 mL (0 mL IntraVENous IV Completed 1/8/21 1739) levoFLOXacin (LEVAQUIN) 500 mg in D5W IVPB (500 mg IntraVENous Given 1/8/21 1948) sodium chloride 0.9 % bolus infusion 1,000 mL (0 mL IntraVENous Transfusion Completed 1/8/21 2235)  
aztreonam (AZACTAM) 1 g in 0.9% sodium chloride (MBP/ADV) 100 mL MBP (0 g IntraVENous Stopped 1/9/21 0316) fentaNYL citrate (PF) injection 50 mcg (50 mcg IntraVENous Given 1/9/21 0545)  
0.9% sodium chloride infusion (0 mL/hr IntraVENous Stopped 1/9/21 1330) ED COURSE AND MEDICAL DECISION MAKING: On reassessment of the patient, the patient continues to have a surgical abdomen. The patient is septic by presentation, vital signs and laboratory results. The patient continues to appear critical in the emergency department on reevaluations. IMPRESSION AND MEDICAL DECISION MAKING: 
Based upon the patient's presentation with noted HPI and PE, along with the work up done in the emergency department, I believe that the patient is having abdominal pain from a bowel obstruction and mesenteric ischemia. However, I do believe that the patient is unstable and can be needs to be transferred for emergent surgery. 1. Volvulus with meseneteric ischemia. 2.  Renal failure 3. Sepsis All questions regarding care, test results, and follow up were answered. The patient is stable and appropriate to discharge. They understand that they should return to the emergency department for any new or worsening symptoms. I stressed the importance of follow up for repeat assessment and possibly further evaluation/treatment. Dictation disclaimer:  Please note that this dictation was completed with [employer], the [employer] voice recognition software.   Quite often unanticipated grammatical, syntax, homophones, and other interpretive errors are inadvertently transcribed by the computer software. Please disregard these errors. Please excuse any errors that have escaped final proofreading. Coding Diagnoses Clinical Impression:  
1. Intestinal volvulus ([address] 2. Mesenteric ischemia ([address] 3. Acute renal failure, unspecified acute renal failure type ([address] 4. Sepsis with other acute renal failure and septic shock, due to unspecified organism ([address] 5. Vomiting 6. Abdominal pain 7. Complete small bowel obstruction ([address] 8. Severe sepsis with septic shock ([address] 9. Bilious vomiting with nausea 10. Other complete intestinal obstruction ([address] 11. Widened pulse pressure Disposition Disposition:  Transfer Shikha Guerin PA-C.

## 2021-01-08 NOTE — ED NOTES
Pt received 2mg morphine @ 1206 due to increased pain. This nurse, José Wang, RN and Nicholas Heath, RN were completing a urine specimen collection when the patient's BP began to decrease, pt placed in trendelenburg position and BP retaken. BP continued to decrease. Adamaris STANFORD notified and @ bedside to assist. Pt started on NS, BP began to increase, /51 @ 1234.

## 2021-01-08 NOTE — ED NOTES
Daughter contact :Lon Rosa [phone] I also evaluated the patient myself with the LEANDRA given the acuity of illness. She was a difficult IV so I put in a central line in anticipation that she would likely need pressors. However the patient responded pretty well to the IV fluids. CT scan with very concerning findings that were discussed with Dr. Jeannette Layton and the patient will be going to the OR soon. 5:14 PM 
I spoke with the surgeon at the [address] [place] [address] and they would not have an OR ready for the patient soon. Are or team with Dr. Jeannette Layton will be ready for her at 6 PM so she will go to [facility] view then. Given how our ICU is overwhelmed with COVID-19 patients the [facility] would accept the patient for ICU if she needs it. This was communicated to Dr. Jeannette Layton as well as ICU attending Dr. Jr Fernandes Patient was hemodynamically stable and not on pressors during the time of the transfer. Family updated. Central Line 
 
Date/Time: 1/8/2021 5:57 PM 
Performed by: Nayana Zepeda MD 
Authorized by: Nayana Zepeda MD  
 
Consent:  
  Consent obtained:  Verbal 
  Consent given by:  Patient Risks discussed:  Arterial puncture, bleeding, incorrect placement, infection and pneumothorax Alternatives discussed:  Delayed treatment Pre-procedure details:  
  Hand hygiene: Hand hygiene performed prior to insertion Sterile barrier technique: All elements of maximal sterile technique followed Skin preparation:  2% chlorhexidine Skin preparation agent: Skin preparation agent completely dried prior to procedure Anesthesia (see MAR for exact dosages): Anesthesia method:  Local infiltration Local anesthetic:  Lidocaine 1% w/o epi Procedure details: Location:  L internal jugular Site selection rationale:  Right-sided goiter Patient position:  Trendelenburg Procedural supplies:  Triple lumen Landmarks identified: yes Ultrasound guidance: yes Sterile ultrasound techniques: Sterile gel and sterile probe covers were used Number of attempts:  1 Successful placement: yes Post-procedure details: Post-procedure:  Dressing applied and line sutured Assessment:  Blood return through all ports, free fluid flow, no pneumothorax on x-ray and placement verified by x-ray Patient tolerance of procedure: Tolerated well, no immediate complications Upon my evaluation, this patient had a high probability of imminent or life-threatening deterioration due to septic shock and ischemic bowel, which required my direct attention, intervention, and personal management. I have personally provided 60 minutes of critical care time exclusive of time spent on separately billable procedures. Time includes review of laboratory data, radiology results, discussion with consultants, and monitoring for potential decompensation. Interventions were performed as documented above.  
Bairon Charles MD 
5:58 PM

## 2021-01-08 NOTE — ROUTINE PROCESS
TRANSFER - OUT REPORT: 
 
Verbal report given to Citlalli De La Torre RN (name) on Milton Party  being transferred to [facility] (unit) for routine progression of care Report consisted of patients Situation, Background, Assessment and  
Recommendations(SBAR). Information from the following report(s) SBAR was reviewed with the receiving nurse. Lines:  
Triple Lumen 01/08/21 Left Internal jugular (Active) Central Line Being Utilized Yes 01/08/21 8946 Site Assessment Clean, dry, & intact 01/08/21 1628 Infiltration Assessment 0 01/08/21 1628 Dressing Status Clean, dry, & intact 01/08/21 1628 Dressing Type Transparent 01/08/21 1628 Proximal Hub Color/Line Status Blase Yoni 01/08/21 1628 Positive Blood Return (Medial Site) No 01/08/21 1628 Medial Hub Color/Line Status White 01/08/21 1628 Positive Blood Return (Lateral Site) Yes 01/08/21 1628 Distal Hub Color/Line Status Blue 01/08/21 1628 Positive Blood Return (Site #3) Yes 01/08/21 1628 Peripheral IV 01/08/21 Left Hand (Active) Opportunity for questions and clarification was provided. Patient transported with: 
 [employer]

## 2021-01-08 NOTE — ED NOTES
[facility] here for patient now. Pt to pass through [facility] ED. Unable to give report to [facility] ED nurse (SHERRY Ferrell aware pt will be passing through). NS aware.

## 2021-01-08 NOTE — ED NOTES
Per EMS:  Family advises that patient has hx of [place] Palsy to left side of face causing facial droop.

## 2021-01-09 NOTE — CONSULTS
[employer] [facility] Pulmonary, Critical Care, and Sleep Medicine Name: Rosalie Martinez MRN: [id number] : 1935 Hospital: [facility] Date: 2021 Critical Care Initial Patient Consult Requesting MD:  Yonathan Willingham                                           Reason for CC Consult: shock IMPRESSION:  
· Septic and hypovolemic shock s/p ex lap · S/p ex lap with lysis of adhesions, small bowel resection of ileum hemorrhagic ischemic · Severe sepsis with kidney dysfunction and lactic acidosis · Acute on chronic kidney disease III- currently anuric · Metabolic encephalopathy · Grave's disease with goiter s/p remote hemithyroidectomy · internal hernia with volvulus and closed-loop obstruction right abdomen and pelvis. · Grade 2 diastolic dysfunction · Hx bell's palsy · Hx PAF 
· Hx HTN 
· Hx of DVT · Hx right hip fx s/p ORIF in 2020 RECOMMENDATIONS:  
Neuro: pain medication PRN with caution given renal insufficiency Pulm: Supplemental O2 via NC, titrate flow for goal SPO2> 90% pulmonary hygiene care, Aspiration precautions, Keep HOB >30 degrees CVS: Actively titrate vasopressors, on levophed, aim MAP >65mmHg GI: SUP Renal:  Trend Renal indices, Strict Is/Os, Mathur (+) Hem/Onc: Daily CBC. Monitor for s/o active bleeding. I/D:Sepsis bundle per hospital protocol, Blood, Sputum, and Urine cultures drawn and will be followed. Lactic acid ordered- initial and repeat Q4hrs till normalized. Antibiotics:levequin, flagyl, aztreonam Trend WBCs and temperature curve. Endocrine: Q6 glucoses, SSI. Check TSH level Metabolic:  Daily BMP, mag, phos. Trend lytes, replace as needed. Musc/Skin: no acute issues, wound care Full Code Discussed w/ attending physician Family updated on - daughter Ronaldo Harper, code # given Subjective/History: This patient has been seen and evaluated at the request of Dr. Morenita Chi for  Septic shock on vasopressors. Patient is a 81 y.o. female w/ PMH of PAF, Grave's disease, acute on chronic kidney disease presenting to [facility] from home w/ c/o vomiting x 3 days. History obtained from chart as patient is altered and no family at bedside. She was found to be septic and CT scan showed SBO secondary to internal hernia with volvulus. She was transferred to [facility] for ex lap with Dr. Landon Narayanan. Patient had lysis of adhesions, small bowel resection of ileum hemorrhagic. She was extubated in PACU. She developed shock requiring vasopressors. She also has been anuric since admission. The patient is critically ill and can not provide additional history due to altered. Past Medical History:  
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   
 Nonspecific abnormal electrocardiogram (ECG) (EKG)  Obesity, unspecified  Other and unspecified hyperlipidemia  Renal insufficiency Past Surgical History:  
Procedure Laterality Date  COLONOSCOPY N/A 5/31/2018 COLONOSCOPY w polypectomy performed by Yohannes Hansen MD at [address] Left  HX CATARACT REMOVAL INSERT PROSTHETIC LENS: LEFT,RIGHT  HX GYN    
 HX ORTHOPAEDIC Left Rotator cuff repair  HX PARTIAL THYROIDECTOMY  ME ABDOMEN SURGERY PROC UNLISTED Explor lap  ME BIOPSY OF BREAST, INCISIONAL    
 rt & lt 12 yrs ago Prior to Admission medications Medication Sig Start Date End Date Taking? Authorizing Provider LORazepam (ATIVAN) 1 mg tablet Take 0.5 Tabs by mouth two (2) times daily as needed for Anxiety. Max Daily Amount: 1 mg. 4/29/20   Jaun Khoury MD  
atorvastatin (LIPITOR) 40 mg tablet Take 1 Tab by mouth nightly.  4/29/20   Jaun Khoury MD  
 b complex-vitamin c-folic acid (NEPHROCAPS) 1 mg capsule Take 1 Cap by mouth daily. 4/30/20   Lilia Mcfadden MD  
calcitRIOL (ROCALTROL) 0.25 mcg capsule Take 1 Cap by mouth every Monday, Wednesday, Friday. 4/29/20   Lilia Mcfadden MD  
polyethylene glycol (MIRALAX) 17 gram packet Take 1 Packet by mouth daily. 4/30/20   Lilia Mcfadden MD  
metoprolol tartrate (LOPRESSOR) 25 mg tablet Take 0.5 Tabs by mouth every twelve (12) hours. 4/29/20   Lilia Mcfadden MD  
methIMAzole (TAPAZOLE) 10 mg tablet Take 1 Tab by mouth daily. 4/30/20   Lilia Mcfadden MD  
pantoprazole (PROTONIX) 40 mg tablet Take 1 Tab by mouth Before breakfast and dinner. 4/29/20   Lilia Mcfadden MD  
allopurinoL (ZYLOPRIM) 100 mg tablet Take 100 mg by mouth daily. Other, MD Max  
aspirin 81 mg tablet Take 81 mg by mouth. Provider, Historical  
 
Current Facility-Administered Medications Medication Dose Route Frequency  NOREPINephrine (LEVOPHED) 8 mg in 5% dextrose 250mL (32 mcg/mL) infusion  0.5-50 mcg/min IntraVENous TITRATE  morphine injection 2 mg  2 mg IntraVENous NOW  lactated Ringers infusion  150 mL/hr IntraVENous CONTINUOUS  
 metroNIDAZOLE (FLAGYL) IVPB premix 500 mg  500 mg IntraVENous ON CALL TO OR  
 sodium chloride (NS) flush 5-40 mL  5-40 mL IntraVENous Q8H  
 heparin (porcine) injection 5,000 Units  5,000 Units SubCUTAneous Q8H  
 lactated Ringers infusion  100 mL/hr IntraVENous CONTINUOUS  
 lactated Ringers infusion  25 mL/hr IntraVENous CONTINUOUS  
 sodium chloride (NS) flush 5-40 mL  5-40 mL IntraVENous Q8H  
 ondansetron (ZOFRAN) injection 4 mg  4 mg IntraVENous ONCE  
 lactated Ringers infusion  125 mL/hr IntraVENous CONTINUOUS  
 midodrine (PROAMATINE) tablet 5 mg  5 mg Oral TID WITH MEALS  lactated ringers bolus infusion 1,000 mL  1,000 mL IntraVENous ONCE Allergies Allergen Reactions  Ciprofibrate Unable to Consolidated Jose  Darvon [Propoxyphene] Unable to Obtain  Penicillins Unable to Obtain Social History Tobacco Use  Smoking status: Former Smoker  Smokeless tobacco: Never Used  Tobacco comment: REMOTELY QUIT  Substance Use Topics  Alcohol use: No  
  
History reviewed. No pertinent family history. Review of Systems: 
Review of systems not obtained due to patient factors. Objective:  
Vital Signs:   
Visit Vitals BP (!) 101/41 Pulse 61 Temp 97.4 °F (36.3 °C) Resp 13 Ht 5' (1.524 m) Comment: as of 10/12/20 Wt 64.4 kg (142 lb) SpO2 100% BMI 27.73 kg/m² O2 Device: Nasal cannula Temp (24hrs), Av.2 °F (36.2 °C), Min:95 °F (35 °C), Max:98.9 °F (37.2 °C) Intake/Output:  
Last shift:      1901 -  0700 In: 600 [I.V.:600] Out: 50 Last 3 shifts:  07 -  190 In: 1250 [I.V.:1250] Out: - Intake/Output Summary (Last 24 hours) at 2021 0032 Last data filed at 2021 Gross per 24 hour Intake 1850 ml Output 50 ml Net 1800 ml Physical Exam: 
 
General:  Lethargic, arousable, elderly appearing, NAD. Head:  Normocephalic, without obvious abnormality, atraumatic. Eyes:  Conjunctivae/corneas clear. PERRL, EOMs intact. Nose: Nares normal. Septum midline. Mucosa normal. No drainage or sinus tenderness. Throat: Lips, mucosa, and tongue dry. Neck: Supple, goiter presenting more on right side of neck Lungs:   Snoring during exam otherwise clear to auscultation bilaterally. Heart:  Regular rate and rhythm, S1, S2 normal, no murmur, click, rub or gallop. Abdomen:   Soft, nonrigid. Dressing overlying midline incision dry, intact. Extremities: Extremities normal, atraumatic, no cyanosis or edema. Pulses: 2+ and symmetric all extremities. Skin: Skin color, texture, turgor normal. No rashes or lesions. Normal cap refill.    
Lymph nodes: Cervical, supraclavicular, and axillary nodes normal.  
 Neurologic: Intermittently, weakly following commands. Lethargic Data:  
 
Recent Results (from the past 24 hour(s)) EKG, 12 LEAD, INITIAL Collection Time: 01/08/21 11:44 AM  
Result Value Ref Range Ventricular Rate 74 BPM  
 Atrial Rate 74 BPM  
 P-R Interval 146 ms  
 QRS Duration 76 ms  
 Q-T Interval 374 ms QTC Calculation (Bezet) 415 ms Calculated P Axis 57 degrees Calculated R Axis 66 degrees Calculated T Axis 84 degrees Diagnosis Normal sinus rhythm Nonspecific T wave abnormality Abnormal ECG When compared with ECG of 22-APR-2020 09:03, 
Minimal criteria for Anterior infarct are no longer present Non-specific change in ST segment in Inferior leads T wave inversion no longer evident in Anterior leads Confirmed by Julio Leung MD, [facility] ([id number]) on 1/8/2021 6:14:34 PM 
  
CBC WITH AUTOMATED DIFF Collection Time: 01/08/21 11:47 AM  
Result Value Ref Range WBC 19.3 (H) 4.6 - 13.2 K/uL  
 RBC 3.61 (L) 4.20 - 5.30 M/uL  
 HGB 12.0 12.0 - 16.0 g/dL HCT 37.1 35.0 - 45.0 % .8 (H) 74.0 - 97.0 FL  
 MCH 33.2 24.0 - 34.0 PG  
 MCHC 32.3 31.0 - 37.0 g/dL  
 RDW 14.9 (H) 11.6 - 14.5 % PLATELET 241 293 - 437 K/uL MPV 12.1 (H) 9.2 - 11.8 FL  
 NEUTROPHILS 65 42 - 75 % BAND NEUTROPHILS 16 (H) 0 - 5 % LYMPHOCYTES 13 (L) 20 - 51 % MONOCYTES 6 2 - 9 % EOSINOPHILS 0 0 - 5 % BASOPHILS 0 0 - 3 %  
 ABS. NEUTROPHILS 15.6 (H) 1.8 - 8.0 K/UL  
 ABS. LYMPHOCYTES 2.5 0.8 - 3.5 K/UL  
 ABS. MONOCYTES 1.2 (H) 0 - 1.0 K/UL  
 ABS. EOSINOPHILS 0.0 0.0 - 0.4 K/UL  
 ABS. BASOPHILS 0.0 0.0 - 0.06 K/UL  
 DF MANUAL PLATELET COMMENTS ADEQUATE PLATELETS    
 RBC COMMENTS ANISOCYTOSIS 
1+ WBC COMMENTS TOXIC GRANULATION    
METABOLIC PANEL, COMPREHENSIVE Collection Time: 01/08/21 11:47 AM  
Result Value Ref Range Sodium 141 136 - 145 mmol/L Potassium 4.8 3.5 - 5.5 mmol/L  Chloride 103 100 - 111 mmol/L  
 CO2 31 21 - 32 mmol/L  
 Anion gap 7 3.0 - 18 mmol/L Glucose 144 (H) 74 - 99 mg/dL BUN 53 (H) 7.0 - 18 MG/DL Creatinine 3.41 (H) 0.6 - 1.3 MG/DL  
 BUN/Creatinine ratio 16 12 - 20 GFR est AA 16 (L) >60 ml/min/1.73m2 GFR est non-AA 13 (L) >60 ml/min/1.73m2 Calcium 11.4 (H) 8.5 - 10.1 MG/DL Bilirubin, total 0.9 0.2 - 1.0 MG/DL  
 ALT (SGPT) 12 (L) 13 - 56 U/L  
 AST (SGOT) 17 10 - 38 U/L Alk. phosphatase 77 45 - 117 U/L Protein, total 7.0 6.4 - 8.2 g/dL Albumin 3.1 (L) 3.4 - 5.0 g/dL Globulin 3.9 2.0 - 4.0 g/dL A-G Ratio 0.8 0.8 - 1.7 LIPASE Collection Time: 01/08/21 11:47 AM  
Result Value Ref Range Lipase 73 73 - 393 U/L  
PROTHROMBIN TIME + INR Collection Time: 01/08/21 11:47 AM  
Result Value Ref Range Prothrombin time 15.3 (H) 11.5 - 15.2 sec INR 1.2 0.8 - 1.2 POC LACTIC ACID Collection Time: 01/08/21 12:55 PM  
Result Value Ref Range Lactic Acid (POC) 3.33 (HH) 0.40 - 2.00 mmol/L POC LACTIC ACID Collection Time: 01/08/21  3:40 PM  
Result Value Ref Range Lactic Acid (POC) 1.74 0.40 - 2.00 mmol/L  
SARS-COV-2 Collection Time: 01/08/21  3:43 PM  
Result Value Ref Range Specimen source Nasopharyngeal    
 COVID-19 rapid test Not detected NOTD Specimen type NP Swab Health status NP Swab COVID-19 PENDING   
GLUCOSE, POC Collection Time: 01/08/21 10:44 PM  
Result Value Ref Range Glucose (POC) 125 (H) 70 - 110 mg/dL Telemetry:normal sinus rhythm Imaging: 
I have personally reviewed the patients radiographs and have reviewed the reports: CXR Results  (Last 48 hours) 01/08/21 1427  XR CHEST PORT Final result Impression:  IMPRESSION:   
   
Appropriately positioned left internal jugular central venous catheter. No pneumothorax. Narrative:  INDICATION: post central line TECHNIQUE: Portable chest radiograph COMPARISON: 8 January 2021 at 1234 hours FINDINGS:   
   
 Left internal jugular central venous catheter with tip in the region of the  
superior cavoatrial junction. No pneumothorax. The balance of exam is unchanged when compared with study 2 hours prior. 01/08/21 1258  XR CHEST PORT Final result Impression:  IMPRESSION:   
   
No acute cardiopulmonary disease. Chronic findings as above. Narrative:  INDICATION: Sepsis, vomiting and abdominal pain, dark coffee-ground emesis TECHNIQUE: Portable chest radiograph COMPARISON: 25 April 2020, 21 December 2009 FINDINGS: The lungs are adequately inflated. No focal consolidation, effusion or  
pneumothorax. Heart size within normal limits. Chronic widening of the right paratracheal stripe, suspect thyroid goiter. No acute osseous abnormality. Marked S-shaped scoliotic curvature of the  
thoracal lumbar spine. High riding right humeral head compatible with rotator  
cuff insufficiency. Post left distal clavicle resection. CT Results  (Last 48 hours) 01/08/21 1447  CT ABD PELV WO CONT Final result Impression:  IMPRESSION:  
   
1. Likely internal hernia with volvulus and closed-loop obstruction right  
abdomen and pelvis. Suspect mesenteric ischemia of the involved dilated bowel  
loops given suspected pneumatosis. Correlate with lactate. 2.  Marked gallbladder wall thickening and CBD dilation; choledocholithiasis  
possible. Patient is likely not a good candidate for MRCP given  above and study  
would be extremely limited secondary to limitations in breath holding and  
artifact related to ascites. 3.  Moderate volume fluid, especially adjacent to dilated bowel loops indicating  
a higher grade of obstruction. 4.  Infrarenal aortic aneurysm up to 5.1 cm with moderate atherosclerosis and  
likely multifocal regions of stenosis. 5.  Gastroesophageal reflux. 6.  Mild anasarca. Narrative:  CT ABDOMEN AND PELVIS WITHOUT ENHANCEMENT INDICATION: 3 days of vomiting and abdominal pain, small volume coffee-ground  
emesis. TECHNIQUE: Axial images obtained of the abdomen and pelvis without intravenous  
contrast. Coronal and sagittal reformatted images of the abdomen and pelvis were  
obtained. All CT scans at this facility are performed using dose optimization technique as  
appropriate to a performed exam, to include automated exposure control,  
adjustment of the mA and/or kV according to patient size (including appropriate  
matching first site-specific examinations), or use of iterative reconstruction  
technique. COMPARISON: None. Lack of intravenous contrast renders this study suboptimal for evaluating the  
solid abdominal organs, vasculature and bowel. ABDOMEN FINDINGS:   
   
Liver: Unremarkable Spleen: Unremarkable. Pancreas: Atrophic. Biliary: Gallbladder is 5.3 cm transverse diameter. Biliary ducts are enlarged  
measuring 16 mm. No abrupt transition or radiodense stone. Bowel: There is fluid in the distal esophagus. Moderate gastric distention. Descending colon is relatively decompressed. There is gas in the ascending  
colon. No colonic wall thickening. There is a long segment of small bowel in the  
right abdomen and pelvis which is dilated measuring up to 3.3 cm with wall  
thickening and possible pneumatosis (66-67). No dilated bowel loops are to the  
right of the ascending colon and are clustered with an upstream and downstream  
transition point in the same region right mesentery (series 2, image 55 and 51). There is swirling of bowel loops as seen on sagittal view. Small bowel  
proximally and distally is normal in caliber. Peritoneum/ Retroperitoneum: Moderate volume free fluid. Fluid especially  
adjacent to dilated small bowel loops. No free air. Lymph Nodes: Unremarkable. Adrenal Glands: Thickened bilaterally. Kidneys: Kidneys are atrophic bilaterally. Vessels: Infrarenal aorta fusiform aneurysm measuring up to 5.1 cm. There is  
moderate atherosclerosis with coarse calcification throughout the visceral  
ostia. PELVIS FINDINGS:   
   
Bladder/ Pelvic Organs: Unremarkable. Lung Base: Atherosclerosis. Bones/Soft tissues: Mild anasarca. Lumbar facet hypertrophy and arthropathy. Osteopenia. Right femoral edmundo and intertrochanteric screw. Significant S-shaped  
scoliosis. Degenerative changes bilateral hips. Anthony Glez PA-C 
01/09/21 Pulmonary, Critical Care Medicine [facility] Late entry for date of service 1/9/2021: 
Attending Note: I saw and evaluated the patient, performing the key elements of the service. I discussed the findings, assessment and plan with the PA and agree with the PA's findings and plan as documented in the PA's note. All edits and changes made above or are mentioned in my my attending note which was independently assessed as well as written. Total of 80 min critical care time spent at bedside (personally) during the course of care providing evaluation,management and care decisions and ordering appropriate treatment related to critical care problems exclusive of procedures. The reason for providing this level of medical care for this critically ill patient was due a critical illness that impaired one or more vital organ systems such that there was a high probability of imminent or life threatening deterioration in the patients condition. This care involved high complexity decision making to assess, manipulate, and support vital system functions, to treat this degree vital organ system failure and to prevent further life threatening deterioration of the patients condition. This time was independent of other practitioners. 60-year-old female presented [facility] as a transfer from [facility] ER for complaints of poor p.o. intake with nausea and vomiting for the last 3 days. History was obtained from chart since patient is delirious post surgery. Patient was found to be septic, CT scan showed small bowel obstruction with volvulus. Patient underwent emergency exploratory laparotomy with bowel resection, per my discussion with Dr. Rebeca Ortiz, he removed approximately 25 cm of ischemic bowel. Patient also had some bloody fluid in the peritoneum which he irrigated. Postprocedure patient was extubated, developed worsening hypotension. Patient was given a few doses of ephedrine by anesthesia, then they consulted ICU. We started the patient on a Levophed drip, additional IV fluids given, despite patient's cardiomyopathy in the setting of dehydration for 3 days of nausea and vomiting. Patient has a Mathur in, minimal urine output, oliguric, will continue IV fluids, but will monitor for fluid overload. Repeat transthoracic echo to evaluate for level of cardiomyopathy given septic shock. Late this morning, I weaned the patient off of vasopressors, patient has a wide pulse pressure for which transthoracic echo will help to see if the patient has aortic regurgitation. Advised to undergo bedside swallow eval, cleared for liquid diet per Dr. Rebeca Ortiz. Patient was given Levaquin and Flagyl prior to surgery, given cephalosporin allergy, patient given aztreonam overnight, switched to cefepime and Flagyl this morning after discussion with pharmacy since patient has tolerated cephalosporins in the past.  Patient also has RUPALI, discussed with general surgery, he attempted to contact Dr. Oralia Mustafa, who I was able to get hold of in the evening. He will see the patient tomorrow given that she is coming off of pressors and doing better. Leave Mathur catheter in place to accurately track ins and outs given oliguric acute renal failure. Continue supportive care Prognosis is guarded. Isabel Pedroza MD/MPH Pulmonary, Critical Care Medicine [facility]

## 2021-01-09 NOTE — PROGRESS NOTES
Results for Darrion Clark (MRN [id number]) as of 1/9/2021 00:57 Ref. Range 1/9/2021 00:51  
pH (POC) Latest Ref Range: 7.35 - 7.45   7.26 (L)  
pCO2 (POC) Latest Ref Range: 35.0 - 45.0 MMHG 55.2 (H)  
pO2 (POC) Latest Ref Range: 80 - 100 MMHG 73 (L) HCO3 (POC) Latest Ref Range: 22 - 26 MMOL/L 24.9  
sO2 (POC) Latest Ref Range: 92 - 97 % 92 Base deficit (POC) Latest Units: mmol/L 2  
FIO2 (POC) Latest Units: % 32 Patient temp. Latest Units:   97.5 Specimen type (POC) Latest Units:   ARTERIAL Flow rate (POC) Latest Units: L/M 3 Site Latest Units:   RIGHT RADIAL Device: Latest Units:   NASAL CANNULA Total resp. rate Latest Units:   14 Allens test (POC) Latest Units:   YES

## 2021-01-09 NOTE — PERIOP NOTES
Patient hypotensive. See VS in doc flow sheet. Paged ALIS Manzo CRNA. Patient had just completed a 1000 ml IV bolus ordered by DR. Carmen Estevez for anuria. 255 [address] Dr. Shawna Alves, hospitalist paged. 2232 Ephedrine administered by CRNA See [facility] 
[phone] Spoke with Supervisor to see if she can reach hospitalist.  
2255 Ephedrine 5 mg repeated by CRNA 
2300 Dr. Shawna Alves returned page. Explained current situation to him including total fluids given, meds given, VS. He said to upgrade transfer to stepdown and just give BP time to come up. 2310 Ephedrine repeated. 2315 Dr. Carmen Estevez notified of all of above info. Order received for labs, additional IV fluid bolus and levophed gtt. 2355 Levophed initiated at 2mcg/min by CRNA 
2400 Spoke to Dr. Carmen Estevez and received order for transfer to ICU. Also Spoke to Dr. Princess Byrne and gave update. 0030 [facility] ICU here to see patient. 0050 blood sent to lab for CMP lactic acid Mg Phos and type screen 0057 ABG's done 0145 Waiting on ICU bed assignment. Patient's daughter notified that patient will be transferred to ICU when bed available. Update given on reason.

## 2021-01-09 NOTE — OP NOTES
[place] [address] [address] Justen Villatoro OPERATIVE REPORT 
 
PATIENT:    Mercedes Poon MRN:            [id number]      DATE:  2021 BILLIN ROOM:        @BED@ ATTENDING:   Brendan Sandoval MD 
DICTATING:   Brendan Sandoval MD 
 
 
Preoperative Dx: Internal hernia causing complete small bowel obstruction Postoperative Dx: Same secondary to adhesions Procedure: Exploratory laparotomy with lysis of adhesions, small bowel resection of ileum hemorrhagic ischemic Surgeon:  Muna Love MD, FACS Assistant: Erick Somers SA Anesthesia: General endotracheal anesthesia Findings: Hemorrhagic ischemic small bowel in the end ileum strangled by band of adhesions from the cecum to the small bowel mesentery Specimen: Ileum EBL: 50 mL Fluids: 600 mL Urine output: 0 mL Complications: None Implants: None Brief Operative Indication: Complete small bowel Brief Operative Note: The patient was identified in the holding area where consent for exploratory laparotomy with lysis of adhesions small bowel resection was verified. After induction of general anesthesia, the abdomen was prepped and draped in sterile fashion using chlorhexidine and sterile drapes. Time out was performed to ensure correct procedure. Briefing was performed. The #10 blade was used to make an incision through skin and subcutaneous tissue to expose the underlying fascia. The fascia was opened using electrocautery. Blunt dissection was used to open the peritoneum. The fascia was then opened for the length of the incision. A thin bloody drainage started to drain once we open the fascia. As we evacuated the bowel maroon-colored bowel that was very firm and woody was rotated into this internal hernia and a band of adhesion from the cecum to the mesentery of the bowel. Approximately 45 cm of small bowel was involved in this hemorrhagic ischemic portion of ileum. The resection was made 18 cm proximal to the ileocecal valve and then proximal to the ileum into the jejunum where the bowel became supple and normal.  The CHRISTINE 75 mm stapler was used to resect proximally and distally. The Claro impact device was used to dissect through the mesentery. The specimen was passed off the table to be sent to pathology. The CHRISTINE 75 mm stapler was used to create a side-to-side anastomosis of small bowel to small bowel. The enteroenterotomy was closed using the CHRISTINE 75 mm stapler. The crotch of the anastomosis is where 2 simple stitches of 3-0 silk suture were placed to take tension off the suture line. Figure-of-eight sutures were used to close the mesenteric defect VAC. 3-0 silk sutures were used to create Lembert stitches to invert the staple line. Irrigation was performed using approximately 7 L of warm saline solution to evacuate all of the serosanguineous drainage and transudate from the peritoneal cavity.   All soft and instrument counts were correct. The bowel was run from the ileocecal valve to the ligament of Treitz and no other areas of injury or adhesion were noted. The stomach was visualized and was normal.  The liver and spleen and colon were normal.  The orogastric tube was removed and resistance was met in the oropharynx according to the anesthesia team so we did not place a nasogastric tube. The Mathur catheter was left in place. #1 single-stranded PDS was used to close the fascia with a stitch started in the cephalad apex and sewn to below the umbilicus. A stitch was then started in the caudal apex and sewn to meet it. Skin staples were used to reapproximate the skin. Sterile dressings were applied. Disposition: The patient tolerated the procedure very well. She was extubated and stable upon transport to the recovery room.

## 2021-01-09 NOTE — ANESTHESIA PREPROCEDURE EVALUATION
Relevant Problems No relevant active problems Anesthetic History No history of anesthetic complications Review of Systems / Medical History Patient summary reviewed and pertinent labs reviewed Pulmonary Within defined limits Neuro/Psych Within defined limits Comments: [place] palsey Cardiovascular Hypertension Hyperlipidemia Exercise tolerance: >4 METS 
  
GI/Hepatic/Renal 
Within defined limits Endo/Other Within defined limits Other Findings Comments: H/o DVT, post Hip fx Physical Exam 
 
Airway Mallampati: II 
TM Distance: 4 - 6 cm Neck ROM: normal range of motion Mouth opening: Normal 
 
 Cardiovascular Regular rate and rhythm,  S1 and S2 normal,  no murmur, click, rub, or gallop Rhythm: regular Rate: normal 
 
 
 
 Dental 
 
Dentition: Lower dentition intact and Upper dentition intact Pulmonary Breath sounds clear to auscultation Abdominal 
GI exam deferred Other Findings Anesthetic Plan ASA: 3, emergent Anesthesia type: general 
 
 
 
 
Induction: Intravenous Anesthetic plan and risks discussed with: Patient and Son / Daughter History and consent from daughter by phone

## 2021-01-09 NOTE — ANESTHESIA POSTPROCEDURE EVALUATION
Procedure(s): EXPLORATORY LAPAROTOMY/POSSIBLE BOWEL RESECTION. general 
 
Anesthesia Post Evaluation Multimodal analgesia: multimodal analgesia not used between 6 hours prior to anesthesia start to PACU discharge Patient location during evaluation: PACU Patient participation: complete - patient participated Level of consciousness: responsive to verbal stimuli and sleepy but conscious Pain score: 0 Airway patency: patent Anesthetic complications: no 
Cardiovascular status: hypotensive Respiratory status: acceptable Hydration status: acceptable Comments: Pt hypotensive in PACU; contacted Dr Joaquín Ferrell to increase level of care, Levophed gtt started in PACU, report to intensivist. 
Post anesthesia nausea and vomiting:  none Final Post Anesthesia Temperature Assessment:  Normothermia (36.0-37.5 degrees C) INITIAL Post-op Vital signs:  
Vitals Value Taken Time BP 91/39 01/09/21 0441 Temp 37.1 °C (98.8 °F) 01/09/21 0207 Pulse 85 01/09/21 0445 Resp 19 01/09/21 0445 SpO2 98 % 01/09/21 0445 Vitals shown include unvalidated device data.

## 2021-01-09 NOTE — PROGRESS NOTES
conducted an initial consultation and Spiritual Assessment for Anita Swan, who is a 81 y.o.,female. Patient's Primary Language is: [place]. According to the patient's EMR Oriental orthodox Affiliation is: Oriental orthodox. The reason the Patient came to the hospital is:  
Patient Active Problem List  
 Diagnosis Date Noted  S/P exploratory laparotomy 01/08/2021  Bowel obstruction ([facility] 01/08/2021  Hyperkalemia 04/25/2020  Acidosis, metabolic 06/87/2053  Aspiration pneumonia ([facility] 04/23/2020  Leucocytosis 04/23/2020  Atrial fibrillation ([facility] 04/21/2020  Chronic renal disease, stage III 04/20/2020  Anemia 04/20/2020  Secondary hyperparathyroidism of renal origin ([facility] 04/20/2020  Hip fracture requiring operative repair ([facility] 04/11/2020  Mitral valve disorders(424.0)  Other and unspecified hyperlipidemia  Essential hypertension, benign  Obesity, unspecified  Nonspecific abnormal electrocardiogram (ECG) (EKG)  Renal insufficiency The  provided the following Interventions: 
Initiated a relationship of care and support. Explored issues of geno, belief, spirituality and Tenriism/ritual needs while hospitalized. Listened empathically. Connected family via Zoom Video connection. Offered prayer on patient's behalf. Chart reviewed. The following outcomes where achieved: 
Patient shared limited information about both their medical narrative and spiritual journey/beliefs.  confirmed Patient's Oriental orthodox Affiliation. Patient processed feeling about current hospitalization. Patient expressed gratitude for 's visit. Assessment: 
Patient does not have any Tenriism/cultural needs that will affect patient's preferences in health care. There are no spiritual or Tenriism issues which require intervention at this time. Plan: 
Chaplains will continue to follow and will provide pastoral care on an as needed/requested basis.  recommends bedside caregivers page  on duty if patient shows signs of acute spiritual or emotional distress. Chaplain Gwen Middleton Spiritual Care  
[phone]

## 2021-01-09 NOTE — PERIOP NOTES
Patient arrived from [place] [address] ED via stretcher. Report received prior to transfer from [facility]. She reported that patient was incontinent in an adult diaper earlier today and then when a poon was inserted there was no urine out. Poon was inserted twice to make sure it was in correct place. Patient awake and answers some questions. Seems to understand she is here for surgery on her abdomen. Patient has history of dementia. Phoned Daughter Twyla Kim to obtain information for pre-op checklist and medical history. 1900 Telephone consent obtained from daughter for anesthesia and surgery by Dr. Nanci Romero and Dr. Lori Nogueira with my confirmation with her and as a witness. Belongings secured from patient include: 
Jewelery-4 bracelets, 1 pair of earrings, 1 rosary, and watch. Placed in a zip lock plastic bag and included with her clothing bag. Meds- 14 bottles of assorted medication bottles One nightgown, one green bath towel and a beige blanket place in patient clear plastic patient belonging bag. This bag of belongings arrived with patient from [place] [address].

## 2021-01-09 NOTE — H&P
[facility] General Surgery Subjective: HPI: The patient is a very pleasant 81-year-old female with a past medical history remarkable for hypertension, chronic renal insufficiency stage III, secondary hyperparathyroidism secondary to renal origin, history of aspiration pneumonia, history of aortic abdominal aneurysm, history of hysterectomy, history of atrial fibrillation and hyperlipidemia. She presented to the emergency department at [facility] this afternoon with abdominal pain and nausea vomiting. She was found to have leukocytosis with a left shift and bandemia creatinine of 3.3 elevated lactic acid at 3.7 and the CAT scan which I did review independently on the monitor revealed small bowel obstruction which appeared to be a complete small bowel obstruction secondary to a internal hernia in the right upper quadrant of the abdomen. Patient also had fluid in the abdomen and possible pneumatosis. She was hypotensive on presentation but responded well to IV fluid administration. A Mathur catheter was inserted but no urine was obtained. Patient Active Problem List  
 Diagnosis Date Noted  S/P exploratory laparotomy 01/08/2021  Hyperkalemia 04/25/2020  Acidosis, metabolic 17/63/8056  Aspiration pneumonia ([address] 04/23/2020  Leucocytosis 04/23/2020  Atrial fibrillation ([address] 04/21/2020  Chronic renal disease, stage III 04/20/2020  Anemia 04/20/2020  Secondary hyperparathyroidism of renal origin ([address] 04/20/2020  Hip fracture requiring operative repair ([address] 04/11/2020  Mitral valve disorders(424.0)  Other and unspecified hyperlipidemia  Essential hypertension, benign  Obesity, unspecified  Nonspecific abnormal electrocardiogram (ECG) (EKG)  Renal insufficiency Past Medical History:  
Diagnosis Date  Bell's palsy  Essential hypertension, benign CONTROLLED  Mitral valve disorders(424.0)  2007 TRACE MR   
  Nonspecific abnormal electrocardiogram (ECG) (EKG)  Obesity, unspecified  Other and unspecified hyperlipidemia  Renal insufficiency Past Surgical History:  
Procedure Laterality Date  COLONOSCOPY N/A 5/31/2018 COLONOSCOPY w polypectomy performed by Jong Ferrell MD at [address] Left  HX CATARACT REMOVAL INSERT PROSTHETIC LENS: LEFT,RIGHT  HX GYN    
 HX ORTHOPAEDIC Left Rotator cuff repair  HX PARTIAL THYROIDECTOMY  PA ABDOMEN SURGERY PROC UNLISTED Explor lap  PA BIOPSY OF BREAST, INCISIONAL    
 rt & lt 12 yrs ago History reviewed. No pertinent family history. Social History Tobacco Use  Smoking status: Former Smoker  Smokeless tobacco: Never Used  Tobacco comment: REMOTELY QUIT 1980'S Substance Use Topics  Alcohol use: No  
  
Allergies Allergen Reactions  Ciprofibrate Unable to Consolidated Jose  Darvon [Propoxyphene] Unable to Obtain  Penicillins Unable to Obtain Prior to Admission medications Medication Sig Start Date End Date Taking? Authorizing Provider LORazepam (ATIVAN) 1 mg tablet Take 0.5 Tabs by mouth two (2) times daily as needed for Anxiety. Max Daily Amount: 1 mg. 4/29/20   Dejah Miller MD  
atorvastatin (LIPITOR) 40 mg tablet Take 1 Tab by mouth nightly. 4/29/20   Dejah Miller MD  
b complex-vitamin c-folic acid (NEPHROCAPS) 1 mg capsule Take 1 Cap by mouth daily. 4/30/20   Dejah Miller MD  
calcitRIOL (ROCALTROL) 0.25 mcg capsule Take 1 Cap by mouth every Monday, Wednesday, Friday. 4/29/20   Dejah Miller MD  
polyethylene glycol (MIRALAX) 17 gram packet Take 1 Packet by mouth daily. 4/30/20   Dejah Miller MD  
metoprolol tartrate (LOPRESSOR) 25 mg tablet Take 0.5 Tabs by mouth every twelve (12) hours. 4/29/20   Dejah Miller MD  
methIMAzole (TAPAZOLE) 10 mg tablet Take 1 Tab by mouth daily.  4/30/20   Dejah Miller MD  
 pantoprazole (PROTONIX) 40 mg tablet Take 1 Tab by mouth Before breakfast and dinner. 4/29/20   Lilia Mcfadden MD  
allopurinoL (ZYLOPRIM) 100 mg tablet Take 100 mg by mouth daily. Max Eugene MD  
aspirin 81 mg tablet Take 81 mg by mouth. Provider, Historical  
 
 
Review of Systems:   
14 systems were reviewed. The results are as above in the HPI and otherwise negative. Objective:  
 
Vitals:  
 01/08/21 1830 01/08/21 1831 01/08/21 1832 01/08/21 1840 BP: (!) 108/35   (!) 141/56 Pulse:  61 60 64 Resp:  12 17 19 Temp:      
SpO2:  97% 100% 100% Weight:      
Height:      
 
 
Physical Exam: 
GENERAL: alert, cooperative, no distress, appears stated age, EYE: conjunctivae/corneas clear. PERRL, EOM's intact. THROAT & NECK: normal and no erythema or exudates noted. ,   
LYMPHATIC: Cervical, supraclavicular, and axillary nodes normal. ,  
LUNG: clear to auscultation bilaterally, HEART: regular rate and rhythm, S1, S2 normal, no murmur, click, rub or gallop, ABDOMEN: soft, tender distended. Bowel sounds normal. No masses,  no organomegaly, EXTREMITIES:  extremities normal, atraumatic, no cyanosis or edema, SKIN: Normal., NEUROLOGIC: AOx3. Cranial nerves 2-12 and sensation grossly intact. ,  
 
Data Review:   
Recent Results (from the past 24 hour(s)) EKG, 12 LEAD, INITIAL Collection Time: 01/08/21 11:44 AM  
Result Value Ref Range Ventricular Rate 74 BPM  
 Atrial Rate 74 BPM  
 P-R Interval 146 ms  
 QRS Duration 76 ms  
 Q-T Interval 374 ms QTC Calculation (Bezet) 415 ms Calculated P Axis 57 degrees Calculated R Axis 66 degrees Calculated T Axis 84 degrees Diagnosis Normal sinus rhythm Nonspecific T wave abnormality Abnormal ECG When compared with ECG of 22-APR-2020 09:03, 
Minimal criteria for Anterior infarct are no longer present Non-specific change in ST segment in Inferior leads T wave inversion no longer evident in Anterior leads Confirmed by Reginaldo Thomason MD, Yuliet Pickard ([id number]) on 1/8/2021 6:14:34 PM 
  
CBC WITH AUTOMATED DIFF Collection Time: 01/08/21 11:47 AM  
Result Value Ref Range WBC 19.3 (H) 4.6 - 13.2 K/uL  
 RBC 3.61 (L) 4.20 - 5.30 M/uL  
 HGB 12.0 12.0 - 16.0 g/dL HCT 37.1 35.0 - 45.0 % .8 (H) 74.0 - 97.0 FL  
 MCH 33.2 24.0 - 34.0 PG  
 MCHC 32.3 31.0 - 37.0 g/dL  
 RDW 14.9 (H) 11.6 - 14.5 % PLATELET 597 576 - 478 K/uL MPV 12.1 (H) 9.2 - 11.8 FL  
 NEUTROPHILS 65 42 - 75 % BAND NEUTROPHILS 16 (H) 0 - 5 % LYMPHOCYTES 13 (L) 20 - 51 % MONOCYTES 6 2 - 9 % EOSINOPHILS 0 0 - 5 % BASOPHILS 0 0 - 3 %  
 ABS. NEUTROPHILS 15.6 (H) 1.8 - 8.0 K/UL  
 ABS. LYMPHOCYTES 2.5 0.8 - 3.5 K/UL  
 ABS. MONOCYTES 1.2 (H) 0 - 1.0 K/UL  
 ABS. EOSINOPHILS 0.0 0.0 - 0.4 K/UL  
 ABS. BASOPHILS 0.0 0.0 - 0.06 K/UL  
 DF MANUAL PLATELET COMMENTS ADEQUATE PLATELETS    
 RBC COMMENTS ANISOCYTOSIS 
1+ WBC COMMENTS TOXIC GRANULATION    
METABOLIC PANEL, COMPREHENSIVE Collection Time: 01/08/21 11:47 AM  
Result Value Ref Range Sodium 141 136 - 145 mmol/L Potassium 4.8 3.5 - 5.5 mmol/L Chloride 103 100 - 111 mmol/L  
 CO2 31 21 - 32 mmol/L Anion gap 7 3.0 - 18 mmol/L Glucose 144 (H) 74 - 99 mg/dL BUN 53 (H) 7.0 - 18 MG/DL Creatinine 3.41 (H) 0.6 - 1.3 MG/DL  
 BUN/Creatinine ratio 16 12 - 20 GFR est AA 16 (L) >60 ml/min/1.73m2 GFR est non-AA 13 (L) >60 ml/min/1.73m2 Calcium 11.4 (H) 8.5 - 10.1 MG/DL Bilirubin, total 0.9 0.2 - 1.0 MG/DL  
 ALT (SGPT) 12 (L) 13 - 56 U/L  
 AST (SGOT) 17 10 - 38 U/L Alk. phosphatase 77 45 - 117 U/L Protein, total 7.0 6.4 - 8.2 g/dL Albumin 3.1 (L) 3.4 - 5.0 g/dL Globulin 3.9 2.0 - 4.0 g/dL A-G Ratio 0.8 0.8 - 1.7 LIPASE Collection Time: 01/08/21 11:47 AM  
Result Value Ref Range Lipase 73 73 - 393 U/L  
PROTHROMBIN TIME + INR Collection Time: 01/08/21 11:47 AM  
Result Value Ref Range Prothrombin time 15.3 (H) 11.5 - 15.2 sec INR 1.2 0.8 - 1.2 POC LACTIC ACID Collection Time: 01/08/21 12:55 PM  
Result Value Ref Range Lactic Acid (POC) 3.33 (HH) 0.40 - 2.00 mmol/L POC LACTIC ACID Collection Time: 01/08/21  3:40 PM  
Result Value Ref Range Lactic Acid (POC) 1.74 0.40 - 2.00 mmol/L  
SARS-COV-2 Collection Time: 01/08/21  3:43 PM  
Result Value Ref Range Specimen source Nasopharyngeal    
 COVID-19 rapid test Not detected NOTD Specimen type NP Swab Health status NP Swab COVID-19 PENDING Impression: · Patient with closed-loop small bowel obstruction secondary to internal hernia. She is at increased risk of bleeding which I did discuss with her daughter because of the Eliquis which she is taking for history of recent deep venous thrombosis in the leg and arm. Plan:  
 
· Exploratory laparotomy with lysis of adhesions and possible bowel resection · Consent on chart · Preoperative orders written · We will consult hospitalist for help with care after surgery. · We will also need nephrology consultation because of the renal failure. Signed By: Elizabeth Rizvi MD   
 January 8, 2021

## 2021-01-09 NOTE — PERIOP NOTES
Patient hypotensive. See VS in doc flow sheet. Paged ALIS Manzo CRNA. Patient had just completed a 1000 ml IV bolus ordered by DR. Juan F Dukes for anuria. [address] Dr. Fidel [place], hospitalist paged. 2234 Ephedrine administered by CRNA See [facility] 
[phone] Spoke with Supervisor to see if she can reach hospitalist.  
2255 Ephedrine 5 mg repeated by CRNA 
2300 Dr. Fidel [place] returned page. Explained current situation to him including total fluids given, meds given, VS. He said to upgrade transfer to stepdown and just give BP time to come up. 2310 Ephedrine repeated. 2315 Dr. Juan F Dukes notified of all of above info. Order received for labs, additional IV fluid bolus and levophed gtt. 2355 Levophed initiated at 2mcg/min by CRNA 
2400 Sp[address] to Dr. Juan F Dukes and received order for transfer to ICU. Also Spoke to Dr. Jason Helms and gave update. 0030 [facility] ICU here to see patient. 0050 blood sent to lab for CMP lactic acid Mg Phos and type screen 0057 ABG's done 0145 Waiting on ICU bed assignment. Patient's daughter notified that patient will be transferred to ICU when bed available. Update given on reason.

## 2021-01-09 NOTE — PERIOP NOTES
TRANSFER - OUT REPORT: 
 
Verbal report given to Elina Dickson RN(name) on Bhanu Call  being transferred to ICU (unit) for routine post - op Report consisted of patients Situation, Background, Assessment and  
Recommendations(SBAR). Information from the following report(s) SBAR, Kardex, OR Summary, Procedure Summary, Intake/Output, MAR, Recent Results and Med Rec Status was reviewed with the receiving nurse. Lines:  
Triple Lumen 01/08/21 Left Internal jugular (Active) Central Line Being Utilized Yes 01/09/21 0201 Criteria for Appropriate Use Limited/no vessel suitable for conventional peripheral access 01/09/21 0201 Site Assessment Clean, dry, & intact 01/09/21 0201 Infiltration Assessment 0 01/09/21 0201 Date of Last Dressing Change 01/08/21 01/08/21 2102 Dressing Status Clean, dry, & intact 01/09/21 0201 Dressing Type Disk with Chlorhexadine gluconate (CHG); Transparent 01/09/21 0201 Proximal Hub Color/Line Status White 01/08/21 2102 Positive Blood Return (Medial Site) No 01/08/21 1628 Medial Hub Color/Line Status Blue 01/08/21 2102 Positive Blood Return (Lateral Site) Yes 01/08/21 1628 Distal Hub Color/Line Status Diaz Cisco 01/08/21 2102 Positive Blood Return (Site #3) Yes 01/08/21 1628 Peripheral IV 01/08/21 Left Hand (Active) Site Assessment Clean, dry, & intact 01/09/21 0201 Phlebitis Assessment 0 01/09/21 0201 Infiltration Assessment 0 01/09/21 0201 Dressing Status Clean, dry, & intact 01/09/21 0201 Dressing Type Tape;Transparent 01/09/21 0201 Hub Color/Line Status Capped;Yellow 01/09/21 0201 Opportunity for questions and clarification was provided. Patient transported with: 
 Monitor O2 @ 3 liters Registered Nurse

## 2021-01-09 NOTE — ROUTINE PROCESS
TRANSFER - IN REPORT: 
 
Verbal report received from ***(name) on Danisha Hummel  being received from ***(unit) for {TRANSFER [id number]} Report consisted of patients Situation, Background, Assessment and  
Recommendations(SBAR). Information from the following report(s) {SBAR REPORTS [id number]} was reviewed with the receiving nurse. Opportunity for questions and clarification was provided. Assessment completed upon patients arrival to unit and care assumed. Per supervisor, leave and finish documentation another time. ***:  Verbal Patient-side shift change report given to ***, RN, (oncoming nurse) by Yanet Clark RN 
(offgoing nurse). Report included the following information SBAR, Kardex, ED Summary, Procedure Summary, Intake/Output, MAR, Recent Results and Med Rec Status. Included:  Intro, hx, two-RN eval of relevant assessment findings, LDAs, skin, diagnostics and infusions.

## 2021-01-09 NOTE — ROUTINE PROCESS
0700: report received from [address] [place] [address] and care assumed at this time. 0800: Assessment completed, see flow sheet. 1200: Pt reassessed at this time, see flow sheet. 1300: Update given to pt's daughter, Otilio Mccabeer [phone]. Daughter requesting Zoom meeting.  
 
1600: Pt reassessed at this time, no changes noted from previous assessment, see flow sheet. 1930: Report given to Donna ESPINOZA RN and care handed off at this time.

## 2021-01-10 PROBLEM — N99.89 OLIGURIA OR ANURIA DUE TO PROCEDURE: Status: ACTIVE | Noted: 2021-01-01

## 2021-01-10 PROBLEM — R34 OLIGURIA OR ANURIA DUE TO PROCEDURE: Status: ACTIVE | Noted: 2021-01-01

## 2021-01-10 PROBLEM — N17.9 ACUTE RENAL FAILURE (ARF) (HCC): Status: ACTIVE | Noted: 2021-01-01

## 2021-01-10 PROBLEM — E05.00 GRAVES DISEASE: Status: ACTIVE | Noted: 2021-01-01

## 2021-01-10 NOTE — PROGRESS NOTES
[facility] Patient: Vasquez Mealing Age: 81 y.o. : 1935 MR#: [id number] SSN: [id number] Date: 1/10/2021 Subjective/24-hour events:  
 
Transferred out of ICU overnight. Comfortable currently, no significant pain reported. + Bowel movement overnight, tolerating clears thus far, no N/V today. UOP improved. Assessment: SBO secondary to strangulated internal hernia s/p ex-lap with ileal resection and RAD Acute metabolic encephalopathy, improved RUPALI Obesity Severe sepsis, treated/resolved Lactic acidosis, rsolved Hemorrhagic/hypovolemia shock, rsolved Plan:  
Continue routine postoperative surgical care per Dr. Duglas Jacobs. IVF per nephrology, maintain close watch on renal indices and electrollytes. Continue current ABX. Will ask ID to see in AM for recommendations on therapy going forward. Stress dose-steroids and thyroid replacement continue. Will obtain endocrinology evaluation in AM.  Have discussed case with Dr. Power Lopes via phone. Assistance/recommendations appreciated in advance. PT/OT, mobilize. Disposition TBD. Supportive care o/w. Follow. Case discussed with:  [x]Patient  []Family  [x]Nursing  []Case Management DVT Prophylaxis:  []Lovenox  []Hep SQ  []SCDs  []Coumadin   []On Heparin gtt Objective:  
VS:  
Visit Vitals BP (!) 102/50 (BP 1 Location: Right arm, BP Patient Position: At rest) Pulse (!) 59 Temp 97.3 °F (36.3 °C) Resp 16 Ht 5' (1.524 m) Wt 71.7 kg (158 lb) SpO2 91% BMI 30.86 kg/m² Tmax/24hrs: Temp (24hrs), Av.7 °F (36.5 °C), Min:97.3 °F (36.3 °C), Max:98 °F (36.7 °C) Intake/Output Summary (Last 24 hours) at 1/10/2021 1301 Last data filed at 1/10/2021 1208 Gross per 24 hour Intake 2605 ml Output 445 ml Net 2160 ml General:  In NAD. Nontoxic-appearing. Cardiovascular:  RRR. Pulmonary:  Lungs clear bilaterally, no wheezes. GI:  Abdomen soft, NTTP. Extremities: Warm, no edema or ischemia. Neuro:  Awake and alert. Moves extremities spontaneously. Labs:   
Recent Results (from the past 24 hour(s)) RENAL FUNCTION PANEL Collection Time: 01/09/21  3:40 PM  
Result Value Ref Range Sodium 144 136 - 145 mmol/L Potassium 5.0 3.5 - 5.5 mmol/L Chloride 112 (H) 100 - 111 mmol/L  
 CO2 25 21 - 32 mmol/L Anion gap 7 3.0 - 18 mmol/L Glucose 92 74 - 99 mg/dL BUN 65 (H) 7.0 - 18 MG/DL Creatinine 3.22 (H) 0.6 - 1.3 MG/DL  
 BUN/Creatinine ratio 20 12 - 20 GFR est AA 17 (L) >60 ml/min/1.73m2 GFR est non-AA 14 (L) >60 ml/min/1.73m2 Calcium 9.9 8.5 - 10.1 MG/DL Phosphorus 3.8 2.5 - 4.9 MG/DL Albumin 2.1 (L) 3.4 - 5.0 g/dL GLUCOSE, POC Collection Time: 01/09/21  5:27 PM  
Result Value Ref Range Glucose (POC) 95 70 - 110 mg/dL RENAL FUNCTION PANEL Collection Time: 01/09/21  9:45 PM  
Result Value Ref Range Sodium 142 136 - 145 mmol/L Potassium 4.7 3.5 - 5.5 mmol/L Chloride 112 (H) 100 - 111 mmol/L  
 CO2 25 21 - 32 mmol/L Anion gap 5 3.0 - 18 mmol/L Glucose 90 74 - 99 mg/dL BUN 66 (H) 7.0 - 18 MG/DL Creatinine 3.37 (H) 0.6 - 1.3 MG/DL  
 BUN/Creatinine ratio 20 12 - 20 GFR est AA 16 (L) >60 ml/min/1.73m2 GFR est non-AA 13 (L) >60 ml/min/1.73m2 Calcium 9.9 8.5 - 10.1 MG/DL Phosphorus 3.5 2.5 - 4.9 MG/DL Albumin 2.2 (L) 3.4 - 5.0 g/dL URINALYSIS W/ RFLX MICROSCOPIC Collection Time: 01/09/21  9:45 PM  
Result Value Ref Range Color YELLOW Appearance CLEAR Specific gravity 1.016 1.005 - 1.030    
 pH (UA) 5.0 5.0 - 8.0 Protein TRACE (A) NEG mg/dL Glucose Negative NEG mg/dL Ketone Negative NEG mg/dL Bilirubin Negative NEG Blood Negative NEG Urobilinogen 0.2 0.2 - 1.0 EU/dL Nitrites Negative NEG Leukocyte Esterase SMALL (A) NEG    
SODIUM, UR, RANDOM Collection Time: 01/09/21  9:45 PM  
Result Value Ref Range  Sodium,urine random 10 (L) 20 - 110 MMOL/L  
CREATININE, UR, RANDOM Collection Time: 01/09/21  9:45 PM  
Result Value Ref Range Creatinine, urine 79.00 30 - 125 mg/dL MYOGLOBIN, UR, SCREEN Collection Time: 01/09/21  9:45 PM  
Result Value Ref Range Myoglobin,urine screen Negative NEG    
URINE MICROSCOPIC ONLY Collection Time: 01/09/21  9:45 PM  
Result Value Ref Range WBC 5 to 10 0 - 4 /hpf  
 RBC Negative 0 - 5 /hpf Epithelial cells Negative 0 - 5 /lpf Bacteria Negative NEG /hpf Mucus 1+ (A) NEG /lpf Hyaline cast 5 to 10 0 - 2 /lpf  
CBC WITH AUTOMATED DIFF Collection Time: 01/10/21  4:00 AM  
Result Value Ref Range WBC 9.4 4.6 - 13.2 K/uL  
 RBC 2.10 (L) 4.20 - 5.30 M/uL HGB 6.8 (L) 12.0 - 16.0 g/dL HCT 21.5 (L) 35.0 - 45.0 % .4 (H) 74.0 - 97.0 FL  
 MCH 32.4 24.0 - 34.0 PG  
 MCHC 31.6 31.0 - 37.0 g/dL  
 RDW 15.3 (H) 11.6 - 14.5 % PLATELET 820 (L) 839 - 420 K/uL MPV 11.8 9.2 - 11.8 FL  
 NEUTROPHILS 78 (H) 40 - 73 % LYMPHOCYTES 18 (L) 21 - 52 % MONOCYTES 4 3 - 10 % EOSINOPHILS 0 0 - 5 % BASOPHILS 0 0 - 2 %  
 ABS. NEUTROPHILS 7.4 1.8 - 8.0 K/UL  
 ABS. LYMPHOCYTES 1.7 0.9 - 3.6 K/UL  
 ABS. MONOCYTES 0.3 0.05 - 1.2 K/UL  
 ABS. EOSINOPHILS 0.0 0.0 - 0.4 K/UL  
 ABS. BASOPHILS 0.0 0.0 - 0.1 K/UL  
 DF AUTOMATED METABOLIC PANEL, COMPREHENSIVE Collection Time: 01/10/21  4:00 AM  
Result Value Ref Range Sodium 143 136 - 145 mmol/L Potassium 4.3 3.5 - 5.5 mmol/L Chloride 113 (H) 100 - 111 mmol/L  
 CO2 26 21 - 32 mmol/L Anion gap 4 3.0 - 18 mmol/L Glucose 109 (H) 74 - 99 mg/dL BUN 66 (H) 7.0 - 18 MG/DL Creatinine 3.38 (H) 0.6 - 1.3 MG/DL  
 BUN/Creatinine ratio 20 12 - 20 GFR est AA 16 (L) >60 ml/min/1.73m2 GFR est non-AA 13 (L) >60 ml/min/1.73m2 Calcium 9.7 8.5 - 10.1 MG/DL Bilirubin, total 0.3 0.2 - 1.0 MG/DL  
 ALT (SGPT) 14 13 - 56 U/L  
 AST (SGOT) 21 10 - 38 U/L Alk. phosphatase 56 45 - 117 U/L  Protein, total 5.2 (L) 6.4 - 8.2 g/dL  
 Albumin 2.0 (L) 3.4 - 5.0 g/dL Globulin 3.2 2.0 - 4.0 g/dL A-G Ratio 0.6 (L) 0.8 - 1.7 PHOSPHORUS Collection Time: 01/10/21  4:00 AM  
Result Value Ref Range Phosphorus 3.4 2.5 - 4.9 MG/DL  
PTH INTACT Collection Time: 01/10/21  4:00 AM  
Result Value Ref Range Calcium 9.7 8.5 - 10.1 MG/DL  
 PTH, Intact 199.7 (H) 18.4 - 88.0 pg/mL CALCIUM, IONIZED Collection Time: 01/10/21  4:00 AM  
Result Value Ref Range Ionized Calcium 1.38 (H) 1.12 - 1.32 MMOL/L  
PROCALCITONIN Collection Time: 01/10/21  4:00 AM  
Result Value Ref Range Procalcitonin 8.32 ng/mL MAGNESIUM Collection Time: 01/10/21  4:00 AM  
Result Value Ref Range Magnesium 2.1 1.6 - 2.6 mg/dL  
RBC, ALLOCATE Collection Time: 01/10/21  6:30 AM  
Result Value Ref Range HISTORY CHECKED? Historical check performed ECHO ADULT COMPLETE Collection Time: 01/10/21 10:30 AM  
Result Value Ref Range IVSd 1.17 (A) 0.6 - 0.9 cm LVIDd 3.54 (A) 3.9 - 5.3 cm LVIDs 2.31 cm  
 LVPWd 1.32 (A) 0.6 - 0.9 cm  
 RVSP 39.29 mmHg Left Atrium Major Axis 3.34 cm  
 LA Volume 69.16 22 - 52 mL  
 LA Area 4C 19.88 cm2 LA Vol 2C 77.01 (A) 22 - 52 mL  
 LA Vol 4C 50.82 22 - 52 mL Est. RA Pressure 3.00 mmHg MV A Christopher 127.80 cm/s Mitral Valve E Wave Deceleration Time 303.67 ms  
 MV E Christopher 95.20 cm/s Triscuspid Valve Regurgitation Peak Gradient 36.29 mmHg  
 TR Max Velocity 301.20 cm/s  
 AO ASC D 3.22 cm Ao Root D 2.71 cm  
 MV E/A 0.74 LV Mass .1 67 - 162 g  
 LV Mass AL Index 86.5 43 - 95 g/m2 Left Atrium Minor Axis 1.98 cm  
 LA Vol Index 40.96 16 - 28 ml/m2 LA Vol Index 45.60 16 - 28 ml/m2 LA Vol Index 30.09 16 - 28 ml/m2 Signed By: Renan Payne MD   
 January 10, 2021

## 2021-01-10 NOTE — PROGRESS NOTES
[facility]. Pulmonary, Critical Care, and Sleep Medicine Name: Esdras Sam MRN: [id number] : 1935 Hospital: [address] [place] [address] Dr Date: 1/10/2021  Admission Date: 2021 Chart and notes reviewed. Data reviewed. I have evaluated all findings. [x]I have reviewed the flowsheet and previous days notes. Interval HPI:Patient is a 81 y.o. female w/ PMH of PAF, Grave's disease, acute on chronic kidney disease presenting to [facility] from home w/ c/o vomiting x 3 days. History obtained from chart as patient is altered and no family at bedside. She was found to be septic and CT scan showed SBO secondary to internal hernia with volvulus. She was transferred to [facility] for ex lap with Dr. Paul Mckinley. Patient had lysis of adhesions, small bowel resection of ileum hemorrhagic. She was extubated in PACU. She developed shock requiring vasopressors. Subjective 01/10/21 Hospital Day:2 
POD:1 
Patient did well overnight, she slept. Denies abdominal pain, SOB, CP. 
400mL UOP overnight. ROS:A comprehensive review of systems was negative except for that written in the HPI. Events and notes from last 24 hours reviewed. Care plan discussed on multidisciplinary rounds. Patient Active Problem List  
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  
 Obesity, unspecified E66.9  Nonspecific abnormal electrocardiogram (ECG) (EKG) R94.31  
 Renal insufficiency N28.9  Hip fracture requiring operative repair ([address] S72.009A  Chronic renal disease, stage III N18.30  Anemia D64.9  Secondary hyperparathyroidism of renal origin ([address] N25.81  
 Atrial fibrillation (HCC) I48.91  
 Aspiration pneumonia ([address] J69.0  Leucocytosis D72.829  
 Hyperkalemia E87.5  Acidosis, metabolic E98.2  S/P exploratory laparotomy O20.973  Bowel obstruction ([address] X57.278 Vital Signs: Visit Vitals BP (!) 102/39 Pulse (!) 59 Temp 98 °F (36.7 °C) Resp 13 Ht 5' (1.524 m) Wt 71.8 kg (158 lb 4.6 oz) SpO2 95% BMI 30.91 kg/m² O2 Device: Room air O2 Flow Rate (L/min): 1 l/min Temp (24hrs), Av °F (36.7 °C), Min:97.7 °F (36.5 °C), Max:98.7 °F (37.1 °C) Intake/Output:  
Last shift:      1901 - 01/10 0700 In: 9652 [P.O.:150; I.V.:935] Out: 365 [BQTTU:038] Last 3 shifts:  07 -  1900 In: 5335.9 [I.V.:5335.9] Out: 190 [Urine:140] Intake/Output Summary (Last 24 hours) at 1/10/2021 3589 Last data filed at 1/10/2021 1273 Gross per 24 hour Intake 2979.56 ml Output 485 ml Net 2494.56 ml Ventilator Settings: 
  
  
  
  
 
Current Facility-Administered Medications Medication Dose Route Frequency  pantoprazole (PROTONIX) 40 mg in 0.9% sodium chloride 10 mL injection  40 mg IntraVENous DAILY  NOREPINephrine (LEVOPHED) 8 mg in 5% dextrose 250mL (32 mcg/mL) infusion  0.5-50 mcg/min IntraVENous TITRATE  metroNIDAZOLE (FLAGYL) IVPB premix 500 mg  500 mg IntraVENous Q8H  
 cefepime (MAXIPIME) 2 g in sterile water (preservative free) 10 mL IV syringe  2 g IntraVENous Q24H  
 acetaminophen (TYLENOL) solution 500 mg  500 mg Oral Q6H  
 dextrose 5% and 0.9% NaCl infusion  75 mL/hr IntraVENous CONTINUOUS  
 sodium chloride (NS) flush 5-40 mL  5-40 mL IntraVENous Q8H  
 heparin (porcine) injection 5,000 Units  5,000 Units SubCUTAneous Q8H  
 midodrine (PROAMATINE) tablet 5 mg  5 mg Oral TID WITH MEALS Telemetry: [x]Sinus []A-flutter []Paced []A-fib []Multiple PVCs Physical Exam:  
  
General: awake, alert, NAD HEENT:  Anicteric sclerae; pink palpebral conjunctivae; mucosa moist, large goiter Resp:  Symmetrical chest expansion, no accessory muscle use; good airway entry; no rales/ wheezing/ rhonchi noted CV:  S1, S2 present; regular rate and rhythm GI:  Abdomen soft, non-tender; (+) active bowel sounds, Midline incision dressing c/d/i Extremities:  +2 pulses on all extremities; no edema/ cyanosis/ clubbing noted Skin:  Warm; no rashes/ lesions noted, normal turgor/cap refill Neurologic:  Non-focal 
Devices:  cvl DATA: 
MAR reviewed and pertinent medications noted or modified as needed Labs: 
Recent Labs  
  01/10/21 
0400 01/09/21 
0025 01/08/21 
1147 WBC 9.4 9.8 19.3* HGB 6.8* 8.4* 12.0 HCT 21.5* 25.9* 37.1 * 150 217 Recent Labs  
  01/10/21 
0400 01/09/21 
2145 01/09/21 
1540 01/09/21 
0025 01/09/21 
0025 01/08/21 
1147  142 144   < > 145 141  
K 4.3 4.7 5.0   < > 4.5 4.8  
* 112* 112*   < > 112* 103 CO2 26 25 25   < > 28 31 * 90 92   < > 123* 144* BUN 66* 66* 65*   < > 57* 53* CREA 3.38* 3.37* 3.22*   < > 3.11* 3.41* CA 9.7  9.7 9.9 9.9   < > 9.9 11.4* MG 2.1  --   --   --  2.0  --   
PHOS 3.4 3.5 3.8   < > 4.0  --   
ALB 2.0* 2.2* 2.1*   < > 2.0* 3.1* ALT 14  --   --   --  19 12* INR  --   --   --   --   --  1.2  
 < > = values in this interval not displayed. No results for input(s): PH, PCO2, PO2, HCO3, FIO2 in the last 72 hours. Recent Labs 01/09/21 
0051 FIO2I 32 HCO3I 24.9 PCO2I 55.2* PHI 7.26* PO2I 73* Imaging: 
[x]   I have personally reviewed the patients radiographs and reports XR Results (most recent): CXR Results  (Last 48 hours) 01/08/21 1721  XR CHEST PORT Final result Impression:  IMPRESSION:   
   
The NG tube has been partially pulled from the stomach to the proximal esophagus  
with the banded lobe of the tube unchanged. Complete removal and are reinsertion  
of the tube advised. No pneumothorax or other acute finding. Thank you for your referral.  
  
 Narrative:  Chest AP single view HISTORY: NG tube placement COMPARISON: Earlier today at 1641 hours FINDINGS: The NG tube has been partially interval pulled but the distal bended  
loop of the tube remains, the distal end of the loop now both from the proximal  
stomach to the proximal esophagus at the level of the pari. Complete removal  
and reinserted of the NG tube advised. The cardiac silhouette appears  
unremarkable. The lungs are clear of acute infiltration. .  The pulmonary  
vascularity and the mediastinum appear unremarkable. Leslee Chikis 01/08/21 1650  XR CHEST PORT Final result Impression:  IMPRESSION:   
   
NG tube placed with distal portion looped back from proximal stomach to the mid  
esophagus. Repositioning indicated. Left subclavian vein line appears stable. No pneumothorax or other acute  
finding. Thank you for your referral.  
  
 Narrative:  Chest AP single view HISTORY: NG tube placement COMPARISON: Yesterday. FINDINGS: The cardiac silhouette appears unremarkable. The lungs are clear of  
acute infiltration. .  The pulmonary vascularity and the mediastinum appear  
unremarkable. There is interval placed NG tube since the prior study with its  
distal portion looped from proximal stomach neck to the mid esophagus. Repositioning indicated. The left subclavian central line appears unchanged, tip  
in the SVC. No pneumothorax. 01/08/21 1427  XR CHEST PORT Final result Impression:  IMPRESSION:   
   
Appropriately positioned left internal jugular central venous catheter. No pneumothorax. Narrative:  INDICATION: post central line TECHNIQUE: Portable chest radiograph COMPARISON: 8 January 2021 at 1234 hours FINDINGS:   
   
Left internal jugular central venous catheter with tip in the region of the  
superior cavoatrial junction. No pneumothorax. The balance of exam is unchanged when compared with study 2 hours prior. 01/08/21 1258  XR CHEST PORT Final result Impression:  IMPRESSION:   
   
No acute cardiopulmonary disease. Chronic findings as above.   
  
 Narrative:  INDICATION: Sepsis, vomiting and abdominal pain, dark coffee-ground emesis TECHNIQUE: Portable chest radiograph COMPARISON: 25 April 2020, 21 December 2009 FINDINGS: The lungs are adequately inflated. No focal consolidation, effusion or  
pneumothorax. Heart size within normal limits. Chronic widening of the right paratracheal stripe, suspect thyroid goiter. No acute osseous abnormality. Marked S-shaped scoliotic curvature of the  
thoracal lumbar spine. High riding right humeral head compatible with rotator  
cuff insufficiency. Post left distal clavicle resection. CT Results (most recent): CXR Results  (Last 48 hours) 01/08/21 1721  XR CHEST PORT Final result Impression:  IMPRESSION:   
   
The NG tube has been partially pulled from the stomach to the proximal esophagus  
with the banded lobe of the tube unchanged. Complete removal and are reinsertion  
of the tube advised. No pneumothorax or other acute finding. Thank you for your referral.  
  
 Narrative:  Chest AP single view HISTORY: NG tube placement COMPARISON: Earlier today at 1641 hours FINDINGS: The NG tube has been partially interval pulled but the distal bended  
loop of the tube remains, the distal end of the loop now both from the proximal  
stomach to the proximal esophagus at the level of the pari. Complete removal  
and reinserted of the NG tube advised. The cardiac silhouette appears  
unremarkable. The lungs are clear of acute infiltration. .  The pulmonary  
vascularity and the mediastinum appear unremarkable. Heddy  01/08/21 1650  XR CHEST PORT Final result Impression:  IMPRESSION:   
   
NG tube placed with distal portion looped back from proximal stomach to the mid  
esophagus. Repositioning indicated. Left subclavian vein line appears stable. No pneumothorax or other acute  
finding.   
   
Thank you for your referral.  
  
 Narrative:  Chest AP single view HISTORY: NG tube placement COMPARISON: Yesterday. FINDINGS: The cardiac silhouette appears unremarkable. The lungs are clear of  
acute infiltration. .  The pulmonary vascularity and the mediastinum appear  
unremarkable. There is interval placed NG tube since the prior study with its  
distal portion looped from proximal stomach neck to the mid esophagus. Repositioning indicated. The left subclavian central line appears unchanged, tip  
in the SVC. No pneumothorax. 01/08/21 1427  XR CHEST PORT Final result Impression:  IMPRESSION:   
   
Appropriately positioned left internal jugular central venous catheter. No pneumothorax. Narrative:  INDICATION: post central line TECHNIQUE: Portable chest radiograph COMPARISON: 8 January 2021 at 1234 hours FINDINGS:   
   
Left internal jugular central venous catheter with tip in the region of the  
superior cavoatrial junction. No pneumothorax. The balance of exam is unchanged when compared with study 2 hours prior. 01/08/21 1258  XR CHEST PORT Final result Impression:  IMPRESSION:   
   
No acute cardiopulmonary disease. Chronic findings as above. Narrative:  INDICATION: Sepsis, vomiting and abdominal pain, dark coffee-ground emesis TECHNIQUE: Portable chest radiograph COMPARISON: 25 April 2020, 21 December 2009 FINDINGS: The lungs are adequately inflated. No focal consolidation, effusion or  
pneumothorax. Heart size within normal limits. Chronic widening of the right paratracheal stripe, suspect thyroid goiter. No acute osseous abnormality. Marked S-shaped scoliotic curvature of the  
thoracal lumbar spine. High riding right humeral head compatible with rotator  
cuff insufficiency. Post left distal clavicle resection. IMPRESSION:  
· Septic and hypovolemic shock s/p ex lap- resolved · POD1 S/p ex lap with lysis of adhesions, small bowel resection of ileum hemorrhagic ischemic · Severe sepsis with kidney dysfunction and lactic acidosis · Acute on chronic kidney disease III- urine OP improving · Metabolic encephalopathy - resolved · Grave's disease with goiter s/p remote hemithyroidectomy · internal hernia with volvulus and closed-loop obstruction right abdomen and pelvis. · Acute on chronic anemia- suspect some blood loss from surgery · Grade 2 diastolic dysfunction · Hx bell's palsy · Hx PAF 
· Hx HTN 
· Hx of DVT · Hx right hip fx s/p ORIF in 4/2020 Patient Active Problem List  
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  
 Obesity, unspecified E66.9  Nonspecific abnormal electrocardiogram (ECG) (EKG) R94.31  
 Renal insufficiency N28.9  Hip fracture requiring operative repair ([address] S72.009A  Chronic renal disease, stage III N18.30  Anemia D64.9  Secondary hyperparathyroidism of renal origin ([address] N25.81  
 Atrial fibrillation (HCC) I48.91  
 Aspiration pneumonia ([address] J69.0  Leucocytosis D72.829  
 Hyperkalemia E87.5  Acidosis, metabolic Z83.1  S/P exploratory laparotomy D82.048  Bowel obstruction ([address] G61.369 RECOMMENDATIONS:  
Neuro: pain medication PRN with caution given renal insufficiency Pulm: Supplemental O2 via NC, titrate flow for goal SPO2> 90% pulmonary hygiene care, Aspiration precautions, Keep HOB >30 degrees CVS: off pressors, stable GI: SUP Renal:  Trend Renal indices, Strict Is/Os, Mathur (+) Hem/Onc: Daily CBC. Monitor for s/o active bleeding. I/D:Sepsis bundle per hospital protocol, Blood, Sputum, and Urine cultures drawn and will be followed. Lactic acid normalized. Antibiotics:cefepime, flagyl. Trend WBCs and temperature curve. Endocrine: Q6 glucoses, SSI. Metabolic:  Daily BMP, mag, phos. Trend lytes, replace as needed. Musc/Skin: no acute issues, wound care Full Code Discussed in interdisciplinary rounds Family updated on 1/9- daughter Faith Garcia, code # given Patient stable for transfer to surgical floor today. Best practice : 
 
Glycemic control IHI ICU bundles: 
 Central Line Bundle Followed  and Poon Bundle Followed Mech Vent patients- VAP bundle, aim to keep peak plateau pressure 79-12OQ H2O Sress ulcer prophylaxis. DVT prophylaxis. Need for Lines, poon assessed. Restraints need. Palliative care evaluation. High complexity decision making was performed during this consultation and evaluation. [x]       Pt is at high risk for further organ failure and dysfunction. Critical care time 37 minutes with >50% of time spent face to face with patient. Jeni Owens PA-C 
01/10/21 Pulmonary, Critical Care Medicine [facility]

## 2021-01-10 NOTE — PROGRESS NOTES
Received call from Dr. Erwin Velásquez. Updated on patient current status. Informed of patient urine output of 120cc on previous shift. Received verbal orders to d/c LR and start D5NS @ 75cc/hr. See MAR. Also received orders for UA, urine Na+, creatinine, urine osmolality. See lab results. 2300: Dr. Erwin Velásquez called and given update on patient status and lab results. UA results provided. Urine random Na+ 10. Informed that patient is likely \"dry\" and to continue fluids. Informed to monitor urine output closely. No further orders received.

## 2021-01-10 NOTE — ROUTINE PROCESS
0700: Received call from Blood Bank. 1unit of blood ready. 0720: Blood consent verified. Blood transfusion started. 0730: Bedside and Verbal shift change report given to Cathy Rucker RN (oncoming nurse) by Stacy Tello RN (offgoing nurse). Report included the following information SBAR, Kardex, ED Summary, Intake/Output, MAR, Recent Results and Cardiac Rhythm NSR.

## 2021-01-10 NOTE — CONSULTS
[facility] Name:  Agapito Kelsey 
MR#:   [id number] :  1935 ACCOUNT #:  [de-identified] DATE OF SERVICE:  01/10/2021 RENAL CONSULTATION 
 
CONSULT REQUESTED BY:  Griselda Reyes MD from the intensive care unit. REASON FOR CONSULTATION:  Decreased urine output in a patient with status post laparotomy as well as known history of kidney disease. HISTORY OF PRESENT ILLNESS:  This is a pleasant 31-year-old [facility] American female, a very well-known patient of mine but who had not seen me since last discharge from the hospital, was admitted to the [facility] after she was found to have a complete small bowel obstruction. She presented there from home with a complaint of vomiting for 3 days. Also having abdominal pain. No history of recent bowel movement also. She was found to be septic and a CAT scan showed small bowel obstruction secondary to internal hernia with volvulus. She was transferred to [facility] and a rapid COVID test was negative and did undergo exploratory laparotomy with Dr. Duglas Jacobs. The patient did undergo lysis of adhesions, small bowel resection of ileum and hemorrhoids. She was extubated in the PACU and then transferred to ICU. She was found to be in shock and requiring vasopressin. She was given a bolus of lactated Ringer's 1 liter and subsequently also in the ICU and was maintaining lactated Ringer's at 25 mL/hour. The patient was also requiring pressors including Levophed. I was notified about this patient's status. I did review the history of the patient, in and out record and I advised that whatever urine she has was mixed urine and send the urinalysis and the urine electrolytes. I changed the IV fluid and reviewed the volume status of the patient and started her on D5 normal saline at 75 mL/hour. Within 2-3 hours, she made 120 mL of urine and continues to make good urine output.   Hemodynamics also improved and the patient this morning was transferred to medical floor. She was also given 1 unit of packed red blood cells. Currently, the patient feels better, knows her name, can recognize me by name also, but still a little obtunded, cannot give any good history. Cognitive function is relatively poor. PAST MEDICAL HISTORY:  Significant for hypertension, stage III-stage IV chronic renal failure, atrial fibrillation, Graves' disease, Bell's palsy, hip fracture requiring internal fixation, also hypercalcemia along with secondary hyperparathyroidism, aspiration pneumonia. REVIEW OF SYSTEMS:  Compressive review of the system was negative except that written in the HPI. PHYSICAL EXAMINATION: 
VITAL SIGNS:  As follows:  Temperature 98.0, heart rate 60 per minute, blood pressure 102/59, BMI of 30.91 with oxygen saturation of 95%. HEENT:  Oral mucosa dry. NECK:  Supple and palpable thyroid gland. LUNGS:  Decreased breath sounds without any crackles. HEART:  S1, S2 without any gallop or murmur. ABDOMEN:  Soft. The incision is well apposed, very weak bowel sounds. EXTREMITIES:  Ankle is negative for edema. The patient started taking clear liquid. LABORATORY DATA:  Relevant labs as of today, WBC of 9.4 with hemoglobin of 6.8, hematocrit 21.5. At the time of admission, WBC count was 19.3 with hemoglobin of 12, hematocrit of 37.1. Chemistry at the time of admission, serum sodium was 141, potassium 4.8, chloride 103, CO2 of 31, glucose of 144, blood urea nitrogen of 53, creatinine of 3.41, calcium was 11.4. As of today, sodium 143, potassium 4.3, chloride 113, CO2 of 26, glucose of 109, blood urea nitrogen of 86, creatinine of 3.38, calcium of 9.7, albumin of 2.0. Urinalysis that was done on 01/09/2021, specific gravity of 1.016, pH of 5, protein trace, glucose negative, ketone negative, blood negative, nitrites negative, leukocyte esterase small, mucus 1+, wbc 5-10, hyaline casts 5-10, no granular casts, no tubular cells. Chest x-ray report as of 01/08, the NG tube has been partially pulled from the stomach to the proximal esophagus with  tube unchanged. Subsequently, the NG tube has been taken out. No pneumothorax or other acute findings. Last blood gas that was done yesterday, pH of 7.26, pCO2 of 55.2, pO2 of 73, bicarb of 24.9, oxygen sats of 92% at 32% of oxygen. Blood culture that was done so far no growth. CURRENT LIST OF MEDICATIONS:  Home medications will be restarted including calcitriol, on antibiotic cefepime 2 g every 24 hours, heparin subcu, metronidazole 500 mg every 12 hours, midodrine 5 mg three times daily, Protonix 40 mg daily and IV fluid D5 normal saline increased to 100 mL/hour. Her other medicines including methimazole (Tapazole) is on hold as this patient's TSH is on the high side. IMPRESSION AND PLAN:  The patient has acute renal failure, oliguric but the patient is on the dry side. No evidence of any acute tubular necrosis so far. Looks like on the dry side, prerenal as reflected by the urine sodium of 10. The patient also has stage IV chronic renal failure that should be monitored carefully. No need for any dialysis at this time. Also this patient has Graves' disease, but her TSH is quite high. Endocrine should follow. Continue the current antibiotic. Keep close eye on the urine output. The patient did receive 1 unit of packed red blood cells and follow the H and H.  Carefully administer the Rocaltrol and keep an eye on the calcium. Further recommendations will be given based on the hospital course. Oma Isaacs MD 
 
 
[id number] 
D:  01/10/2021 13:06 
T:  01/10/2021 17:08 JOB #:  [id number]

## 2021-01-10 NOTE — PROGRESS NOTES
The  provided the following Interventions: 
 helped the patient Rosalie Martinez, who is a 81 y.o.,female, connect with the family with the Zoom Video Connection. Assessment: 
There are no further spiritual or Islam issues which require Spiritual Care Services interventions at this time. Plan: 
Chaplains will continue to follow and will provide pastoral care on an as needed/requested basis.  recommends bedside caregivers page  on duty if patient shows signs of acute spiritual or emotional distress. debbie Jeronimo Spiritual Care [phone]

## 2021-01-10 NOTE — PROGRESS NOTES
All events Noted. Was not aware of her Admission. Now S/P Exploratory laparotomy from complete SBO. Very poor UOP despite receiving LR of 1 liter bolus & maintaining at 25 cc/hour. Hypotensive, now off Pressor. Mentally clear as per nurse. Patient has Oliguric ATN by clinical History on the top of stage 3 CRF. May also have component  of Volume depletion. Reviewd Chest X-ray no sign of Volume overload Plan : Will DC LR, don't feel safe in Oliguric patient, it may cause High K. Will change IVF to D5NS at 75 cc/hour & see any improvement in hemodynamics & UOP, Also send Ua, Urine Na, creatinine  , Urine Osmolality. Look for granular casts, tubular cells. Advised her nurse to give  Me a call after 3 hours of giving IVF if any Urine Out put improves. If not will give Albumin with Lasix. Left my cell # [phone] to call me. Will follow

## 2021-01-10 NOTE — PROGRESS NOTES
Progress Note Marquise Oneil 
81 y.o. Admit Date: 1/8/2021 Principal Problem: S/P exploratory laparotomy (1/8/2021) POA: Unknown Active Problems: 
  Chronic kidney disease, stage III (moderate) (4/20/2020) POA: Unknown Anemia (4/20/2020) POA: Yes Secondary hyperparathyroidism of renal origin ([facility] [address] (4/20/2020) POA: Yes Bowel obstruction ([facility] [address] (1/8/2021) POA: Unknown Acute renal failure (ARF) ([address] (1/10/2021) POA: Unknown Oliguria OR anuria due to procedure (1/10/2021) POA: Unknown Graves disease (1/10/2021) POA: Unknown Subjective:  
 
Patient is out of ICU. Feels little better. Started taking clear liquid. Still a little slow to respond but could recognize me even by my name. Does not know from where she came from. Started making urine since I started IV fluid since last night. No shortness of breath. Also received 1 unit of packed red blood cells this morning. Of pressors A comprehensive review of systems was negative except for that written in the History of Present Illness. Objective:  
 
Visit Vitals BP (!) 104/51 (BP 1 Location: Right arm, BP Patient Position: At rest) Pulse 61 Temp 97.4 °F (36.3 °C) Resp 16 Ht 5' (1.524 m) Wt 71.7 kg (158 lb) SpO2 94% BMI 30.86 kg/m² Intake/Output Summary (Last 24 hours) at 1/10/2021 1134 Last data filed at 1/10/2021 9665 Gross per 24 hour Intake 3105 ml Output 485 ml Net 2620 ml  
 
 
Current Facility-Administered Medications Medication Dose Route Frequency Provider Last Rate Last Admin  
 0.9% sodium chloride infusion 250 mL  250 mL IntraVENous PRN Bibiana Austin PA-C      
 pantoprazole (PROTONIX) 40 mg in 0.9% sodium chloride 10 mL injection  40 mg IntraVENous DAILY Bibiana Austin PA-C   40 mg at 01/09/21 1149  NOREPINephrine (LEVOPHED) 8 mg in 5% dextrose 250mL (32 mcg/mL) infusion  0.5-50 mcg/min IntraVENous TITRATE Bibiana Austin PA-C   Stopped at 01/09/21 1100  
 metroNIDAZOLE (FLAGYL) IVPB premix 500 mg  500 mg IntraVENous Q8H Jensen Daniels NP   Stopped at 01/10/21 4152  cefepime (MAXIPIME) 2 g in sterile water (preservative free) 10 mL IV syringe  2 g IntraVENous Q24H Sandy HAGEN NP   2 g at 01/09/21 1528  acetaminophen (TYLENOL) solution 500 mg  500 mg Oral Q6H Musa De La Rosa MD   500 mg at 01/10/21 0209  
 dextrose 5% and 0.9% NaCl infusion  75 mL/hr IntraVENous CONTINUOUS Chong Delgado MD 75 mL/hr at 01/09/21 2112 75 mL/hr at 01/09/21 2112  sodium chloride (NS) flush 5-10 mL  5-10 mL IntraVENous PRN Tami Osorio PA-C      
 sodium chloride (NS) flush 5-40 mL  5-40 mL IntraVENous Q8H [place] Conradi, DO   10 mL at 01/10/21 5549  sodium chloride (NS) flush 5-40 mL  5-40 mL IntraVENous PRN Aydin Conradi, DO      
 acetaminophen (TYLENOL) tablet 650 mg  650 mg Oral Q6H PRN [place] Conradi, DO Or  
 acetaminophen (TYLENOL) suppository 650 mg  650 mg Rectal Q6H PRN Aydin Conradi, DO      
 polyethylene glycol (MIRALAX) packet 17 g  17 g Oral DAILY PRN Aydin Conradi, DO      
 promethazine (PHENERGAN) tablet 12.5 mg  12.5 mg Oral Q6H PRN [place] Conradi, DO Or  
 ondansetron (ZOFRAN) injection 4 mg  4 mg IntraVENous Q6H PRN [place] Conradi, DO      
 heparin (porcine) injection 5,000 Units  5,000 Units SubCUTAneous Q8H Aydin Conradi, DO   5,000 Units at 01/10/21 2126  ePHEDrine in NS (PF) injection 5 mg  5 mg IntraVENous Q20MIN PRN Perla Lick, CRNA   5 mg at 01/08/21 2255  midodrine (PROAMATINE) tablet 5 mg  5 mg Oral TID WITH MEALS Aydin Ramirez DO   5 mg at 01/09/21 4879 Physical Exam:  
 
Physical Exam:  
General:  Alert, cooperative, no distress, appears stated age. Neck: Supple, symmetrical, trachea midline, no adenopathy, thyroid: no enlargement/tenderness/nodules, no carotid bruit and no JVD. Lungs:   Clear to auscultation bilaterally. Heart:  Regular rate and rhythm, S1, S2 normal, no murmur, click, rub or gallop. Abdomen:   Soft, non-tender. Bowel sounds normal. No masses,  No organomegaly. Scar from recent laparotomy. Extremities: Extremities normal, atraumatic, no cyanosis or edema Skin: Skin color, texture, turgor normal. No rashes or lesions Data Review: CBC w/Diff Recent Labs  
  01/10/21 
0400 01/09/21 
0025 01/08/21 
1147 WBC 9.4 9.8 19.3*  
RBC 2.10* 2.54* 3.61* HGB 6.8* 8.4* 12.0 HCT 21.5* 25.9* 37.1 .4* 102.0* 102.8*  
MCH 32.4 33.1 33.2 MCHC 31.6 32.4 32.3 RDW 15.3* 15.3* 14.9* Recent Labs  
  01/10/21 
0400 01/09/21 
0025 01/08/21 
1147 BANDS  --   --  16* MONOS 4 7 6 EOS 0 0 0  
BASOS 0 0 0  
RDW 15.3* 15.3* 14.9* Comprehensive Metabolic Profile Recent Labs  
  01/10/21 
0400 01/09/21 
2145 01/09/21 
1540  142 144  
K 4.3 4.7 5.0  
* 112* 112* CO2 26 25 25 BUN 66* 66* 65* CREA 3.38* 3.37* 3.22* Recent Labs  
  01/10/21 
0400 01/09/21 
2145 01/09/21 
1540 01/09/21 
0025 01/09/21 
0025 01/08/21 
1147 CA 9.7  9.7 9.9 9.9   < > 9.9 11.4* PHOS 3.4 3.5 3.8   < > 4.0  --   
ALB 2.0* 2.2* 2.1*   < > 2.0* 3.1* TP 5.2*  --   --   --  5.0* 7.0 TBILI 0.3  --   --   --  1.0 0.9  
 < > = values in this interval not displayed. Results for Evgeny Fuentes (MRN [id number]) as of 1/10/2021 11:30 Ref. Range 1/9/2021 21:45 Color Latest Units:   YELLOW Appearance Latest Units:   CLEAR Specific gravity Latest Ref Range: 1.005 - 1.030   1.016  
pH (UA) Latest Ref Range: 5.0 - 8.0   5.0 Protein Latest Ref Range: NEG mg/dL TRACE (A) Glucose Latest Ref Range: NEG mg/dL Negative Ketone Latest Ref Range: NEG mg/dL Negative Blood Latest Ref Range: NEG   Negative Bilirubin Latest Ref Range: NEG   Negative Urobilinogen Latest Ref Range: 0.2 - 1.0 EU/dL 0.2 Nitrites Latest Ref Range: NEG   Negative Leukocyte Esterase Latest Ref Range: NEG   SMALL (A) Epithelial cells Latest Ref Range: 0 - 5 /lpf Negative Mucus Latest Ref Range: NEG /lpf 1+ (A) WBC Latest Ref Range: 0 - 4 /hpf 5 to 10 RBC Latest Ref Range: 0 - 5 /hpf Negative Bacteria Latest Ref Range: NEG /hpf Negative Hyaline cast Latest Ref Range: 0 - 2 /lpf 5 to 10 Myoglobin,urine screen Latest Ref Range: NEG   Negative Results for Sheri Castillo (MRN [id number]) as of 1/10/2021 11:30 Ref. Range 1/9/2021 21:45 Creatinine, urine Latest Ref Range: 30 - 125 mg/dL 79.00 Sodium,urine random Latest Ref Range: 20 - 110 MMOL/L 10 (L) Impression: Active Hospital Problems Diagnosis Date Noted  Acute renal failure (ARF) ([facility] [address] 01/10/2021  Oliguria OR anuria due to procedure 01/10/2021  Graves disease 01/10/2021  S/P exploratory laparotomy 01/08/2021  Bowel obstruction ([address] 01/08/2021  Chronic kidney disease, stage III (moderate) 04/20/2020  Anemia 04/20/2020  Secondary hyperparathyroidism of renal origin ([facility] [address] 04/20/2020 Patient was clinically dry. By urinalysis shows several hyaline cast indicating patient is on the dry side moreover urine sodium of 10 also indicates patient is on the right side. No granular cast no tubular cells by urine analysis definitely does not have any marker for acute tubular necrosis but patient remain at risk for acute tubular necrosis. Plan: We will continue to monitor closely including urine output increase the IV fluid rate to 100 cc/h monitor renal functions she has known stage III to stage IV chronic renal failure. Adjust the doses of medicines as per the renal function. Carefully use Rocaltrol given her calcium level better on the higher side. Moreover she has history of Graves' disease and was on methimazole. Given her high TSH we have to hold her methimazole. May need Synthroid strongly recommend she should be seen by endocrinology.  
 
 
Hussein Porter MD

## 2021-01-10 NOTE — PROGRESS NOTES
Indio Boothe M.D. FACS PROGRESS NOTE Name: Raad Mcmanus MRN: [id number] : 1935 [facility] Date: 1/10/[facility]21 Admission Date: 2021 11:21 AM  
 
Hospital Day: 3 
2 Days Post-Op Subjective: 
Patient without acute overnight events. Improved urine output. Patient states that she had a large bowel movement last night. She did receive 1 unit of packed red blood cells yesterday. Objective: 
Vitals:  
 01/10/21 0723 01/10/21 0800 01/10/21 0806 01/10/21 1000 BP: (!) 108/44  (!) 108/44 (!) 104/51 Pulse: 61   61 Resp: 14   16 Temp: 97.9 °F (36.6 °C) 97.8 °F (36.6 °C)  97.4 °F (36.3 °C) SpO2: 99%   94% Weight:   71.7 kg (158 lb) Height:   5' (1.524 m) Date 21 - 01/10/21 3210 01/10/21 0700 - 21 0606 Shift 2865-9449 5350-3166 24 Hour Total 3440-6338 7782-0629 24 Hour Total  
INTAKE  
P.O.  150 150     
  P. O.  150 150     
I. V.(mL/kg/hr) 4752.5(5.6) 960(1.1) 2829.6(1.6) 75  75 I.V.  30 30 Levophed Volume 9.6  9.6 Volume (lactated Ringers infusion) 750 70 820 Volume (0.9% sodium chloride infusion) 1000  1000 Volume (dextrose 5% and 0.9% NaCl infusion)  660 660 75  75 Volume (metroNIDAZOLE (FLAGYL) IVPB premix 500 mg) 100 200 300 Volume (cefepime (MAXIPIME) 2 g in sterile water (preservative free) 10 mL IV syringe) 10  10 Blood  0 0 310  310 Autotransfused  0 0 Volume (TRANSFUSE PACKED RBC'S)    310  310 Other  0 0 Other  0 0 Shift Total(mL/kg) [id number]) 2375(43.1) 2979. 6(41.5) 385(5.4)  385(5.4) OUTPUT Urine(mL/kg/hr) 40(0) 445(0.5) 485(0.3) Urine Voided  0 0 Urine Occurrence(s)  0 x 0 x Urine Output (mL) (Urinary Catheter 21 Mathur) 40 445 485 Emesis/NG output  0 0 Emesis  0 0 Emesis Occurrence(s)  0 x 0 x Other  0 0 Other Output  0 0 Stool  0 0 Stool Occurrence(s)  0 x 0 x   Stool  0 0 Blood  0 0 Estimated Blood Loss  0 0 Quantitative Blood Loss  0 0 Blood  0 0 Shift Total(mL/kg) 40(0.6) 445(6.2) 485(6.8) NET 1829.6 665 2494.6 385  385 Weight (kg) 71.8 71.8 71.8 71.7 71.7 71.7 Physical Exam:  
 General: Awake and alert, oriented x4, no apparent distress Abdomen: abdomen is soft with midline incisional tenderness. Incision(s) are C/D/I. No masses, organomegaly or guarding Extremities: No clubbing cyanosis or edema Labs: 
Recent Results (from the past 24 hour(s)) RENAL FUNCTION PANEL Collection Time: 01/09/21  3:40 PM  
Result Value Ref Range Sodium 144 136 - 145 mmol/L Potassium 5.0 3.5 - 5.5 mmol/L Chloride 112 (H) 100 - 111 mmol/L  
 CO2 25 21 - 32 mmol/L Anion gap 7 3.0 - 18 mmol/L Glucose 92 74 - 99 mg/dL BUN 65 (H) 7.0 - 18 MG/DL Creatinine 3.22 (H) 0.6 - 1.3 MG/DL  
 BUN/Creatinine ratio 20 12 - 20 GFR est AA 17 (L) >60 ml/min/1.73m2 GFR est non-AA 14 (L) >60 ml/min/1.73m2 Calcium 9.9 8.5 - 10.1 MG/DL Phosphorus 3.8 2.5 - 4.9 MG/DL Albumin 2.1 (L) 3.4 - 5.0 g/dL GLUCOSE, POC Collection Time: 01/09/21  5:27 PM  
Result Value Ref Range Glucose (POC) 95 70 - 110 mg/dL RENAL FUNCTION PANEL Collection Time: 01/09/21  9:45 PM  
Result Value Ref Range Sodium 142 136 - 145 mmol/L Potassium 4.7 3.5 - 5.5 mmol/L Chloride 112 (H) 100 - 111 mmol/L  
 CO2 25 21 - 32 mmol/L Anion gap 5 3.0 - 18 mmol/L Glucose 90 74 - 99 mg/dL BUN 66 (H) 7.0 - 18 MG/DL Creatinine 3.37 (H) 0.6 - 1.3 MG/DL  
 BUN/Creatinine ratio 20 12 - 20 GFR est AA 16 (L) >60 ml/min/1.73m2 GFR est non-AA 13 (L) >60 ml/min/1.73m2 Calcium 9.9 8.5 - 10.1 MG/DL Phosphorus 3.5 2.5 - 4.9 MG/DL Albumin 2.2 (L) 3.4 - 5.0 g/dL URINALYSIS W/ RFLX MICROSCOPIC Collection Time: 01/09/21  9:45 PM  
Result Value Ref Range Color YELLOW Appearance CLEAR  Specific gravity 1.016 1.005 - 1.030    
 pH (UA) 5.0 5.0 - 8.0 Protein TRACE (A) NEG mg/dL Glucose Negative NEG mg/dL Ketone Negative NEG mg/dL Bilirubin Negative NEG Blood Negative NEG Urobilinogen 0.2 0.2 - 1.0 EU/dL Nitrites Negative NEG Leukocyte Esterase SMALL (A) NEG    
SODIUM, UR, RANDOM Collection Time: 01/09/21  9:45 PM  
Result Value Ref Range Sodium,urine random 10 (L) 20 - 110 MMOL/L  
CREATININE, UR, RANDOM Collection Time: 01/09/21  9:45 PM  
Result Value Ref Range Creatinine, urine 79.00 30 - 125 mg/dL MYOGLOBIN, UR, SCREEN Collection Time: 01/09/21  9:45 PM  
Result Value Ref Range Myoglobin,urine screen Negative NEG    
URINE MICROSCOPIC ONLY Collection Time: 01/09/21  9:45 PM  
Result Value Ref Range WBC 5 to 10 0 - 4 /hpf  
 RBC Negative 0 - 5 /hpf Epithelial cells Negative 0 - 5 /lpf Bacteria Negative NEG /hpf Mucus 1+ (A) NEG /lpf Hyaline cast 5 to 10 0 - 2 /lpf  
CBC WITH AUTOMATED DIFF Collection Time: 01/10/21  4:00 AM  
Result Value Ref Range WBC 9.4 4.6 - 13.2 K/uL  
 RBC 2.10 (L) 4.20 - 5.30 M/uL HGB 6.8 (L) 12.0 - 16.0 g/dL HCT 21.5 (L) 35.0 - 45.0 % .4 (H) 74.0 - 97.0 FL  
 MCH 32.4 24.0 - 34.0 PG  
 MCHC 31.6 31.0 - 37.0 g/dL  
 RDW 15.3 (H) 11.6 - 14.5 % PLATELET 900 (L) 035 - 420 K/uL MPV 11.8 9.2 - 11.8 FL  
 NEUTROPHILS 78 (H) 40 - 73 % LYMPHOCYTES 18 (L) 21 - 52 % MONOCYTES 4 3 - 10 % EOSINOPHILS 0 0 - 5 % BASOPHILS 0 0 - 2 %  
 ABS. NEUTROPHILS 7.4 1.8 - 8.0 K/UL  
 ABS. LYMPHOCYTES 1.7 0.9 - 3.6 K/UL  
 ABS. MONOCYTES 0.3 0.05 - 1.2 K/UL  
 ABS. EOSINOPHILS 0.0 0.0 - 0.4 K/UL  
 ABS. BASOPHILS 0.0 0.0 - 0.1 K/UL  
 DF AUTOMATED METABOLIC PANEL, COMPREHENSIVE Collection Time: 01/10/21  4:00 AM  
Result Value Ref Range Sodium 143 136 - 145 mmol/L Potassium 4.3 3.5 - 5.5 mmol/L Chloride 113 (H) 100 - 111 mmol/L  
 CO2 26 21 - 32 mmol/L Anion gap 4 3.0 - 18 mmol/L  Glucose 109 (H) 74 - 99 mg/dL BUN 66 (H) 7.0 - 18 MG/DL Creatinine 3.38 (H) 0.6 - 1.3 MG/DL  
 BUN/Creatinine ratio 20 12 - 20 GFR est AA 16 (L) >60 ml/min/1.73m2 GFR est non-AA 13 (L) >60 ml/min/1.73m2 Calcium 9.7 8.5 - 10.1 MG/DL Bilirubin, total 0.3 0.2 - 1.0 MG/DL  
 ALT (SGPT) 14 13 - 56 U/L  
 AST (SGOT) 21 10 - 38 U/L Alk. phosphatase 56 45 - 117 U/L Protein, total 5.2 (L) 6.4 - 8.2 g/dL Albumin 2.0 (L) 3.4 - 5.0 g/dL Globulin 3.2 2.0 - 4.0 g/dL A-G Ratio 0.6 (L) 0.8 - 1.7 PHOSPHORUS Collection Time: 01/10/21  4:00 AM  
Result Value Ref Range Phosphorus 3.4 2.5 - 4.9 MG/DL  
PTH INTACT Collection Time: 01/10/21  4:00 AM  
Result Value Ref Range Calcium 9.7 8.5 - 10.1 MG/DL  
 PTH, Intact 199.7 (H) 18.4 - 88.0 pg/mL CALCIUM, IONIZED Collection Time: 01/10/21  4:00 AM  
Result Value Ref Range Ionized Calcium 1.38 (H) 1.12 - 1.32 MMOL/L  
PROCALCITONIN Collection Time: 01/10/21  4:00 AM  
Result Value Ref Range Procalcitonin 8.32 ng/mL MAGNESIUM Collection Time: 01/10/21  4:00 AM  
Result Value Ref Range Magnesium 2.1 1.6 - 2.6 mg/dL  
RBC, ALLOCATE Collection Time: 01/10/21  6:30 AM  
Result Value Ref Range HISTORY CHECKED? Historical check performed All Micro Results Procedure Component Value Units Date/Time CULTURE, BLOOD [[id number]] Collected: 01/08/21 1254 Order Status: Completed Specimen: Blood Updated: 01/09/21 0730 Special Requests: --     
  NO SPECIAL REQUESTS 
LEFT 
HAND Culture result: NO GROWTH AFTER 14 HOURS     
 CULTURE, BLOOD [[id number]] Collected: 01/08/21 1253 Order Status: Completed Specimen: Blood Updated: 01/09/21 0730 Special Requests: --     
  NO SPECIAL REQUESTS 
LEFT Antecubital 
  
  Culture result: NO GROWTH AFTER 14 HOURS Current Medications: 
Current Facility-Administered Medications Medication Dose Route Frequency Provider Last Rate Last Admin  
 0.9% sodium chloride infusion 250 mL 250 mL IntraVENous PRN Bibiana Carter PA-C      
 pantoprazole (PROTONIX) 40 mg in 0.9% sodium chloride 10 mL injection  40 mg IntraVENous DAILY Bibiana Austin PA-C   40 mg at 01/09/21 1143  
 NOREPINephrine (LEVOPHED) 8 mg in 5% dextrose 250mL (32 mcg/mL) infusion  0.5-50 mcg/min IntraVENous TITRATE Bibiana Austin PA-C   Stopped at 01/09/21 1100  
 metroNIDAZOLE (FLAGYL) IVPB premix 500 mg  500 mg IntraVENous Q8H Ketan Luna, NP   Stopped at 01/10/21 3711  cefepime (MAXIPIME) 2 g in sterile water (preservative free) 10 mL IV syringe  2 g IntraVENous Q24H Blanca HAGEN NP   2 g at 01/09/21 1528  acetaminophen (TYLENOL) solution 500 mg  500 mg Oral Q6H Jose Barcenas MD   500 mg at 01/10/21 0209  
 dextrose 5% and 0.9% NaCl infusion  75 mL/hr IntraVENous CONTINUOUS Doni Alvarez MD 75 mL/hr at 01/09/21 2112 75 mL/hr at 01/09/21 2112  sodium chloride (NS) flush 5-10 mL  5-10 mL IntraVENous PRN Nidia Briceño PA-C      
 sodium chloride (NS) flush 5-40 mL  5-40 mL IntraVENous Q8H Euel Benne, DO   10 mL at 01/10/21 0756  sodium chloride (NS) flush 5-40 mL  5-40 mL IntraVENous PRN Euel Benne, DO      
 acetaminophen (TYLENOL) tablet 650 mg  650 mg Oral Q6H PRN Euel Benne, DO Or  
 acetaminophen (TYLENOL) suppository 650 mg  650 mg Rectal Q6H PRN Euel Benne, DO      
 polyethylene glycol (MIRALAX) packet 17 g  17 g Oral DAILY PRN Euel Benne, DO      
 promethazine (PHENERGAN) tablet 12.5 mg  12.5 mg Oral Q6H PRN Euel Benne, DO Or  
 ondansetron (ZOFRAN) injection 4 mg  4 mg IntraVENous Q6H PRN Euel Benne, DO      
 heparin (porcine) injection 5,000 Units  5,000 Units SubCUTAneous Q8H Euel Benne, DO   5,000 Units at 01/10/21 3044  ePHEDrine in NS (PF) injection 5 mg  5 mg IntraVENous Q20MIN PRN Lizy Blood CRNA   5 mg at 01/08/21 9913  midodrine (PROAMATINE) tablet 5 mg  5 mg Oral TID WITH MEALS Hank Mireles, Robbie, DO   5 mg at 01/09/21 8755 Chart and notes reviewed. Data reviewed. I have evaluated and examined the patient. IMPRESSION:  
· Patient is postop day 2 from exploratory laparotomy with small bowel resection for strangulated internal hernia with ischemic hemorrhagic small bowel the ileum secondary to adhesions. PLAN:/DISCUSION:  
· Appreciate nephrology management of IV fluid · Continue cefepime and Flagyl for sepsis · Advance diet · Pain control · SCD and subcu heparin for DVT prophylaxis · PT OT evaluation · Appreciate speech therapy evaluation Raymond Cooper MD

## 2021-01-10 NOTE — PROGRESS NOTES
Problem: Dysphagia (Adult) 
Goal: *Acute Goals and Plan of Care (Insert Text) 
Description: Patient will: 
1. Tolerate PO trials with 0 s/s overt distress in 4/5 trials-met  
 
Recommend:  
Continue current diet-advance to regular diet with thin liquids as medically appropriate  
Meds as tolerated  
General safe swallow precautions 
 
Outcome: Resolved/Met 
 
SPEECH LANGUAGE PATHOLOGY BEDSIDE SWALLOW EVALUATION AND DISCHARGE 
 
Patient: Cammy Sheridan (85 y.o. female) 
Date: 1/10/2021 
Primary Diagnosis: S/P exploratory laparotomy [Z98.890] 
Bowel obstruction (HCC) [K56.609] 
Procedure(s) (LRB): 
EXPLORATORY LAPAROTOMY/POSSIBLE BOWEL RESECTION (N/A) 2 Days Post-Op  
Precautions: None on file     
 
ASSESSMENT : 
Clinical beside swallow eval completed per MD orders. Oral mech exam unremarkable.  Pt observed with clear liquid breakfast meal, tolerating all items with no overt s/sx aspiration.  Demo timely swallow initiation, adequate laryngeal elevation to palpation and no overt s/sx aspiration across multiple trials of thin liquid + straw.  Demo positive bolus manipulation/control with jello from tray.  No oropharyngeal dysphagia appreciated -recommend advance to regular/thin liquids as medically appropriate.  Will sign off as no further skilled SLP needs identified.   D/w pt and nursing.   
 
PLAN : 
Recommendations and Planned Interventions: 
No formal ST needs ID'd for dysphagia. Eval only.  
Discharge Recommendations: To Be Determined  
 
SUBJECTIVE:  
Patient stated “Thank you”. 
 
OBJECTIVE:  
 
Past Medical History:  
Diagnosis Date  
 Bell's palsy   
 Essential hypertension, benign   
 CONTROLLED  
 Mitral valve disorders(424.0)   
 2007 TRACE MR   
 Nonspecific abnormal electrocardiogram (ECG) (EKG)   
 Obesity, unspecified   
 Other and unspecified hyperlipidemia   
 Renal insufficiency   
 
Past Surgical History:  
Procedure Laterality Date  
 COLONOSCOPY N/A 5/31/2018  
 COLONOSCOPY w polypectomy performed by Frank Whiteside MD at [address] [place] [address] Left HX CATARACT REMOVAL INSERT PROSTHETIC LENS: LEFT,RIGHT  
 HX GYN    
 HX ORTHOPAEDIC Left Rotator cuff repair HX PARTIAL THYROIDECTOMY    
 [place] ABDOMEN SURGERY PROC UNLISTED Explor lap [place] BIOPSY OF BREAST, INCISIONAL    
 rt & lt 12 yrs ago Prior Level of Function/Home Situation: Unknown Diet prior to admission: pt reports regular diet Current Diet:  Clear liquid Cognitive and Communication Status: 
Neurologic State: Alert, Confused Orientation Level: Oriented to person, Oriented to place, Disoriented to place, Disoriented to situation Cognition: Follows commands Oral Assessment: 
Oral Assessment Labial: No impairment Dentition: Natural 
Oral Hygiene: Good Lingual: No impairment Velum: No impairment Mandible: No impairment P.O. Trials: 
Patient Position: 45 at [address] Vocal quality prior to P.O.: No impairment Consistency Presented: Thin liquid(Jello ) How Presented: Straw;Successive swallows;Spoon;SLP-fed/presented Bolus Acceptance: No impairment Bolus Formation/Control: No impairment Propulsion: No impairment Oral Residue: None Initiation of Swallow: No impairment Laryngeal Elevation: Functional 
Aspiration Signs/Symptoms: None Pharyngeal Phase Characteristics: No impairment, issues, or problems Oral Phase Severity: No impairment Pharyngeal Phase Severity : No impairment The severity rating is based on the following outcomes:   
Clinical judgment Pain: 
Pt reports 0/10 pain or discomfort prior to eval.  
Pt reports 0/10 pain or discomfort post eval.  
 
After treatment:  
[]            Patient left in no apparent distress sitting up in chair 
[x]            Patient left in no apparent distress in bed 
[x]            Call bell left within reach [x]            Nursing notified 
[]            Caregiver present 
[]            Bed alarm activated COMMUNICATION/EDUCATION:  
[x]            SLP educated pt with regard to aspiration s/sx and to alert MD/RN if symptoms arise. Thank you for this referral, Wilmer Killian M.S., CCC-SLP Speech-Language Pathologist

## 2021-01-10 NOTE — ROUTINE PROCESS
0715: Report received from [address] [place] [address] and care assumed at this time. 0745: Report called to [facility], RN on 4N, pt being transferred to rm 471. Transport put in to transfer pt to new room.

## 2021-01-11 NOTE — PROGRESS NOTES
conducted a Follow up consultation and Spiritual Assessment for Esteban Lacy, who is a 81 y.o.,female. The  provided the following Interventions: 
Continued the relationship of care and support. Patient's eyes were closed but was aroused upon calling her name. Listened empathically. When asked how she's doing, she said, \"I'm not doing well. \" Patient looks tired and drowsy. Patient's food remained untouched and I asked if she wants to receive holy communion and she said,\"No\". I also told her that I could coordinate a clergy visit if she desires. Offered prayer and patient prayed the [employer] Prayer and the [facility] and [facility] [place] with me. Offered assurance of continued prayer on patient's behalf. Chart reviewed. The following outcomes were achieved: 
Patient expressed gratitude for 's visit. Assessment: 
There are no further spiritual or Religion issues which require Spiritual Care Services interventions at this time. AMD: 
Patient has no Advance Medical Directive but has DNR document on file.  noted patient's record indicates \"full code\" but has a signed DNR (by daughter via phone) on file as of 4/23/2020. GREER Gee and GLYNN Pike were both informed and record verified and corrected. Plan: 
Chaplains will continue to follow and will provide pastoral care on an as needed/requested basis.  recommends bedside caregivers page  on duty if patient shows signs of acute spiritual or emotional distress. [facility] [address] [facility]  
[phone]

## 2021-01-11 NOTE — PROGRESS NOTES
Progress Note Patient: Esdras Sam MRN: [id number]  CSN: [id number] YOB: 1935  Age: 81 y.o. Sex: female DOA: 1/8/2021 LOS:  LOS: 6 days Subjective:  
 
Late entry for 1/10/21 Esdras Sam is a 81 y.o. female with a PMHx of paroxsymal A-fib, DVT, diastolic CHF, CKD stage III, HTN, and Grave's disease who was admitted by the surgery team for small bowel obstruction. She was taken for emergent exploratory laparatomy which was done on 1/8/21 which was done without complications and minimal blood loss. Hospitalist team has been consulted for medical management. Had lysis of adhesions, small bowel resection, early am 1/9/21. In the PACU she was extubated but required vasopressors for septic and hypovolemic shock and was transferred to ICU. She was weaned off of vasopressors overnight. She is now doing well and stable for tx out of the ICU. Objective:  
 
Physical Exam: 
Visit Vitals BP (!) 157/73 (BP 1 Location: Left arm, BP Patient Position: At rest) Pulse 93 Temp 98.1 °F (36.7 °C) Resp 18 Ht 5' (1.524 m) Wt 73.8 kg (162 lb 12.8 oz) SpO2 93% BMI 31.79 kg/m² General:         Alert, no acute distress HEENT: NC, Atraumatic. Anicteric sclerae. Large goiter. Lungs: CTA Bilaterally. No Wheezing/Rhonchi/Rales. Heart:  Regular  rhythm,  No murmur, No Rubs, No Gallops Abdomen: Soft, Non distended, Non tender. +Bowel sounds, midline dressing c/d/i Extremities: No c/c/e Psych:   Not anxious or agitated. Neurologic:  Alert,  No acute neurological deficits. Intake and Output: 
Current Shift:  01/13 1901 - 01/14 0700 In: -  
Out: 129 [EQYUN:248] Last three shifts:  01/12 0701 - 01/13 1900 In: 2238.8 [P.O.:800; I.V.:1438.8] Out: 1250 [XMLCK:2346] Labs: Results:  
   
Chemistry Recent Labs  
  01/13/21 2015 01/12/21 
1525 01/12/21 
7592 01/11/21 
3734 01/11/21 
5358 * 111* 133*   < > 139* * 151* 148*   < > 145  
K 3.9 3.3* 3.4*   < > 3.6 * 123* 119*   < > 115* CO2 24 23 23   < > 23 BUN 41* 47* 52*   < > 60* CREA 2.06* 2.18* 2.70*   < > 3.09* CA 9.9 8.8 9.4   < > 9.7 AGAP 6 5 6   < > 7  
BUCR 20 22* 19   < > 19 AP  --   --   --   --  56  
TP  --   --   --   --  5.4* ALB 2.3* 2.0* 2.0*   < > 2.1*  
GLOB  --   --   --   --  3.3 AGRAT  --   --   --   --  0.6*  
 < > = values in this interval not displayed. CBC w/Diff Recent Labs  
  01/12/21 
0624 01/11/21 
9855 WBC 8.0 7.8  
RBC 2.34* 2.39* HGB 7.4* 7.7* HCT 23.5* 23.9*  
 145 GRANS 69 75* LYMPH 20* 17* EOS 2 1 Cardiac Enzymes No results for input(s): CPK, CKND1, MICHELLE in the last 72 hours. No lab exists for component: Enolia Maday Coagulation No results for input(s): PTP, INR, APTT, INREXT, INREXT in the last 72 hours. Lipid Panel Lab Results Component Value Date/Time Cholesterol, total 182 01/16/2020 01:39 PM  
 HDL Cholesterol 55 01/16/2020 01:39 PM  
 LDL, calculated 99 01/16/2020 01:39 PM  
 Triglyceride 140 01/16/2020 01:39 PM  
 CHOL/HDL Ratio 3.3 01/16/2020 01:39 PM  
  
BNP No results for input(s): BNPP in the last 72 hours. Liver Enzymes Recent Labs  
  01/13/21 2015 01/11/21 
0513 01/11/21 
3624 TP  --   --  5.4* ALB 2.3*   < > 2.1* AP  --   --  56  
 < > = values in this interval not displayed. Thyroid Studies Lab Results Component Value Date/Time TSH 74.60 (H) 01/09/2021 06:56 AM  
    
 
 
 
 
Assessment and Plan:  
 
Lexii Rai is a 81 y.o. female with a PMHx of paroxsymal A-fib, DVT, diastolic CHF, CKD stage III, HTN, and Grave's disease who was admitted by the surgery team for small bowel obstruction. She is s/p exploratory laparatomy with small bowel resection for strangulated internal hernia with ischemic hemorrhagic small bowel secondary to adhesions. 1. Small bowel obstruction 2/2 exploratory laparotomy with small bowel resection 2. Septic shock 3. Hemorrhagic shock 4. Acute metabolic encephalopathy 5. RUPALI on CKD stage III 6. Paroxsymal A-fib 7. Chronic diastolic CHF 8. HTN 
9. Grave's disease 10. Hx DVT Surgery is primary, hospitalist group is consulted Nephrology following as well Cont IVF per surgery Pain control Diet per surgery Cont IV abx Cont stress dose steroids Cont SSI w/accuchecks FU blood, urine and sputum cx  
FU CBC, BMP  
PT, OT  
 
 
H. Bob Sinclair, DO 
1/14/2021 Dragon medical dictation software was used for portions of this report. Unintended errors may occur.

## 2021-01-11 NOTE — PROGRESS NOTES
Patient is alert to self with complaints of \"not feeling well\". Patient O2 sats on room air is between 88-90%. Pt placed on 2L NC, which increased O2 sat to 96%. Patient tolerated medications. AM assessment completed. Bed in lowest locked position, call light within reach, side rails x3. Problem: Risk for Spread of Infection Goal: Prevent transmission of infectious organism to others Description: Prevent the transmission of infectious organisms to other patients, staff members, and visitors. Outcome: Progressing Towards Goal 
  
Problem: Patient Education:  Go to Education Activity Goal: Patient/Family Education Outcome: Progressing Towards Goal 
  
Problem: Pressure Injury - Risk of 
Goal: *Prevention of pressure injury Description: Document Thierry Scale and appropriate interventions in the flowsheet. Outcome: Progressing Towards Goal 
Note: Pressure Injury Interventions: 
Sensory Interventions: Assess changes in LOC, Avoid rigorous massage over bony prominences, Check visual cues for pain, Keep linens dry and wrinkle-free, Maintain/enhance activity level, Minimize linen layers, Pressure redistribution bed/mattress (bed type) Moisture Interventions: Absorbent underpads, Internal/External urinary devices, Apply protective barrier, creams and emollients Activity Interventions: Increase time out of bed, PT/OT evaluation Mobility Interventions: Turn and reposition approx. every two hours(pillow and wedges), Pressure redistribution bed/mattress (bed type), HOB 30 degrees or less Nutrition Interventions: Document food/fluid/supplement intake, Offer support with meals,snacks and hydration Friction and Shear Interventions: Apply protective barrier, creams and emollients, Foam dressings/transparent film/skin sealants, Lift sheet, Transferring/repositioning devices Problem: Patient Education: Go to Patient Education Activity Goal: Patient/Family Education Outcome: Progressing Towards Goal 
  
Problem: Falls - Risk of 
Goal: *Absence of Falls Description: Document Christi Steele Fall Risk and appropriate interventions in the flowsheet. Outcome: Progressing Towards Goal 
Note: Fall Risk Interventions: 
  
 
Mentation Interventions: Adequate sleep, hydration, pain control, Bed/chair exit alarm, Door open when patient unattended Medication Interventions: Bed/chair exit alarm Elimination Interventions: Call light in reach, Bed/chair exit alarm, Patient to call for help with toileting needs Problem: Patient Education: Go to Patient Education Activity Goal: Patient/Family Education Outcome: Progressing Towards Goal 
  
Problem: Patient Education: Go to Patient Education Activity Goal: Patient/Family Education Outcome: Progressing Towards Goal 
  
Problem: Patient Education: Go to Patient Education Activity Goal: Patient/Family Education Outcome: Progressing Towards Goal 
  
Problem: Patient Education: Go to Patient Education Activity Goal: Patient/Family Education Outcome: Progressing Towards Goal

## 2021-01-11 NOTE — PROGRESS NOTES
Bedside shift change report given to GREER Fisher (oncoming nurse) by GREER Man (offgoing nurse). Report included the following information SBAR, Kardex, Intake/Output, MAR and Recent Results.

## 2021-01-11 NOTE — PROGRESS NOTES
Problem: Risk for Spread of Infection Goal: Prevent transmission of infectious organism to others Description: Prevent the transmission of infectious organisms to other patients, staff members, and visitors. Outcome: Progressing Towards Goal 
  
Problem: Pressure Injury - Risk of 
Goal: *Prevention of pressure injury Description: Document Thierry Scale and appropriate interventions in the flowsheet. Outcome: Progressing Towards Goal 
Note: Pressure Injury Interventions: 
Sensory Interventions: Assess changes in LOC Moisture Interventions: Absorbent underpads Activity Interventions: PT/OT evaluation Mobility Interventions: Pressure redistribution bed/mattress (bed type) Nutrition Interventions: Document food/fluid/supplement intake Friction and Shear Interventions: Minimize layers

## 2021-01-11 NOTE — PROGRESS NOTES
Reason for Admission:  S/P exploratory laparotomy [Z98.890] Bowel obstruction ([facility] [address] [Q60.237] RUR Score:  24% Plan for utilizing home health:    Patient has no home health orders, in place, at this time. This writer will continue to monitor for potential home health needs and orders. Likelihood of Readmission:   Moderate Do you (patient/family) have any concerns for transition/discharge?  no 
 
Transition of Care Plan:  Patient's discharge plan is uncertain at this time. This writer will monitor for discharge recommendations, from the medical team. Her family will transport her, if she is able to transport by car. If not, she will need stretcher transport. Initial assessment completed with patient's daughter Yee Mojica. Cognitive status of patient: Alert with periods of confusion. Face sheet information confirmed:  yes. The patient designates her daughter Yee Mojica to participate in her discharge plan and to receive any needed information. This patient lives in a house with her daughter Yee Mojica. Patient is not able to navigate steps as needed. Prior to hospitalization, patient was considered to be independent with ADLs/IADLS : yes . Patient has a current ACP document on file: no. This writer was unable to complete an ACP, due to her cognition. Patient's family will be available to transport patient home upon discharge, if she is able to safely transport by car. If not, she will need stretcher transport. The patient already has a walker and a wheelchair, available in the home. Patient is not currently active with home health. Patient has stayed in a skilled nursing facility or rehab. Was  stay within last 60 days : no. This patient is on dialysis :no 
 
Freedom of choice signed: yes, for [address], if needed. Currently, the discharge plan is uncertain at this time. This writer will continue to closely monitor for discharge recommendations to ensure a safe discharge from [facility]. Patient's family will transport upon discharge, if she can safely transfer by car. If not, she will need stretcher. Patient's daughter is Fabi Carr VL#[phone]). Patient's PCP is Dr. Laureano Donovan. Patient is insured through [employer] and [employer]. The patient states that she can obtain her medications from the [facility] , and take her medications as directed. This writer will continue to monitor for discharge planning to ensure a safe discharge from [facility]. Care Management Interventions PCP Verified by CM: Yes(Dr. Laureano Donovan) Palliative Care Criteria Met (RRAT>21 & CHF Dx)?: No 
Mode of Transport at Discharge: Other (see comment)(Family vs. Stretcher) Transition of Care Consult (CM Consult): Discharge Planning Current Support Network: Relative's Home(Lives with her daughter Delta Galeazzi)) Confirm Follow Up Transport: Other (see comment)(Family vs. Stretcher) The Plan for Transition of Care is Related to the Following Treatment Goals : [address] (If needed). The Patient and/or Patient Representative was Provided with a Choice of Provider and Agrees with the Discharge Plan?: Yes Name of the Patient Representative Who was Provided with a Choice of Provider and Agrees with the Discharge Plan: Lou Forest Freedom of Choice List was Provided with Basic Dialogue that Supports the Patient's Individualized Plan of Care/Goals, Treatment Preferences and Shares the Quality Data Associated with the Providers?: Yes Discharge Location Discharge Placement: Unable to determine at this time Janine Maldonado. [facility] Care Manager Pager#: [phone]

## 2021-01-11 NOTE — PROGRESS NOTES
Problem: Risk for Spread of Infection Goal: Prevent transmission of infectious organism to others Description: Prevent the transmission of infectious organisms to other patients, staff members, and visitors. Outcome: Progressing Towards Goal 
  
Problem: Patient Education:  Go to Education Activity Goal: Patient/Family Education Outcome: Progressing Towards Goal 
  
Problem: Pressure Injury - Risk of 
Goal: *Prevention of pressure injury Description: Document Thierry Scale and appropriate interventions in the flowsheet. Outcome: Progressing Towards Goal 
Note: Pressure Injury Interventions: 
Sensory Interventions: Turn and reposition approx. every two hours (pillows and wedges if needed) Moisture Interventions: Minimize layers Activity Interventions: PT/OT evaluation Mobility Interventions: Turn and reposition approx. every two hours(pillow and wedges) Nutrition Interventions: Offer support with meals,snacks and hydration Friction and Shear Interventions: Minimize layers Problem: Patient Education: Go to Patient Education Activity Goal: Patient/Family Education Outcome: Progressing Towards Goal 
  
Problem: Falls - Risk of 
Goal: *Absence of Falls Description: Document Jogre Perez Fall Risk and appropriate interventions in the flowsheet. Outcome: Progressing Towards Goal 
Note: Fall Risk Interventions: 
  
 
Mentation Interventions: Bed/chair exit alarm, Toileting rounds, Update white board Medication Interventions: Teach patient to arise slowly Elimination Interventions: Toileting schedule/hourly rounds, Patient to call for help with toileting needs, Call light in reach, Bed/chair exit alarm Problem: Patient Education: Go to Patient Education Activity Goal: Patient/Family Education Outcome: Progressing Towards Goal 
  
Problem: Patient Education: Go to Patient Education Activity Goal: Patient/Family Education Outcome: Progressing Towards Goal

## 2021-01-11 NOTE — ROUTINE PROCESS
Bedside and Verbal shift change report given to Axel rivera RN (oncoming nurse) by Boris Headley RN (offgoing nurse). Report included the following information SBAR, Kardex, MAR and Recent Results.

## 2021-01-11 NOTE — PROGRESS NOTES
Indio Baker M.D. FACS PROGRESS NOTE Name: Marquise Oneil MRN: [id number] : 1935 Hospital: [facility] Date: 2021 Admission Date: 2021 11:21 AM  
 
Hospital Day: 4 
3 Days Post-Op Subjective: 
Patient without acute overnight events. She states that she feels a little shaky this morning. I spoke with the endocrinologist regarding her Synthroid dosing. She had not received it at this time when I saw her. She denies any bowel movement or flatus overnight. She is eating a GI light tray without nausea vomiting. She has minimal abdominal pain. Objective: 
Vitals:  
 01/10/21 1000 01/10/21 1212 21 0000 21 0400 BP: (!) 104/51 (!) 102/50 121/69 (!) 114/49 Pulse: 61 (!) 59 63 74 Resp: 16 16 18 18 Temp: 97.4 °F (36.3 °C) 97.3 °F (36.3 °C) 97.5 °F (36.4 °C) 97.7 °F (36.5 °C) SpO2: 94% 91% 92% 90% Weight:      
Height:      
 
Date 01/10/21 0700 - 21 0659 21 07 - 21 2838 Shift [id number] 3966-9508 24 Hour Total 0762-6665 5849-3890 24 Hour Total  
INTAKE  
I.V.(mL/kg/hr) 75(0.1)  75(0) Volume (dextrose 5% and 0.9% NaCl infusion) 75  75 Blood 310  310 Volume (TRANSFUSE PACKED RBC'S) 310  310 Shift Total(mL/kg) 385(5.4)  385(5.4) OUTPUT Urine(mL/kg/hr)  550(0.6) 550(0.3) Urine Output (mL) (Urinary Catheter 21 Mathur)  550 550 Shift Total(mL/kg)  550(7.7) 550(7.7)  -550 -165 Weight (kg) 71.7 71.7 71.7 71.7 71.7 71.7 Physical Exam:  
 General: Awake and alert, no apparent distress Abdomen: abdomen is soft with midline incisional tenderness. Incision(s) are C/D/I. No masses, organomegaly or guarding Extremities: No clubbing cyanosis or edema bilateral lower extremities Labs: 
Recent Results (from the past 24 hour(s)) ECHO ADULT COMPLETE Collection Time: 01/10/21 10:30 AM  
Result Value Ref Range IVSd 1.17 (A) 0.6 - 0.9 cm  LVIDd 3.54 (A) 3.9 - 5.3 cm  
 LVIDs 2.31 cm  
 LVPWd 1.32 (A) 0.6 - 0.9 cm  
 RVSP 39.29 mmHg Left Atrium Major Axis 3.34 cm  
 LA Volume 69.16 22 - 52 mL  
 LA Area 4C 19.88 cm2 LA Vol 2C 77.01 (A) 22 - 52 mL  
 LA Vol 4C 50.82 22 - 52 mL Est. RA Pressure 3.00 mmHg MV A Christopher 127.80 cm/s Mitral Valve E Wave Deceleration Time 303.67 ms  
 MV E Christopher 95.20 cm/s Triscuspid Valve Regurgitation Peak Gradient 36.29 mmHg  
 TR Max Velocity 301.20 cm/s  
 AO ASC D 3.22 cm Ao Root D 2.71 cm  
 MV E/A 0.74 LV Mass .1 67 - 162 g  
 LV Mass AL Index 86.5 43 - 95 g/m2 Left Atrium Minor Axis 1.98 cm  
 LA Vol Index 40.96 16 - 28 ml/m2 LA Vol Index 45.60 16 - 28 ml/m2 LA Vol Index 30.09 16 - 28 ml/m2 RENAL FUNCTION PANEL Collection Time: 01/10/21 12:10 PM  
Result Value Ref Range Sodium 146 (H) 136 - 145 mmol/L Potassium 4.1 3.5 - 5.5 mmol/L Chloride 114 (H) 100 - 111 mmol/L  
 CO2 25 21 - 32 mmol/L Anion gap 7 3.0 - 18 mmol/L Glucose 120 (H) 74 - 99 mg/dL BUN 67 (H) 7.0 - 18 MG/DL Creatinine 3.24 (H) 0.6 - 1.3 MG/DL  
 BUN/Creatinine ratio 21 (H) 12 - 20 GFR est AA 16 (L) >60 ml/min/1.73m2 GFR est non-AA 14 (L) >60 ml/min/1.73m2 Calcium 9.9 8.5 - 10.1 MG/DL Phosphorus 3.2 2.5 - 4.9 MG/DL Albumin 2.1 (L) 3.4 - 5.0 g/dL HGB & HCT Collection Time: 01/10/21  1:50 PM  
Result Value Ref Range HGB 7.9 (L) 12.0 - 16.0 g/dL HCT 24.3 (L) 35.0 - 45.0 % CBC WITH AUTOMATED DIFF Collection Time: 01/11/21  5:13 AM  
Result Value Ref Range WBC 7.8 4.6 - 13.2 K/uL  
 RBC 2.39 (L) 4.20 - 5.30 M/uL HGB 7.7 (L) 12.0 - 16.0 g/dL HCT 23.9 (L) 35.0 - 45.0 % .0 (H) 74.0 - 97.0 FL  
 MCH 32.2 24.0 - 34.0 PG  
 MCHC 32.2 31.0 - 37.0 g/dL  
 RDW 16.8 (H) 11.6 - 14.5 % PLATELET 821 192 - 643 K/uL MPV 12.1 (H) 9.2 - 11.8 FL  
 NEUTROPHILS 75 (H) 40 - 73 % LYMPHOCYTES 17 (L) 21 - 52 % MONOCYTES 7 3 - 10 % EOSINOPHILS 1 0 - 5 % BASOPHILS 0 0 - 2 %  
 ABS. NEUTROPHILS 5.9 1.8 - 8.0 K/UL  
 ABS. LYMPHOCYTES 1.3 0.9 - 3.6 K/UL  
 ABS. MONOCYTES 0.6 0.05 - 1.2 K/UL  
 ABS. EOSINOPHILS 0.1 0.0 - 0.4 K/UL  
 ABS. BASOPHILS 0.0 0.0 - 0.1 K/UL  
 DF AUTOMATED METABOLIC PANEL, COMPREHENSIVE Collection Time: 01/11/21  5:13 AM  
Result Value Ref Range Sodium 145 136 - 145 mmol/L Potassium 3.6 3.5 - 5.5 mmol/L Chloride 115 (H) 100 - 111 mmol/L  
 CO2 23 21 - 32 mmol/L Anion gap 7 3.0 - 18 mmol/L Glucose 139 (H) 74 - 99 mg/dL BUN 60 (H) 7.0 - 18 MG/DL Creatinine 3.09 (H) 0.6 - 1.3 MG/DL  
 BUN/Creatinine ratio 19 12 - 20 GFR est AA 17 (L) >60 ml/min/1.73m2 GFR est non-AA 14 (L) >60 ml/min/1.73m2 Calcium 9.7 8.5 - 10.1 MG/DL Bilirubin, total 0.4 0.2 - 1.0 MG/DL  
 ALT (SGPT) 15 13 - 56 U/L  
 AST (SGOT) 16 10 - 38 U/L Alk. phosphatase 56 45 - 117 U/L Protein, total 5.4 (L) 6.4 - 8.2 g/dL Albumin 2.1 (L) 3.4 - 5.0 g/dL Globulin 3.3 2.0 - 4.0 g/dL A-G Ratio 0.6 (L) 0.8 - 1.7 PHOSPHORUS Collection Time: 01/11/21  5:13 AM  
Result Value Ref Range Phosphorus 2.5 2.5 - 4.9 MG/DL MAGNESIUM Collection Time: 01/11/21  5:13 AM  
Result Value Ref Range Magnesium 2.1 1.6 - 2.6 mg/dL All Micro Results Procedure Component Value Units Date/Time CULTURE, BLOOD [[id number]] Collected: 01/08/21 7537 Order Status: Completed Specimen: Blood Updated: 01/11/21 0745 Special Requests: --     
  NO SPECIAL REQUESTS 
LEFT 
HAND Culture result: NO GROWTH 3 DAYS     
 CULTURE, BLOOD [[id number]] Collected: 01/08/21 1253 Order Status: Completed Specimen: Blood Updated: 01/11/21 0745 Special Requests: --     
  NO SPECIAL REQUESTS 
LEFT Antecubital 
  
  Culture result: NO GROWTH 3 DAYS Current Medications: 
Current Facility-Administered Medications Medication Dose Route Frequency Provider Last Rate Last Admin  
 0.9% sodium chloride infusion 250 mL  250 mL IntraVENous PRN Bibiana Espinal, PEREZ      
 calcitRIOL (ROCALTROL) capsule 0.25 mcg  0.25 mcg Oral Q MON, WED & Elie Rivera MD      
 oxyCODONE (ROXICODONE) 5 mg/5 mL oral solution 5 mg  5 mg Oral Q4H PRN Jessica Lewis MD      
 pantoprazole (PROTONIX) 40 mg in 0.9% sodium chloride 10 mL injection  40 mg IntraVENous DAILY Bibiana Austin PA-C   40 mg at 01/10/21 0900  
 metroNIDAZOLE (FLAGYL) IVPB premix 500 mg  500 mg IntraVENous Q8H Taffy Bones B,  mL/hr at 01/11/21 0036 500 mg at 01/11/21 0036  cefepime (MAXIPIME) 2 g in sterile water (preservative free) 10 mL IV syringe  2 g IntraVENous Q24H Taffy Bones B, NP   2 g at 01/10/21 1543  acetaminophen (TYLENOL) solution 500 mg  500 mg Oral Q6H Kemar Browning MD   500 mg at 01/11/21 8042  dextrose 5% and 0.9% NaCl infusion  100 mL/hr IntraVENous CONTINUOUS Jessica Lewis  mL/hr at 01/10/21 2331 100 mL/hr at 01/10/21 2331  sodium chloride (NS) flush 5-10 mL  5-10 mL IntraVENous PRN Alberto Rodriguez PA-C      
 sodium chloride (NS) flush 5-40 mL  5-40 mL IntraVENous Q8H Karen Quill, DO   10 mL at 01/11/21 4260  
 sodium chloride (NS) flush 5-40 mL  5-40 mL IntraVENous PRN Karen Quill, DO      
 acetaminophen (TYLENOL) tablet 650 mg  650 mg Oral Q6H PRN Karen Quill, DO Or  
 acetaminophen (TYLENOL) suppository 650 mg  650 mg Rectal Q6H PRN Karen Quill, DO      
 polyethylene glycol (MIRALAX) packet 17 g  17 g Oral DAILY PRN Karen Quill, DO      
 promethazine (PHENERGAN) tablet 12.5 mg  12.5 mg Oral Q6H PRN Karen Quill, DO Or  
 ondansetron (ZOFRAN) injection 4 mg  4 mg IntraVENous Q6H PRN Kaern Quill, DO      
 heparin (porcine) injection 5,000 Units  5,000 Units SubCUTAneous Q8H Karen Quill, DO   5,000 Units at 01/11/21 5558  
 ePHEDrine in NS (PF) injection 5 mg  5 mg IntraVENous Q20MIN PRN Evelyn Ojeda CRNA   5 mg at 01/08/21 8510  midodrine (PROAMATINE) tablet 5 mg  5 mg Oral TID WITH MEALS Penelope Pedersen DO   5 mg at 01/10/21 9086 Chart and notes reviewed. Data reviewed. I have evaluated and examined the patient. IMPRESSION:  
· Patient is postop day three from exploratory laparotomy with small bowel resection and lysis of adhesions for incarcerated strangulated internal hernia with sepsis and acute renal failure. PLAN:/DISCUSION:  
· Resolving sepsis continue IV antibiotics cefepime and Flagyl · Renal failure-Per nephrology · Endocrine-hypothyroidism-Synthroid orders written by endocrinologist 
· DVT prophylaxis-subcu heparin 5000 units every 8 hours with sequential compression devices · Physical therapy and Occupational Therapy · Encourage incentive spirometry usage · Bowel regimen-mineral oil and Dulcolax p.o. · Discontinue Mathur catheter Raymond Cooper MD

## 2021-01-11 NOTE — PROGRESS NOTES
Problem: Mobility Impaired (Adult and Pediatric) Goal: *Acute Goals and Plan of Care (Insert Text) Description: Physical Therapy Goals Initiated 1/11/2021 and to be accomplished within 7 day(s) 1. Patient will move from supine to sit and sit to supine  in bed with minimal assistance. 2.  Patient will transfer from bed to chair and chair to bed with moderate assistance  using the least restrictive device. 3.  Patient will perform sit to stand with moderate assistance . 4. Patient will ambulate with moderate assistance  for 10 feet with the least restrictive device. PLOF: pt is unable to provide PLOF, reports that she lives with her daughter Outcome: Progressing Towards Goal 
PHYSICAL THERAPY EVALUATION Patient: Aurelia Angulo (75 y.o. female) Date: 1/11/2021 Primary Diagnosis: S/P exploratory laparotomy [E40.184] Bowel obstruction ([address] [N76.725] Procedure(s) (LRB): 
EXPLORATORY LAPAROTOMY/POSSIBLE BOWEL RESECTION (N/A) 3 Days Post-Op Precautions:   Fall, Skin PLOF: see above ASSESSMENT : 
Based on the objective data described below, the patient presents with decreased strength, balance and activity tolerance and increased pain and confusion resulting in decreased independence with functional mobility. Pt required mod/max A with bed mobility and sat edge of bed with CGA/SBA. Pt complained of increased abdominal pain when sitting up and requested to return to supine. Pt required max A for sit to supine. Pt was educated on log roll technique to protect abdominal incision however with require constant re-education. Pt performed supine LE exercises with assistance and then was left in bed with lunch tray set up. Patient will benefit from skilled intervention to address the above impairments. Patient's rehabilitation potential is considered to be Fair Factors which may influence rehabilitation potential include:  
[]         None noted 
[x]         Mental ability/status [x]         Medical condition 
[]         Home/family situation and support systems 
[]         Safety awareness [x]         Pain tolerance/management 
[]         Other: PLAN : 
Recommendations and Planned Interventions:  
[x]           Bed Mobility Training             []    Neuromuscular Re-Education 
[x]           Transfer Training                   []    Orthotic/Prosthetic Training 
[x]           Gait Training                          []    Modalities [x]           Therapeutic Exercises           []    Edema Management/Control 
[x]           Therapeutic Activities            []    Family Training/Education 
[x]           Patient Education 
[]           Other (comment): Frequency/Duration: Patient will be followed by physical therapy 1-2 times per day/4-7 days per week to address goals. Discharge Recommendations: Inpatient Rehab and [facility] Further Equipment Recommendations for Discharge: N/A  
 
SUBJECTIVE:  
Patient stated [place] you so much.  OBJECTIVE DATA SUMMARY:  
 
Past Medical History:  
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   
 Nonspecific abnormal electrocardiogram (ECG) (EKG) Obesity, unspecified Other and unspecified hyperlipidemia Renal insufficiency Past Surgical History:  
Procedure Laterality Date COLONOSCOPY N/A 5/31/2018 COLONOSCOPY w polypectomy performed by Hanna Bobby MD at [address] [place] [address] Left HX CATARACT REMOVAL INSERT PROSTHETIC LENS: LEFT,RIGHT  
 HX GYN    
 HX ORTHOPAEDIC Left Rotator cuff repair HX PARTIAL THYROIDECTOMY    
 GA ABDOMEN SURGERY PROC UNLISTED Explor lap GA BIOPSY OF BREAST, INCISIONAL    
 rt & lt 12 yrs ago Barriers to Learning/Limitations: yes;  altered mental status (i.e.Sedation, Confusion) Compensate with: Verbal Cues and Tactile Cues Home Situation: Pt reports being in a first floor bedroom and living with her daughter Critical Behavior: 
Neurologic State: Alert;Confused Orientation Level: Oriented to person;Disoriented to time;Disoriented to situation;Disoriented to place([facility] when presented with 2 choices) Cognition: Follows commands;Decreased attention/concentration Safety/Judgement: Fall prevention Strength:   
Strength: Grossly decreased, non-functional 
Tone & Sensation:  
Tone: Normal 
Sensation: Intact Range Of Motion: 
AROM: Grossly decreased, non-functional 
PROM: Generally decreased, functional 
Functional Mobility: 
Bed Mobility: 
Rolling: Moderate assistance Supine to Sit: Moderate assistance;Maximum assistance Sit to Supine: Maximum assistance Balance:  
Sitting: Impaired; With support Sitting - Static: Fair (occasional) Therapeutic Exercises: Ankle pumps, heel slides, hip abd/adduction x10 Pain: 
Pain level pre-treatment: 2/10 Pain level post-treatment: 7/10 Pain Intervention(s) : Rest, Repositioning Response to intervention: Nurse notified, See doc flow Activity Tolerance:  
Poor+ Please refer to the flowsheet for vital signs taken during this treatment. After treatment:  
[]         Patient left in no apparent distress sitting up in chair 
[x]         Patient left in no apparent distress in bed 
[x]         Call bell left within reach [x]         Nursing notified 
[]         Caregiver present [x]         Bed alarm activated 
[]         SCDs applied COMMUNICATION/EDUCATION:  
[x]         Role of Physical Therapy in the acute care setting. [x]         Fall prevention education was provided and the patient/caregiver indicated understanding. [x]         Patient/family have participated as able in goal setting and plan of care. [x]         Patient/family agree to work toward stated goals and plan of care. []         Patient understands intent and goals of therapy, but is neutral about his/her participation. []         Patient is unable to participate in goal setting/plan of care: ongoing with therapy staff. 
[]         Other: Thank you for this referral. 
Perla May, PT Time Calculation: 40 mins Eval Complexity: History: HIGH Complexity :3+ comorbidities / personal factors will impact the outcome/ POC Exam:MEDIUM Complexity : 3 Standardized tests and measures addressing body structure, function, activity limitation and / or participation in recreation  Presentation: MEDIUM Complexity : Evolving with changing characteristics  Clinical Decision Making:Medium Complexity    Overall Complexity:MEDIUM

## 2021-01-11 NOTE — PROGRESS NOTES
Problem: Self Care Deficits Care Plan (Adult) Goal: *Acute Goals and Plan of Care (Insert Text) Description: Occupational Therapy Goals Initiated 1/11/2021 within 7 day(s). 1.  Patient will perform bed mobility in preparation for self-care with minimal assistance/contact guard assist.  
2.  Patient will perform bilateral grooming task sitting EOB with supervision/set-up. 3.  Patient will perform upper body dressing with supervision/set-up. 4.  Patient will perform toilet transfers with moderate assistance . 5. Patient will perform all aspects of toileting with moderate assistance . 6. Patient will participate in upper extremity therapeutic exercise/activities with modified independence for 8 minutes. 7.  Patient will utilize energy conservation techniques during functional activities with verbal cues. Prior Level of Function: Patient a poor historian. Oriented to person only this sesison. Per patient she lives with her  and daughter and was independent with self-care and used a RW for functional mobility PTA. Outcome: Progressing Towards Goal 
  
OCCUPATIONAL THERAPY EVALUATION Patient: Viki Lucio (51 y.o. female) Date: 1/11/2021 Primary Diagnosis: S/P exploratory laparotomy [L91.972] [facility] ([facility] [Z45.596] Procedure(s) (LRB): 
EXPLORATORY LAPAROTOMY/POSSIBLE BOWEL RESECTION (N/A) 3 Days Post-Op Precautions: Fall, Skin ASSESSMENT : 
 Patient cleared to participate in OT evaluation by RN. Upon entering the room, the patient was supine in bed, alert, and agreeable to participate in OT evaluation. Patient oriented to person only. Reports \"2002\" as year, [facility] provided [facility]. Patient with increased abdominal pain this session but observed with pain in R shoulder and LLE as well during AAROM. 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 Notified RN as patient does indicate pain with movement. Patient also with outpatient notes referencing closed displaced fracture of 3rd metacarpal bone in R hand s/p fall from October 12, 2020. Based on the objective data described below, the patient presents with decreased strength, decreased AROM, decreased independence, decreased functional balance, and decreased functional mobility, which impedes pt performance in basic self-care/ADL tasks. Patient would benefit from skilled OT to restore PLOF and maximize function. Patient will benefit from skilled intervention to address the above impairments. Patient's rehabilitation potential is considered to be Fair Factors which may influence rehabilitation potential include:  
[]             None noted [x]             Mental ability/status [x]             Medical condition [x]             Home/family situation and support systems [x]             Safety awareness [x]             Pain tolerance/management 
[]             Other: PLAN : 
Recommendations and Planned Interventions:  
[x]               Self Care Training                  [x]      Therapeutic Activities [x]               Functional Mobility Training   [x]      Cognitive Retraining [x]               Therapeutic Exercises           [x]      Endurance Activities [x]               Balance Training                    [x]      Neuromuscular Re-Education []               Visual/Perceptual Training     [x]      Home Safety Training 
[x]               Patient Education                   [x]      Family Training/Education []               Other (comment): Frequency/Duration: Patient will be followed by occupational therapy 1-2 times per day/4-7 days per week to address goals. Discharge Recommendations: Dimas Mccracken Further Equipment Recommendations for Discharge: TBD at next level of care SUBJECTIVE:  
Patient stated \"Have you met other people with stomach pain like this?  OBJECTIVE DATA SUMMARY:  
 
Past Medical History:  
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   
 Nonspecific abnormal electrocardiogram (ECG) (EKG)  Obesity, unspecified  Other and unspecified hyperlipidemia  Renal insufficiency Past Surgical History:  
Procedure Laterality Date  COLONOSCOPY N/A 5/31/2018 COLONOSCOPY w polypectomy performed by Melissa Voss MD at [address] Left  HX CATARACT REMOVAL INSERT PROSTHETIC LENS: LEFT,RIGHT  HX GYN    
 HX ORTHOPAEDIC Left Rotator cuff repair  HX PARTIAL THYROIDECTOMY  GA ABDOMEN SURGERY PROC UNLISTED Explor lap  GA BIOPSY OF BREAST, INCISIONAL    
 rt & lt 12 yrs ago Barriers to Learning/Limitations: yes;  altered mental status (i.e.Sedation, Confusion) Compensate with: visual, verbal, tactile, kinesthetic cues/model Home Situation:  
Per chart review/last admission (4/2020) patient lives with daughter in 2 story condo, 8 steps to bedroom has shower chair 
 
 
[x]  Right hand dominant   []  Left hand dominant Cognitive/Behavioral Status: 
Neurologic State: Alert;Confused Orientation Level: Oriented to person;Disoriented to time;Disoriented to situation;Disoriented to place(able to [facility] when presented with 2 choices) Cognition: Follows commands;Decreased attention/concentration Safety/Judgement: Fall prevention Skin: Intact Edema: None noted Vision/Perceptual:   
Tracking: Able to track stimulus in all quadrants w/o difficulty Coordination: BUE Fine Motor Skills-Upper: Left Intact; Right Intact Gross Motor Skills-Upper: Left Intact; Right Impaired Balance: 
Sitting: Impaired; With support Sitting - Static: Poor (constant support) Strength: BUE Strength: Generally decreased, functional(BUE ) Tone & Sensation: BUE Tone: Normal 
Sensation: Intact Range of Motion: BUE 
AROM: Grossly decreased, non-functional(R shoulder; LUE and R elbow/wrist/hand generally dec, fxl) PROM: Generally decreased, functional(R shoulder; pt c/o pain) Functional Mobility and Transfers for ADLs: 
Bed Mobility: 
Supine to Sit: Moderate assistance(supine to long sit) Sit to Supine: Moderate assistance ADL Assessment:  
Feeding: Setup;Stand-by assistance Oral Facial Hygiene/Grooming: Setup;Stand-by assistance Bathing: Maximum assistance Upper Body Dressing: Moderate assistance Lower Body Dressing: Maximum assistance; Total assistance Toileting: Maximum assistance; Total assistance ADL Intervention: 
Feeding Feeding Assistance: Set-up; Stand-by assistance Food to Mouth: Stand-by assistance(pt able to take 1 bite of pear fruit for this OT using LUE) Upper Body Dressing Assistance Dressing Assistance: Moderate assistance Hospital Gown: Moderate assistance Cognitive Retraining Safety/Judgement: Fall prevention Pain: 
Pain indicated in abdomen, RUE and LLE with abduction, no numerical value given Pain Intervention(s): Medication (see MAR); Response to intervention: Nurse notified, See doc flow Activity Tolerance: Patient demonstrated decreased activity tolerance during OT evaluation. Please refer to the flowsheet for vital signs taken during this treatment. After treatment:  
[] Patient left in no apparent distress sitting up in chair 
[x] Patient left in no apparent distress in bed 
[x] Call bell left within reach [x] Nursing notified 
[] Caregiver present 
[] Bed alarm activated COMMUNICATION/EDUCATION:  
[x] Role of Occupational Therapy in the acute care setting 
[x] Home safety education was provided and the patient/caregiver indicated understanding. [x] Patient/family have participated as able in goal setting and plan of care. [x] Patient/family agree to work toward stated goals and plan of care. [] Patient understands intent and goals of therapy, but is neutral about his/her participation. [] Patient is unable to participate in goal setting and plan of care. Thank you for this referral. 
Derian Baldwin, OTR/L Time Calculation: 23 mins Eval Complexity: History: MEDIUM Complexity : Expanded review of history including physical, cognitive and psychosocial  history ; Examination: MEDIUM Complexity : 3-5 performance deficits relating to physical, cognitive , or psychosocial skils that result in activity limitations and / or participation restrictions; Decision Making:MEDIUM Complexity : Patient may present with comorbidities that affect occupational performnce. Miniml to moderate modification of tasks or assistance (eg, physical or verbal ) with assesment(s) is necessary to enable patient to complete evaluation

## 2021-01-11 NOTE — ACP (ADVANCE CARE PLANNING)
Advance Care Planning Advance Care Planning Clinical Specialist 
Conversation Note Date of ACP Conversation: 01/11/21 Conversation Conducted with:  Patient's daughter/caregiver Kate Victoria). ACP Clinical Specialist: Jeremiah Torrez, [address] [place] [address] N Decision Maker: NO 
 
Current Designated Health Care Decision Maker: N/A. Patient's daughter/caregiver is Kate Victoria, VU#[phone]). Care Preferences Hospitalization: \"If your health worsens and it becomes clear that your chance of recovery is unlikely, what would your preference be regarding hospitalization? \" Choice: 
[x]  The patient wants hospitalization 
[]  The patient prefers comfort-focused treatment without hospitalization. [] Yes  [x] No   Educated Patient / Ayde Turner regarding differences between Advance Directives and portable DNR orders. Length of ACP Conversation in minutes:   
 
Conversation Outcomes: 
[x] ACP discussion completed, but unable to complete due to patient's cognition. 
[] Existing advance directive reviewed with patient; no changes to patient's previously recorded wishes 
 [] New Advance Directive completed 
 [] Portable Do Not Resuscitate prepared for Provider review and signature 
[] POLST/POST/MOLST/MOST prepared for Provider review and signature Follow-up plan:   
[] Schedule follow-up conversation to continue planning 
[] Referred individual to Provider for additional questions/concerns  
[] Advised patient/agent/surrogate to review completed ACP document and update if needed with changes in condition, patient preferences or care setting  
 
[x] This note routed to one or more involved healthcare providers Janine Godoy [facility] Care Manager Pager#: [phone]
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
## 2021-01-12 NOTE — PROGRESS NOTES
Problem: Risk for Spread of Infection Goal: Prevent transmission of infectious organism to others Description: Prevent the transmission of infectious organisms to other patients, staff members, and visitors. Outcome: Progressing Towards Goal 
  
Problem: Patient Education:  Go to Education Activity Goal: Patient/Family Education Outcome: Progressing Towards Goal 
  
Problem: Pressure Injury - Risk of 
Goal: *Prevention of pressure injury Description: Document Thierry Scale and appropriate interventions in the flowsheet. Outcome: Progressing Towards Goal 
Note: Pressure Injury Interventions: 
Sensory Interventions: Assess changes in LOC, Minimize linen layers, Turn and reposition approx. every two hours (pillows and wedges if needed) Moisture Interventions: Absorbent underpads Activity Interventions: Increase time out of bed Mobility Interventions: Turn and reposition approx. every two hours(pillow and wedges) Nutrition Interventions: Document food/fluid/supplement intake Friction and Shear Interventions: HOB 30 degrees or less, Transferring/repositioning devices Problem: Patient Education: Go to Patient Education Activity Goal: Patient/Family Education Outcome: Progressing Towards Goal 
  
Problem: Falls - Risk of 
Goal: *Absence of Falls Description: Document Yesenia Donahue Fall Risk and appropriate interventions in the flowsheet. Outcome: Progressing Towards Goal 
Note: Fall Risk Interventions: 
  
 
Mentation Interventions: Adequate sleep, hydration, pain control Medication Interventions: Bed/chair exit alarm Elimination Interventions: Call light in reach Problem: Patient Education: Go to Patient Education Activity Goal: Patient/Family Education Outcome: Progressing Towards Goal 
  
Problem: Patient Education: Go to Patient Education Activity Goal: Patient/Family Education Outcome: Progressing Towards Goal 
  
 Problem: Patient Education: Go to Patient Education Activity Goal: Patient/Family Education Outcome: Progressing Towards Goal 
  
Problem: Patient Education: Go to Patient Education Activity Goal: Patient/Family Education Outcome: Progressing Towards Goal

## 2021-01-12 NOTE — PROGRESS NOTES
Progress Note Dexter Mas 
81 y.o. Admit Date: 1/8/2021 Principal Problem: S/P exploratory laparotomy (1/8/2021) POA: Unknown Active Problems: 
  Chronic kidney disease, stage III (moderate) (4/20/2020) POA: Unknown Anemia (4/20/2020) POA: Yes Secondary hyperparathyroidism of renal origin ([address] (4/20/2020) POA: Yes Bowel obstruction ([address] (1/8/2021) POA: Unknown Acute renal failure (ARF) ([address] (1/10/2021) POA: Unknown Oliguria OR anuria due to procedure (1/10/2021) POA: Unknown Graves disease (1/10/2021) POA: Unknown Subjective:  
 
Patient is little confused, not taking enough PO foods, continues to make urine through Mathur, Endocrine  will start Synthroid Parentally. A comprehensive review of systems was negative except for that written in the History of Present Illness. Objective:  
 
Visit Vitals BP (!) 143/68 (BP 1 Location: Right arm) Pulse 70 Temp 98.1 °F (36.7 °C) Resp 20 Ht 5' (1.524 m) Wt 71.7 kg (158 lb) SpO2 94% BMI 30.86 kg/m² Intake/Output Summary (Last 24 hours) at 1/11/2021 1943 Last data filed at 1/11/2021 1457 Gross per 24 hour Intake  Output 1450 ml Net -1450 ml  
 
 
Current Facility-Administered Medications Medication Dose Route Frequency Provider Last Rate Last Admin  [START ON 1/12/2021] famotidine (PF) (PEPCID) 20 mg in 0.9% sodium chloride 10 mL injection  20 mg IntraVENous DAILY Yanni Lantigua MD      
 [START ON 1/12/2021] levothyroxine (SYNTHROID) injection 150 mcg  150 mcg IntraVENous DAILY Nicole Gonzalez MD      
 [START ON 1/12/2021] mineral oil 30 mL  30 mL Oral DAILY Leticia Gage MD      
 bisacodyL (DULCOLAX) tablet 5 mg  5 mg Oral DAILY PRN Leticia Gage MD      
 0.9% sodium chloride infusion 250 mL  250 mL IntraVENous PRN Bibiana Austin PA-C      
 calcitRIOL (ROCALTROL) capsule 0.25 mcg  0.25 mcg Oral Q MON, WED & Aisha Castañeda MD      
 oxyCODONE (ROXICODONE) 5 mg/5 mL oral solution 5 mg  5 mg Oral Q4H PRN Jeronimo Patrick MD      
 metroNIDAZOLE (FLAGYL) IVPB premix 500 mg  500 mg IntraVENous Q8H Skeet Blush B,  mL/hr at 01/11/21 1443 500 mg at 01/11/21 1443  cefepime (MAXIPIME) 2 g in sterile water (preservative free) 10 mL IV syringe  2 g IntraVENous Q24H Skeet Blush B, NP   2 g at 01/11/21 1442  dextrose 5% and 0.9% NaCl infusion  100 mL/hr IntraVENous CONTINUOUS Jeronimo Patrick  mL/hr at 01/10/21 2331 100 mL/hr at 01/10/21 2331  sodium chloride (NS) flush 5-10 mL  5-10 mL IntraVENous PRN Winter Ott PA-C      
 sodium chloride (NS) flush 5-40 mL  5-40 mL IntraVENous Q8H [place] Sleek, DO   10 mL at 01/11/21 1444  sodium chloride (NS) flush 5-40 mL  5-40 mL IntraVENous PRN [place] Sleek, DO      
 acetaminophen (TYLENOL) tablet 650 mg  650 mg Oral Q6H PRN [place] Sleek, DO Or  
 acetaminophen (TYLENOL) suppository 650 mg  650 mg Rectal Q6H PRN [place] Sleek, DO      
 polyethylene glycol (MIRALAX) packet 17 g  17 g Oral DAILY PRN [place] Sleek, DO      
 promethazine (PHENERGAN) tablet 12.5 mg  12.5 mg Oral Q6H PRN [place] Sleek, DO Or  
 ondansetron (ZOFRAN) injection 4 mg  4 mg IntraVENous Q6H PRN [place] Sleek, DO      
 heparin (porcine) injection 5,000 Units  5,000 Units SubCUTAneous Q8H [place] Sleek, DO   5,000 Units at 01/11/21 1442  ePHEDrine in NS (PF) injection 5 mg  5 mg IntraVENous Q20MIN PRN Melanie Alcala CRNA   5 mg at 01/08/21 5386  midodrine (PROAMATINE) tablet 5 mg  5 mg Oral TID WITH MEALS [place] Sleek, DO   Stopped at 01/11/21 3390 Physical Exam:  
 
Physical Exam:  
General:  Alert, cooperative, no distress, appears stated age. Eyes:  Conjunctivae/corneas clear. PERRL, EOMs intact.    
Mouth/Throat: Lips, mucosa, and tongue normal. Teeth and gums normal.  
Neck: Supple, symmetrical, trachea midline, no adenopathy, thyroid: no enlargement/tenderness/nodules, no carotid bruit and no JVD. Lungs:   Clear to auscultation bilaterally. Heart:  Regular rate and rhythm, S1, S2 normal, no murmur, click, rub or gallop. Abdomen:   Soft, non-tender. Bowel sounds normal. No masses,  No organomegaly. Incision is clean Extremities: Extremities normal, atraumatic, no cyanosis ,trace edema Data Review: CBC w/Diff Recent Labs  
  01/11/21 
0513 01/10/21 
1350 01/10/21 
0400 01/09/21 
0025 WBC 7.8  --  9.4 9.8  
RBC 2.39*  --  2.10* 2.54* HGB 7.7* 7.9* 6.8* 8.4* HCT 23.9* 24.3* 21.5* 25.9*  
.0*  --  102.4* 102.0*  
MCH 32.2  --  32.4 33.1 MCHC 32.2  --  31.6 32.4  
RDW 16.8*  --  15.3* 15.3* Recent Labs  
  01/11/21 
0513 01/10/21 
0400 01/09/21 
0025 MONOS 7 4 7 EOS 1 0 0  
BASOS 0 0 0  
RDW 16.8* 15.3* 15.3* Comprehensive Metabolic Profile Recent Labs  
  01/11/21 
0513 01/10/21 
1210 01/10/21 
0400  146* 143  
K 3.6 4.1 4.3 * 114* 113* CO2 23 25 26 BUN 60* 67* 66* CREA 3.09* 3.24* 3.38* Recent Labs  
  01/11/21 
0513 01/10/21 
1210 01/10/21 
0400 01/09/21 
0025 01/09/21 
0025 CA 9.7 9.9 9.7  9.7   < > 9.9 PHOS 2.5 3.2 3.4   < > 4.0 ALB 2.1* 2.1* 2.0*   < > 2.0*  
TP 5.4*  --  5.2*  --  5.0* TBILI 0.4  --  0.3  --  1.0  
 < > = values in this interval not displayed. Impression: Active Hospital Problems Diagnosis Date Noted  Acute renal failure (ARF) ([facility] 01/10/2021  Oliguria OR anuria due to procedure 01/10/2021  Graves disease 01/10/2021  S/P exploratory laparotomy 01/08/2021  Bowel obstruction ([facility] [address] 01/08/2021  Chronic kidney disease, stage III (moderate) 04/20/2020  Anemia 04/20/2020  Secondary hyperparathyroidism of renal origin ([facility] [address] 04/20/2020 Renal function continues to improve with Hydration, no sign of Volume Overload. Lindsey Ou Plan:  
Continue current Hydration, Aspiration Precautions ,avoid Nephrotoxins.  
 
 
Thom Masters MD

## 2021-01-12 NOTE — PROGRESS NOTES
Bedside shift change report given to Victor Hugo Valdes RN (oncoming nurse) by Chuckie Frazier RN (offgoing nurse). Report included the following information SBAR, Kardex, MAR and Recent Results.

## 2021-01-12 NOTE — CONSULTS
Infectious Disease Consultation Note Reason: Sepsis, recent complete small bowel obstruction status post surgery Current abx Prior abx Cefepime since 1/9, metronidazole since 1/8 Levofloxacin 1/8 Lines:  
 
 
Assessment : 
 
21-year-old [facility] female with h/o bell's palsy, HTN  Presented to ed on 1/8/21 with complaint of vomiting for 3 days,  abdominal pain Clinical presentation consistent with sepsis -developed after admission likely due to transient bacteremia from small bowel obstruction status post exploratory laparotomy, lysis of adhesions, small bowel resection of ileum and hemorrhoids on 1/8/2021 Postoperative hypotension likely secondary to volume depletion superimposed on sepsis. Acute on chronic kidney injury-likely secondary to volume depletion. Nephrology follow-up appreciated-improving Clinically improving. Improving abdominal pain/tenderness. Tolerating p.o. diet. Intra-Op findings discussed with Dr. Keyonna Zarate. No evidence of bowel perforation or secondary peritonitis noted intraoperatively. No definitive evidence in the literature to support prolonged antibiotic use in this setting. Hence will discontinue antibiotics and monitor off antibiotics Also antibiotic management complicated due to history of penicillin allergy. But patient has tolerated cephalosporins in the past and is currently tolerating cefepime without difficulties. Recommendations: 1. Discontinue cefepime, metronidazole. Monitor off antibiotics 2. Advance diet per surgery 3. Follow-up nephrology recommendation 4. Monitor CBC, clinically Thank you for consultation request. Above plan was discussed in details with patient, RN and dr Eduardo Roth. Please call me if any further questions or concerns. Will continue to participate in the care of this patient.  
HPI: 
 
21-year-old [facility] female with h/o bell's palsy, HTN  Presented to ed on 1/8/21 with complaint of vomiting for 3 days,  abdominal pain. She was found to be septic and a CAT scan showed small bowel obstruction secondary to internal hernia with volvulus. She was transferred to [facility] and a rapid COVID test was negative and did undergo exploratory laparotomy with Dr. Rock Mendez on 1/8/21. The patient did undergo lysis of adhesions, small bowel resection of ileum and hemorrhoids. She was extubated in the PACU and then transferred to ICU. She was found to be in shock and requiring vasopressin. She was given a bolus of lactated Ringer's 1 liter and subsequently also in the ICU and was maintaining lactated Ringer's at 25 mL/hour. The patient was also requiring pressors including Levophed. Patient was started on cefepime, metronidazole post surgery. Patient was quickly weaned off the pressors. Blood cultures done on admission have been negative. I have been consulted for antibiotic recommendations. Patient states that she feels better. Denies worsening abdominal pain. She states that she is able to eat. Poor p.o. intake per nursing staff. 
  
 
 
Past Medical History:  
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   
 Nonspecific abnormal electrocardiogram (ECG) (EKG)  Obesity, unspecified  Other and unspecified hyperlipidemia  Renal insufficiency Past Surgical History:  
Procedure Laterality Date  COLONOSCOPY N/A 5/31/2018 COLONOSCOPY w polypectomy performed by Naomie Gloria MD at [address]  HX CATARACT REMOVAL INSERT PROSTHETIC LENS: LEFT,RIGHT  HX GYN    
 HX ORTHOPAEDIC Left Rotator cuff repair  HX PARTIAL THYROIDECTOMY  NC ABDOMEN SURGERY PROC UNLISTED Explor lap  NC BIOPSY OF BREAST, INCISIONAL    
 rt & lt 12 yrs ago  
 
 
home Medication List  
 Details LORazepam (ATIVAN) 1 mg tablet Take 0.5 Tabs by mouth two (2) times daily as needed for Anxiety. Max Daily Amount: 1 mg. Qty: 6 Tab, Refills: 0 Associated Diagnoses: Anxiety  
  
atorvastatin (LIPITOR) 40 mg tablet Take 1 Tab by mouth nightly. Qty: 30 Tab, Refills: 0  
  
b complex-vitamin c-folic acid (NEPHROCAPS) 1 mg capsule Take 1 Cap by mouth daily. Qty: 30 Cap, Refills: 0  
  
calcitRIOL (ROCALTROL) 0.25 mcg capsule Take 1 Cap by mouth every Monday, Wednesday, Friday. Qty: 12 Cap, Refills: 0  
  
polyethylene glycol (MIRALAX) 17 gram packet Take 1 Packet by mouth daily. Qty: 30 Packet, Refills: 0  
  
metoprolol tartrate (LOPRESSOR) 25 mg tablet Take 0.5 Tabs by mouth every twelve (12) hours. Qty: 15 Tab, Refills: 0  
  
methIMAzole (TAPAZOLE) 10 mg tablet Take 1 Tab by mouth daily. Qty: 30 Tab, Refills: 0  
  
pantoprazole (PROTONIX) 40 mg tablet Take 1 Tab by mouth Before breakfast and dinner. Qty: 60 Tab, Refills: 0  
  
allopurinoL (ZYLOPRIM) 100 mg tablet Take 100 mg by mouth daily. aspirin 81 mg tablet Take 81 mg by mouth. Current Facility-Administered Medications Medication Dose Route Frequency  famotidine (PF) (PEPCID) 20 mg in 0.9% sodium chloride 10 mL injection  20 mg IntraVENous DAILY  levothyroxine (SYNTHROID) injection 150 mcg  150 mcg IntraVENous DAILY  mineral oil 30 mL  30 mL Oral DAILY  bisacodyL (DULCOLAX) tablet 5 mg  5 mg Oral DAILY PRN  
 0.9% sodium chloride infusion 250 mL  250 mL IntraVENous PRN  
 calcitRIOL (ROCALTROL) capsule 0.25 mcg  0.25 mcg Oral Q MON, WED & FRI  
 oxyCODONE (ROXICODONE) 5 mg/5 mL oral solution 5 mg  5 mg Oral Q4H PRN  
 metroNIDAZOLE (FLAGYL) IVPB premix 500 mg  500 mg IntraVENous Q8H  
 cefepime (MAXIPIME) 2 g in sterile water (preservative free) 10 mL IV syringe  2 g IntraVENous Q24H  
 dextrose 5% and 0.9% NaCl infusion  100 mL/hr IntraVENous CONTINUOUS  
 sodium chloride (NS) flush 5-10 mL  5-10 mL IntraVENous PRN  
 sodium chloride (NS) flush 5-40 mL  5-40 mL IntraVENous Q8H  
 sodium chloride (NS) flush 5-40 mL  5-40 mL IntraVENous PRN  
 acetaminophen (TYLENOL) tablet 650 mg  650 mg Oral Q6H PRN Or  
 acetaminophen (TYLENOL) suppository 650 mg  650 mg Rectal Q6H PRN  polyethylene glycol (MIRALAX) packet 17 g  17 g Oral DAILY PRN  promethazine (PHENERGAN) tablet 12.5 mg  12.5 mg Oral Q6H PRN Or  
 ondansetron (ZOFRAN) injection 4 mg  4 mg IntraVENous Q6H PRN  
 heparin (porcine) injection 5,000 Units  5,000 Units SubCUTAneous Q8H  
 ePHEDrine in NS (PF) injection 5 mg  5 mg IntraVENous Q20MIN PRN  
 midodrine (PROAMATINE) tablet 5 mg  5 mg Oral TID WITH MEALS Allergies: Ciprofibrate, Darvon [propoxyphene], and Penicillins History reviewed. No pertinent family history. Social History Socioeconomic History  Marital status:  Spouse name: Not on file  Number of children: Not on file  Years of education: Not on file  Highest education level: Not on file Occupational History  Not on file Social Needs  Financial resource strain: Not on file  Food insecurity Worry: Not on file Inability: Not on file  Transportation needs Medical: Not on file Non-medical: Not on file Tobacco Use  Smoking status: Former Smoker  Smokeless tobacco: Never Used  Tobacco comment: REMOTELY QUIT 1980'S Substance and Sexual Activity  Alcohol use: No  
 Drug use: No  
 Sexual activity: Not on file Lifestyle  Physical activity Days per week: Not on file Minutes per session: Not on file  Stress: Not on file Relationships  Social connections Talks on phone: Not on file Gets together: Not on file Attends Hinduism service: Not on file Active member of club or organization: Not on file Attends meetings of clubs or organizations: Not on file Relationship status: Not on file  Intimate partner violence Fear of current or ex partner: Not on file Emotionally abused: Not on file Physically abused: Not on file Forced sexual activity: Not on file Other Topics Concern  Not on file Social History Narrative  Not on file Social History Tobacco Use Smoking Status Former Smoker Smokeless Tobacco Never Used Tobacco Comment REMOTELY QUIT  Temp (24hrs), Av °F (36.7 °C), Min:97.4 °F (36.3 °C), Max:98.9 °F (37.2 °C) Visit Vitals BP (!) 153/62 (BP 1 Location: Right arm, BP Patient Position: At rest) Pulse 81 Temp 98.1 °F (36.7 °C) Resp 18 Ht 5' (1.524 m) Wt 73.7 kg (162 lb 6.4 oz) SpO2 97% BMI 31.72 kg/m² ROS: 12 point ROS obtained in details. Pertinent positives as mentioned in HPI,  
otherwise negative Physical Exam: 
 
General:   elderly appearing, NAD. Communicative. Appears comfortable Head:  Normocephalic, without obvious abnormality, atraumatic. Eyes:  Conjunctivae/corneas clear. PERRL, EOMs intact. Nose: Nares normal. Septum midline. Mucosa normal. No drainage or sinus tenderness. Throat: Lips, mucosa, and tongue dry. Neck: Supple, goiter presenting more on right side of neck Lungs:    clear to auscultation bilaterally. Heart:  Regular rate and rhythm, S1, S2 normal, no  click, rub or gallop. Abdomen:   Soft, nonrigid. Dressing overlying midline incision dry, intact. No surrounding erythema or drainage. Extremities: Extremities normal, atraumatic, no cyanosis or edema. Pulses: 2+ and symmetric all extremities. Skin: Skin color, texture, turgor normal. No rashes or lesions. Normal cap refill. Lymph nodes:  Not examined Neurologic: Intermittently, weakly following commands.  
  
 
 
Labs: Results:  
Chemistry Recent Labs  
  21 
0624 21 
1932 21 
0513 01/10/21 
0400 01/10/21 
0400 * 141* 139*   < > 109* * 147* 145   < > 143 K 3.4* 3.7 3.6   < > 4.3 * 118* 115*   < > 113* CO2 23 25 23   < > 26 BUN 52* 59* 60*   < > 66* CREA 2.70* 3.01* 3.09*   < > 3.38* CA 9.4 9.5 9.7   < > 9.7  9.7 AGAP 6 4 7   < > 4  
BUCR 19 20 19   < > 20 AP  --   --  56  --  56  
TP  --   --  5.4*  --  5.2* ALB 2.0* 2.3* 2.1*   < > 2.0*  
GLOB  --   --  3.3  --  3.2 AGRAT  --   --  0.6*  --  0.6*  
 < > = values in this interval not displayed. CBC w/Diff Recent Labs  
  01/12/21 
0624 01/11/21 
0513 01/10/21 
1350 01/10/21 
0400 WBC 8.0 7.8  --  9.4  
RBC 2.34* 2.39*  --  2.10* HGB 7.4* 7.7* 7.9* 6.8* HCT 23.5* 23.9* 24.3* 21.5*  
 145  --  128* GRANS 69 75*  --  78* LYMPH 20* 17*  --  18* EOS 2 1  --  0 Microbiology No results for input(s): CULT in the last 72 hours. RADIOLOGY: 
 
All available imaging studies/reports in [facility] care for this admission were reviewed Dr. Diamante Osorio, Infectious Disease Specialist 
[phone] January 12, 2021 
10:08 AM

## 2021-01-12 NOTE — PROGRESS NOTES
Problem: Mobility Impaired (Adult and Pediatric) Goal: *Acute Goals and Plan of Care (Insert Text) Description: Physical Therapy Goals Initiated 1/11/2021 and to be accomplished within 7 day(s) 1. Patient will move from supine to sit and sit to supine  in bed with minimal assistance. 2.  Patient will transfer from bed to chair and chair to bed with moderate assistance  using the least restrictive device. 3.  Patient will perform sit to stand with moderate assistance . 4. Patient will ambulate with moderate assistance  for 10 feet with the least restrictive device. PLOF: pt is unable to provide PLOF, reports that she lives with her daughter Outcome: Progressing Towards Goal 
 
PHYSICAL THERAPY TREATMENT Patient: Dano Nazario (83 y.o. female) Date: 1/12/2021 Diagnosis: S/P exploratory laparotomy [J55.159] Bowel obstruction ([facility] [address] [Y45.811] S/P exploratory laparotomy Procedure(s) (LRB): 
EXPLORATORY LAPAROTOMY/POSSIBLE BOWEL RESECTION (N/A) 4 Days Post-Op Precautions: Fall, Skin PLOF: see above ASSESSMENT: 
Pt cleared to participate in PT session, pt agreeable to session. 1L O2 donned throughout. Pt requiring assist for moving LEs towards R edge of bed, reporting discomfort in abdomen. MaxA for roll to R, max-totalA for sidelying to sit. Pt able to sit with CGA, intermittent loss of balance. Able to complete LE exercise in sitting with support in sitting. Pt then requesting to return to supine with maxA. Scooting toward [facility] with totalA. Pt positioned for comfort, pt reporting she was wet from urination. Brought new linens and gown to room, attempted to find CNA/RN but unable to find at this time. Pt left with call bell in reach and linens ready for change, purewick in place. Progression toward goals:  
[]      Improving appropriately and progressing toward goals [x]      Improving slowly and progressing toward goals []      Not making progress toward goals and plan of care will be adjusted PLAN: 
Patient continues to benefit from skilled intervention to address the above impairments. Continue treatment per established plan of care. Discharge Recommendations:  Dimas Mccracken Further Equipment Recommendations for Discharge:  TBD pending progress, currently slide board, wheelchair SUBJECTIVE:  
Patient stated I need to lay back down.  OBJECTIVE DATA SUMMARY:  
Critical Behavior: 
Neurologic State: Drowsy, Confused Orientation Level: Oriented to person Cognition: Decreased command following, Impaired decision making Safety/Judgement: Fall prevention Functional Mobility Training: 
Bed Mobility: 
 Rolling: maxA to R Supine to Sit: Maximum assistance; Total assistance (sidelying to sitting EOB) Sit to Supine: Maximum assistance Scooting: Maximum assistance Balance: 
Sitting: Impaired; With support Sitting - Static: Fair (occasional) Sitting - Dynamic: Poor (constant support) Standing: (pt declined, requesting to return to supine ) Posture: 
 Posture (WDL): Exceptions to [facility] Posture Assessment: Forward head Pain: 
Pain level pre-treatment: 0/10 Pain level post-treatment: 0/10 Activity Tolerance:  
Poor activity tolerance, able to sit for <5 minutes before requesting to return to supine Please refer to the flowsheet for vital signs taken during this treatment. After treatment:  
[] Patient left in no apparent distress sitting up in chair 
[x] Patient left in no apparent distress in bed 
[x] Call bell left within reach 
[] Nursing notified 
[] Caregiver present 
[] Bed alarm activated 
[] SCDs applied COMMUNICATION/EDUCATION:  
[x]         Role of Physical Therapy in the acute care setting. []         Fall prevention education was provided and the patient/caregiver indicated understanding. []         Patient/family have participated as able in working toward goals and plan of care. []         Patient/family agree to work toward stated goals and plan of care. []         Patient understands intent and goals of therapy, but is neutral about his/her participation. [x]         Patient is unable to participate in stated goals/plan of care: ongoing with therapy staff. 
[]         Other: 
 
   
Arielle Valente, PT Time Calculation: 12 mins

## 2021-01-12 NOTE — PROGRESS NOTES
Progress Note Martha Hummel 
81 y.o. Admit Date: 1/8/2021 Patient Active Problem List  
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  
 Obesity, unspecified E66.9  Nonspecific abnormal electrocardiogram (ECG) (EKG) R94.31  
 Renal insufficiency N28.9  Hip fracture requiring operative repair ([facility] S72.009A  Chronic kidney disease, stage III (moderate) N18.30  Anemia D64.9  Secondary hyperparathyroidism of renal origin ([facility] N25.81  
 Atrial fibrillation (HCC) I48.91  
 Aspiration pneumonia ([facility] J69.0  Leucocytosis D72.829  
 Hyperkalemia E87.5  Acidosis, metabolic M04.3  S/P exploratory laparotomy E92.315  Bowel obstruction ([facility] Q84.055  Acute renal failure (ARF) (HCC) N17.9  Oliguria OR anuria due to procedure N99.89, R34  
 Graves disease E05.00 Subjective:  
 
Patient say was feeling anxious, nervous yesterday , not today, eating very little, Mathur is taken out , Urine Out put recorded in [facility]. A comprehensive review of systems was negative except for that written in the History of Present Illness. Objective:  
 
Visit Vitals BP (!) 149/113 Pulse 69 Temp 97.8 °F (36.6 °C) Resp 20 Ht 5' (1.524 m) Wt 73.7 kg (162 lb 6.4 oz) SpO2 97% BMI 31.72 kg/m² Intake/Output Summary (Last 24 hours) at 1/12/2021 1243 Last data filed at 1/12/2021 7994 Gross per 24 hour Intake 220 ml Output 1200 ml Net -980 ml  
 
 
Current Facility-Administered Medications Medication Dose Route Frequency Provider Last Rate Last Admin  famotidine (PF) (PEPCID) 20 mg in 0.9% sodium chloride 10 mL injection  20 mg IntraVENous DAILY Jose Barcenas MD   20 mg at 01/12/21 6000  levothyroxine (SYNTHROID) injection 150 mcg  150 mcg IntraVENous DAILY Gabby Angel MD   150 mcg at 01/12/21 6025  mineral oil 30 mL  30 mL Oral DAILY Maggi Marcelino MD      
 bisacodyL (DULCOLAX) tablet 5 mg  5 mg Oral DAILY PRN Maggi Marcelino MD      
 0.9% sodium chloride infusion 250 mL  250 mL IntraVENous PRN Bibiana Austin PA-C      
 calcitRIOL (ROCALTROL) capsule 0.25 mcg  0.25 mcg Oral Q MON, WED & Girish Turner MD   0.25 mcg at 01/11/21 2336  oxyCODONE (ROXICODONE) 5 mg/5 mL oral solution 5 mg  5 mg Oral Q4H PRN Maggi Marcelino MD   5 mg at 01/12/21 0600  
 metroNIDAZOLE (FLAGYL) IVPB premix 500 mg  500 mg IntraVENous Q8H Ever HAGEN,  mL/hr at 01/12/21 0823 500 mg at 01/12/21 6283  cefepime (MAXIPIME) 2 g in sterile water (preservative free) 10 mL IV syringe  2 g IntraVENous Q24H Ever HAGEN, NP   2 g at 01/11/21 1442  dextrose 5% and 0.9% NaCl infusion  75 mL/hr IntraVENous CONTINUOUS Jazmyne Felder MD 75 mL/hr at 01/12/21 1149 75 mL/hr at 01/12/21 1149  
 sodium chloride (NS) flush 5-10 mL  5-10 mL IntraVENous PRN Pinky Lefort, PA-C      
 sodium chloride (NS) flush 5-40 mL  5-40 mL IntraVENous Q8H Lennox Ovens, DO   10 mL at 01/12/21 0601  
 sodium chloride (NS) flush 5-40 mL  5-40 mL IntraVENous PRN Lennox Ovens, DO      
 acetaminophen (TYLENOL) tablet 650 mg  650 mg Oral Q6H PRN Lennox Ovens, DO Or  
 acetaminophen (TYLENOL) suppository 650 mg  650 mg Rectal Q6H PRN Lennox Ovens, DO      
 polyethylene glycol (MIRALAX) packet 17 g  17 g Oral DAILY PRN Lennox Ovens, DO      
 promethazine (PHENERGAN) tablet 12.5 mg  12.5 mg Oral Q6H PRN Lennox Ovens, DO Or  
 ondansetron (ZOFRAN) injection 4 mg  4 mg IntraVENous Q6H PRN Lennox Ovens, DO      
 heparin (porcine) injection 5,000 Units  5,000 Units SubCUTAneous Q8H Lennox Ovens, DO   5,000 Units at 01/12/21 0600  
 ePHEDrine in NS (PF) injection 5 mg  5 mg IntraVENous Q20MIN PRN Velta Beecham, CRNA   5 mg at 01/08/21 7491 Physical Exam: Physical Exam:  
General:  Alert, cooperative, no distress, appears stated age. Neck: Supple, symmetrical, trachea midline, no adenopathy, thyroid: no enlargement/tenderness/nodules, no carotid bruit and no JVD. Lungs:   Clear to auscultation bilaterally. Heart:  Regular rate and rhythm, S1, S2 normal, no murmur, click, rub or gallop. Abdomen:   Soft, non-tender. Bowel sounds normal. No masses,  No organomegaly. Incision is well apposed. Site is clean. Extremities: Extremities normal, atraumatic, no cyanosis or edema,   
Skin: Skin color, texture, turgor normal. No rashes or lesions Data Review: CBC w/Diff Recent Labs  
  01/12/21 
0624 01/11/21 
0513 01/10/21 
1350 01/10/21 
0400 WBC 8.0 7.8  --  9.4  
RBC 2.34* 2.39*  --  2.10* HGB 7.4* 7.7* 7.9* 6.8* HCT 23.5* 23.9* 24.3* 21.5*  
.4* 100.0*  --  102.4*  
MCH 31.6 32.2  --  32.4 MCHC 31.5 32.2  --  31.6 RDW 16.3* 16.8*  --  15.3* Recent Labs  
  01/12/21 0624 01/11/21 
0513 01/10/21 
0400 MONOS 9 7 4 EOS 2 1 0  
BASOS 0 0 0  
RDW 16.3* 16.8* 15.3* Comprehensive Metabolic Profile Recent Labs  
  01/12/21 0624 01/11/21 1932 01/11/21 
2858 * 147* 145  
K 3.4* 3.7 3.6 * 118* 115* CO2 23 25 23 BUN 52* 59* 60* CREA 2.70* 3.01* 3.09* Recent Labs  
  01/12/21 0624 01/11/21 1932 01/11/21 
0513 01/10/21 
0400 01/10/21 
0400 CA 9.4 9.5 9.7   < > 9.7  9.7 PHOS 2.0* 1.9* 2.5   < > 3.4 ALB 2.0* 2.3* 2.1*   < > 2.0*  
TP  --   --  5.4*  --  5.2* TBILI  --   --  0.4  --  0.3  
 < > = values in this interval not displayed. Lab Results Component Value Date/Time  GFR est AA 20 (L) 01/12/2021 06:24 AM  
 GFR est non-AA 17 (L) 01/12/2021 06:24 AM  
 Creatinine 2.70 (H) 01/12/2021 06:24 AM  
 BUN 52 (H) 01/12/2021 06:24 AM  
 BUN, POC 45 (H) 05/31/2018 09:53 AM  
 Sodium,  05/31/2018 09:53 AM  
 Sodium 148 (H) 01/12/2021 06:24 AM  
 Potassium 3.4 (L) 01/12/2021 06:24 AM  
 Potassium, POC 4.0 05/31/2018 09:53 AM  
 Chloride,  05/31/2018 09:53 AM  
 Chloride 119 (H) 01/12/2021 06:24 AM  
 CO2 23 01/12/2021 06:24 AM  
 
 
 
Imaging:  
 
Procedures/imaging: see electronic medical records for all procedures, Xrays and details which were not copied into this note but were reviewed prior to  
taking my decision. Impression: Active Hospital Problems Diagnosis Date Noted  Acute renal failure (ARF) ([facility] [address] [facility] 01/10/2021  Oliguria OR anuria due to procedure 01/10/2021  Graves disease 01/10/2021  S/P exploratory laparotomy 01/08/2021  Bowel obstruction ([place] [address] 01/08/2021  Chronic kidney disease, stage III (moderate) 04/20/2020  Anemia 04/20/2020  Secondary hyperparathyroidism of renal origin ([facility] 04/20/2020 Plan: Will change IVF with D51/2 Ns with Kcl 20 me/liter at 75 cc/hour, ID to see regarding her antibiotics. Needs to drink more free water.  
 
 
Angelina Carlson MD

## 2021-01-12 NOTE — PROGRESS NOTES
Bedside shift change report given to [address] (oncoming nurse) by Randee Fajardo RN (offgoing nurse). Report included the following information Kardex, Procedure Summary, Intake/Output, MAR and Procedure Verification.

## 2021-01-13 NOTE — PROGRESS NOTES
[facility] Patient: Desiree Griffith Age: 81 y.o. : 1935 MR#: [id number] SSN: [id number] Date: 2021 Subjective/24-hour events:  
 
Stable, tolerating clears but no BM. BPs running a bit high at times. Assessment: SBO secondary to strangulated internal hernia s/p ex-lap with ileal resection and RAD Acute metabolic encephalopathy, improved HTN RUPALI Obesity Severe sepsis, treated/resolved Lactic acidosis, rsolved Hemorrhagic/hypovolemia shock, rsolved Plan:  
Continue diet as tolerated. Remains on IVF currently, management per nephrology. Monitor BPs, adjust antihypertensive therapy as necessary. Mobilize as much as able/tolerated. Following. Case discussed with:  [x]Patient  []Family  [x]Nursing  []Case Management DVT Prophylaxis:  []Lovenox  []Hep SQ  []SCDs  []Coumadin   []On Heparin gtt Objective:  
VS:  
Visit Vitals BP (!) 178/81 (BP 1 Location: Right arm, BP Patient Position: At rest) Pulse 84 Temp 98.9 °F (37.2 °C) Resp 20 Ht 5' (1.524 m) Wt 73.7 kg (162 lb 6.4 oz) SpO2 100% BMI 31.72 kg/m² Tmax/24hrs: Temp (24hrs), Av.2 °F (36.8 °C), Min:97.8 °F (36.6 °C), Max:98.9 °F (37.2 °C) Intake/Output Summary (Last 24 hours) at 2021 1106 Last data filed at 2021 4220 Gross per 24 hour Intake 1778.75 ml Output 500 ml Net 1278.75 ml General:  In NAD. Nontoxic-appearing. Cardiovascular:  RRR. Pulmonary:  Lungs clear bilaterally, no wheezes. GI:  Abdomen soft. Extremities:  Warm, no edema or ischemia. Neuro:  Awake and alert but confused. Motor grossly nonfocal. 
 
Labs:   
Recent Results (from the past 24 hour(s)) RENAL FUNCTION PANEL Collection Time: 21  3:25 PM  
Result Value Ref Range Sodium 151 (H) 136 - 145 mmol/L Potassium 3.3 (L) 3.5 - 5.5 mmol/L Chloride 123 (H) 100 - 111 mmol/L  
 CO2 23 21 - 32 mmol/L  Anion gap 5 3.0 - 18 mmol/L  
 Glucose 111 (H) 74 - 99 mg/dL BUN 47 (H) 7.0 - 18 MG/DL Creatinine 2.18 (H) 0.6 - 1.3 MG/DL  
 BUN/Creatinine ratio 22 (H) 12 - 20 GFR est AA 26 (L) >60 ml/min/1.73m2 GFR est non-AA 21 (L) >60 ml/min/1.73m2 Calcium 8.8 8.5 - 10.1 MG/DL Phosphorus 1.6 (L) 2.5 - 4.9 MG/DL Albumin 2.0 (L) 3.4 - 5.0 g/dL Signed By: Betina Sorensen MD   
 January 13, 2021

## 2021-01-13 NOTE — PROGRESS NOTES
Indio Ortiz M.D. FACS PROGRESS NOTE Name: Cece Hernandez MRN: [id number] : 1935 Hospital: [facility] Date: 2021 Admission Date: 2021 11:21 AM  
 
Hospital Day: 6 
5 Days Post-Op Subjective: 
Patient sitting upright in bed and eating breakfast.  She still feels like she is dying. She states the people are telling her that she is dying. I clarified for the patient that she is not dying. No one thinks that she is dying. Objective: 
Vitals:  
 21 0017 21 0422 21 6582 BP: (!) 170/82 135/66 (!) 149/69 (!) 178/81 Pulse: 98 73 70 84 Resp:  20 Temp: 98.1 °F (36.7 °C) 98.2 °F (36.8 °C) 98 °F (36.7 °C) 98.9 °F (37.2 °C) SpO2: 98% 99% 99% 100% Weight:      
Height:      
 
Date 21 0700 - 21 0659 21 07 - 21 5369 Shift 9582-1273 8294-0824 24 Hour Total 7799-9989 4342-3314 24 Hour Total  
INTAKE  
P.O. 340  340 100  100  
  P. O. 340  340 100  100  
I. V.(mL/kg/hr)  551.3(0.6) 551.3(0.3) 887.5  887.5 Volume (dextrose 5% - 0.45% NaCl with KCl 20 mEq/L infusion)  551.3 551.3 887.5  887.5 Shift Total(mL/kg) 340(4.6) 551.3(7.5) 891. 3(12.1) 987.5(13.4)  987.5(13.4) OUTPUT Urine(mL/kg/hr) 500(0.6)  500(0.3) 300  300 Urine Voided 500  500 300  300 Shift Total(mL/kg) 500(6.8)  500(6.8) 300(4.1)  300(4.1) NET -160 551.3 391.3 687.5  687.5 Weight (kg) 73.7 73.7 73.7 73.7 73.7 73.7 Physical Exam:  
 General: Awake and alert, oriented x4, no apparent distress Abdomen: abdomen is soft with minimal incisional tenderness. Incision(s) are C/D/I. No masses, organomegaly or guarding Extremities: No clubbing cyanosis or edema Labs: 
Recent Results (from the past 24 hour(s)) EKG, 12 LEAD, INITIAL Collection Time: 21  8:58 AM  
Result Value Ref Range Ventricular Rate 79 BPM  
 Atrial Rate 79 BPM  
 P-R Interval 148 ms QRS Duration 92 ms Q-T Interval 342 ms QTC Calculation (Bezet) 392 ms Calculated P Axis 48 degrees Calculated R Axis 46 degrees Calculated T Axis 67 degrees Diagnosis Normal sinus rhythm Nonspecific ST and T wave abnormality Abnormal ECG When compared with ECG of 08-JAN-2021 11:44, No significant change was found Confirmed by Dian Payan ([id number]) on 1/12/2021 10:03:54 AM 
  
RENAL FUNCTION PANEL Collection Time: 01/12/21  3:25 PM  
Result Value Ref Range Sodium 151 (H) 136 - 145 mmol/L Potassium 3.3 (L) 3.5 - 5.5 mmol/L Chloride 123 (H) 100 - 111 mmol/L  
 CO2 23 21 - 32 mmol/L Anion gap 5 3.0 - 18 mmol/L Glucose 111 (H) 74 - 99 mg/dL BUN 47 (H) 7.0 - 18 MG/DL Creatinine 2.18 (H) 0.6 - 1.3 MG/DL  
 BUN/Creatinine ratio 22 (H) 12 - 20 GFR est AA 26 (L) >60 ml/min/1.73m2 GFR est non-AA 21 (L) >60 ml/min/1.73m2 Calcium 8.8 8.5 - 10.1 MG/DL Phosphorus 1.6 (L) 2.5 - 4.9 MG/DL Albumin 2.0 (L) 3.4 - 5.0 g/dL All Micro Results Procedure Component Value Units Date/Time CULTURE, BLOOD [[id number]] Collected: 01/08/21 1253 Order Status: Completed Specimen: Blood Updated: 01/13/21 0645 Special Requests: --     
  NO SPECIAL REQUESTS 
LEFT Antecubital 
  
  Culture result: NO GROWTH 5 DAYS     
 CULTURE, BLOOD [[id number]] Collected: 01/08/21 1254 Order Status: Completed Specimen: Blood Updated: 01/13/21 0645 Special Requests: --     
  NO SPECIAL REQUESTS 
LEFT 
HAND Culture result: NO GROWTH 5 DAYS Current Medications: 
Current Facility-Administered Medications Medication Dose Route Frequency Provider Last Rate Last Admin  dextrose 5% - 0.45% NaCl with KCl 20 mEq/L infusion  75 mL/hr IntraVENous CONTINUOUS Shae De La Garza MD 75 mL/hr at 01/12/21 1259 75 mL/hr at 01/12/21 1259  famotidine (PF) (PEPCID) 20 mg in 0.9% sodium chloride 10 mL injection  20 mg IntraVENous DAILY Homer Gonzalez MD   20 mg at 01/13/21 8049  levothyroxine (SYNTHROID) injection 150 mcg  150 mcg IntraVENous DAILY George Ma MD   150 mcg at 01/13/21 0829  
 mineral oil 30 mL  30 mL Oral DAILY Bernhard Oppenheim, MD   30 mL at 01/12/21 1300  
 bisacodyL (DULCOLAX) tablet 5 mg  5 mg Oral DAILY PRN Bernhard Oppenheim, MD      
 0.9% sodium chloride infusion 250 mL  250 mL IntraVENous PRN Bibiana Austin PA-C      
 calcitRIOL (ROCALTROL) capsule 0.25 mcg  0.25 mcg Oral Q MON, WED & Kate Voss MD   0.25 mcg at 01/11/21 2336  oxyCODONE (ROXICODONE) 5 mg/5 mL oral solution 5 mg  5 mg Oral Q4H PRN Bernhard Oppenheim, MD   5 mg at 01/12/21 2257  sodium chloride (NS) flush 5-10 mL  5-10 mL IntraVENous PRN Qi Fenton PA-C      
 sodium chloride (NS) flush 5-40 mL  5-40 mL IntraVENous Q8H Hanna Curly, DO   10 mL at 01/13/21 0535  sodium chloride (NS) flush 5-40 mL  5-40 mL IntraVENous PRN Hanna Curly, DO      
 acetaminophen (TYLENOL) tablet 650 mg  650 mg Oral Q6H PRN Hanna Curly, DO Or  
 acetaminophen (TYLENOL) suppository 650 mg  650 mg Rectal Q6H PRN Hanna Curly, DO      
 polyethylene glycol (MIRALAX) packet 17 g  17 g Oral DAILY PRN Hanna Curly, DO      
 promethazine (PHENERGAN) tablet 12.5 mg  12.5 mg Oral Q6H PRN Hanna Curly, DO   12.5 mg at 01/12/21 2257 Or  
 ondansetron (ZOFRAN) injection 4 mg  4 mg IntraVENous Q6H PRN Hanna Curly, DO      
 heparin (porcine) injection 5,000 Units  5,000 Units SubCUTAneous Q8H Hanna Curly, DO   5,000 Units at 01/13/21 7951  ePHEDrine in NS (PF) injection 5 mg  5 mg IntraVENous Q20MIN PRN Connie Half, CRNA   5 mg at 01/08/21 2255 Chart and notes reviewed. Data reviewed. I have evaluated and examined the patient.      
 
IMPRESSION:  
 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 PLAN:/DISCUSION:  
· Encourage p.o. intake · PT OT 
· Discharge planning Yossi Casiano MD

## 2021-01-13 NOTE — PROGRESS NOTES
Problem: Risk for Spread of Infection Goal: Prevent transmission of infectious organism to others Description: Prevent the transmission of infectious organisms to other patients, staff members, and visitors. 1/13/2021 0826 by Jorje Al RN Outcome: Progressing Towards Goal 
1/13/2021 0713 by Jorje Al RN Outcome: Progressing Towards Goal

## 2021-01-13 NOTE — PROGRESS NOTES
Bedside shift change report given to Milad Mills (oncoming nurse) by Wil guallpa RN (offgoing nurse). Report included the following information SBAR, Kardex and MAR.
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
## 2021-01-13 NOTE — PROGRESS NOTES
Problem: Mobility Impaired (Adult and Pediatric) Goal: *Acute Goals and Plan of Care (Insert Text) Description: Physical Therapy Goals Initiated 1/11/2021 and to be accomplished within 7 day(s) 1. Patient will move from supine to sit and sit to supine  in bed with minimal assistance. 2.  Patient will transfer from bed to chair and chair to bed with moderate assistance  using the least restrictive device. 3.  Patient will perform sit to stand with moderate assistance . 4. Patient will ambulate with moderate assistance  for 10 feet with the least restrictive device. PLOF: pt is unable to provide PLOF, reports that she lives with her daughter Outcome: Progressing Towards Goal 
 
PHYSICAL THERAPY TREATMENT Patient: Danisha Hummel (66 y.o. female) Date: 1/13/2021 Diagnosis: S/P exploratory laparotomy [P53.208] Bowel obstruction ([facility] [address] [J23.697] S/P exploratory laparotomy Procedure(s) (LRB): 
EXPLORATORY LAPAROTOMY/POSSIBLE BOWEL RESECTION (N/A) 5 Days Post-Op Precautions: Fall, Skin PLOF: see above ASSESSMENT: 
Pt cleared to participate in PT session, pt received semi-reclined in bed and agreeable to PT session. Completing with OT to maximize safety and mobility. 2L O2 via nasal cannula, SPO2 in high 90s and HR in low 90s throughout. Pt completing supine to sit with maxAx2. Pt completing with log roll technique to decrease load on abdominal musculature. Pt sitting with posterior lean due to abdominal pain and prop sitting with BUEs. Pt with increased edema in R upper and lower extremities but patient reporting this was \"normal\". Pt able to complete LE active mobility against gravity in sitting. Pt then standing with modAx2 to RW, side steps toward [facility] with modAx2. Pt reporting fatigue and requesting return to supine. Pt requiring modAx2 for transition to supine. Pt scooting up toward [facility] with totalAx2. Pt left with all needs met and call bell in reach. Progression toward goals: []      Improving appropriately and progressing toward goals [x]      Improving slowly and progressing toward goals 
[]      Not making progress toward goals and plan of care will be adjusted PLAN: 
Patient continues to benefit from skilled intervention to address the above impairments. Continue treatment per established plan of care. Discharge Recommendations:  Dimas Mccracken Further Equipment Recommendations for Discharge:  rolling walker, wheelchair for distance SUBJECTIVE:  
Patient stated I don't remember rolling yesterday.  OBJECTIVE DATA SUMMARY:  
Critical Behavior: 
Neurologic State: Confused Orientation Level: Oriented to person, Oriented to place Cognition: Decreased command following Safety/Judgement: Fall prevention Functional Mobility Training: 
Bed Mobility: 
Rolling: Maximum assistance Supine to Sit: Maximum assistance;Assist x2 Sit to Supine: Moderate assistance;Assist x2 Scooting: Total assistance;Assist x2 Transfers: 
Sit to Stand: Moderate assistance;Assist x2 Stand to Sit: Moderate assistance;Assist x2(for control in sitting ) Balance: 
Sitting: Impaired; With support Sitting - Static: Fair (occasional) Sitting - Dynamic: Fair (occasional) Standing: Impaired; With support Standing - Static: Fair Standing - Dynamic : Poor Posture: 
 Posture (WDL): Exceptions to [facility] Posture Assessment: Forward head(posterior trunk lean due to abdominal pain in sitting ) Ambulation/Gait Training: 
Distance (ft): 2 Feet (ft)(side steps to L toward HOB ) Assistive Device: Walker, rolling Ambulation - Level of Assistance: Moderate assistance;Assist x2 Gait Description (WDL): Exceptions to [facility] Gait Abnormalities: Decreased step clearance;Trunk sway increased Base of Support: Narrowed; Center of gravity altered Speed/Shirlene: Slow;Shuffled Step Length: Right shortened;Left shortened Therapeutic Exercises: Instructed in and completed the following EXERCISE Sets Reps Active Active Assist  
Passive Self ROM Comments Ankle Pumps 1 10  [x] [] [] [] Quad Sets/Glut Sets    [] [] [] [] Hold for 5 secs Hamstring Sets   [] [] [] [] Short Arc Quads   [] [] [] [] Heel Slides   [] [] [] [] Straight Leg Raises   [] [] [] [] Hip Add   [] [] [] [] Hold for 5 secs, w/ pillow squeeze Long Arc Quads 1 10 [x] [] [] [] Seated Marching   [] [] [] []   
Standing Marching   [] [] [] []   
   [] [] [] []   
 
 
Pain: 
Pt did not rate pain but reporting abdominal pain with mobility Activity Tolerance:  
Fair activity tolerance, increased fatigue Please refer to the flowsheet for vital signs taken during this treatment. After treatment:  
[] Patient left in no apparent distress sitting up in chair 
[x] Patient left in no apparent distress in bed 
[x] Call bell left within reach [x] Nursing notified 
[] Caregiver present 
[] Bed alarm activated 
[] SCDs applied COMMUNICATION/EDUCATION:  
[x]         Role of Physical Therapy in the acute care setting. [x]         Fall prevention education was provided and the patient/caregiver indicated understanding. [x]         Patient/family have participated as able in working toward goals and plan of care. [x]         Patient/family agree to work toward stated goals and plan of care. []         Patient understands intent and goals of therapy, but is neutral about his/her participation. []         Patient is unable to participate in stated goals/plan of care: ongoing with therapy staff. 
[]         Other: 
 
   
Yakelin Vasquez, PT Time Calculation: 33 mins

## 2021-01-13 NOTE — PROGRESS NOTES
conducted a Follow up consultation and Spiritual Assessment for Elinor Box, who is a 81 y.o.,female. The  provided the following Interventions: 
Continued the relationship of care and support. Patient looked perked up today compared to last Monday. She said, \"I'm feeling a lot better today. \" 
Listened empathically. Offered prayer and assurance of continued prayer on patient's behalf. Patient received holy communion today. Chart reviewed. The following outcomes were achieved: 
Patient expressed gratitude for 's visit. Assessment: 
There are no further spiritual or Tenriism issues which require Spiritual Care Services interventions at this time. Plan: 
Chaplains will continue to follow and will provide pastoral care on an as needed/requested basis.  recommends bedside caregivers page  on duty if patient shows signs of acute spiritual or emotional distress. [facility] [address] [facility]  
[phone]

## 2021-01-13 NOTE — PROGRESS NOTES
Awake, alert. Mentally still somewhat confused. VS stable. Free T4 1.9. Will reduce daily Synthroid dose to 100 mcg per day.

## 2021-01-14 NOTE — PROGRESS NOTES
Pt remains somewhat confused. VS are stable but free T4 is higher than one would suspect based on the treatment given. Review of older lab tests suggests she had a significant titer of TSI back in April. Plan: 1. Stop Synthroid now. 2. Draw TSI and TSH. 3. Will continue daily free T4 levels.

## 2021-01-14 NOTE — PROGRESS NOTES
Problem: Risk for Spread of Infection Goal: Prevent transmission of infectious organism to others Description: Prevent the transmission of infectious organisms to other patients, staff members, and visitors. Outcome: Progressing Towards Goal 
  
Problem: Patient Education:  Go to Education Activity Goal: Patient/Family Education Outcome: Progressing Towards Goal 
  
Problem: Pressure Injury - Risk of 
Goal: *Prevention of pressure injury Description: Document Thierry Scale and appropriate interventions in the flowsheet. Outcome: Progressing Towards Goal 
Note: Pressure Injury Interventions: 
Sensory Interventions: Assess changes in LOC, Assess need for specialty bed, Float heels, Keep linens dry and wrinkle-free, Minimize linen layers, Pressure redistribution bed/mattress (bed type) Moisture Interventions: Absorbent underpads, Assess need for specialty bed, Internal/External urinary devices, Minimize layers, Offer toileting Q_hr Activity Interventions: Assess need for specialty bed, Pressure redistribution bed/mattress(bed type) Mobility Interventions: Assess need for specialty bed, Float heels, HOB 30 degrees or less, Pressure redistribution bed/mattress (bed type) Nutrition Interventions: Document food/fluid/supplement intake Friction and Shear Interventions: HOB 30 degrees or less, Minimize layers Problem: Patient Education: Go to Patient Education Activity Goal: Patient/Family Education Outcome: Progressing Towards Goal 
  
Problem: Falls - Risk of 
Goal: *Absence of Falls Description: Document Hugo March Fall Risk and appropriate interventions in the flowsheet. Outcome: Progressing Towards Goal 
Note: Fall Risk Interventions: 
  
 
Mentation Interventions: Adequate sleep, hydration, pain control, Bed/chair exit alarm, Door open when patient unattended, More frequent rounding, Reorient patient, Toileting rounds, Update white board, Room close to nurse's station Medication Interventions: Bed/chair exit alarm, Patient to call before getting OOB, Teach patient to arise slowly Elimination Interventions: Bed/chair exit alarm, Call light in reach, Elevated toilet seat, Patient to call for help with toileting needs, Stay With Me (per policy), Toilet paper/wipes in reach, Toileting schedule/hourly rounds Problem: Patient Education: Go to Patient Education Activity Goal: Patient/Family Education Outcome: Progressing Towards Goal 
  
Problem: Patient Education: Go to Patient Education Activity Goal: Patient/Family Education Outcome: Progressing Towards Goal 
  
Problem: Patient Education: Go to Patient Education Activity Goal: Patient/Family Education Outcome: Progressing Towards Goal 
  
Problem: Patient Education: Go to Patient Education Activity Goal: Patient/Family Education Outcome: Progressing Towards Goal 
  
Problem: General Medical Care Plan Goal: *Vital signs within specified parameters Outcome: Progressing Towards Goal 
Goal: *Labs within defined limits Outcome: Progressing Towards Goal 
Goal: *Absence of infection signs and symptoms Outcome: Progressing Towards Goal 
Goal: *Optimal pain control at patient's stated goal 
Outcome: Progressing Towards Goal 
Goal: *Skin integrity maintained Outcome: Progressing Towards Goal 
Goal: *Fluid volume balance Outcome: Progressing Towards Goal 
Goal: *Optimize nutritional status Outcome: Progressing Towards Goal 
Goal: *Anxiety reduced or absent Outcome: Progressing Towards Goal 
Goal: *Progressive mobility and function (eg: ADL's) Outcome: Progressing Towards Goal 
  
Problem: Patient Education: Go to Patient Education Activity Goal: Patient/Family Education Outcome: Progressing Towards Goal 
  
Problem: Anemia Care Plan (Adult and Pediatric) Goal: *Labs within defined limits Outcome: Progressing Towards Goal 
Goal: *Tolerates increased activity Outcome: Progressing Towards Goal 
  
 Problem: Patient Education: Go to Patient Education Activity Goal: Patient/Family Education Outcome: Progressing Towards Goal

## 2021-01-14 NOTE — PROGRESS NOTES
Chart reviewed. Patient is afebrile. Hemodynamically stable. Tolerating p.o. Remains stable off antibiotics. Recommend to continue to monitor off antibiotics. We will sign off. Please call if any new questions or concerns Time spent greater than 10 minutes

## 2021-01-14 NOTE — PROGRESS NOTES
Bedside shift change report given to Diana Garza RN (oncoming nurse) by Vickie Shoemaker RN (offgoing nurse). Report included the following information SBAR, Kardex and MAR.

## 2021-01-14 NOTE — PROGRESS NOTES
Comprehensive Nutrition Assessment Type and Reason for Visit: Initial, RD nutrition re-screen/LOS Nutrition Recommendations/Plan: 
- Change supplement: Add Ensure Enlive, TID and remove Ensure Pudding - Encourage and monitor po and supplement intake - Recommend magnesium replacement; addressed by MD following discussion Nutrition Assessment:  S/p ex-lap with ileal resection and RAD. Pt reported poor appetite and poor intake of meals and supplement, eating 0-50% of meals. Observed untouched breakfast tray in room during interview. Pt stated she had no appetite and denies nausea. Discussed Ensure Enlive with pt, pt agreeable. Malnutrition Assessment: 
Malnutrition Status: At risk for malnutrition (specify)(Poor intake PTA and during admission) Nutrition History and Allergies: PMHx of HTN, CKD stage 3, secondary hyperparathyroidism, aspiration pneumonia, and hyperlipidemia. Pt admitted with complete small bowel obstruction 2/2 internal hernia. Pt reported poor appetite and po intake PTA, vomiting for 3 days PTA. Pt stated normal meal intake is 1 meal daily attributed to poor appetite. Pt noted weight loss the past few months with a UBW of 150 lbs, chart review indicates fluctuating weights since April 2020. NKFA. Estimated Daily Nutrient Needs: 
Energy (kcal): 4250-0433; Weight Used for Energy Requirements: Current(73.8 kg) Protein (g): 74-89; Weight Used for Protein Requirements: Current(1-1.2) Fluid (ml/day): 4450-9710; Method Used for Fluid Requirements: 1 ml/kcal 
 
Nutrition Related Findings:  BM PTA, on aggressive bowel regimen to include mineral oil and Ducolax (oral and suppository). Pt agreeable to adding prune juice to diet. Hypophosphatemia, noted replacement. Hypomagnesaemia noted 1.4 mg/dL, discussed Mg with MD.   
 
Wounds:   
Surgical incision Additional Caloric Sources: D5 with KCl at 100 mL/hr to provide 120 gm dextrose, 408 kcal 
 
Current Nutrition Therapies: DIET GI LITE (POST SURGICAL) DIET NUTRITIONAL SUPPLEMENTS All Meals; Ensure Pudding Anthropometric Measures: 
· Height:  5' (152.4 cm) · Current Body Wt:  73.8 kg (162 lb 11.2 oz) · Admission Body Wt:  141 lb 15.6 oz   
· Usual Body Wt:  68 kg (150 lb) · Ideal Body Wt:  100 lbs:  162.7 % · BMI Category:  Obese class 1 (BMI 30.0-34. 9) Nutrition Diagnosis:  
· Inadequate oral intake related to early satiety(S/p ex-lap with ileal resection) as evidenced by intake 0-25%, poor intake prior to admission Nutrition Interventions:  
Food and/or Nutrient Delivery: Continue current diet, Mineral supplement, Modify oral nutrition supplement, IV fluid delivery Nutrition Education and Counseling: Education not indicated Coordination of Nutrition Care: Continue to monitor while inpatient Goals: 
PO nutrition intake will meet >75% of patient estimated nutritional needs within the next 7 days. Nutrition Monitoring and Evaluation:  
Behavioral-Environmental Outcomes: None identified Food/Nutrient Intake Outcomes: Diet advancement/tolerance, Food and nutrient intake, Supplement intake, IVF intake, Vitamin/mineral intake Physical Signs/Symptoms Outcomes: Biochemical data, Constipation, GI status, Fluid status or edema, Meal time behavior, Nutrition focused physical findings Discharge Planning:   
Continue oral nutrition supplement, Continue current diet Electronically signed by Elsie Baires on 1/14/2021 at 1:56 PM 
 
Contact: [phone]

## 2021-01-14 NOTE — PROGRESS NOTES
Problem: Self Care Deficits Care Plan (Adult) Goal: *Acute Goals and Plan of Care (Insert Text) Description: Occupational Therapy Goals Initiated 1/11/2021 within 7 day(s). 1.  Patient will perform bed mobility in preparation for self-care with minimal assistance/contact guard assist.  
2.  Patient will perform bilateral grooming task sitting EOB with supervision/set-up. 3.  Patient will perform upper body dressing with supervision/set-up. 4.  Patient will perform toilet transfers with moderate assistance . 5. Patient will perform all aspects of toileting with moderate assistance . 6. Patient will participate in upper extremity therapeutic exercise/activities with modified independence for 8 minutes. 7.  Patient will utilize energy conservation techniques during functional activities with verbal cues. Prior Level of Function: Patient a poor historian. Oriented to person only this sesison. Per patient she lives with her  and daughter and was independent with self-care and used a RW for functional mobility PTA. Outcome: Progressing Towards Goal 
OCCUPATIONAL THERAPY TREATMENT Patient: Ian Torres (9 y.o. female) Date: 1/14/2021 Diagnosis: S/P exploratory laparotomy [Y18.467] [facility] [I51.833] S/P exploratory laparotomy Procedure(s) (LRB): 
EXPLORATORY LAPAROTOMY/POSSIBLE BOWEL RESECTION (N/A) 6 Days Post-Op Precautions: Fall, Skin Chart, occupational therapy assessment, plan of care, and goals were reviewed.  
ASSESSMENT: 
 Pt is asleep with breakfast tray in front of him. Pt reports she is having difficulty feeding herself. Pt noted increased edema in RUE, per pt this is \"like that all the time\". Pillow was placed under pt's RUE for proximal support, pt was able to bring food to mouth initially with CGA, which improved to SBA with repetition. Pt initially dropped cup with the attempt to self-hydrate with RUE, was able to bring drink to mouth with BUE with SBA. Following self-feeding pt was provided with wash cloth for simple grooming, requiring CGA to wash face and hands. Pt reports difficulty using call bell, however, was able to press all buttons following appropriate set-up and repetition. Progression toward goals: 
[]          Improving appropriately and progressing toward goals [x]          Improving slowly and progressing toward goals 
[]          Not making progress toward goals and plan of care will be adjusted PLAN: 
Patient continues to benefit from skilled intervention to address the above impairments. Continue treatment per established plan of care. Discharge Recommendations:  Dimas Mccracken Further Equipment Recommendations for Discharge:  N/A  
 
SUBJECTIVE:  
Patient stated I am so tired today. I don't have strength to eat.  OBJECTIVE DATA SUMMARY:  
Cognitive/Behavioral Status: 
Neurologic State: Alert, Drowsy Orientation Level: Oriented to person Cognition: Follows commands Safety/Judgement: Fall prevention Functional Mobility and Transfers for ADLs: 
 Bed Mobility: 
 Scooting: Maximum assistance ADL Intervention: 
Feeding Food to Mouth: Contact guard assistance;Stand-by assistance Drink to Mouth: Stand-by assistance Grooming Grooming Assistance: Contact guard assistance Washing Face: Contact guard assistance Washing Hands: Contact guard assistance Cognitive Retraining Safety/Judgement: Fall prevention Pain: 
Pain level pre-treatment: not rated Pain level post-treatment: not rated Activity Tolerance:   
Fair Please refer to the flowsheet for vital signs taken during this treatment. After treatment:  
[]  Patient left in no apparent distress sitting up in chair 
[x]  Patient left in no apparent distress in bed 
[x]  Call bell left within reach [x]  Nursing notified 
[]  Caregiver present 
[]  Bed alarm activated COMMUNICATION/EDUCATION:  
[x] Role of Occupational Therapy in the acute care setting 
[x] Home safety education was provided and the patient/caregiver indicated understanding. [x] Patient/family have participated as able in working towards goals and plan of care. [x] Patient/family agree to work toward stated goals and plan of care. [] Patient understands intent and goals of therapy, but is neutral about his/her participation. [] Patient is unable to participate in goal setting and plan of care. Thank you for this referral. 
Preston Weaver OTR/L Time Calculation: 24 mins

## 2021-01-14 NOTE — PROGRESS NOTES
Progress Note Esdras Pap 
81 y.o. Admit Date: 1/8/2021 Principal Problem: S/P exploratory laparotomy (1/8/2021) POA: Unknown Active Problems: 
  Chronic kidney disease, stage III (moderate) (4/20/2020) POA: Unknown Anemia (4/20/2020) POA: Yes Secondary hyperparathyroidism of renal origin ([facility] [address] (4/20/2020) POA: Yes Bowel obstruction ([facility] [address] (1/8/2021) POA: Unknown Acute renal failure (ARF) ([address] (1/10/2021) POA: Unknown Oliguria OR anuria due to procedure (1/10/2021) POA: Unknown Graves disease (1/10/2021) POA: Unknown Subjective:  
 
Patient is comfortable, speech is quite garbled, hard to understand,eating very little, No BM yet A comprehensive review of systems was negative except for that written in the History of Present Illness. Objective:  
 
Visit Vitals BP (!) 150/74 Pulse 86 Temp 98.7 °F (37.1 °C) Resp 16 Ht 5' (1.524 m) Wt 73.8 kg (162 lb 12.8 oz) SpO2 100% BMI 31.79 kg/m² Intake/Output Summary (Last 24 hours) at 1/14/2021 1449 Last data filed at 1/14/2021 2933 Gross per 24 hour Intake 610 ml Output 300 ml Net 310 ml  
 
 
Current Facility-Administered Medications Medication Dose Route Frequency Provider Last Rate Last Admin  bisacodyL (DULCOLAX) tablet 5 mg  5 mg Oral DAILY Edmar Dukes MD   5 mg at 01/14/21 1053  bisacodyL (DULCOLAX) suppository 10 mg  10 mg Rectal DAILY Edmar Dukes MD   10 mg at 01/14/21 1137  famotidine (PEPCID) tablet 20 mg  20 mg Oral DAILY Kasey Bravo MD   Stopped at 01/14/21 1304  magnesium oxide (MAG-OX) tablet 400 mg  400 mg Oral BID Edmar Dukes MD      
 phosphorus (K PHOS NEUTRAL) 250 mg tablet 1 Tab  1 Tab Oral BID Addi Khan MD   1 Tab at 01/14/21 0820  
 dextrose 5% 1,000 mL with potassium chloride 20 mEq infusion   IntraVENous CONTINUOUS Addi Kahn  mL/hr at 01/14/21 0053 New Bag at 01/14/21 9841  mineral oil 30 mL  30 mL Oral DAILY Edmar Dukes MD   30 mL at 01/14/21 0820  
 0.9% sodium chloride infusion 250 mL  250 mL IntraVENous PRN Bibiana Robles PA-C      
 calcitRIOL (ROCALTROL) capsule 0.25 mcg  0.25 mcg Oral Q MON, WED & Manav Rim, Khanh Nelson MD   0.25 mcg at 01/13/21 2107  
 oxyCODONE (ROXICODONE) 5 mg/5 mL oral solution 5 mg  5 mg Oral Q4H PRN Edmar Dukes MD   5 mg at 01/12/21 2257  sodium chloride (NS) flush 5-10 mL  5-10 mL IntraVENous PRN Janie Canseco PA-C      
 sodium chloride (NS) flush 5-40 mL  5-40 mL IntraVENous Q8H Wadsworth Pride, DO   10 mL at 01/14/21 0510  
 sodium chloride (NS) flush 5-40 mL  5-40 mL IntraVENous PRN Wadsworth Pride, DO      
 acetaminophen (TYLENOL) tablet 650 mg  650 mg Oral Q6H PRN Wadsworth Pride, DO Or  
 acetaminophen (TYLENOL) suppository 650 mg  650 mg Rectal Q6H PRN Wadsworth Pride, DO      
 polyethylene glycol (MIRALAX) packet 17 g  17 g Oral DAILY PRN Wadsworth Pride, DO      
 promethazine (PHENERGAN) tablet 12.5 mg  12.5 mg Oral Q6H PRN Wadsworth Pride, DO   12.5 mg at 01/12/21 2257 Or  
 ondansetron (ZOFRAN) injection 4 mg  4 mg IntraVENous Q6H PRN Wadsworth Pride, DO      
 heparin (porcine) injection 5,000 Units  5,000 Units SubCUTAneous Q8H Wadsworth Pride, DO   5,000 Units at 01/14/21 5642  ePHEDrine in NS (PF) injection 5 mg  5 mg IntraVENous Q20MIN PRN Joel Broach, CRNA   5 mg at 01/08/21 2255 Physical Exam:  
 
Physical Exam:  
General:  Alert, cooperative, no distress, appears stated age. Neck: Supple, symmetrical, trachea midline, no adenopathy, thyroid: no enlargement/tenderness/nodules, no carotid bruit and no JVD. Lungs:   Clear to auscultation bilaterally. Heart:  Regular rate and rhythm, S1, S2 normal, no murmur, click, rub or gallop. Abdomen:   Soft, non-tender. Bowel sounds normal. No masses,  No organomegaly. Incision is well apposed. Extremities: Extremities normal, atraumatic, no cyanosis or edema Skin: Skin color, texture, turgor normal. No rashes or lesions Data Review: CBC w/Diff Recent Labs  
  01/14/21 
0505 01/12/21 
7549 WBC 9.9 8.0  
RBC 2.50* 2.34* HGB 7.7* 7.4* HCT 25.0* 23.5*  
.0* 100.4* MCH 30.8 31.6 MCHC 30.8* 31.5  
RDW 16.2* 16.3* Recent Labs  
  01/14/21 
0505 01/12/21 
4937 MONOS 12* 9  
EOS 2 2  
BASOS 0 0  
RDW 16.2* 16.3* Comprehensive Metabolic Profile Recent Labs  
  01/14/21 
0505 01/13/21 2015 01/12/21 
1525  151* 151*  
K 3.8 3.9 3.3*  
* 121* 123* CO2 23 24 23 BUN 37* 41* 47* CREA 1.83* 2.06* 2.18* Recent Labs  
  01/14/21 
0505 01/13/21 2015 01/12/21 
1525 CA 9.5 9.9 8.8 PHOS 1.9* 2.2* 1.6* ALB 2.0* 2.3* 2.0* Impression: Active Hospital Problems Diagnosis Date Noted  Acute renal failure (ARF) ([facility] 01/10/2021  Oliguria OR anuria due to procedure 01/10/2021  Graves disease 01/10/2021  S/P exploratory laparotomy 01/08/2021  Bowel obstruction ([facility] [address] 01/08/2021  Chronic kidney disease, stage III (moderate) 04/20/2020  Anemia 04/20/2020  Secondary hyperparathyroidism of renal origin ([facility] [address] 04/20/2020 Plan:  
Continue OT/PT Laxative. Continue IVF. Tara Banks MD

## 2021-01-14 NOTE — PROGRESS NOTES
Progress Note Martha Hummel 
81 y.o. Admit Date: 1/8/2021 Principal Problem: S/P exploratory laparotomy (1/8/2021) POA: Unknown Active Problems: 
  Chronic kidney disease, stage III (moderate) (4/20/2020) POA: Unknown Anemia (4/20/2020) POA: Yes Secondary hyperparathyroidism of renal origin ([facility] [address] (4/20/2020) POA: Yes Bowel obstruction ([facility] [address] (1/8/2021) POA: Unknown Acute renal failure (ARF) ([facility] [address] (1/10/2021) POA: Unknown Oliguria OR anuria due to procedure (1/10/2021) POA: Unknown Graves disease (1/10/2021) POA: Unknown Subjective:  
 
Patient is more alert. Communicative, still confused, NO BM yet, no SOB , on IVF A comprehensive review of systems was negative except for that written in the History of Present Illness. Objective:  
 
Visit Vitals /79 (BP 1 Location: Right arm, BP Patient Position: At rest) Pulse 88 Temp 97 °F (36.1 °C) Resp 18 Ht 5' (1.524 m) Wt 73.7 kg (162 lb 6.4 oz) SpO2 97% BMI 31.72 kg/m² Intake/Output Summary (Last 24 hours) at 1/13/2021 0403 Last data filed at 1/13/2021 1328 Gross per 24 hour Intake 1898.75 ml Output 750 ml Net 1148.75 ml Current Facility-Administered Medications Medication Dose Route Frequency Provider Last Rate Last Admin  [START ON 1/14/2021] levothyroxine (SYNTHROID) injection 100 mcg  100 mcg IntraVENous DAILY Gabby Angel MD      
 phosphorus (K PHOS NEUTRAL) 250 mg tablet 1 Tab  1 Tab Oral BID Doni Alvarez MD   1 Tab at 01/13/21 1715  dextrose 5% - 0.45% NaCl with KCl 20 mEq/L infusion  100 mL/hr IntraVENous CONTINUOUS Doni Alvarez  mL/hr at 01/13/21 1555 100 mL/hr at 01/13/21 1555  famotidine (PF) (PEPCID) 20 mg in 0.9% sodium chloride 10 mL injection  20 mg IntraVENous DAILY Jose Barcenas MD   20 mg at 01/13/21 0829  
 mineral oil 30 mL  30 mL Oral DAILY Hanna Rivera MD   30 mL at 01/13/21 0900  bisacodyL (DULCOLAX) tablet 5 mg  5 mg Oral DAILY PRN Leo Matt MD      
 0.9% sodium chloride infusion 250 mL  250 mL IntraVENous PRN Bibiana Austin PA-C      
 calcitRIOL (ROCALTROL) capsule 0.25 mcg  0.25 mcg Oral Q MON, WED & Hollie Pickard MD   0.25 mcg at 01/11/21 2336  oxyCODONE (ROXICODONE) 5 mg/5 mL oral solution 5 mg  5 mg Oral Q4H PRN Leo Matt MD   5 mg at 01/12/21 2257  sodium chloride (NS) flush 5-10 mL  5-10 mL IntraVENous PRN Juan Carlos Burch PA-C      
 sodium chloride (NS) flush 5-40 mL  5-40 mL IntraVENous Q8H Zak Harrisonless, DO   10 mL at 01/13/21 1400  
 sodium chloride (NS) flush 5-40 mL  5-40 mL IntraVENous PRN Zak Loveless, DO      
 acetaminophen (TYLENOL) tablet 650 mg  650 mg Oral Q6H PRN Zak Harrisonless, DO Or  
 acetaminophen (TYLENOL) suppository 650 mg  650 mg Rectal Q6H PRN Zak Loveless, DO      
 polyethylene glycol (MIRALAX) packet 17 g  17 g Oral DAILY PRN Zak Harrisonless, DO      
 promethazine (PHENERGAN) tablet 12.5 mg  12.5 mg Oral Q6H PRN Zak Loveless, DO   12.5 mg at 01/12/21 2257 Or  
 ondansetron (ZOFRAN) injection 4 mg  4 mg IntraVENous Q6H PRN Zak Loveless, DO      
 heparin (porcine) injection 5,000 Units  5,000 Units SubCUTAneous Q8H Mayfield Loveless, DO   5,000 Units at 01/13/21 1439  
 ePHEDrine in NS (PF) injection 5 mg  5 mg IntraVENous Q20MIN PRN Nehemias Dyke, CRNA   5 mg at 01/08/21 2255 Physical Exam:  
 
Physical Exam:  
General:  Alert, cooperative, no distress, appears stated age. , still sort of confused Neck: Supple, symmetrical, trachea midline, no adenopathy, thyroid: no enlargement/tenderness/nodules, no carotid bruit and no JVD. Lungs:   Clear to auscultation bilaterally. Heart:  Regular rate and rhythm, S1, S2 normal, no murmur, click, rub or gallop. Abdomen:   Soft, non-tender. Bowel sounds normal. No masses,  No organomegaly. Extremities: Extremities normal, atraumatic, no cyanosis or edema. Skin: Skin color, texture, turgor normal. No rashes or lesions Data Review: CBC w/Diff Recent Labs  
  01/12/21 
1828 01/11/21 
9881 WBC 8.0 7.8  
RBC 2.34* 2.39* HGB 7.4* 7.7* HCT 23.5* 23.9*  
.4* 100.0*  
MCH 31.6 32.2 MCHC 31.5 32.2 RDW 16.3* 16.8* Recent Labs  
  01/12/21 
4610 01/11/21 
1618 MONOS 9 7 EOS 2 1  
BASOS 0 0  
RDW 16.3* 16.8* Comprehensive Metabolic Profile Recent Labs  
  01/12/21 
1525 01/12/21 
0624 01/11/21 
1932 * 148* 147* K 3.3* 3.4* 3.7 * 119* 118* CO2 23 23 25 BUN 47* 52* 59* CREA 2.18* 2.70* 3.01* Recent Labs  
  01/12/21 
1525 01/12/21 
1646 01/11/21 
1932 01/11/21 
4798 CA 8.8 9.4 9.5 9.7 PHOS 1.6* 2.0* 1.9* 2.5 ALB 2.0* 2.0* 2.3* 2.1*  
TP  --   --   --  5.4* TBILI  --   --   --  0.4 Impression: Active Hospital Problems Diagnosis Date Noted  Acute renal failure (ARF) ([facility] [address] 01/10/2021  Oliguria OR anuria due to procedure 01/10/2021  Graves disease 01/10/2021  S/P exploratory laparotomy 01/08/2021  Bowel obstruction ([address] 01/08/2021  Chronic kidney disease, stage III (moderate) 04/20/2020  Anemia 04/20/2020  Secondary hyperparathyroidism of renal origin ([facility] [address] 04/20/2020 Having free water deficient, also Phosphorus deficient Plan:  
 
Continue IVF, replace, K, Phosphorus, add free water as tolerated Tamika Graham MD  
 
 
 
 
 
 
 Cheilitis Aggressive Treatment: I recommended application of Vaseline or Aquaphor numerous times a day (as often as every hour) and before going to bed. I also prescribed a topical steroid for twice daily use.

## 2021-01-14 NOTE — PROGRESS NOTES
This patient meets the following P&T approved criteria and has been converted from IV to oral therapy for the following medication: Famotidine Functional GI Tract (Applies to all medications) I have verified that the patient meets the following criteria below (Please note that a No response means the patient is not a candidate for IV to PO conversion) Has no active NPO order (check order review activity) Yes Is taking enteral meds (PO, NG, GT, etc) (check medication activity order review for a tube) Yes Is tolerating tube feeds at goal rate or a full liquid, soft, or regular diet  (check progress notes, order review activity, LDA flowsheet) Diet = 
Current Diet Orders Procedures DIET GI LITE (POST SURGICAL) Yes If with an NG tube placed, is not on continuous suction  
(check progress notes) Yes If on Continuous tube feedings, can be interrupted for an extended period of time for the administration of drugs where absorption and effectiveness are reduced in the presence of enteral feedings (e.g. Ciprofloxacin) (check progress notes, order review) Yes Has no nausea and/or vomiting or severe diarrhea within past 24 hours (check progress notes, use of antiemetics, antidiarrheal agents) Yes Has no malabsorption  syndromes  
(e.g. gastrectomy, intestinal resection, bariatric surgery, uncontrolled diarrhea, short bowel syndrome, ileus, gastrointestinal obstruction) (check progress notes) Yes Doesnt have active upper gastrointestinal bleeding  (check progress notes) Yes Has no significant painful oral ulceration (check progress notes, use of meds for oral ulceration such as magic mouthwash, phenol spray) Yes 2. Miscellaneous Criteria (Applies to all medications) I have verified that the patient meets the following criteria below (Please note that a No response means the patient is not a candidate for IV to PO conversion) Is not on vasopressor medications (check medication activity) Yes 3. Antiepileptic Specific Criteria 
(Antiepileptic medications only) I have verified that the patient meets the following criteria below (Please note that a No response means the patient is not a candidate for IV to PO conversion) Hasnt had a seizure for 72hrs Yes 3. Levothyroxine Specific  Criteria I have verified that the patient meets the following criteria below (Please note that a No response means the patient is not a candidate for IV to PO conversion) Is not being treated for myxedema coma NA Treatment is not for organ donation NA Is euthyroid Euthyroid: 
TSH = 0.36 - 3.74 uIU/mL T4, Free = 0.7 - 1.5 NG/DL NA  
4. Infection Specific Criteria (ABX medications only) I have verified that the patient meets the following criteria below (Please note that a No response means the patient is not a candidate for IV to PO conversion) Vital Signs (to help determine clinical stability) HR?100 Pulse (Heart Rate): 93 SBP ?90 BP: (!) 153/81 RR ? 24 Resp Rate: 16 Temp < 100.4° F 
in the last 24 hours Temp: 98.6 °F (37 °C)  
O2 ?90% O2 Sat (%): 99 % Is clinically stable NA Has initial 24 hours of IV therapy NA Has a normal WBC or  the WBC is normalizing (check labs) WBC = Lab Results Component Value Date/Time WBC 9.9 01/14/2021 05:05 AM  
 
NA Doesnt have neutropenia (ANC < 1500 cells/microL) present 
(check labs) NA Has no infection with high treatment failure rate 
(e.g. meningitis, brain abscess,orbital cellulitis, other central nervous system infections, endophthalmitis, mediastinitis,  IV TMP/SMX treatment for PCP in AIDs, fungemia, ventriculitis, endocarditis, Pseudomonas pneumonia, intra-abdominal abscess, empyema, necrotizing soft tissue infections, osteomyelitis or post-obstructive pneumonia) (check indication for antibiotic, progress notes) NA  
 
 Pharmacist Notes: Patient is taking other PO medications, on PPI PTA Pharmacy will continue to monitor for the duration of therapy to ensure the patient continues to meet protocol criteria and the conversion remains appropriate.  
 
Karl Merino, Pharmacist 
1/14/2021 10:57 AM

## 2021-01-14 NOTE — PROGRESS NOTES
Patient is alert to self with no complaints of pain or discomfort. Patient is resting comfortably in bed. AM assessment completed. Bed in lowest locked position, call light within reach, side rails x3. Problem: Risk for Spread of Infection Goal: Prevent transmission of infectious organism to others Description: Prevent the transmission of infectious organisms to other patients, staff members, and visitors. Outcome: Progressing Towards Goal 
  
Problem: Patient Education:  Go to Education Activity Goal: Patient/Family Education Outcome: Progressing Towards Goal 
  
Problem: Pressure Injury - Risk of 
Goal: *Prevention of pressure injury Description: Document Thierry Scale and appropriate interventions in the flowsheet. Outcome: Progressing Towards Goal 
Note: Pressure Injury Interventions: 
Sensory Interventions: Assess changes in LOC, Avoid rigorous massage over bony prominences, Check visual cues for pain, Keep linens dry and wrinkle-free, Maintain/enhance activity level, Minimize linen layers Moisture Interventions: Absorbent underpads, Apply protective barrier, creams and emollients, Check for incontinence Q2 hours and as needed, Internal/External urinary devices Activity Interventions: Assess need for specialty bed, Increase time out of bed, Pressure redistribution bed/mattress(bed type) Mobility Interventions: Turn and reposition approx. every two hours(pillow and wedges), Pressure redistribution bed/mattress (bed type) Nutrition Interventions: Document food/fluid/supplement intake Friction and Shear Interventions: Apply protective barrier, creams and emollients, Foam dressings/transparent film/skin sealants, Lift sheet Problem: Patient Education: Go to Patient Education Activity Goal: Patient/Family Education Outcome: Progressing Towards Goal 
  
Problem: Falls - Risk of 
Goal: *Absence of Falls Description: Document Yesenia Donahue Fall Risk and appropriate interventions in the flowsheet. Outcome: Progressing Towards Goal 
Note: Fall Risk Interventions: 
  
 
Mentation Interventions: Adequate sleep, hydration, pain control, Bed/chair exit alarm, Door open when patient unattended Medication Interventions: Bed/chair exit alarm Elimination Interventions: Bed/chair exit alarm, Call light in reach, Patient to call for help with toileting needs, Toileting schedule/hourly rounds Problem: Patient Education: Go to Patient Education Activity Goal: Patient/Family Education Outcome: Progressing Towards Goal 
  
Problem: Patient Education: Go to Patient Education Activity Goal: Patient/Family Education Outcome: Progressing Towards Goal 
  
Problem: Patient Education: Go to Patient Education Activity Goal: Patient/Family Education Outcome: Progressing Towards Goal 
  
Problem: Patient Education: Go to Patient Education Activity Goal: Patient/Family Education Outcome: Progressing Towards Goal 
  
Problem: General Medical Care Plan Goal: *Vital signs within specified parameters Outcome: Progressing Towards Goal 
Goal: *Labs within defined limits Outcome: Progressing Towards Goal 
Goal: *Absence of infection signs and symptoms Outcome: Progressing Towards Goal 
Goal: *Optimal pain control at patient's stated goal 
Outcome: Progressing Towards Goal 
Goal: *Skin integrity maintained Outcome: Progressing Towards Goal 
Goal: *Fluid volume balance Outcome: Progressing Towards Goal 
Goal: *Optimize nutritional status Outcome: Progressing Towards Goal 
Goal: *Anxiety reduced or absent Outcome: Progressing Towards Goal 
Goal: *Progressive mobility and function (eg: ADL's) Outcome: Progressing Towards Goal 
  
Problem: Patient Education: Go to Patient Education Activity Goal: Patient/Family Education Outcome: Progressing Towards Goal 
  
Problem: Anemia Care Plan (Adult and Pediatric) Goal: *Labs within defined limits Outcome: Progressing Towards Goal 
Goal: *Tolerates increased activity Outcome: Progressing Towards Goal 
  
Problem: Patient Education: Go to Patient Education Activity Goal: Patient/Family Education Outcome: Progressing Towards Goal 
  
Problem: Nutrition Deficit Goal: *Optimize nutritional status Outcome: Progressing Towards Goal

## 2021-01-14 NOTE — PROGRESS NOTES
Indio Jose M.D. FACS PROGRESS NOTE Name: Anita Swan MRN: [id number] : 1935 [facility] Date: 2021 Admission Date: 2021 11:21 AM  
 
Hospital Day: 7 
6 Days Post-Op Subjective: Pt without acute overnight events. No BM or Flatus. No n/v.  
Objective: 
Vitals:  
 21 2356 21 0005 21 3346 21 0502 BP:  (!) 157/73 (!) 131/57 (!) 153/81 Pulse:  93 75 93 Resp:  18 18 16 Temp:  98.1 °F (36.7 °C) 98 °F (36.7 °C) 98.6 °F (37 °C) SpO2:  93% 100% 99% Weight: 73.8 kg (162 lb 12.8 oz) Height:      
 
Date 21 0700 - 21 0659 21 0700 - 01/15/21 4368 Shift 3370-6102 4732-7973 24 Hour Total 7315-9964 3215-5955 24 Hour Total  
INTAKE  
P.O. 460  460     
  P. O. 460  460     
I. V.(mL/kg/hr) 887.5(1) 610(0.7) 1497.5(0.8) Volume (dextrose 5% - 0.45% NaCl with KCl 20 mEq/L infusion) 887.5  887.5 Volume (dextrose 5% 1,000 mL with potassium chloride 20 mEq infusion)  610 610 Volume (0.9% sodium chloride infusion 250 mL)  0 0 Shift Total(mL/kg) 1347. 5(18.3) 610(8.3) 1957.5(26.5) OUTPUT Urine(mL/kg/hr) 750(0.8) 300(0.3) 1050(0.6) Urine Voided  Urine Occurrence(s)  2 x 2 x Shift Total(mL/kg) 750(10.2) 300(4.1) 1893(58.6) .5 310 907.5 Weight (kg) 73.7 73.8 73.8 73.8 73.8 73.8 Physical Exam:  
 General: A&A, NAD, Oriented to self and location Abdomen: abdomen is soft with minimal midline incisional tenderness. Incision(s) are C/D/I. No masses, organomegaly or guarding Ext:  No c/c/e BLE. But edema of the right arm. Labs: 
Recent Results (from the past 24 hour(s)) RENAL FUNCTION PANEL Collection Time: 21  8:15 PM  
Result Value Ref Range Sodium 151 (H) 136 - 145 mmol/L Potassium 3.9 3.5 - 5.5 mmol/L Chloride 121 (H) 100 - 111 mmol/L  
 CO2 24 21 - 32 mmol/L  Anion gap 6 3.0 - 18 mmol/L  
 Glucose 122 (H) 74 - 99 mg/dL BUN 41 (H) 7.0 - 18 MG/DL Creatinine 2.06 (H) 0.6 - 1.3 MG/DL  
 BUN/Creatinine ratio 20 12 - 20 GFR est AA 28 (L) >60 ml/min/1.73m2 GFR est non-AA 23 (L) >60 ml/min/1.73m2 Calcium 9.9 8.5 - 10.1 MG/DL Phosphorus 2.2 (L) 2.5 - 4.9 MG/DL Albumin 2.3 (L) 3.4 - 5.0 g/dL T4, FREE Collection Time: 01/14/21  5:05 AM  
Result Value Ref Range T4, Free 4.2 (H) 0.7 - 1.5 NG/DL  
CBC WITH AUTOMATED DIFF Collection Time: 01/14/21  5:05 AM  
Result Value Ref Range WBC 9.9 4.6 - 13.2 K/uL  
 RBC 2.50 (L) 4.20 - 5.30 M/uL HGB 7.7 (L) 12.0 - 16.0 g/dL HCT 25.0 (L) 35.0 - 45.0 % .0 (H) 74.0 - 97.0 FL  
 MCH 30.8 24.0 - 34.0 PG  
 MCHC 30.8 (L) 31.0 - 37.0 g/dL  
 RDW 16.2 (H) 11.6 - 14.5 % PLATELET 319 336 - 672 K/uL MPV 10.8 9.2 - 11.8 FL  
 NEUTROPHILS 63 40 - 73 % LYMPHOCYTES 23 21 - 52 % MONOCYTES 12 (H) 3 - 10 % EOSINOPHILS 2 0 - 5 % BASOPHILS 0 0 - 2 %  
 ABS. NEUTROPHILS 6.3 1.8 - 8.0 K/UL  
 ABS. LYMPHOCYTES 2.2 0.9 - 3.6 K/UL  
 ABS. MONOCYTES 1.2 0.05 - 1.2 K/UL  
 ABS. EOSINOPHILS 0.2 0.0 - 0.4 K/UL  
 ABS. BASOPHILS 0.0 0.0 - 0.1 K/UL  
 DF AUTOMATED METABOLIC PANEL, BASIC Collection Time: 01/14/21  5:05 AM  
Result Value Ref Range Sodium 145 136 - 145 mmol/L Potassium 3.8 3.5 - 5.5 mmol/L Chloride 115 (H) 100 - 111 mmol/L  
 CO2 23 21 - 32 mmol/L Anion gap 7 3.0 - 18 mmol/L Glucose 127 (H) 74 - 99 mg/dL BUN 37 (H) 7.0 - 18 MG/DL Creatinine 1.83 (H) 0.6 - 1.3 MG/DL  
 BUN/Creatinine ratio 20 12 - 20 GFR est AA 32 (L) >60 ml/min/1.73m2 GFR est non-AA 26 (L) >60 ml/min/1.73m2 Calcium 9.5 8.5 - 10.1 MG/DL  
PHOSPHORUS Collection Time: 01/14/21  5:05 AM  
Result Value Ref Range Phosphorus 1.9 (L) 2.5 - 4.9 MG/DL MAGNESIUM Collection Time: 01/14/21  5:05 AM  
Result Value Ref Range Magnesium 1.4 (L) 1.6 - 2.6 mg/dL ALBUMIN  Collection Time: 01/14/21  5:05 AM  
 Result Value Ref Range Albumin 2.0 (L) 3.4 - 5.0 g/dL All Micro Results Procedure Component Value Units Date/Time CULTURE, BLOOD [[id number]] Collected: 01/08/21 1254 Order Status: Completed Specimen: Blood Updated: 01/14/21 2310 Special Requests: --     
  NO SPECIAL REQUESTS 
LEFT 
HAND Culture result: NO GROWTH 6 DAYS     
 CULTURE, BLOOD [[id number]] Collected: 01/08/21 1253 Order Status: Completed Specimen: Blood Updated: 01/14/21 3160 Special Requests: --     
  NO SPECIAL REQUESTS 
LEFT Antecubital 
  
  Culture result: NO GROWTH 6 DAYS Current Medications: 
Current Facility-Administered Medications Medication Dose Route Frequency Provider Last Rate Last Admin  levothyroxine (SYNTHROID) injection 100 mcg  100 mcg IntraVENous DAILY Yossi Tellez MD   100 mcg at 01/14/21 0821  
 phosphorus (K PHOS NEUTRAL) 250 mg tablet 1 Tab  1 Tab Oral BID Soumya Mcknight MD   1 Tab at 01/14/21 0820  
 dextrose 5% 1,000 mL with potassium chloride 20 mEq infusion   IntraVENous CONTINUOUS Soumya Mcknight  mL/hr at 01/14/21 0053 New Bag at 01/14/21 4648  famotidine (PF) (PEPCID) 20 mg in 0.9% sodium chloride 10 mL injection  20 mg IntraVENous DAILY Arlen Dubon MD   20 mg at 01/14/21 0820  
 mineral oil 30 mL  30 mL Oral DAILY Alta Phoenix MD   30 mL at 01/14/21 0820  
 bisacodyL (DULCOLAX) tablet 5 mg  5 mg Oral DAILY PRN Alta Phoenix MD      
 0.9% sodium chloride infusion 250 mL  250 mL IntraVENous PRN Bibiana Austin PA-C      
 calcitRIOL (ROCALTROL) capsule 0.25 mcg  0.25 mcg Oral Q MON, WED & Cameron Wtason MD   0.25 mcg at 01/13/21 2107  
 oxyCODONE (ROXICODONE) 5 mg/5 mL oral solution 5 mg  5 mg Oral Q4H PRN Alta Phoenix MD   5 mg at 01/12/21 2257  sodium chloride (NS) flush 5-10 mL  5-10 mL IntraVENous PRN Glyn Dance, PA-C      
  sodium chloride (NS) flush 5-40 mL  5-40 mL IntraVENous Q8H Lennox Ovens, DO   10 mL at 01/14/21 0510  
 sodium chloride (NS) flush 5-40 mL  5-40 mL IntraVENous PRN Lennox Ovens, DO      
 acetaminophen (TYLENOL) tablet 650 mg  650 mg Oral Q6H PRN Lennox Ovens, DO Or  
 acetaminophen (TYLENOL) suppository 650 mg  650 mg Rectal Q6H PRN Lennox Ovens, DO      
 polyethylene glycol (MIRALAX) packet 17 g  17 g Oral DAILY PRN Lennox Ovens, DO      
 promethazine (PHENERGAN) tablet 12.5 mg  12.5 mg Oral Q6H PRN Lennox Ovens, DO   12.5 mg at 01/12/21 2257 Or  
 ondansetron (ZOFRAN) injection 4 mg  4 mg IntraVENous Q6H PRN Lennox Ovens, DO      
 heparin (porcine) injection 5,000 Units  5,000 Units SubCUTAneous Q8H Lennox Ovens, DO   5,000 Units at 01/14/21 0251  ePHEDrine in NS (PF) injection 5 mg  5 mg IntraVENous Q20MIN PRN Velta Beecham, CRNA   5 mg at 01/08/21 2255 Chart and notes reviewed. Data reviewed. I have evaluated and examined the patient. IMPRESSION:  
· Pt is POD 6. PLAN:/DISCUSION:  
· Encourage po intake · Discharge planning Etelvina Torres MD

## 2021-01-14 NOTE — ROUTINE PROCESS
Bedside and Verbal shift change report given to Jessi Navarrete RN (oncoming nurse) by Chester Delgadillo (offgoing nurse). Report included the following information SBAR, Kardex, MAR and Recent Results. SITUATION: 
Code Status: DNR Reason for Admission: S/P exploratory laparotomy [Z98.890] Bowel obstruction ([facility] [address] [P91.742] Hospital day: 6 Problem List:  
   
Hospital Problems  Date Reviewed: 1/10/2021 Codes Class Noted POA Acute renal failure (ARF) (HCC) ICD-10-CM: N17.9 ICD-9-CM: 584.9  1/10/2021 Unknown Oliguria OR anuria due to procedure ICD-10-CM: N99.89, R34 
ICD-9-CM: 997.5, 788.5  1/10/2021 Unknown Graves disease ICD-10-CM: E05.00 ICD-9-CM: 242.00  1/10/2021 Unknown * (Principal) S/P exploratory laparotomy ICD-10-CM: Y14.081 ICD-9-CM: V45.89  1/8/2021 Unknown Bowel obstruction ([facility] [address] ICD-10-CM: T87.773 ICD-9-CM: 560.9  1/8/2021 Unknown Chronic kidney disease, stage III (moderate) ICD-10-CM: N18.30 ICD-9-CM: 585.3  4/20/2020 Unknown Anemia ICD-10-CM: D64.9 ICD-9-CM: 285.9  4/20/2020 Yes Secondary hyperparathyroidism of renal origin [facility]) ICD-10-CM: N25.81 ICD-9-CM: 588.81  4/20/2020 Yes BACKGROUND: 
 Past Medical History:  
Past Medical History:  
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   
 Nonspecific abnormal electrocardiogram (ECG) (EKG)  Obesity, unspecified  Other and unspecified hyperlipidemia  Renal insufficiency Patient taking anticoagulants yes Patient has a defibrillator: no  
 History of shots Yes for example, flu, pneumonia, tetanus Isolation History NO for example, MRSA, CDiff ASSESSMENT: 
Changes in Assessment Throughout Shift: None Significant Changes in 24 hours (for example, RR/code, fall) Patient has Central Line: Yes Patient has Mathur Cath: no  
Mobility Issues PT 
IV Patency OR Checklist 
Pending Tests Last Vitals: 
Vitals w/ MEWS Score (last day) Date/Time MEWS Score Pulse Resp Temp BP Level of Consciousness SpO2  
 01/14/21 0416  1  75  18  98 °F (36.7 °C)  (!) 131/57  Alert  100 % 01/14/21 0005  1  93  18  98.1 °F (36.7 °C)  (!) 157/73  Alert  93 % 01/13/21 2029  1  89  18  98.4 °F (36.9 °C)  (!) 152/86  Alert  98 % 01/13/21 1502  1  88  18  97 °F (36.1 °C)  136/79  Alert  97 % 01/13/21 1120  1  77  20  98.8 °F (37.1 °C)  (!) 159/80  Alert  99 % 01/13/21 0812  1  84  20  98.9 °F (37.2 °C)  (!) 178/81  Alert  100 % 01/13/21 0422  1  70  18  98 °F (36.7 °C)  (!) 149/69  Alert  99 % 01/13/21 0017    73  18  98.2 °F (36.8 °C)  135/66    99 % PAIN Pain Assessment Pain Intensity 1: 0 (01/14/21 0509) Pain Location 1: Abdomen(?) Pain Intervention(s) 1: Medication (see MAR) Patient Stated Pain Goal: 0 Intervention effective: N/A Time of last intervention: N/A Reassessment Completed: yes Other actions taken for pain: Distraction Last 3 Weights: 
Last 3 Recorded Weights in this Encounter 01/10/21 0806 01/12/21 0600 01/13/21 2356 Weight: 71.7 kg (158 lb) 73.7 kg (162 lb 6.4 oz) 73.8 kg (162 lb 12.8 oz) Weight change: INTAKE/OUPUT Current Shift: No intake/output data recorded. Last three shifts: 01/12 1901 - 01/14 0700 In: 2508.8 [P.O.:460; I.V.:2048.8] Out: 8266 [[id number]] RECOMMENDATIONS AND DISCHARGE PLANNING Patient needs and requests: Assistance with ADLs Pending tests/procedures: Labs Discharge plan for patient: Home Discharge planning Needs or Barriers: None Estimated Discharge Date: 1/21/2021 Posted on Whiteboard in [facility] Room: yes \"HEALS\" SAFETY CHECK A safety check occurred in the patient's room between off going nurse and oncoming nurse listed above.  
 
The safety check included the below items: 
 
H 
 High Alert Medications Verify all high alert medication drips (heparin, PCA, etc.) E Equipment Suction is set up for ALL patients (with navi) Red plugs utilized for all equipment (IV pumps, etc.) WOWs wiped down at end of shift. Room stocked with oxygen, suction, and other unit-specific supplies A Alarms Bed alarm is set for fall risk patients Ensure chair alarm is in place and activated if patient is up in a chair L Lines Check IV for any infiltration Mathur bag is empty if patient has a Mathur Tubing and IV bags are labeled Candelaria Gerber Safety Room is clean, patient is clean, and equipment is clean. Hallways are clear from equipment besides carts. Fall bracelet on for fall risk patients Ensure room is clear and free of clutter Suction is set up for ALL patients (with navi) Hallways are clear from equipment besides carts. Isolation precautions followed, supplies available outside room, sign posted Luwana Pean

## 2021-01-15 NOTE — PROGRESS NOTES
Problem: Risk for Spread of Infection Goal: Prevent transmission of infectious organism to others Description: Prevent the transmission of infectious organisms to other patients, staff members, and visitors. Outcome: Progressing Towards Goal 
  
Problem: Patient Education:  Go to Education Activity Goal: Patient/Family Education Outcome: Progressing Towards Goal 
  
Problem: Pressure Injury - Risk of 
Goal: *Prevention of pressure injury Description: Document Thierry Scale and appropriate interventions in the flowsheet. Outcome: Progressing Towards Goal 
Note: Pressure Injury Interventions: 
Sensory Interventions: Assess changes in LOC, Avoid rigorous massage over bony prominences, Check visual cues for pain, Keep linens dry and wrinkle-free, Maintain/enhance activity level, Minimize linen layers, Monitor skin under medical devices, Turn and reposition approx. every two hours (pillows and wedges if needed) Moisture Interventions: Absorbent underpads, Apply protective barrier, creams and emollients, Internal/External urinary devices Activity Interventions: PT/OT evaluation, Increase time out of bed Mobility Interventions: Turn and reposition approx. every two hours(pillow and wedges), PT/OT evaluation, Pressure redistribution bed/mattress (bed type) Nutrition Interventions: Document food/fluid/supplement intake, Offer support with meals,snacks and hydration Friction and Shear Interventions: Apply protective barrier, creams and emollients, Foam dressings/transparent film/skin sealants, Lift sheet, Lift team/patient mobility team, Transferring/repositioning devices Problem: Patient Education: Go to Patient Education Activity Goal: Patient/Family Education Outcome: Progressing Towards Goal 
  
Problem: Falls - Risk of 
Goal: *Absence of Falls Description: Document Andre Cease Fall Risk and appropriate interventions in the flowsheet.  
Outcome: Progressing Towards Goal 
 Note: Fall Risk Interventions: 
  
 
Mentation Interventions: Adequate sleep, hydration, pain control, Bed/chair exit alarm Medication Interventions: Bed/chair exit alarm Elimination Interventions: Bed/chair exit alarm Problem: Patient Education: Go to Patient Education Activity Goal: Patient/Family Education Outcome: Progressing Towards Goal 
  
Problem: Patient Education: Go to Patient Education Activity Goal: Patient/Family Education Outcome: Progressing Towards Goal 
  
Problem: Patient Education: Go to Patient Education Activity Goal: Patient/Family Education Outcome: Progressing Towards Goal 
  
Problem: Patient Education: Go to Patient Education Activity Goal: Patient/Family Education Outcome: Progressing Towards Goal 
  
Problem: General Medical Care Plan Goal: *Vital signs within specified parameters Outcome: Progressing Towards Goal 
Goal: *Labs within defined limits Outcome: Progressing Towards Goal 
Goal: *Absence of infection signs and symptoms Outcome: Progressing Towards Goal 
Goal: *Optimal pain control at patient's stated goal 
Outcome: Progressing Towards Goal 
Goal: *Skin integrity maintained Outcome: Progressing Towards Goal 
Goal: *Fluid volume balance Outcome: Progressing Towards Goal 
Goal: *Optimize nutritional status Outcome: Progressing Towards Goal 
Goal: *Anxiety reduced or absent Outcome: Progressing Towards Goal 
Goal: *Progressive mobility and function (eg: ADL's) Outcome: Progressing Towards Goal 
  
Problem: Patient Education: Go to Patient Education Activity Goal: Patient/Family Education Outcome: Progressing Towards Goal 
  
Problem: Anemia Care Plan (Adult and Pediatric) Goal: *Labs within defined limits Outcome: Progressing Towards Goal 
Goal: *Tolerates increased activity Outcome: Progressing Towards Goal 
  
Problem: Patient Education: Go to Patient Education Activity Goal: Patient/Family Education Outcome: Progressing Towards Goal 
  
 Problem: Nutrition Deficit Goal: *Optimize nutritional status Outcome: Progressing Towards Goal

## 2021-01-15 NOTE — PROGRESS NOTES
ARU/IPR REFERRAL CONTACT NOTE [id number] [facility] Re: Anne Gardner IP Consult for IP Rehab Screen received. Will review case and advise as appropriate. No available beds until next week. Will follow. Thank you for the consult. Lisa Gunter
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
## 2021-01-15 NOTE — PROGRESS NOTES
Bedside shift change report given to Unique Bates RN (oncoming nurse) by Mary Beth Juárez RN (offgoing nurse). Report included the following information SBAR, Kardex, MAR and Recent Results.

## 2021-01-15 NOTE — PROGRESS NOTES
Pt is awake, alert, stil a bit confused. Pulse rate has increaed into the 90's. Free T4 has returned wuite high at 7.0. TSH has now dropped to normal.  
 
Review of the records indicates she was on methimazole, 10 mg. Twice a day, when last seen in January a year ago. She has had longstanding hyperthyroidism controlled by anti-thyroid medication. [place] has rapidly become hyperthyroid again since the methimazole was stopped on admission. Plan:  Restart methimazole at 20 mg twice daily. Will continue to follow a daily free T4 level.

## 2021-01-15 NOTE — PROGRESS NOTES
Discharge planning: 
 
Patient is progressing slowly towards discharge. She resides with her daughter and now she is being recommended for rehab. This writer reached out to ARU for them to start evaluating patient for potential admission to ARU. Care management will continue to monitor for discharge planning. Janien Daly. [facility] Care Manager Pager#: [phone]

## 2021-01-15 NOTE — PROGRESS NOTES
Progress Note Horace Cr 
81 y.o. Admit Date: 1/8/2021 Principal Problem: S/P exploratory laparotomy (1/8/2021) POA: Unknown Active Problems: 
  Chronic kidney disease, stage III (moderate) (4/20/2020) POA: Unknown Anemia (4/20/2020) POA: Yes Secondary hyperparathyroidism of renal origin ([facility] [address] (4/20/2020) POA: Yes Bowel obstruction ([facility] [address] (1/8/2021) POA: Unknown Acute renal failure (ARF) ([facility] [address] (1/10/2021) POA: Unknown Oliguria OR anuria due to procedure (1/10/2021) POA: Unknown Graves disease (1/10/2021) POA: Unknown Subjective:  
 
Patient is comfortable, remained on bed. . Now back on Tapazole A comprehensive review of systems was negative except for that written in the History of Present Illness. Objective:  
 
Visit Vitals /69 Pulse 90 Temp 97.2 °F (36.2 °C) Resp 16 Ht 5' (1.524 m) Wt 73.3 kg (161 lb 11.2 oz) SpO2 98% BMI 31.58 kg/m² Intake/Output Summary (Last 24 hours) at 1/15/2021 1403 Last data filed at 1/15/2021 1183 Gross per 24 hour Intake 2183.34 ml Output  Net 2183.34 ml Current Facility-Administered Medications Medication Dose Route Frequency Provider Last Rate Last Admin  dextrose 5% with KCl 20 mEq/L 1,000 mL infusion   IntraVENous CONTINUOUS Reema Moyer  mL/hr at 01/15/21 0613 New Bag at 01/15/21 5650  methIMAzole (TAPAZOLE) tablet 20 mg  20 mg Oral Q8H Iraida Zhang MD      
 bisacodyL (DULCOLAX) tablet 5 mg  5 mg Oral DAILY Shawna Alcaraz MD   Stopped at 01/15/21 0916  
 bisacodyL (DULCOLAX) suppository 10 mg  10 mg Rectal DAILY Shawna Alcaraz MD   Stopped at 01/15/21 9232  famotidine (PEPCID) tablet 20 mg  20 mg Oral DAILY Marci Estrada MD   20 mg at 01/15/21 0916  
 magnesium oxide (MAG-OX) tablet 400 mg  400 mg Oral BID Shawna Alcaraz MD   400 mg at 01/15/21 8775  phosphorus (K PHOS NEUTRAL) 250 mg tablet 1 Tab  1 Tab Oral BID Blaire Cid, Dano Ingram MD   1 Tab at 01/15/21 0916  
 mineral oil 30 mL  30 mL Oral DAILY Jessica Lewis MD   30 mL at 01/15/21 8307  
 0.9% sodium chloride infusion 250 mL  250 mL IntraVENous PRN Bibiana Huerta PA-C      
 calcitRIOL (ROCALTROL) capsule 0.25 mcg  0.25 mcg Oral Q MON, WED & Soha Angustura, Dano Ingram MD   0.25 mcg at 01/13/21 2107  
 oxyCODONE (ROXICODONE) 5 mg/5 mL oral solution 5 mg  5 mg Oral Q4H PRN Jessica Lewis MD   5 mg at 01/12/21 2257  sodium chloride (NS) flush 5-10 mL  5-10 mL IntraVENous PRN Alberto Rodriguez PA-C      
 sodium chloride (NS) flush 5-40 mL  5-40 mL IntraVENous Q8H Karen Quill, DO   10 mL at 01/15/21 5625  sodium chloride (NS) flush 5-40 mL  5-40 mL IntraVENous PRN Karen Quill, DO      
 acetaminophen (TYLENOL) tablet 650 mg  650 mg Oral Q6H PRN Karen Quill, DO   650 mg at 01/14/21 2131 Or  acetaminophen (TYLENOL) suppository 650 mg  650 mg Rectal Q6H PRN Karen Quill, DO      
 polyethylene glycol (MIRALAX) packet 17 g  17 g Oral DAILY PRN Karen Quill, DO      
 promethazine (PHENERGAN) tablet 12.5 mg  12.5 mg Oral Q6H PRN Karen Quill, DO   12.5 mg at 01/12/21 2257 Or  
 ondansetron (ZOFRAN) injection 4 mg  4 mg IntraVENous Q6H PRN Karen Quill, DO      
 heparin (porcine) injection 5,000 Units  5,000 Units SubCUTAneous Q8H Karen Quill, DO   5,000 Units at 01/15/21 2640  ePHEDrine in NS (PF) injection 5 mg  5 mg IntraVENous Q20MIN PRN Evelyn Creeks, CRNA   5 mg at 01/08/21 2255 Physical Exam:  
 
Physical Exam:  
General:  Alert, cooperative, no distress, appears stated age. Neck: Supple, symmetrical, trachea midline, no adenopathy, thyroid: no enlargement/tenderness/nodules, no carotid bruit and no JVD. Lungs:   Clear to auscultation bilaterally. Heart:  Regular rate and rhythm, S1, S2 normal, no murmur, click, rub or gallop. Abdomen:   Soft, non-tender.  Bowel sounds normal. No masses,  No organomegaly. Extremities: Extremities normal, atraumatic, no cyanosis or edema Skin: Skin color, texture, turgor normal. No rashes or lesions Data Review: CBC w/Diff Recent Labs  
  01/14/21 
0505 WBC 9.9  
RBC 2.50* HGB 7.7* HCT 25.0*  
.0*  
MCH 30.8 MCHC 30.8*  
RDW 16.2* Recent Labs  
  01/14/21 
0505 MONOS 12*  
EOS 2  
BASOS 0  
RDW 16.2* Comprehensive Metabolic Profile Recent Labs  
  01/15/21 
0415 01/14/21 
2235 01/14/21 
0505  144 145  
K 3.6 3.7 3.8 * 114* 115* CO2 23 24 23 BUN 31* 33* 37* CREA 1.59* 1.68* 1.83* Recent Labs  
  01/15/21 
0415 01/14/21 2235 01/14/21 
0505 CA 9.6 9.2 9.5 PHOS 2.2* 2.2* 1.9* ALB 2.0* 2.0* 2.0* Impression: Active Hospital Problems Diagnosis Date Noted  Acute renal failure (ARF) ([address] 01/10/2021  Oliguria OR anuria due to procedure 01/10/2021  Graves disease 01/10/2021  S/P exploratory laparotomy 01/08/2021  Bowel obstruction ([address] 01/08/2021  Chronic kidney disease, stage III (moderate) 04/20/2020  Anemia 04/20/2020  Secondary hyperparathyroidism of renal origin ([address] 04/20/2020 Renal function is back to her baseline Plan:  
 
Continue current supportive care,encourage Ambulation.  
 
 
Gagan Malhotra MD

## 2021-01-15 NOTE — PROGRESS NOTES
Problem: Self Care Deficits Care Plan (Adult) Goal: *Acute Goals and Plan of Care (Insert Text) Description: Occupational Therapy Goals Initiated 1/11/2021 within 7 day(s). 1.  Patient will perform bed mobility in preparation for self-care with minimal assistance/contact guard assist.  
2.  Patient will perform bilateral grooming task sitting EOB with supervision/set-up. 3.  Patient will perform upper body dressing with supervision/set-up. 4.  Patient will perform toilet transfers with moderate assistance . 5. Patient will perform all aspects of toileting with moderate assistance . 6. Patient will participate in upper extremity therapeutic exercise/activities with modified independence for 8 minutes. 7.  Patient will utilize energy conservation techniques during functional activities with verbal cues. Prior Level of Function: Patient a poor historian. Oriented to person only this sesison. Per patient she lives with her  and daughter and was independent with self-care and used a RW for functional mobility PTA. Outcome: Progressing Towards Goal 
 OCCUPATIONAL THERAPY TREATMENT Patient: Raad Mcmanus (32 y.o. female) Date: 1/15/2021 Diagnosis: S/P exploratory laparotomy [X25.267] Bowel obstruction ([facility] [address] [U62.139] S/P exploratory laparotomy Procedure(s) (LRB): 
EXPLORATORY LAPAROTOMY/POSSIBLE BOWEL RESECTION (N/A) 7 Days Post-Op Precautions: Fall, Skin PLOF: Patient a poor historian. Oriented to person only this sesison. Per patient she lives with her  and daughter and was independent with self-care and used a RW for functional mobility PTA. Chart, occupational therapy assessment, plan of care, and goals were reviewed.  
ASSESSMENT: 
 Pt presented supine in bed upon therapist arrival, 1.5 L O2NC found on bedside and re donned, and agreeable for participation. PT co tx to maximize pt safety and participation. Pt pleasantly confusion throughout tx session with SPO2 at 99%. Therapist observed pt and linens soiled. Pt performed functional bed mobility rolling L/R with MOD A to change chux pad and sheets. Pt required MAX A for marcello area hygiene while @ supine. TOTAL A x 2 scooting to [facility] for optimal bed positioning. Pt left with HOB elevated, 1.5L O2NC intact, B UE's elevated for edema mgmt, and call bell left within reach. Progression toward goals: 
[]          Improving appropriately and progressing toward goals [x]          Improving slowly and progressing toward goals 
[]          Not making progress toward goals and plan of care will be adjusted PLAN: 
Patient continues to benefit from skilled intervention to address the above impairments. Continue treatment per established plan of care. Discharge Recommendations:  Rehab Further Equipment Recommendations for Discharge:  TBA SUBJECTIVE:  
Patient stated  I love you guys. \" OBJECTIVE DATA SUMMARY:  
Cognitive/Behavioral Status: 
Neurologic State: Alert, Confused Orientation Level: Oriented to person, Oriented to place Cognition: Follows commands Safety/Judgement: Fall prevention Functional Mobility and Transfers for ADLs: 
 Bed Mobility: 
Rolling: Moderate assistance;Assist x1 ADL Intervention: Lower Body Bathing Bathing Assistance: Maximum assistance Perineal  : Maximum assistance Position Performed: Supine Cognitive Retraining Safety/Judgement: Fall prevention Pain: 
Pain level pre-treatment: 5/10 abdominal  
Pain level post-treatment: 5/10 Pain Intervention(s): Medication (see MAR); Rest, Repositioning Response to intervention: Nurse notified, See doc flow Activity Tolerance:   
Fair Please refer to the flowsheet for vital signs taken during this treatment. After treatment:  
[]  Patient left in no apparent distress sitting up in chair 
[x]  Patient left in no apparent distress in bed 
[x]  Call bell left within reach [x]  Nursing notified 
[]  Caregiver present [x]  Bed alarm activated COMMUNICATION/EDUCATION:  
[x] Role of Occupational Therapy in the acute care setting 
[] Home safety education was provided and the patient/caregiver indicated understanding. [x] Patient/family have participated as able in working towards goals and plan of care. [x] Patient/family agree to work toward stated goals and plan of care. [] Patient understands intent and goals of therapy, but is neutral about his/her participation. [] Patient is unable to participate in goal setting and plan of care. Thank you for this referral. 
JANEL Bear Time Calculation: 28 mins

## 2021-01-15 NOTE — DISCHARGE INSTRUCTIONS
Patient Education        Adhesions: What to Expect at [address] [place] [address] Drive have had surgery to remove adhesions. Adhesions are scar tissue that forms between two structures or organs inside the body that are not normally connected to each other. You may also have had part of your small or large intestine taken out. You're likely to feel weak and tired, and you may feel nauseated. It's common to have some pain in your belly and around your incision. The pain should steadily get better over the next few weeks. You may be able to return to normal activities after 2 to 4 weeks. Your bowel movements may not be regular for several weeks. Also, you may have some blood in your stool. This care sheet gives you a general idea about how long it will take for you to recover. But each person recovers at a different pace. Follow the steps below to get better as quickly as possible. How can you care for yourself at home? Activity    · Rest when you feel tired. Getting enough sleep will help you recover.     · Try to walk each day. Start by walking a little more than you did the day before. Bit by bit, increase the amount you walk. Walking boosts blood flow and helps prevent pneumonia and constipation.     · Avoid strenuous activities, such as biking, jogging, weight lifting, or aerobic exercise, until your doctor says it is okay.     · Avoid lifting anything that would make you strain. This may include heavy grocery bags and milk containers, a heavy briefcase or backpack, cat litter or dog food bags, a vacuum , or a child.     · Ask your doctor when you can drive again.     · You will probably need to take a few days or weeks off from work. It depends on the type of work you do and how you feel.     · You may shower 24 to 48 hours after surgery, if your doctor says it is okay. Pat the cut (incision) dry.  Do not take a bath for the first 2 weeks, or until your doctor tells you it is okay.     · Ask your doctor when it is okay for you to have sex. Diet    · You may not have much appetite after the surgery. But try to eat a healthy diet. Your doctor will tell you about any foods you should not eat.     · Eat a low-fiber diet for several weeks after surgery. Eat many small meals throughout the day. Add high-fiber foods a little at a time.     · Eat yogurt. It puts good bacteria into your colon and may help prevent diarrhea.     · You may need to take vitamins that contain sodium and potassium. Your doctor will tell you whether you should take any vitamins or supplements.     · Drink plenty of fluids (enough so that your urine is light yellow or clear like water) to prevent dehydration. Choose water and other caffeine-free clear liquids until you feel better. If you have kidney, heart, or liver disease and have to limit fluids, talk with your doctor before you increase the amount of fluids you drink. Medicines    · Your doctor will tell you if and when you can restart your medicines. He or she will also give you instructions about taking any new medicines.     · If you take aspirin or some other blood thinner, ask your doctor if and when to start taking it again. Make sure that you understand exactly what your doctor wants you to do.     · Take pain medicines exactly as directed. ? If the doctor gave you a prescription medicine for pain, take it as prescribed. ? If you are not taking a prescription pain medicine, ask your doctor if you can take an over-the-counter medicine.     · If you think your pain medicine is making you sick to your stomach:  ? Take your medicine after meals (unless your doctor has told you not to). ? Ask your doctor for a different pain medicine.     · If your doctor prescribed antibiotics, take them as directed. Do not stop taking them just because you feel better.  You need to take the full course of antibiotics.     · If your doctor recommends or gives you a stool softener for constipation, take it as directed. Incision care    · If you have strips of tape on the cut (incision) the doctor made, leave the tape on for a week or until it falls off. Or follow your doctor's instructions for removing the tape.     · Wash the area daily with warm, soapy water, and pat it dry. Don't use hydrogen peroxide or alcohol, which can slow healing. You may cover the area with a gauze bandage if it weeps or rubs against clothing. Change the bandage every day.     · Keep the area clean and dry. Follow-up care is a key part of your treatment and safety. Be sure to make and go to all appointments, and call your doctor if you are having problems. It's also a good idea to know your test results and keep a list of the medicines you take. When should you call for help? Call 911 anytime you think you may need emergency care. For example, call if:    · You passed out (lost consciousness).     · You are short of breath. Call your doctor now or seek immediate medical care if:    · You are sick to your stomach or cannot drink fluids.     · You have signs of a blood clot in your leg (called a deep vein thrombosis), such as:  ? Pain in your calf, back of the knee, thigh, or groin. ? Redness and swelling in your leg or groin.     · You have signs of infection, such as:  ? Increased pain, swelling, warmth, or redness. ? Red streaks leading from the incision. ? Pus draining from the incision. ? A fever.     · You cannot pass stools or gas.     · You have pain that does not get better after you take pain medicine.     · You have loose stitches, or your incision comes open.     · Bright red blood has soaked through the bandage over your incision. Watch closely for changes in your health, and be sure to contact your doctor if you have any problems. Where can you learn more? Go to http://www.gray.com/  Enter N163 in the search box to learn more about \"Adhesions: What to Expect at Home. \"  Current as of: April 15, 2020               Content Version: 12.6  © 2006-2020 [employer]. Care instructions adapted under license by [employer] (which disclaims liability or warranty for this information). If you have questions about a medical condition or this instruction, always ask your healthcare professional. [address] any warranty or liability for your use of this information. Patient Education        Open Bowel Resection: What to Expect at [address] [place] [address] Drive are likely to have pain that comes and goes for the next few days after bowel surgery. You may have bowel cramps, and your cut (incision) may hurt. You may also feel like you have the flu. You may have a low fever and feel tired and nauseated. This is common. You should feel better after a week and will probably be back to normal in 2 to 3 weeks. This care sheet gives you a general idea about how long it will take for you to recover. But each person recovers at a different pace. Follow the steps below to get better as quickly as possible. How can you care for yourself at home? Activity    · Rest when you feel tired. Getting enough sleep will help you recover.     · Try to walk each day. Start by walking a little more than you did the day before. Bit by bit, increase the amount you walk. Walking boosts blood flow and helps prevent pneumonia and constipation.     · Avoid strenuous activities, such as biking, jogging, weight lifting, or aerobic exercise, until your doctor says it is okay.     · Ask your doctor when you can drive again.     · You will probably need to take 3 to 4 weeks off from work. It depends on the type of work you do and how you feel. You may need to take off 4 to 6 weeks if you lift heavy objects in your job.     · You may shower 24 to 48 hours after surgery, if your doctor says it is okay. Pat the cut (incision) dry.  Do not take a bath for the first 2 weeks, or until your doctor tells you it is okay.     · Ask your doctor when it is okay for you to have sex. Diet    · You may not have much appetite after the surgery. But try to eat a healthy diet. Your doctor will tell you about any foods you should not eat.     · Eat a low-fiber diet for several weeks after surgery. Eat many small meals throughout the day. Add high-fiber foods a little at a time.     · Eat yogurt. It puts good bacteria into your colon and helps prevent diarrhea.     · Try to avoid nuts, seeds, and corn for a while. They may be hard to digest.     · You may need to take vitamins that contain sodium and potassium. Ask your doctor.     · Drink plenty of fluids to avoid becoming dehydrated. Medicines    · Your doctor will tell you if and when you can restart your medicines. He or she will also give you instructions about taking any new medicines.     · If you take aspirin or some other blood thinner, ask your doctor if and when to start taking it again. Make sure that you understand exactly what your doctor wants you to do.     · Take pain medicines exactly as directed. ? If the doctor gave you a prescription medicine for pain, take it as prescribed. ? If you are not taking a prescription pain medicine, ask your doctor if you can take an over-the-counter medicine. ? Do not take two or more pain medicines at the same time unless the doctor told you to. Many pain medicines have acetaminophen, which is Tylenol. Too much acetaminophen (Tylenol) can be harmful.     · If you think your pain medicine is making you sick to your stomach:  ? Take your medicine after meals (unless your doctor tells you not to). ? Ask your doctor for a different pain medicine.     · If your doctor prescribed antibiotics, take them as directed. Do not stop taking them just because you feel better. You need to take the full course of antibiotics.     · You may need to take some medicines in a different form.  You will be told whether to crush pills or take a liquid form of the medicine.     · If your doctor gives you a stool softener, take it as directed. Incision care    · If you have strips of tape on the incision, leave the tape on for a week or until it falls off.     · Wash the area daily with warm, soapy water, and pat it dry. Follow-up care is a key part of your treatment and safety. Be sure to make and go to all appointments, and call your doctor if you are having problems. It's also a good idea to know your test results and keep a list of the medicines you take. When should you call for help? Call 911 anytime you think you may need emergency care. For example, call if:    · You passed out (lost consciousness).     · You are short of breath. Call your doctor now or seek immediate medical care if:    · You are sick to your stomach and cannot drink fluids or keep them down.     · You have signs of a blood clot in your leg (called a deep vein thrombosis), such as:  ? Pain in your calf, back of the knee, thigh, or groin. ? Redness and swelling in your leg or groin.     · You have signs of infection, such as:  ? Increased pain, swelling, warmth, or redness. ? Red streaks leading from the incision. ? Pus draining from the incision. ? A fever.     · You have pain that does not get better after you take pain medicine.     · You have loose stitches, or your incision comes open.     · Bright red blood has soaked through the bandage.     · You cannot pass stools or gas. Watch closely for any changes in your health, and be sure to contact your doctor if you have any problems. Where can you learn more? Go to http://www.gray.com/  Enter Z281 in the search box to learn more about \"Open Bowel Resection: What to Expect at Home. \"  Current as of: April 15, 2020               Content Version: 12.6  © 4106-0490 [employer].    Care instructions adapted under license by [employer] (which disclaims liability or warranty for this information). If you have questions about a medical condition or this instruction, always ask your healthcare professional. [place] [address] any warranty or liability for your use of this information.

## 2021-01-15 NOTE — ROUTINE PROCESS
Bedside and Verbal shift change report given to Aria Jaimes RN (oncoming nurse) by Mardene Nyhan (offgoing nurse). Report included the following information SBAR, Kardex, MAR and Recent Results. SITUATION: 
Code Status: DNR Reason for Admission: S/P exploratory laparotomy [Z98.890] Bowel obstruction ([facility] [address] [N97.072] Hospital day: 7 Problem List:  
   
Hospital Problems  Date Reviewed: 1/10/2021 Codes Class Noted POA Acute renal failure (ARF) (HCC) ICD-10-CM: N17.9 ICD-9-CM: 584.9  1/10/2021 Unknown Oliguria OR anuria due to procedure ICD-10-CM: N99.89, R34 
ICD-9-CM: 997.5, 788.5  1/10/2021 Unknown Graves disease ICD-10-CM: E05.00 ICD-9-CM: 242.00  1/10/2021 Unknown * (Principal) S/P exploratory laparotomy ICD-10-CM: U00.932 ICD-9-CM: V45.89  1/8/2021 Unknown Bowel obstruction ([facility] [address] ICD-10-CM: Q24.980 ICD-9-CM: 560.9  1/8/2021 Unknown Chronic kidney disease, stage III (moderate) ICD-10-CM: N18.30 ICD-9-CM: 585.3  4/20/2020 Unknown Anemia ICD-10-CM: D64.9 ICD-9-CM: 285.9  4/20/2020 Yes Secondary hyperparathyroidism of renal origin [facility]) ICD-10-CM: N25.81 ICD-9-CM: 588.81  4/20/2020 Yes BACKGROUND: 
 Past Medical History:  
Past Medical History:  
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   
 Nonspecific abnormal electrocardiogram (ECG) (EKG)  Obesity, unspecified  Other and unspecified hyperlipidemia  Renal insufficiency Patient taking anticoagulants yes Patient has a defibrillator: no  
 History of shots Yes for example, flu, pneumonia, tetanus Isolation History NO for example, MRSA, CDiff ASSESSMENT: 
Changes in Assessment Throughout Shift: None Significant Changes in 24 hours (for example, RR/code, fall) Patient has Central Line: Yes Patient has Mathur Cath: no  
Mobility Issues PT 
IV Patency OR Checklist 
Pending Tests Last Vitals: 
Vitals w/ MEWS Score (last day) Date/Time MEWS Score Pulse Resp Temp BP Level of Consciousness SpO2  
 01/15/21 0409  1  96  16  98 °F (36.7 °C)  (!) 151/79  Alert  98 % 01/15/21 0028  1  91  16  98.1 °F (36.7 °C)  (!) 152/68  Alert  100 % 01/14/21 1910  1  93  16  98.3 °F (36.8 °C)  (!) 155/71  Alert  100 % 01/14/21 1750  1  94  16  98.7 °F (37.1 °C)  (!) 157/77  Alert  100 % 01/14/21 1234  1  86  16  98.7 °F (37.1 °C)  (!) 150/74  Alert  100 % 01/14/21 0819  1  93  16  98.6 °F (37 °C)  (!) 153/81  Alert  99 % 01/14/21 0416  1  75  18  98 °F (36.7 °C)  (!) 131/57  Alert  100 % 01/14/21 0005  1  93  18  98.1 °F (36.7 °C)  (!) 157/73  Alert  93 % PAIN Pain Assessment Pain Intensity 1: 0 (01/15/21 0409) Pain Location 1: Abdomen(?) Pain Intervention(s) 1: Medication (see MAR) Patient Stated Pain Goal: 0 Intervention effective: yes Time of last intervention: 2131 Reassessment Completed: yes Other actions taken for pain: Distraction Last 3 Weights: 
Last 3 Recorded Weights in this Encounter 01/12/21 0600 01/13/21 2356 01/15/21 0152 Weight: 73.7 kg (162 lb 6.4 oz) 73.8 kg (162 lb 12.8 oz) 73.3 kg (161 lb 11.2 oz) Weight change: -0.499 kg (-1 lb 1.6 oz) INTAKE/OUPUT Current Shift: No intake/output data recorded. Last three shifts: 01/13 1901 - 01/15 0700 In: 2793.3 [I.V.:2793.3] Out: 300 [Urine:300] RECOMMENDATIONS AND DISCHARGE PLANNING Patient needs and requests: Assistance with ADLs Pending tests/procedures: Labs Discharge plan for patient: Home Discharge planning Needs or Barriers: None Estimated Discharge Date: 1/21/2021 Posted on Whiteboard in [facility] Room: yes \"HEALS\" SAFETY CHECK A safety check occurred in the patient's room between off going nurse and oncoming nurse listed above.  
 
The safety check included the below items: 
 
H 
 High Alert Medications Verify all high alert medication drips (heparin, PCA, etc.) E Equipment Suction is set up for ALL patients (with navi) Red plugs utilized for all equipment (IV pumps, etc.) WOWs wiped down at end of shift. Room stocked with oxygen, suction, and other unit-specific supplies A Alarms Bed alarm is set for fall risk patients Ensure chair alarm is in place and activated if patient is up in a chair L Lines Check IV for any infiltration Mathur bag is empty if patient has a Mathur Tubing and IV bags are labeled [facility] Safety Room is clean, patient is clean, and equipment is clean. Hallways are clear from equipment besides carts. Fall bracelet on for fall risk patients Ensure room is clear and free of clutter Suction is set up for ALL patients (with navi) Hallways are clear from equipment besides carts. Isolation precautions followed, supplies available outside room, sign posted Ailyn Pepe

## 2021-01-15 NOTE — DISCHARGE SUMMARY
[facility] 
Indio Dos Santos MD, FACS 
General Surgery 
Discharge Summary  
 
Patient ID: 
Cammy Sheridan 
[id number] 
85 y.o. 
1935 
 
Admit Date: 1/8/2021 
 
Discharge Date: 1/15/2021 
 
Admission Diagnoses: S/P exploratory laparotomy [Z98.890];Bowel obstruction (HCC) [K56.609] 
 
Discharge Diagnoses:   
Problem List as of 1/15/2021 Date Reviewed: 1/10/2021  
       Codes Class Noted - Resolved  
 Acute renal failure (ARF) (HCC) ICD-10-CM: N17.9 
ICD-9-CM: 584.9  1/10/2021 - Present  
   
 Oliguria OR anuria due to procedure ICD-10-CM: N99.89, R34 
ICD-9-CM: 997.5, 788.5  1/10/2021 - Present  
   
 Graves disease ICD-10-CM: E05.00 
ICD-9-CM: 242.00  1/10/2021 - Present  
   
 * (Principal) S/P exploratory laparotomy ICD-10-CM: Z98.890 
ICD-9-CM: V45.89  1/8/2021 - Present  
   
 Bowel obstruction (HCC) ICD-10-CM: K56.609 
ICD-9-CM: 560.9  1/8/2021 - Present  
   
 Hyperkalemia ICD-10-CM: E87.5 
ICD-9-CM: 276.7  4/25/2020 - Present  
   
 Acidosis, metabolic ICD-10-CM: E87.2 
ICD-9-CM: 276.2  4/25/2020 - Present  
   
 Aspiration pneumonia (HCC) ICD-10-CM: J69.0 
ICD-9-CM: 507.0  4/23/2020 - Present  
   
 Leucocytosis ICD-10-CM: D72.829 
ICD-9-CM: 288.60  4/23/2020 - Present  
   
 Atrial fibrillation (HCC) ICD-10-CM: I48.91 
ICD-9-CM: 427.31  4/21/2020 - Present  
   
 Chronic kidney disease, stage III (moderate) ICD-10-CM: N18.30 
ICD-9-CM: 585.3  4/20/2020 - Present  
   
 Anemia ICD-10-CM: D64.9 
ICD-9-CM: 285.9  4/20/2020 - Present  
   
 Secondary hyperparathyroidism of renal origin (HCC) ICD-10-CM: N25.81 
ICD-9-CM: 588.81  4/20/2020 - Present  
   
 Hip fracture requiring operative repair (HCC) ICD-10-CM: S72.009A 
ICD-9-CM: 820.8  4/11/2020 - Present  
   
 Mitral valve disorders(424.0) ICD-10-CM: I05.9 
ICD-9-CM: 424.0  Unknown - Present  
 Overview Signed 7/10/2013  6:39 PM by Poppy Gonzalez  
  2007 TRACE MR 
 
  
  
   
 Other and unspecified hyperlipidemia ICD-10-CM:  E78.5 ICD-9-CM: 272.4  Unknown - Present Essential hypertension, benign ICD-10-CM: I10 
ICD-9-CM: 401.1  Unknown - Present Overview Signed 7/10/2013  6:40 PM by Kailey Hampton Obesity, unspecified ICD-10-CM: E66.9 ICD-9-CM: 278.00  Unknown - Present Nonspecific abnormal electrocardiogram (ECG) (EKG) ICD-10-CM: R94.31 
ICD-9-CM: 794.31  Unknown - Present Renal insufficiency ICD-10-CM: N28.9 ICD-9-CM: 593.9  Unknown - Present Admission Condition: Poor Discharge Condition: Good Last Procedure: Procedure(s): EXPLORATORY LAPAROTOMY/POSSIBLE BOWEL RESECTION Hospital Course:  
Patient was admitted to [facility] Friday, January 8 with a strangulated internal hernia causing bowel obstruction and septic shock. She underwent an urgent open laparotomy with small bowel resection and lysis of adhesions. Postoperatively she had issues with acute renal failure and was managed in the intensive care unit. Consults were obtained from nephrology intensive care and endocrinology. The patient started to make urine after several liters of IV fluid. She was weaned off of pressors postop day 1. She was transferred to the floor postop day 2. She was started on thyroid hormone by endocrinology because of the low TSH level. After several days the thyroid hormone was stopped because of an elevated TSH level. The patient's mental status suffered from anxiety and delirium. This has cleared at the time of discharge. The recommendation from nephrology for the acute renal failure was D5 normal saline at 100 mL's per hour. The patient's creatinine returned to baseline prior to discharge and the urine output normalized. Consults: Hospitalist, Nephrology and Endocrinology Significant Diagnostic Studies: lab work and CT abd/pelvis on admission Disposition: [facility] Patient Instructions:  
Current Discharge Medication List  
  
START taking these medications Details  
oxyCODONE IR (ROXICODONE) 5 mg immediate release tablet Take 1 Tab by mouth every four (4) hours as needed for Pain for up to 3 days. Max Daily Amount: 30 mg. 
Qty: 20 Tab, Refills: 0 Associated Diagnoses: Postoperative pain CONTINUE these medications which have NOT CHANGED Details LORazepam (ATIVAN) 1 mg tablet Take 0.5 Tabs by mouth two (2) times daily as needed for Anxiety. Max Daily Amount: 1 mg. Qty: 6 Tab, Refills: 0 Associated Diagnoses: Anxiety  
  
atorvastatin (LIPITOR) 40 mg tablet Take 1 Tab by mouth nightly. Qty: 30 Tab, Refills: 0  
  
b complex-vitamin c-folic acid (NEPHROCAPS) 1 mg capsule Take 1 Cap by mouth daily. Qty: 30 Cap, Refills: 0  
  
calcitRIOL (ROCALTROL) 0.25 mcg capsule Take 1 Cap by mouth every Monday, Wednesday, Friday. Qty: 12 Cap, Refills: 0  
  
polyethylene glycol (MIRALAX) 17 gram packet Take 1 Packet by mouth daily. Qty: 30 Packet, Refills: 0  
  
metoprolol tartrate (LOPRESSOR) 25 mg tablet Take 0.5 Tabs by mouth every twelve (12) hours. Qty: 15 Tab, Refills: 0  
  
methIMAzole (TAPAZOLE) 10 mg tablet Take 1 Tab by mouth daily. Qty: 30 Tab, Refills: 0  
  
pantoprazole (PROTONIX) 40 mg tablet Take 1 Tab by mouth Before breakfast and dinner. Qty: 60 Tab, Refills: 0  
  
allopurinoL (ZYLOPRIM) 100 mg tablet Take 100 mg by mouth daily. aspirin 81 mg tablet Take 81 mg by mouth. Activity: See surgical instructions Diet: Renal Diet Wound Care: As directed Follow-up with Dr. Sherley Edwards in 2 weeks. Follow-up tests/labs None Signed: 
Elizabeth Rizvi MD 
1/15/2021 
3:06 PM

## 2021-01-16 NOTE — PROGRESS NOTES
[facility] Patient: Roxy Phililp Age: 81 y.o. : 1935 MR#: [id number] SSN: [id number] Date: 2021 Subjective/24-hour events:  
 
She is awake and alert, oriented x 3 with prompting. Denies pain. FT4 has increased to >8.0 Started on methimazole and propranolol yesterday. Large bowel movements recorded on flowsheet daily x last 3 days. Assessment /Plan[de-identified]  
 
1. SBO secondary to strangulated internal hernia s/p ex-lap with ileal resection and RAD 2. Acute metabolic encephalopathy, improved 3. Hypertension 4. Resolving. Gentle fluids. 5. Obesity Body mass index is 31.83 kg/m². 6. Severe sepsis, treated/resolved 7. Lactic acidosis, resolved 8. Hemorrhagic/hypovolemia shock, rsolved 9. Hypothyroidism controlled by methimazole PTA, noncompliant with outpatient follow-up, became hypothyroid. Methimazole stopped on admission, now with rebound hyperthyroidism. Continue methimazole and propranolol per endocrinology recs. Monitor free T4. 
10. Hypomagnesemia replete as needed. 11. Constipation resolving. miralax every other day. 12. DVT prophylaxis 13. DNR. Durable DNR scanned into [facility]. Requires ongoing inpatient monitoring. Objective:  
VS:  
Visit Vitals /69 (BP 1 Location: Left arm, BP Patient Position: At rest) Pulse 99 Temp 97.8 °F (36.6 °C) Resp 20 Ht 5' (1.524 m) Wt 73.9 kg (163 lb) SpO2 94% BMI 31.83 kg/m² General:  In NAD. Nontoxic-appearing. Oriented x3 with prompting HEENT normocephalic atraumatic pupils equally round reactive to light. Oropharynx clear Cardiovascular: tachycardic w/ regular rhythm. Pulmonary:  Lungs clear bilaterally, no wheezes. GI:  Abdomen soft. Appropriately tender to palpation. Staples intact. Extremities:  Warm, no edema or ischemia. Neuro:  Awake and alert. Motor grossly nonfocal. 
Skin no visible rash Labs:   
Recent Results (from the past 24 hour(s)) RENAL FUNCTION PANEL Collection Time: 01/15/21  3:55 PM  
Result Value Ref Range Sodium 141 136 - 145 mmol/L Potassium 4.1 3.5 - 5.5 mmol/L Chloride 110 100 - 111 mmol/L  
 CO2 25 21 - 32 mmol/L Anion gap 6 3.0 - 18 mmol/L Glucose 134 (H) 74 - 99 mg/dL BUN 28 (H) 7.0 - 18 MG/DL Creatinine 1.45 (H) 0.6 - 1.3 MG/DL  
 BUN/Creatinine ratio 19 12 - 20 GFR est AA 42 (L) >60 ml/min/1.73m2 GFR est non-AA 34 (L) >60 ml/min/1.73m2 Calcium 9.0 8.5 - 10.1 MG/DL Phosphorus 2.5 2.5 - 4.9 MG/DL Albumin 1.9 (L) 3.4 - 5.0 g/dL RENAL FUNCTION PANEL Collection Time: 01/15/21 11:10 PM  
Result Value Ref Range Sodium 143 136 - 145 mmol/L Potassium 4.1 3.5 - 5.5 mmol/L Chloride 113 (H) 100 - 111 mmol/L  
 CO2 26 21 - 32 mmol/L Anion gap 4 3.0 - 18 mmol/L Glucose 96 74 - 99 mg/dL BUN 28 (H) 7.0 - 18 MG/DL Creatinine 1.37 (H) 0.6 - 1.3 MG/DL  
 BUN/Creatinine ratio 20 12 - 20 GFR est AA 45 (L) >60 ml/min/1.73m2 GFR est non-AA 37 (L) >60 ml/min/1.73m2 Calcium 9.2 8.5 - 10.1 MG/DL Phosphorus 3.1 2.5 - 4.9 MG/DL Albumin 1.9 (L) 3.4 - 5.0 g/dL T4, FREE Collection Time: 01/16/21  4:20 AM  
Result Value Ref Range T4, Free >8.0 (H) 0.7 - 1.5 NG/DL  
METABOLIC PANEL, BASIC Collection Time: 01/16/21  4:20 AM  
Result Value Ref Range Sodium 143 136 - 145 mmol/L Potassium 4.0 3.5 - 5.5 mmol/L Chloride 114 (H) 100 - 111 mmol/L  
 CO2 23 21 - 32 mmol/L Anion gap 6 3.0 - 18 mmol/L Glucose 93 74 - 99 mg/dL BUN 25 (H) 7.0 - 18 MG/DL Creatinine 1.25 0.6 - 1.3 MG/DL  
 BUN/Creatinine ratio 20 12 - 20 GFR est AA 49 (L) >60 ml/min/1.73m2 GFR est non-AA 41 (L) >60 ml/min/1.73m2 Calcium 8.9 8.5 - 10.1 MG/DL ALBUMIN Collection Time: 01/16/21  4:20 AM  
Result Value Ref Range Albumin 1.9 (L) 3.4 - 5.0 g/dL PHOSPHORUS Collection Time: 01/16/21  4:20 AM  
Result Value Ref Range  Phosphorus 2.6 2.5 - 4.9 MG/DL Signed By: Emir Golden MD   
 January 16, 2021

## 2021-01-16 NOTE — PROGRESS NOTES
Problem: Self Care Deficits Care Plan (Adult) 
Goal: *Acute Goals and Plan of Care (Insert Text) 
Description: Occupational Therapy Goals 
Initiated 1/11/2021 within 7 day(s). 
 
1.  Patient will perform bed mobility in preparation for self-care with minimal assistance/contact guard assist.  
2.  Patient will perform bilateral grooming task sitting EOB with supervision/set-up. 
3.  Patient will perform upper body dressing with supervision/set-up. 
4.  Patient will perform toilet transfers with moderate assistance . 
5.  Patient will perform all aspects of toileting with moderate assistance . 
6.  Patient will participate in upper extremity therapeutic exercise/activities with modified independence for 8 minutes.   
7.  Patient will utilize energy conservation techniques during functional activities with verbal cues. 
 
Prior Level of Function: Patient a poor historian. Oriented to person only this sesison. Per patient she lives with her  and daughter and was independent with self-care and used a RW for functional mobility PTA.  
Outcome: Progressing Towards Goal 
 OCCUPATIONAL THERAPY TREATMENT 
 
Patient: Cammy Sheridan (85 y.o. female) 
Date: 1/16/2021 
Diagnosis: S/P exploratory laparotomy [Z98.890] 
Bowel obstruction (HCC) [K56.609] S/P exploratory laparotomy 
Procedure(s) (LRB): 
EXPLORATORY LAPAROTOMY/POSSIBLE BOWEL RESECTION (N/A) 8 Days Post-Op 
Precautions: Fall, Skin 
PLOF: Oriented to person only this sesison. Per patient she lives with her  and daughter and was independent with self-care and used a RW for functional mobility PTA.  
 
Chart, occupational therapy assessment, plan of care, and goals were reviewed. 
ASSESSMENT: 
 Pt found supine in bed, on 1.5L O2NC, pleasantly confused, and willing to work with OT. Pt reports she want to eat however is having difficulties. R UE continues to be edematous. Observed pt bringing food to mouth/ drink to mouth with SBA requiring vc's to utilize B UE's for increased support. Pt with ~ 25% spillage with mod vc's to take smaller bites to reduce risk of aspiration or choking. Pt educated on incorporating RUE into bilateral grooming activities to increase independence with self-care routine. Following education pt performed simple grooming task washing face with cloth SBA with B UE's requiring increased time. Pt left with HOB elevated, R UE elevated for edema mgmt, and all needs left within reach. Progression toward goals: 
[]          Improving appropriately and progressing toward goals [x]          Improving slowly and progressing toward goals 
[]          Not making progress toward goals and plan of care will be adjusted PLAN: 
Patient continues to benefit from skilled intervention to address the above impairments. Continue treatment per established plan of care. Discharge Recommendations: Rehab Further Equipment Recommendations for Discharge:  TBA SUBJECTIVE:  
Patient stated  It takes me awhile to eat. \" OBJECTIVE DATA SUMMARY:  
Cognitive/Behavioral Status: 
Neurologic State: Alert, Confused Orientation Level: Oriented to person, Oriented to place Cognition: Follows commands Safety/Judgement: Fall prevention Balance: 
Sitting: Intact ADL Intervention: 
Feeding Feeding Assistance: Stand-by assistance Food to Mouth: Stand-by assistance Drink to Mouth: Stand-by assistance Grooming Position Performed: Long sitting on bed Washing Face: Stand-by assistance Cognitive Retraining Safety/Judgement: Fall prevention Pain: 
Pain level pre-treatment: 5/10 abdomen Pain level post-treatment: 5/10 Pain Intervention(s): Medication (see MAR); Rest,  Repositioning Response to intervention: Nurse notified, See doc flow Activity Tolerance:   
Fair Please refer to the flowsheet for vital signs taken during this treatment. After treatment:  
[]  Patient left in no apparent distress sitting up in chair 
[x]  Patient left in no apparent distress in bed 
[x]  Call bell left within reach [x]  Nursing notified 
[]  Caregiver present [x]  Bed alarm activated COMMUNICATION/EDUCATION:  
[x] Role of Occupational Therapy in the acute care setting 
[] Home safety education was provided and the patient/caregiver indicated understanding. [x] Patient/family have participated as able in working towards goals and plan of care. [x] Patient/family agree to work toward stated goals and plan of care. [] Patient understands intent and goals of therapy, but is neutral about his/her participation. [] Patient is unable to participate in goal setting and plan of care. Thank you for this referral. 
JANEL Pal Time Calculation: 24 mins

## 2021-01-16 NOTE — PROGRESS NOTES
[facility] Patient: Elisa Partida Age: 81 y.o. : 1935 MR#: [id number] SSN: [id number] Date: 1/15/2021 Subjective/24-hour events:  
 
Continues to remain stable clinically. Tolerating diet, working with therapy. Assessment: SBO secondary to strangulated internal hernia s/p ex-lap with ileal resection and RAD Acute metabolic encephalopathy, improved HTN RUPALI Obesity Severe sepsis, treated/resolved Lactic acidosis, rsolved Hemorrhagic/hypovolemia shock, rsolved Plan: No new medical issues. Stable for discharge/transfer to inpatient rehab as planned. No beds available at present time. Continue to follow on as needed basis while on medical floor. Objective:  
VS:  
Visit Vitals /63 (BP 1 Location: Left arm, BP Patient Position: At rest) Pulse 86 Temp 99.7 °F (37.6 °C) Resp 20 Ht 5' (1.524 m) Wt 73.3 kg (161 lb 11.2 oz) SpO2 94% BMI 31.58 kg/m² General:  In NAD. Nontoxic-appearing. Cardiovascular:  RRR. Pulmonary:  Lungs clear bilaterally, no wheezes. GI:  Abdomen soft. Extremities:  Warm, no edema or ischemia. Neuro:  Awake and alert. Motor grossly nonfocal. 
 
Labs:   
Recent Results (from the past 24 hour(s)) RENAL FUNCTION PANEL Collection Time: 21 10:35 PM  
Result Value Ref Range Sodium 144 136 - 145 mmol/L Potassium 3.7 3.5 - 5.5 mmol/L Chloride 114 (H) 100 - 111 mmol/L  
 CO2 24 21 - 32 mmol/L Anion gap 6 3.0 - 18 mmol/L Glucose 159 (H) 74 - 99 mg/dL BUN 33 (H) 7.0 - 18 MG/DL Creatinine 1.68 (H) 0.6 - 1.3 MG/DL  
 BUN/Creatinine ratio 20 12 - 20 GFR est AA 35 (L) >60 ml/min/1.73m2 GFR est non-AA 29 (L) >60 ml/min/1.73m2 Calcium 9.2 8.5 - 10.1 MG/DL Phosphorus 2.2 (L) 2.5 - 4.9 MG/DL Albumin 2.0 (L) 3.4 - 5.0 g/dL T4, FREE Collection Time: 01/15/21  4:15 AM  
Result Value Ref Range  T4, Free 7.0 (H) 0.7 - 1.5 NG/DL  
T3, FREE  
 Collection Time: 01/15/21  4:15 AM  
Result Value Ref Range Triiodothyronine (T3), free 17.4 (H) 2.18 - 2.54 PG/ML  
METABOLIC PANEL, BASIC Collection Time: 01/15/21  4:15 AM  
Result Value Ref Range Sodium 142 136 - 145 mmol/L Potassium 3.6 3.5 - 5.5 mmol/L Chloride 112 (H) 100 - 111 mmol/L  
 CO2 23 21 - 32 mmol/L Anion gap 7 3.0 - 18 mmol/L Glucose 108 (H) 74 - 99 mg/dL BUN 31 (H) 7.0 - 18 MG/DL Creatinine 1.59 (H) 0.6 - 1.3 MG/DL  
 BUN/Creatinine ratio 19 12 - 20 GFR est AA 37 (L) >60 ml/min/1.73m2 GFR est non-AA 31 (L) >60 ml/min/1.73m2 Calcium 9.6 8.5 - 10.1 MG/DL ALBUMIN Collection Time: 01/15/21  4:15 AM  
Result Value Ref Range Albumin 2.0 (L) 3.4 - 5.0 g/dL PHOSPHORUS Collection Time: 01/15/21  4:15 AM  
Result Value Ref Range Phosphorus 2.2 (L) 2.5 - 4.9 MG/DL  
TSH 3RD GENERATION Collection Time: 01/15/21  4:15 AM  
Result Value Ref Range TSH 1.09 0.36 - 3.74 uIU/mL RENAL FUNCTION PANEL Collection Time: 01/15/21  3:55 PM  
Result Value Ref Range Sodium 141 136 - 145 mmol/L Potassium 4.1 3.5 - 5.5 mmol/L Chloride 110 100 - 111 mmol/L  
 CO2 25 21 - 32 mmol/L Anion gap 6 3.0 - 18 mmol/L Glucose 134 (H) 74 - 99 mg/dL BUN 28 (H) 7.0 - 18 MG/DL Creatinine 1.45 (H) 0.6 - 1.3 MG/DL  
 BUN/Creatinine ratio 19 12 - 20 GFR est AA 42 (L) >60 ml/min/1.73m2 GFR est non-AA 34 (L) >60 ml/min/1.73m2 Calcium 9.0 8.5 - 10.1 MG/DL Phosphorus 2.5 2.5 - 4.9 MG/DL Albumin 1.9 (L) 3.4 - 5.0 g/dL Signed By: Dale Jamil MD   
 January 15, 2021

## 2021-01-16 NOTE — ROUTINE PROCESS
Bedside and Verbal shift change report given to Sancho Alcala RN (oncoming nurse) by Lilia Aleman (offgoing nurse). Report included the following information SBAR, Kardex, MAR and Recent Results. SITUATION: 
Code Status: DNR Reason for Admission: S/P exploratory laparotomy [Z98.890] Bowel obstruction ([facility] [address] [S11.866] Hospital day: 8 Problem List:  
   
Hospital Problems  Date Reviewed: 1/10/2021 Codes Class Noted POA Acute renal failure (ARF) (HCC) ICD-10-CM: N17.9 ICD-9-CM: 584.9  1/10/2021 Unknown Oliguria OR anuria due to procedure ICD-10-CM: N99.89, R34 
ICD-9-CM: 997.5, 788.5  1/10/2021 Unknown Graves disease ICD-10-CM: E05.00 ICD-9-CM: 242.00  1/10/2021 Unknown * (Principal) S/P exploratory laparotomy ICD-10-CM: P49.816 ICD-9-CM: V45.89  1/8/2021 Unknown Bowel obstruction ([facility] [address] ICD-10-CM: N68.921 ICD-9-CM: 560.9  1/8/2021 Unknown Chronic kidney disease, stage III (moderate) ICD-10-CM: N18.30 ICD-9-CM: 585.3  4/20/2020 Unknown Anemia ICD-10-CM: D64.9 ICD-9-CM: 285.9  4/20/2020 Yes Secondary hyperparathyroidism of renal origin [facility]) ICD-10-CM: N25.81 ICD-9-CM: 588.81  4/20/2020 Yes BACKGROUND: 
 Past Medical History:  
Past Medical History:  
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   
 Nonspecific abnormal electrocardiogram (ECG) (EKG)  Obesity, unspecified  Other and unspecified hyperlipidemia  Renal insufficiency Patient taking anticoagulants yes Patient has a defibrillator: no  
 History of shots Yes for example, flu, pneumonia, tetanus Isolation History NO for example, MRSA, CDiff ASSESSMENT: 
Changes in Assessment Throughout Shift: None Significant Changes in 24 hours (for example, RR/code, fall) Patient has Central Line: Yes Patient has Mathur Cath: no  
Mobility Issues PT 
IV Patency OR Checklist 
Pending Tests 
 
Last Vitals: 
Vitals w/ MEWS Score (last day)   
 Date/Time MEWS Score Pulse Resp Temp BP Level of Consciousness SpO2  
 01/16/21 0047  1  84  20  98.3 °F (36.8 °C)  138/68  Alert  96 %  
 01/15/21 1510  1  86  20  99.7 °F (37.6 °C)  131/63  Alert  94 %  
 01/15/21 0915  1  90  16  97.2 °F (36.2 °C)  132/69  Alert  98 %  
 01/15/21 0409  1  96  16  98 °F (36.7 °C)  (!) 151/79  Alert  98 %  
 01/15/21 0028  1  91  16  98.1 °F (36.7 °C)  (!) 152/68  Alert  100 %  
   
  
 
PAIN 
  Pain Assessment 
  Pain Intensity 1: 0 (01/16/21 0047) 
  Pain Location 1: Abdomen(?) 
  Pain Intervention(s) 1: Medication (see MAR) 
  Patient Stated Pain Goal: 0 
Intervention effective: N/A 
Time of last intervention: N/A Reassessment Completed: yes  
Other actions taken for pain: Distraction 
 
Last 3 Weights: 
Last 3 Recorded Weights in this Encounter  
 01/13/21 2356 01/15/21 0152 01/16/21 0227  
Weight: 73.8 kg (162 lb 12.8 oz) 73.3 kg (161 lb 11.2 oz) 73.9 kg (163 lb)  
Weight change: 0.59 kg (1 lb 4.8 oz) 
 
INTAKE/OUPUT 
  Current Shift: No intake/output data recorded. 
  Last three shifts: 01/14 1901 - 01/16 0700 
In: 4583.3 [I.V.:4583.3] 
Out: -  
 
RECOMMENDATIONS AND DISCHARGE PLANNING 
Patient needs and requests: Assistance with ADLs 
 
Pending tests/procedures: Labs  
 
Discharge plan for patient: Home 
 
Discharge planning Needs or Barriers: None 
 
Estimated Discharge Date: 1/21/2021 Posted on Whiteboard in Patient’s Room: yes   
  
\"HEALS\" SAFETY CHECK 
A safety check occurred in the patient's room between off going nurse and oncoming nurse listed above. 
 
The safety check included the below items: 
 
H 
High Alert Medications Verify all high alert medication drips (heparin, PCA, etc.) 
E 
Equipment Suction is set up for ALL patients (with navi) 
Red plugs utilized for all equipment (IV pumps, etc.) 
WOW’s wiped down at end of shift. 
 Room stocked with oxygen, suction, and other unit-specific supplies A Alarms Bed alarm is set for fall risk patients Ensure chair alarm is in place and activated if patient is up in a chair L Lines Check IV for any infiltration Mathur bag is empty if patient has a Mathur Tubing and IV bags are labeled Jolie Martínez Safety Room is clean, patient is clean, and equipment is clean. Hallways are clear from equipment besides carts. Fall bracelet on for fall risk patients Ensure room is clear and free of clutter Suction is set up for ALL patients (with navi) Hallways are clear from equipment besides carts. Isolation precautions followed, supplies available outside room, sign posted Reema Matson

## 2021-01-16 NOTE — PROGRESS NOTES
Bedside shift change report given to Sil Reis RN (oncoming nurse) by Dona Cross RN (offgoing nurse). Report included the following information SBAR, Kardex, MAR and Recent Results.

## 2021-01-16 NOTE — PROGRESS NOTES
Pt is awake, alert, but still a bit agitated. Pulse rate has increased to 103. Free T4 now is above 8.0. She is quite hyperthyroid. Will add beta blockers in the form of propranolol, 40 mg tid.

## 2021-01-17 NOTE — PROGRESS NOTES
Nurse received call from patient daughter requesting updates about her mother care. Updates given and all questions asked and answered.

## 2021-01-17 NOTE — PROGRESS NOTES
Patient transported with personal belongings to be included green and pink item of clothing and all items in a bag with name tag attached. Items to be transported with patient.

## 2021-01-17 NOTE — ROUTINE PROCESS
Bedside and Verbal shift change report given to GREER Andrade (oncoming nurse) by GREER Parnell (offgoing nurse). Report included the following information SBAR, Kardex and MAR.

## 2021-01-17 NOTE — PROGRESS NOTES
Bereavement Note: 
 
 responded to the death of Farheen Rausch, who is a 81 y.o., female, offering Spiritual Care to patient and family, see flow sheets for interventions. Date of Death: 21 Extended Emergency Contact Information Primary Emergency Contact: Darby Sheridan Symone Harry Home Phone: [phone] Relation: Daughter  needed? No  
 
            YES      NO  UNKNOWN Life Net   []        []    [x] [place]   [] [] [x] Medical Examiner  []        []  [x] Going to [place]  [x]        [] [] Autopsy   []        []         [x] Sympathy Card  []        [x] Bereavement Materials  []        [x] Business Card Provided  []        [x]  Home: To be determined, Family will notify Nursing Supervisor when chosen. Chaplains will continue to follow family and will provide spiritual care as needed. Aamir Bain MDiv. [facility] Spiritual Care  
[phone]

## 2021-01-17 NOTE — ROUTINE PROCESS
Bedside shift change report given to Naun Melendez (oncoming nurse) by Shannan Gutierrez (offgoing nurse). Report included the following information SBAR, Kardex, MAR, Procedure Verification and Quality Measures.

## 2021-01-17 NOTE — PROGRESS NOTES
Problem: Risk for Spread of Infection Goal: Prevent transmission of infectious organism to others Description: Prevent the transmission of infectious organisms to other patients, staff members, and visitors. Outcome: Resolved/Not Met Problem: Patient Education:  Go to Education Activity Goal: Patient/Family Education Outcome: Resolved/Not Met Problem: Pressure Injury - Risk of 
Goal: *Prevention of pressure injury Description: Document Thierry Scale and appropriate interventions in the flowsheet. Outcome: Resolved/Not Met Problem: Patient Education: Go to Patient Education Activity Goal: Patient/Family Education Outcome: Resolved/Not Met Problem: Falls - Risk of 
Goal: *Absence of Falls Description: Document Pranav Askew Fall Risk and appropriate interventions in the flowsheet. Outcome: Resolved/Not Met Problem: Patient Education: Go to Patient Education Activity Goal: Patient/Family Education Outcome: Resolved/Not Met Problem: Patient Education: Go to Patient Education Activity Goal: Patient/Family Education Outcome: Resolved/Not Met Problem: Patient Education: Go to Patient Education Activity Goal: Patient/Family Education Outcome: Resolved/Not Met Problem: Patient Education: Go to Patient Education Activity Goal: Patient/Family Education Outcome: Resolved/Not Met Problem: General Medical Care Plan Goal: *Vital signs within specified parameters Outcome: Resolved/Not Met 
Goal: *Labs within defined limits Outcome: Resolved/Not Met 
Goal: *Absence of infection signs and symptoms Outcome: Resolved/Not Met 
Goal: *Optimal pain control at patient's stated goal 
Outcome: Resolved/Not Met 
Goal: *Skin integrity maintained Outcome: Resolved/Not Met 
Goal: *Fluid volume balance Outcome: Resolved/Not Met 
Goal: *Optimize nutritional status Outcome: Resolved/Not Met 
Goal: *Anxiety reduced or absent Outcome: Resolved/Not Met 
Goal: *Progressive mobility and function (eg: ADL's) Outcome: Resolved/Not Met Problem: Patient Education: Go to Patient Education Activity Goal: Patient/Family Education Outcome: Resolved/Not Met Problem: Anemia Care Plan (Adult and Pediatric) Goal: *Labs within defined limits Outcome: Resolved/Not Met 
Goal: *Tolerates increased activity Outcome: Resolved/Not Met Problem: Patient Education: Go to Patient Education Activity Goal: Patient/Family Education Outcome: Resolved/Not Met Problem: Nutrition Deficit Goal: *Optimize nutritional status Outcome: Resolved/Not Met

## 2021-01-17 NOTE — PROGRESS NOTES
Nurse called into patient room due to patient needing to be suction. Upon arrival patient became unresponsive. Rapid Response called.

## 2021-01-17 NOTE — PROGRESS NOTES
HPI:    Patient ID: Lucian Goodwin. is a 73year old male. HPI    Patient is here c/o persistent low back  and bilateral lower leg pain despite physical therapy.  Pain is 8/10 worsen during ambulation and changing position,especially turning from right to VS stable. Thyroid functions are improving, but still at elevated levels. Will continue th epresent regmen of methimazole and propranolol for hyperthyroidism. total) by mouth daily. Disp: 90 capsule Rfl: 0   methylPREDNISolone (MEDROL) 4 MG Oral Tablet Therapy Pack As directed. Disp: 1 kit Rfl: 0   aspirin 81 MG Oral Tab EC Take 1 tablet (81 mg total) by mouth daily.  Disp: 30 tablet Rfl: 0   metoprolol Tartrate thought content normal.   Nursing note and vitals reviewed.              ASSESSMENT/PLAN:   Chronic midline low back pain with bilateral sciatica  (primary encounter diagnosis)  Coronary artery disease involving native coronary artery of native heart withou

## 2021-01-17 NOTE — DISCHARGE SUMMARY
Discharge Summary Patient: Bhanu Call MRN: [id number]  CSN: [id number] YOB: 1935  Age: 81 y.o. Sex: female DOA: 1/8/2021 LOS:  LOS: 9 days   Discharge Date: 1/17/2021 Admission Diagnoses: S/P exploratory laparotomy [R19.630] Bowel obstruction ([facility] [E89.794] Discharge Diagnoses:   
Problem List as of 1/17/2021 Date Reviewed: 1/10/2021 Codes Class Noted - Resolved Acute renal failure (ARF) (HCC) ICD-10-CM: N17.9 ICD-9-CM: 584.9  1/10/2021 - Present Oliguria OR anuria due to procedure ICD-10-CM: N99.89, R34 
ICD-9-CM: 997.5, 788.5  1/10/2021 - Present Graves disease ICD-10-CM: E05.00 ICD-9-CM: 242.00  1/10/2021 - Present * (Principal) S/P exploratory laparotomy ICD-10-CM: X82.536 ICD-9-CM: V45.89  1/8/2021 - Present Bowel obstruction ([facility] ICD-10-CM: T39.437 ICD-9-CM: 560.9  1/8/2021 - Present Hyperkalemia ICD-10-CM: E87.5 ICD-9-CM: 276.7  4/25/2020 - Present Acidosis, metabolic IIU-35-SH: A29.9 ICD-9-CM: 276.2  4/25/2020 - Present Aspiration pneumonia ([facility] ICD-10-CM: J69.0 ICD-9-CM: 507.0  4/23/2020 - Present Leucocytosis ICD-10-CM: B39.765 ICD-9-CM: 288.60  4/23/2020 - Present Atrial fibrillation [facility]) ICD-10-CM: I48.91 
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  
  2007 TRACE MR  Other and unspecified hyperlipidemia ICD-10-CM: E78.5 ICD-9-CM: 272.4  Unknown - Present Essential hypertension, benign ICD-10-CM: I10 
ICD-9-CM: 401.1  Unknown - Present Overview Signed 7/10/2013  6:40 PM by Anastacia Rosas Obesity, unspecified ICD-10-CM: E66.9 ICD-9-CM: 278.00  Unknown - Present Nonspecific abnormal electrocardiogram (ECG) (EKG) ICD-10-CM: R94.31 
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 She presented to the emergency department at [facility] this afternoon with abdominal pain and nausea vomiting. She was found to have leukocytosis with a left shift and bandemia creatinine of 3.3 elevated lactic acid at 3.7 and the CAT scan which I did review independently on the monitor revealed small bowel obstruction which appeared to be a complete small bowel obstruction secondary to a internal hernia in the right upper quadrant of the abdomen. Patient also had fluid in the abdomen and possible pneumatosis. She was hypotensive on presentation but responded well to IV fluid administration. A Mathur catheter was inserted but no urine was obtained. Hospital Course: 1. SBO secondary to strangulated internal hernia s/p ex-lap with ileal resection and RAD 2. Acute metabolic encephalopathy, improved 3. Hypertension 4. loreta was resolving. Gentle fluids. 5. Obesity Body mass index is 31.83 kg/m². 6. Severe sepsis, treated/resolved 7. Lactic acidosis, resolved 8. Hemorrhagic/hypovolemia shock, rsolved 9.   Hypothyroidism controlled by methimazole PTA, noncompliant with outpatient follow-up, became hypothyroid.  Methimazole stopped on admission, now with rebound hyperthyroidism.  Continued on methimazole and propranolol per endocrinology recs. free T4 was closely monitored.  
10. Hypomagnesemia repleted 
11. Constipation resolving. miralax every other day.   
12. DVT prophylaxis was given in the form of lovenox subcut daily. 
13.  DNR.  Durable DNR scanned into [facility].  Unfortunately on the morning of 1/17/2020 she was noted with shortness of breath, and cough.  Suctioning was done, chest x-ray was ordered, very soon thereafter she was noted with an acute decline in her mental status, and labored breathing.  Pulses were minimally palpable at that time. rapid response was called. Death officially pronounced at 1017, 1/17/2021.  Family was notified, Darby Sheridan daughter.  All questions answered to the best of my ability.

## 2021-01-17 NOTE — PROGRESS NOTES
Rapid Response Note [facility] Patient: Taina Sorensen 81 y.o. female [id number] 
1935 Admit Date: 2021 Admission Diagnosis: S/P exploratory laparotomy [U19.295] Bowel obstruction ([facility] [O89.127] RAPID RESPONSE Rapid response called for unresponsiveness. Pt found unresponsive, apneic, and pulseless without any brainstem reflexes. She is DNR. Pt pronounced . Medications Reviewed ROS OBJECTIVE Visit Vitals /71 (BP 1 Location: Left arm, BP Patient Position: At rest) Pulse (!) 102 Temp 98 °F (36.7 °C) Resp 18 Ht 5' (1.524 m) Wt 73.9 kg (163 lb) SpO2 96% BMI 31.83 kg/m² Physical Exam  
Unresponsive Pulseless carotids and femorals Apneic with no air movement auscultated No brainstem reflexes. Medications administered: none EKG: deferred Labs: none ASSESSMENT, PLAN & DISPOSITION Tiana Sorensen is a 81y.o. year old female admitted for S/P exploratory laparotomy [Z98.890] Bowel obstruction ([facility] [K56.609]. Rapid response called for unresponsivenes. Patient condition currently: . Disposition: noe Attending Dr. Jeannette Layton notified of pt death. Khalif Jacobson MD, PGY-1 [employer] [facility] 21 10:24 AM

## 2021-03-29 NOTE — PROGRESS NOTES
conducted a Follow up consultation and Spiritual Assessment for Cece Hernandez, who is a 81 y.o.,female. The  provided the following Interventions: 
Continued the relationship of care and support. Patient was alert and able to pleasantly engage in short conversation before the aid came in to help her with feeding. Listened empathically. Patient did not recall talking to her daughter at all over the weekend. Patient still prefered not to receive  Holy communion today but allowed me to pray the Act of Spiritual Communion prayer. Offered prayer and assurance of continued prayer on patient's behalf. Chart reviewed. The following outcomes were achieved: 
Patient expressed gratitude for 's visit. Assessment: 
There are no further spiritual or Christian issues which require Spiritual Care Services interventions at this time. Plan: 
Chaplains will continue to follow and will provide pastoral care on an as needed/requested basis.  recommends bedside caregivers page  on duty if patient shows signs of acute spiritual or emotional distress. [facility], [address] [facility]  
[phone] Complex Repair And Rotation Flap Text: The defect edges were debeveled with a #15 scalpel blade.  The primary defect was closed partially with a complex linear closure.  Given the location of the remaining defect, shape of the defect and the proximity to free margins a rotation flap was deemed most appropriate for complete closure of the defect.  Using a sterile surgical marker, an appropriate advancement flap was drawn incorporating the defect and placing the expected incisions within the relaxed skin tension lines where possible.    The area thus outlined was incised deep to adipose tissue with a #15 scalpel blade.  The skin margins were undermined to an appropriate distance in all directions utilizing iris scissors.

## 2021-11-20 NOTE — PROGRESS NOTES
[facility] [address] Progress Note Patient: Chuckie Jeter               Sex: female          DOA: 4/11/2020 YOB: 1935      Age:  81 y.o.        LOS:  LOS: 6 days S/P  Procedure(s): RIGHT FEMORAL INTERTROCHANTERIC NAIL INSERTION Subjective:  
 
Patient transferred to 4N after afib last night and RR called. Very tired this am. But overall feeling ok. Denies cp, sob, abd pain Objective:  
  
Blood pressure 123/68, pulse 79, temperature 97.7 °F (36.5 °C), resp. rate 20, height 5' (1.524 m), weight 158 lb 11.7 oz (72 kg), SpO2 100 %. Well developed, Well Nourished alert, cooperative, no distress Incision clean, dry, no drainage, dressing in place Mild swelling and tenderness noted in right lower extremity. No significant evidence of active bleeding Sensory is intact Motor is intact 
nv intact 2+distal pulses Neg calf tenderness Labs: 
CBC 
@ 
CBC:  
Lab Results Component Value Date/Time WBC 9.5 04/17/2020 01:13 AM  
 RBC 2.67 (L) 04/17/2020 01:13 AM  
 HGB 8.1 (L) 04/17/2020 01:13 AM  
 HGB 8.1 (L) 04/17/2020 01:13 AM  
 HCT 25.0 (L) 04/17/2020 01:13 AM  
 HCT 25.0 (L) 04/17/2020 01:13 AM  
 PLATELET 859 77/50/6357 01:13 AM  
@ Coagulation Lab Results Component Value Date INR 1.3 (H) 04/11/2020 Basic Metabolic Profile Lab Results Component Value Date  04/17/2020 CO2 25 04/17/2020 BUN 49 (H) 04/17/2020 Medications Reviewed Assessment/Plan Active Problems: 
  Hip fracture requiring operative repair ([facility] (4/11/2020) Procedure(s): RIGHT FEMORAL INTERTROCHANTERIC NAIL INSERTION :  
 
1. PT and/or OT Consults: YES continue PT/OT: oob to chair, gentle rom                         PWB 2. Incision Care:  Routine Incision Care and Dressing Changes as necessary: dressing changes POD#2 and as needed 3. Pain control 4. Will stop lovenox and place patient on Aspirin 325mg BID for DVT prophylaxis 5. Acute blood loss anemia - stable today 6. New onset afib last night - transferred to tele unit after RR called Appreciate medicine input on patients medical issues 4. DISCHARGE PLANNING  Intervention : [facility] ([phone] 
[phone] Present (15 x15 bpm)

## (undated) DEVICE — STERILE POLYISOPRENE POWDER-FREE SURGICAL GLOVES: Brand: PROTEXIS

## (undated) DEVICE — SYR 50ML SLIP TIP NSAF LF STRL --

## (undated) DEVICE — PAD,ABDOMINAL,8"X10",ST,LF: Brand: MEDLINE

## (undated) DEVICE — STAPLER INT L75MM CUT LN L73MM STPL LN L77MM BLU B FRM 8

## (undated) DEVICE — BIT DRL L330MM DIA4.2MM CALIB 100MM 3 FLUT QUIK CPL

## (undated) DEVICE — GLOVE SURG BIOGEL 8.0 STRL -- SKINSENSE

## (undated) DEVICE — MEDI-VAC SUCTION HIGH CAPACITY: Brand: CARDINAL HEALTH

## (undated) DEVICE — SNARE ENDO 2.4MMX230CM -- COLD EXACTO

## (undated) DEVICE — BLADE ELECTRODE: Brand: EDGE

## (undated) DEVICE — TRAP SPEC COLL POLYP POLYSTYR --

## (undated) DEVICE — INTENDED FOR TISSUE SEPARATION, AND OTHER PROCEDURES THAT REQUIRE A SHARP SURGICAL BLADE TO PUNCTURE OR CUT.: Brand: BARD-PARKER SAFETY BLADES SIZE 10, STERILE

## (undated) DEVICE — GARMENT,MEDLINE,DVT,SEQUENTIAL,CALF,MD: Brand: MEDLINE

## (undated) DEVICE — SUTURE VCRL SZ 2 L27IN ABSRB VLT L65MM TP-1 1/2 CIR J649G

## (undated) DEVICE — PACK PROCEDURE SURG MAJ W/ BASIN LF

## (undated) DEVICE — 4-PORT MANIFOLD: Brand: NEPTUNE 2

## (undated) DEVICE — ENDOSCOPY PUMP TUBING/ CAP SET: Brand: ERBE

## (undated) DEVICE — GAUZE,SPONGE,4"X4",16PLY,STRL,LF,10/TRAY: Brand: MEDLINE

## (undated) DEVICE — KIT CLN UP BON SECOURS MARYV

## (undated) DEVICE — SUTURE PDS II SZ 1 L36IN ABSRB VLT CTXB L48MM 1/2 CIR BLNT ZB371

## (undated) DEVICE — 3M™ IOBAN™ 2 ANTIMICROBIAL INCISE DRAPE 6651EZ: Brand: IOBAN™ 2

## (undated) DEVICE — REM POLYHESIVE ADULT PATIENT RETURN ELECTRODE: Brand: VALLEYLAB

## (undated) DEVICE — GOWN ISOL IMPERV UNIV, DISP, OPEN BACK, BLUE --

## (undated) DEVICE — SOLUTION IV 1000ML 0.9% SOD CHL

## (undated) DEVICE — 3.2MM GUIDE WIRE 400MM

## (undated) DEVICE — INTENDED FOR TISSUE SEPARATION, AND OTHER PROCEDURES THAT REQUIRE A SHARP SURGICAL BLADE TO PUNCTURE OR CUT.: Brand: BARD-PARKER ®  SAFETY SCALPED

## (undated) DEVICE — (D)SYR 10ML 1/5ML GRAD NSAF -- PKGING CHANGE USE ITEM 338027

## (undated) DEVICE — 3M™ MICROFOAM™ TAPE 1528-4: Brand: 3M™ MICROFOAM™

## (undated) DEVICE — SUTURE VCRL SZ 3-0 L27IN ABSRB UD L26MM SH 1/2 CIR J416H

## (undated) DEVICE — TAPE,CLOTH/SILK,CURAD,3"X10YD,LF,40/CS: Brand: CURAD

## (undated) DEVICE — TRAY,URINE METER,100% SILICONE,16FR10ML: Brand: MEDLINE

## (undated) DEVICE — DRESSING TRNSPAR FLM 4X5IN OPSITE

## (undated) DEVICE — FLEX ADVANTAGE 3000CC: Brand: FLEX ADVANTAGE

## (undated) DEVICE — (D)GLOVE EXAM LG NITRL NS -- DISC BY MFR NO SUB

## (undated) DEVICE — FORCEPS BX L240CM JAW DIA2.8MM L CAP W/ NDL MIC MESH TOOTH

## (undated) DEVICE — CANNULA ORIG TL CLR W FOAM CUSHIONS AND 14FT SUPL TB 3 CHN

## (undated) DEVICE — PREP SKN CHLRAPRP APL 26ML STR --

## (undated) DEVICE — SYRINGE MED 25GA 3ML L5/8IN SUBQ PLAS W/ DETACH NDL SFTY

## (undated) DEVICE — DRESSING,GAUZE,XEROFORM,CURAD,1"X8",ST: Brand: CURAD

## (undated) DEVICE — DRAPE TOWEL: Brand: CONVERTORS

## (undated) DEVICE — FLUFF AND POLYMER UNDERPAD,EXTRA HEAVY: Brand: WINGS

## (undated) DEVICE — SUTURE VCRL SZ 2-0 L36IN ABSRB VLT CTX L48MM 1/2 CIR SGL J369H

## (undated) DEVICE — PREP SKN CHLRAPRP 26ML TNT -- CONVERT TO ITEM 373320

## (undated) DEVICE — GAUZE SPONGES,16 PLY: Brand: CURITY

## (undated) DEVICE — 3M™ MEDIPORE™ H SOFT CLOTH SURGICAL TAPE 2864, 4 INCH X 10 YARD (10CM X 9,14M), 12 ROLLS/CASE: Brand: 3M™ MEDIPORE™

## (undated) DEVICE — SUTURE MCRYL SZ 4-0 L27IN ABSRB UD L24MM PS-1 3/8 CIR PRIM Y935H

## (undated) DEVICE — Device

## (undated) DEVICE — MEDI-VAC NON-CONDUCTIVE SUCTION TUBING: Brand: CARDINAL HEALTH

## (undated) DEVICE — CATHETER SUCT TR FL TIP 14FR W/ O CTRL

## (undated) DEVICE — SOLUTION IRRIG 1000ML H2O STRL BLT

## (undated) DEVICE — SUTURE PERMAHAND SZ 3-0 L18IN NONABSORBABLE BLK L26MM SH C013D

## (undated) DEVICE — SET ADMIN PRIMING 12ML L36IN 2ND INCL RLER CLMP SPIN M

## (undated) DEVICE — RELOAD STPL L75MM OPN H3.8MM CLS 1.5MM WIRE DIA0.2MM REG

## (undated) DEVICE — SYRINGE MED 20ML STD CLR PLAS LUERLOCK TIP N CTRL DISP

## (undated) DEVICE — DRAPE PT ISOLATN 130 IN X 96 IN

## (undated) DEVICE — CURVED, LARGE JAW, OPEN SEALER/DIVIDER NANO-COATED: Brand: LIGASURE IMPACT

## (undated) DEVICE — SPONGE LAP 18X18IN STRL -- 5/PK

## (undated) DEVICE — AIRLIFE™ NASAL OXYGEN CANNULA CURVED, NONFLARED TIP WITH 14 FOOT (4.3 M) CRUSH-RESISTANT TUBING, OVER-THE-EAR STYLE: Brand: AIRLIFE™

## (undated) DEVICE — SNARE POLYP M W27MMXL240CM OVL STIFF DISP CAPTIVATOR

## (undated) DEVICE — GLOVE SURG SZ 8 L11.77IN FNGR THK9.8MIL STRW LTX POLYMER

## (undated) DEVICE — SUTURE PERMA-HAND SZ 3-0 L24IN NONABSORBABLE BLK W/O NDL SA74H